# Patient Record
Sex: MALE | Race: BLACK OR AFRICAN AMERICAN | NOT HISPANIC OR LATINO | Employment: OTHER | URBAN - METROPOLITAN AREA
[De-identification: names, ages, dates, MRNs, and addresses within clinical notes are randomized per-mention and may not be internally consistent; named-entity substitution may affect disease eponyms.]

---

## 2017-07-06 ENCOUNTER — HOSPITAL ENCOUNTER (EMERGENCY)
Facility: HOSPITAL | Age: 67
Discharge: HOME | End: 2017-07-06
Attending: EMERGENCY MEDICINE | Admitting: EMERGENCY MEDICINE
Payer: MEDICARE

## 2017-07-06 ENCOUNTER — APPOINTMENT (EMERGENCY)
Dept: CT IMAGING | Facility: HOSPITAL | Age: 67
End: 2017-07-06
Payer: MEDICARE

## 2017-07-06 VITALS
WEIGHT: 187.83 LBS | RESPIRATION RATE: 17 BRPM | HEIGHT: 72 IN | SYSTOLIC BLOOD PRESSURE: 169 MMHG | OXYGEN SATURATION: 100 % | DIASTOLIC BLOOD PRESSURE: 81 MMHG | TEMPERATURE: 97.8 F | HEART RATE: 54 BPM | BODY MASS INDEX: 25.44 KG/M2

## 2017-07-06 DIAGNOSIS — M54.50 ACUTE LOW BACK PAIN: Primary | ICD-10-CM

## 2017-07-06 LAB
CLARITY, POC: CLEAR
COLOR, POC: YELLOW
EXT BILIRUBIN, UA: NEGATIVE
EXT BLOOD URINE: NEGATIVE
EXT GLUCOSE, UA: NEGATIVE
EXT KETONES: NEGATIVE
EXT NITRITE, UA: NEGATIVE
EXT PH, UA: 6.5
EXT PROTEIN, UA: NEGATIVE
EXT SPECIFIC GRAVITY, UA: 1.01
EXT UROBILINOGEN: 0.2
WBC # BLD EST: NEGATIVE 10*3/UL

## 2017-07-06 PROCEDURE — 74176 CT ABD & PELVIS W/O CONTRAST: CPT

## 2017-07-06 PROCEDURE — 81002 URINALYSIS NONAUTO W/O SCOPE: CPT | Performed by: PHYSICIAN ASSISTANT

## 2017-07-06 PROCEDURE — 99284 EMERGENCY DEPT VISIT MOD MDM: CPT

## 2017-07-06 RX ORDER — ACETAMINOPHEN 325 MG/1
650 TABLET ORAL ONCE
Status: COMPLETED | OUTPATIENT
Start: 2017-07-06 | End: 2017-07-06

## 2017-07-06 RX ORDER — ASPIRIN 81 MG/1
81 TABLET, CHEWABLE ORAL DAILY
COMMUNITY
End: 2019-10-16 | Stop reason: ALTCHOICE

## 2017-07-06 RX ORDER — DIPHENOXYLATE HYDROCHLORIDE AND ATROPINE SULFATE 2.5; .025 MG/1; MG/1
1 TABLET ORAL DAILY
COMMUNITY

## 2017-07-06 RX ORDER — IBUPROFEN 600 MG/1
600 TABLET ORAL ONCE
Status: COMPLETED | OUTPATIENT
Start: 2017-07-06 | End: 2017-07-06

## 2017-07-06 RX ADMIN — IBUPROFEN 600 MG: 600 TABLET ORAL at 11:54

## 2017-07-06 RX ADMIN — ACETAMINOPHEN 650 MG: 325 TABLET ORAL at 11:54

## 2017-10-09 ENCOUNTER — ALLSCRIPTS OFFICE VISIT (OUTPATIENT)
Dept: OTHER | Facility: OTHER | Age: 67
End: 2017-10-09

## 2017-10-09 ENCOUNTER — TRANSCRIBE ORDERS (OUTPATIENT)
Dept: ADMINISTRATIVE | Facility: HOSPITAL | Age: 67
End: 2017-10-09

## 2017-10-09 ENCOUNTER — APPOINTMENT (OUTPATIENT)
Dept: LAB | Facility: HOSPITAL | Age: 67
End: 2017-10-09
Attending: INTERNAL MEDICINE
Payer: MEDICARE

## 2017-10-09 DIAGNOSIS — N18.30 CHRONIC KIDNEY DISEASE, STAGE III (MODERATE) (HCC): ICD-10-CM

## 2017-10-09 LAB
ANION GAP SERPL CALCULATED.3IONS-SCNC: 8 MMOL/L (ref 4–13)
BILIRUB UR QL STRIP: NEGATIVE
BUN SERPL-MCNC: 20 MG/DL (ref 5–25)
CALCIUM SERPL-MCNC: 9.3 MG/DL (ref 8.3–10.1)
CHLORIDE SERPL-SCNC: 104 MMOL/L (ref 100–108)
CLARITY UR: CLEAR
CO2 SERPL-SCNC: 29 MMOL/L (ref 21–32)
COLOR UR: YELLOW
CREAT SERPL-MCNC: 1.33 MG/DL (ref 0.6–1.3)
CREAT UR-MCNC: 104 MG/DL
ERYTHROCYTE [DISTWIDTH] IN BLOOD BY AUTOMATED COUNT: 13.4 % (ref 11.6–15.1)
GFR SERPL CREATININE-BSD FRML MDRD: 63 ML/MIN/1.73SQ M
GLUCOSE P FAST SERPL-MCNC: 87 MG/DL (ref 65–99)
GLUCOSE UR STRIP-MCNC: NEGATIVE MG/DL
HCT VFR BLD AUTO: 42.2 % (ref 36.5–49.3)
HGB BLD-MCNC: 13.9 G/DL (ref 12–17)
HGB UR QL STRIP.AUTO: NEGATIVE
KETONES UR STRIP-MCNC: NEGATIVE MG/DL
LEUKOCYTE ESTERASE UR QL STRIP: NEGATIVE
MCH RBC QN AUTO: 29.4 PG (ref 26.8–34.3)
MCHC RBC AUTO-ENTMCNC: 32.9 G/DL (ref 31.4–37.4)
MCV RBC AUTO: 89 FL (ref 82–98)
NITRITE UR QL STRIP: NEGATIVE
PH UR STRIP.AUTO: 6.5 [PH] (ref 4.5–8)
PHOSPHATE SERPL-MCNC: 2.8 MG/DL (ref 2.3–4.1)
PLATELET # BLD AUTO: 193 THOUSANDS/UL (ref 149–390)
PMV BLD AUTO: 11 FL (ref 8.9–12.7)
POTASSIUM SERPL-SCNC: 4.7 MMOL/L (ref 3.5–5.3)
PROT UR STRIP-MCNC: NEGATIVE MG/DL
PROT UR-MCNC: 7 MG/DL
PROT/CREAT UR: 0.07 MG/G{CREAT} (ref 0–0.1)
RBC # BLD AUTO: 4.72 MILLION/UL (ref 3.88–5.62)
SODIUM SERPL-SCNC: 141 MMOL/L (ref 136–145)
SP GR UR STRIP.AUTO: 1.02 (ref 1–1.03)
UROBILINOGEN UR QL STRIP.AUTO: 0.2 E.U./DL
WBC # BLD AUTO: 3.68 THOUSAND/UL (ref 4.31–10.16)

## 2017-10-09 PROCEDURE — 36415 COLL VENOUS BLD VENIPUNCTURE: CPT

## 2017-10-09 PROCEDURE — 82570 ASSAY OF URINE CREATININE: CPT

## 2017-10-09 PROCEDURE — 81003 URINALYSIS AUTO W/O SCOPE: CPT

## 2017-10-09 PROCEDURE — 84156 ASSAY OF PROTEIN URINE: CPT

## 2017-10-09 PROCEDURE — 80048 BASIC METABOLIC PNL TOTAL CA: CPT

## 2017-10-09 PROCEDURE — 85027 COMPLETE CBC AUTOMATED: CPT

## 2017-10-09 PROCEDURE — 84100 ASSAY OF PHOSPHORUS: CPT

## 2017-10-10 NOTE — PROGRESS NOTES
Assessment  1  Chronic kidney disease, stage 3 (585 3) (N18 3)    Plan  Chronic kidney disease, stage 3    · (1) BASIC METABOLIC PROFILE; Status:Active; Requested AST:[id number];    Perform:[facility]; QQE:[id number]; Ordered; For:Chronic kidney disease, stage 3; Ordered By:Rocael Pyle;   · (1) CBC/ PLT (NO DIFF); Status:Active; Requested AHC:[id number];    Perform:[facility]; SQO:[id number]; Ordered; For:Chronic kidney disease, stage 3; Ordered By:Rocael Pyle;   · (1) PHOSPHORUS; Status:Active; Requested [id number];    Perform:[facility]; UZV:[id number]; Ordered; For:Chronic kidney disease, stage 3; Ordered By:Rocael Pyle;   · (1) URINALYSIS (will reflex a microscopy if leukocytes, occult blood, protein or nitrites are  not within normal limits); Status:Active; Requested USU:[id number];    Perform:[facility]; KUJ:[id number]; Ordered; For:Chronic kidney disease, stage 3; Ordered By:Rocael Pyle;   · (1) URINE PROTEIN CREATININE RATIO; Status:Active; Requested NOO:[id number];    Perform:[facility]; QVE:[id number]; Ordered; For:Chronic kidney disease, stage 3; Ordered By:Rocael Pyle;   · Follow-up visit in 1 year Evaluation and Treatment  Follow-up  Status: Complete -  Scheduling  Done: 38KGX7432 10:12AM   Ordered; For: Chronic kidney disease, stage 3; Ordered By: [place] Dose Performed:  Due: 81NMB8435; Last Updated By: Lisa Peoples; 10/9/2017 10:12:24 AM    Discussion/Summary    CKD: Creatinine baseline is about 1 4 with GFR end range of 60 which may be normal for him  I do not recent blood tests so he will get blood tests  [place] as he is not on medication and on repeated checkup it was normal  will see him back in 1 year unless blood test which he does is abnormal  Advised to avoid nonsteroidal painkiller and diet for salt discussed with him  The patient was counseled regarding instructions for management,-risk factor reductions,-prognosis     The patient has the current Goals: Keep kidney function normal and blood pressure within normal range with diet and exercise  The patent has the current Barriers: None  Patient is able to Self-Care  Possible side effects of new medications were reviewed with the patient/guardian today  CKD Teaching includes hypertension management and sodium restriction  Reason For Visit  Jin Schaeffer came in today for follow-up of CKD  History of Present Illness  He is feeling quite well  No complaint      Review of Systems    Constitutional: No complaints of fever, no chills, no anorexia, no tiredness, no recent weight gain or weight loss  Integumentary: No complaints of skin rash  Gastrointestinal: No complains of abdominal pain, no constipation or diarrhea, no nausea or vomiting  Respiratory: No complaints of shortness of breath, no cough, no productive sputum  Cardiovascular: No complaints of orthopnea, no PND, no chest pain, no palpitations, no lower extremity edema  Musculoskeletal: joint pain  Neurological: No complaints of headache, no lightheadedness or dizziness  Genitourinary: No dysuria, no hematuria, no nocturia, no urinary frequency, no incomplete emptying of bladder, no foamy urine  Eyes: No complaints of eyesight problems or dryness of eyes  ENT: no complaints of hearing loss, no nasal discharge  Psychiatric: Not suicidal, no sleep disturbance, no anxiety or depression, no change in personality, no emotional problems  ROS reviewed  Past Medical History    The active problems and past medical history were reviewed and updated today  Surgical History    The surgical history was reviewed and updated today  Family History    The family history was reviewed and updated today  Current Meds   1  Tamsulosin HCl - 0 4 MG Oral Capsule; TAKE 1 CAPSULE 30 MINUTES AFTER THE   SAME MEAL EACH DAY ONCE A DAY ORALLY;    Therapy: (Recorded:32Ote3734) to Recorded    The medication list was reviewed and updated today  Allergies  1  Penicillins    Vitals  Vital Signs    Recorded: 98TAT8883 10:03AM Recorded: 00APY9795 09:39AM   Temperature  98 F   Heart Rate 64, Apical    Pulse Quality Normal, Apical    Respiration Quality Normal    Respiration 16    Systolic 128, LUE, Sitting    Diastolic 80, LUE, Sitting    Height  5 ft 11 5 in   Weight  185 lb    BMI Calculated  25 44   BSA Calculated  2 05     Physical Exam    Constitutional: General appearance: No acute distress, well appearing and well nourished  ENT: External ears and nose appear normal      Eyes: Anicteric sclerae  Neck: No bruit heard over either carotid  JVD:  No JVD present  Pulmonary: Respiratory effort: No increased work of breathing or signs of respiratory distress  -Auscultation of lungs: Clear to auscultation  Cardiovascular: Auscultation of heart: Normal rate and rhythm, normal S1 and S2, without murmurs  Abdomen: Non-tender, no masses  Extremities: No cyanosis, clubbing or edema  Pulses: Dorsalis Pedis and Posterior Tibial pulses normal    Rash: No rash present  Neurologic: Non Focal      Psychiatric: Orientation to person, place, and time: Normal  -and-Mood and affect: Normal        Future Appointments    Date/Time Provider Specialty Site   10/16/2018 01:30 PM BRANDON Ambriz   Nephrology [address]   10/11/2017 02:00 PM Dave Baeza MD Internal Medicine [facility] INTERNAL MED     Signatures   Electronically signed by : BRANDON De ; Oct  9 2017 10:55AM EST                       (Author)

## 2017-10-11 ENCOUNTER — ALLSCRIPTS OFFICE VISIT (OUTPATIENT)
Dept: OTHER | Facility: OTHER | Age: 67
End: 2017-10-11

## 2017-11-14 ENCOUNTER — ALLSCRIPTS OFFICE VISIT (OUTPATIENT)
Dept: OTHER | Facility: OTHER | Age: 67
End: 2017-11-14

## 2017-11-15 NOTE — PROGRESS NOTES
Assessment  1  Acute bilateral low back pain without sciatica (724 2,338 19) (M54 5)    Discussion/Summary    Musculoskeletal strain  Adjusted stretching exercises heating pad and he may use his TENS unit  Suggested 2 Advil 2 or 3 times a day with food  He may continue his weight lifting but should reduce his weight and he may increase his reps if needed  The patient was counseled regarding instructions for management,-- impressions  Possible side effects of new medications were reviewed with the patient/guardian today  The treatment plan was reviewed with the patient/guardian  The patient/guardian understands and agrees with the treatment plan     Self Referrals: No      Chief Complaint    1  Back Pain  Patient is here today with complaints of lower back pain  History of Present Illness  HPI: Patient comes in today complaining of the acute onset of low back pain across his lower back  No radiation down the legs  He has known sciatic on the right but this feels a little different  He thinks he may have strained his back doing leg presses at the gym  He had increased his weight slightly  No bladder or bowel incontinence  Review of Systems   Constitutional: no fever  Genitourinary: no dysuria  Active Problems    1  Benign prostatic hyperplasia, presence of lower urinary tract symptoms unspecified (600 00) (N40 0)   2  Chronic kidney disease, stage 3 (585 3) (N18 3)   3  Erectile dysfunction (607 84) (N52 9)   4  Need for prophylactic vaccination against Streptococcus pneumoniae (pneumococcus) (V03 82) (Z23)   5  Need for prophylactic vaccination and inoculation against influenza (V04 81) (Z23)   6  Screening for colon cancer (V76 51) (Z12 11)   7  Screening for genitourinary condition (V81 6) (Z13 89)    Past Medical History  Active Problems And Past Medical History Reviewed: The active problems and past medical history were reviewed and updated today        Social History     · Active advance directive (V49 89) (Z78 9)   · NO   · Always uses seat belt   · Does not use illicit drugs (D98 86) (Z78 9)   · Exercises occasionally (V49 89) (Z78 9)   · MODERATE   · Five children   · Former smoker (V15 82) (U04 322)   · (1PPW)   · Denied: History of High risk sexual behavior   ·    · Never used chewing tobacco (V49 89) (Z78 9)   · Occasional alcohol use   · Occasional caffeine consumption   · Retired    Current Meds   1  Cialis 20 MG Oral Tablet; TAKE AS DIRECTED; Therapy: 22OCR4607 to ([phone])  Requested for: 62RAO4157; Last Rx:11Oct2017 Ordered   2  Meloxicam 15 MG Oral Tablet; Take 1 tablet daily; Therapy: (Recorded:11Oct2017) to Recorded   3  Tamsulosin HCl - 0 4 MG Oral Capsule; TAKE 1 CAPSULE 30 MINUTES AFTER THE SAME MEAL EACH DAY ONCE A DAY ORALLY  Requested for: 79YYO1102; Last Rx:11Oct2017 Ordered    The medication list was reviewed and updated today  Allergies  1  Penicillins    Vitals   Recorded: 02JXH8794 12:56PM   Temperature 97 1 F   Heart Rate 72   Systolic 716   Diastolic 70   Height 5 ft 10 5 in   Weight 184 lb 4 oz   BMI Calculated 26 06   BSA Calculated 2 03   O2 Saturation 98       Physical Exam   Constitutional  General appearance: No acute distress, well appearing and well nourished  Musculoskeletal  Gait and station: Normal    Inspection/palpation of joints, bones, and muscles: Abnormal  -- Tender in the lower lumbar region no obvious muscle spasm  Full range of motion of the lower extremities  Strength intact  Future Appointments    Date/Time Provider Specialty Site   10/16/2018 01:30 PM BRANDON Paula   Nephrology [address]   11/27/2017 02:00 PM Rossy Brambila MD Internal Medicine [facility] INTERNAL MED       Signatures   Electronically signed by : Angel Gallardo MD; Nov 14 2017  1:17PM EST                       (Author)

## 2017-11-30 ENCOUNTER — GENERIC CONVERSION - ENCOUNTER (OUTPATIENT)
Dept: OTHER | Facility: OTHER | Age: 67
End: 2017-11-30

## 2017-12-28 ENCOUNTER — GENERIC CONVERSION - ENCOUNTER (OUTPATIENT)
Dept: OTHER | Facility: OTHER | Age: 67
End: 2017-12-28

## 2018-01-13 VITALS
SYSTOLIC BLOOD PRESSURE: 140 MMHG | RESPIRATION RATE: 16 BRPM | TEMPERATURE: 98 F | HEART RATE: 64 BPM | DIASTOLIC BLOOD PRESSURE: 80 MMHG | WEIGHT: 185 LBS | HEIGHT: 72 IN | BODY MASS INDEX: 25.06 KG/M2

## 2018-01-14 VITALS
TEMPERATURE: 97.2 F | HEART RATE: 77 BPM | OXYGEN SATURATION: 98 % | SYSTOLIC BLOOD PRESSURE: 122 MMHG | DIASTOLIC BLOOD PRESSURE: 68 MMHG | HEIGHT: 71 IN | WEIGHT: 187 LBS | BODY MASS INDEX: 26.18 KG/M2

## 2018-01-14 VITALS
OXYGEN SATURATION: 98 % | WEIGHT: 184.25 LBS | HEART RATE: 72 BPM | SYSTOLIC BLOOD PRESSURE: 126 MMHG | TEMPERATURE: 97.1 F | DIASTOLIC BLOOD PRESSURE: 70 MMHG | HEIGHT: 71 IN | BODY MASS INDEX: 25.79 KG/M2

## 2018-01-22 VITALS
BODY MASS INDEX: 25.81 KG/M2 | TEMPERATURE: 96.7 F | DIASTOLIC BLOOD PRESSURE: 70 MMHG | OXYGEN SATURATION: 98 % | WEIGHT: 184.38 LBS | SYSTOLIC BLOOD PRESSURE: 124 MMHG | HEIGHT: 71 IN | HEART RATE: 66 BPM

## 2018-01-24 VITALS
OXYGEN SATURATION: 97 % | WEIGHT: 184 LBS | HEART RATE: 70 BPM | TEMPERATURE: 97.8 F | SYSTOLIC BLOOD PRESSURE: 120 MMHG | BODY MASS INDEX: 26.34 KG/M2 | HEIGHT: 70 IN | DIASTOLIC BLOOD PRESSURE: 72 MMHG

## 2018-01-29 ENCOUNTER — TELEPHONE (OUTPATIENT)
Dept: INTERNAL MEDICINE CLINIC | Facility: CLINIC | Age: 68
End: 2018-01-29

## 2018-01-29 NOTE — TELEPHONE ENCOUNTER
For what reason does his patient want a lidocaine patches, and I am not sure they are going to be covered by his pharmacy  He could try over-the-counter salon paus with 4 percent lidocaine patches

## 2018-04-18 ENCOUNTER — OFFICE VISIT (OUTPATIENT)
Dept: INTERNAL MEDICINE CLINIC | Facility: CLINIC | Age: 68
End: 2018-04-18
Payer: COMMERCIAL

## 2018-04-18 VITALS
HEART RATE: 76 BPM | TEMPERATURE: 98.7 F | DIASTOLIC BLOOD PRESSURE: 90 MMHG | SYSTOLIC BLOOD PRESSURE: 152 MMHG | HEIGHT: 61 IN | RESPIRATION RATE: 19 BRPM | OXYGEN SATURATION: 96 % | BODY MASS INDEX: 35.19 KG/M2 | WEIGHT: 186.4 LBS

## 2018-04-18 DIAGNOSIS — R00.2 PALPITATIONS: Primary | ICD-10-CM

## 2018-04-18 DIAGNOSIS — R07.9 CHEST PAIN, UNSPECIFIED TYPE: ICD-10-CM

## 2018-04-18 DIAGNOSIS — B35.4 TINEA OF THE BODY: ICD-10-CM

## 2018-04-18 PROBLEM — N52.9 ERECTILE DYSFUNCTION: Status: ACTIVE | Noted: 2017-10-11

## 2018-04-18 PROCEDURE — 3008F BODY MASS INDEX DOCD: CPT | Performed by: INTERNAL MEDICINE

## 2018-04-18 PROCEDURE — 93000 ELECTROCARDIOGRAM COMPLETE: CPT | Performed by: INTERNAL MEDICINE

## 2018-04-18 PROCEDURE — 99214 OFFICE O/P EST MOD 30 MIN: CPT | Performed by: INTERNAL MEDICINE

## 2018-04-18 PROCEDURE — 1101F PT FALLS ASSESS-DOCD LE1/YR: CPT | Performed by: INTERNAL MEDICINE

## 2018-04-18 RX ORDER — NYSTATIN 100000 U/G
CREAM TOPICAL 2 TIMES DAILY
Qty: 30 G | Refills: 0 | Status: SHIPPED | OUTPATIENT
Start: 2018-04-18 | End: 2019-02-07 | Stop reason: ALTCHOICE

## 2018-04-18 NOTE — PROGRESS NOTES
Assessment/Plan:     Presently, everything appears normal   Will order testing the make sure no significant abnormality  Followup scheduled per orders  No problem-specific Assessment & Plan notes found for this encounter  Diagnoses and all orders for this visit:    Palpitations  -     CBC and differential; Future  -     Comprehensive metabolic panel; Future  -     TSH, 3rd generation; Future  -     POCT ECG  -     Stress test only, exercise; Future  -     Holter monitor - 48 hour; Future    Chest pain, unspecified type  -     Stress test only, exercise; Future    Tinea of the body  -     nystatin (MYCOSTATIN) cream; Apply topically 2 (two) times a day          Subjective:      Patient ID: Hector Tovar is a 79 y o  male  Patient comes in today because his heart is skipping beats, he thinks  His wife noticed it when she put her hand on his chest   He admits he has felt this before  Sometimes it is sharp  Goes away on its own no associated shortness of breath  He does work out at [employer] routinely without any difficulty          ALLERGIES:  Allergies   Allergen Reactions    Penicillin V Anaphylaxis       CURRENT MEDICATIONS:    Current Outpatient Prescriptions:     aspirin 81 mg chewable tablet, Chew 81 mg daily, Disp: , Rfl:     multivitamin (THERAGRAN) TABS, Take 1 tablet by mouth daily, Disp: , Rfl:     tadalafil (CIALIS) 20 MG tablet, Take by mouth, Disp: , Rfl:     Tamsulosin HCl (FLOMAX PO), Take 1 tablet by mouth daily, Disp: , Rfl:     nystatin (MYCOSTATIN) cream, Apply topically 2 (two) times a day, Disp: 30 g, Rfl: 0    ACTIVE PROBLEM LIST:  Patient Active Problem List   Diagnosis    Benign prostatic hyperplasia, presence of lower urinary tract symptoms unspecified    Erectile dysfunction       PAST MEDICAL HISTORY:  Past Medical History:   Diagnosis Date    Esophageal reflux        PAST SURGICAL HISTORY:  Past Surgical History:   Procedure Laterality Date    APPENDECTOMY 05/21/2015    CYST REMOVAL      Fatty cyst removed from back       FAMILY HISTORY:  Family History   Problem Relation Age of Onset    Prostate cancer Father     Prostate cancer Maternal Grandfather     Stomach cancer Maternal Aunt      malignant tumor of pharynx    Stomach cancer Maternal Uncle      malignant tumor of pharynx    Mental illness Family     Depression Family     Schizophrenia Family        SOCIAL HISTORY:  Social History     Social History    Marital status: /Civil Union     Spouse name: N/A    Number of children: N/A    Years of education: N/A     Occupational History    Retired      Social History Main Topics    Smoking status: Former Smoker    Smokeless tobacco: Never Used      Comment: 1ppw    Alcohol use Yes      Comment: socially    Drug use: No    Sexual activity: Yes     Partners: Female      Comment: denied history of high risk sexual behavior     Other Topics Concern    Not on file     Social History Narrative    No active advance directive    Always uses seat belt    Exercises occasionally, moderate    Five children    Occasional caffeine consumption           Review of Systems   Respiratory: Negative for shortness of breath  Cardiovascular: Negative for chest pain  Gastrointestinal: Negative for abdominal pain  Objective:  Vitals:    04/18/18 1517   BP: 152/90   BP Location: Right arm   Patient Position: Sitting   Cuff Size: Large   Pulse: 76   Resp: 19   Temp: 98 7 °F (37 1 °C)   SpO2: 96%   Weight: 84 6 kg (186 lb 6 4 oz)   Height: 5' 1" (1 549 m)        Physical Exam   Constitutional: He is oriented to person, place, and time  He appears well-developed and well-nourished  Cardiovascular: Normal rate, regular rhythm and normal heart sounds  Pulmonary/Chest: Effort normal and breath sounds normal    Abdominal: Soft  Bowel sounds are normal    Musculoskeletal: He exhibits no edema     Neurological: He is alert and oriented to person, place, and time    Nursing note and vitals reviewed  RESULTS:    No results found for this or any previous visit (from the past 1008 hour(s))

## 2018-04-26 ENCOUNTER — TELEPHONE (OUTPATIENT)
Dept: INTERNAL MEDICINE CLINIC | Facility: CLINIC | Age: 68
End: 2018-04-26

## 2018-04-26 DIAGNOSIS — M54.16 LUMBAR RADICULOPATHY: Primary | ICD-10-CM

## 2018-04-26 RX ORDER — LIDOCAINE 50 MG/G
1 PATCH TOPICAL DAILY
Qty: 30 PATCH | Refills: 1 | Status: SHIPPED | OUTPATIENT
Start: 2018-04-26 | End: 2019-02-07 | Stop reason: SDUPTHER

## 2018-04-26 RX ORDER — LIDOCAINE 50 MG/G
1 PATCH TOPICAL DAILY
Qty: 30 PATCH | Refills: 0 | Status: CANCELLED | OUTPATIENT
Start: 2018-04-26

## 2018-04-26 NOTE — TELEPHONE ENCOUNTER
Dr Marquis Yuan,  Patient called in and requested Lidocaine patches dispense one box and send prescription to [facility] pharmacy on [address] [employer]  Patient reports that you have prescribed the patches one before

## 2018-05-03 ENCOUNTER — APPOINTMENT (OUTPATIENT)
Dept: LAB | Facility: HOSPITAL | Age: 68
End: 2018-05-03
Attending: INTERNAL MEDICINE
Payer: MEDICARE

## 2018-05-03 DIAGNOSIS — R00.2 PALPITATIONS: ICD-10-CM

## 2018-05-03 LAB
ALBUMIN SERPL BCP-MCNC: 3.8 G/DL (ref 3.5–5)
ALP SERPL-CCNC: 61 U/L (ref 46–116)
ALT SERPL W P-5'-P-CCNC: 34 U/L (ref 12–78)
ANION GAP SERPL CALCULATED.3IONS-SCNC: 6 MMOL/L (ref 4–13)
AST SERPL W P-5'-P-CCNC: 25 U/L (ref 5–45)
BASOPHILS # BLD AUTO: 0.04 THOUSANDS/ΜL (ref 0–0.1)
BASOPHILS NFR BLD AUTO: 1 % (ref 0–1)
BILIRUB SERPL-MCNC: 0.5 MG/DL (ref 0.2–1)
BUN SERPL-MCNC: 24 MG/DL (ref 5–25)
CALCIUM SERPL-MCNC: 8.9 MG/DL (ref 8.3–10.1)
CHLORIDE SERPL-SCNC: 103 MMOL/L (ref 100–108)
CO2 SERPL-SCNC: 29 MMOL/L (ref 21–32)
CREAT SERPL-MCNC: 1.36 MG/DL (ref 0.6–1.3)
EOSINOPHIL # BLD AUTO: 0.16 THOUSAND/ΜL (ref 0–0.61)
EOSINOPHIL NFR BLD AUTO: 4 % (ref 0–6)
ERYTHROCYTE [DISTWIDTH] IN BLOOD BY AUTOMATED COUNT: 13.5 % (ref 11.6–15.1)
GFR SERPL CREATININE-BSD FRML MDRD: 62 ML/MIN/1.73SQ M
GLUCOSE P FAST SERPL-MCNC: 84 MG/DL (ref 65–99)
HCT VFR BLD AUTO: 41.9 % (ref 36.5–49.3)
HGB BLD-MCNC: 13.8 G/DL (ref 12–17)
LYMPHOCYTES # BLD AUTO: 1.97 THOUSANDS/ΜL (ref 0.6–4.47)
LYMPHOCYTES NFR BLD AUTO: 46 % (ref 14–44)
MCH RBC QN AUTO: 30.1 PG (ref 26.8–34.3)
MCHC RBC AUTO-ENTMCNC: 32.9 G/DL (ref 31.4–37.4)
MCV RBC AUTO: 92 FL (ref 82–98)
MONOCYTES # BLD AUTO: 0.28 THOUSAND/ΜL (ref 0.17–1.22)
MONOCYTES NFR BLD AUTO: 7 % (ref 4–12)
NEUTROPHILS # BLD AUTO: 1.83 THOUSANDS/ΜL (ref 1.85–7.62)
NEUTS SEG NFR BLD AUTO: 43 % (ref 43–75)
NRBC BLD AUTO-RTO: 0 /100 WBCS
PLATELET # BLD AUTO: 162 THOUSANDS/UL (ref 149–390)
PMV BLD AUTO: 11.2 FL (ref 8.9–12.7)
POTASSIUM SERPL-SCNC: 3.8 MMOL/L (ref 3.5–5.3)
PROT SERPL-MCNC: 7.3 G/DL (ref 6.4–8.2)
RBC # BLD AUTO: 4.58 MILLION/UL (ref 3.88–5.62)
SODIUM SERPL-SCNC: 138 MMOL/L (ref 136–145)
TSH SERPL DL<=0.05 MIU/L-ACNC: 1.01 UIU/ML (ref 0.36–3.74)
WBC # BLD AUTO: 4.29 THOUSAND/UL (ref 4.31–10.16)

## 2018-05-03 PROCEDURE — 85025 COMPLETE CBC W/AUTO DIFF WBC: CPT

## 2018-05-03 PROCEDURE — 84443 ASSAY THYROID STIM HORMONE: CPT

## 2018-05-03 PROCEDURE — 80053 COMPREHEN METABOLIC PANEL: CPT

## 2018-05-03 PROCEDURE — 36415 COLL VENOUS BLD VENIPUNCTURE: CPT

## 2018-05-11 ENCOUNTER — HOSPITAL ENCOUNTER (OUTPATIENT)
Dept: NON INVASIVE DIAGNOSTICS | Facility: HOSPITAL | Age: 68
Discharge: HOME | End: 2018-05-11
Attending: INTERNAL MEDICINE
Payer: MEDICARE

## 2018-05-11 DIAGNOSIS — R00.2 PALPITATIONS: ICD-10-CM

## 2018-05-11 DIAGNOSIS — R07.9 CHEST PAIN, UNSPECIFIED TYPE: ICD-10-CM

## 2018-05-11 LAB
ARRHY DURING EX: NORMAL
CHEST PAIN STATEMENT: NORMAL
MAX DIASTOLIC BP: 90 MMHG
MAX HEART RATE: 150 BPM
MAX PREDICTED HEART RATE: 153 BPM
MAX. SYSTOLIC BP: 184 MMHG
PROTOCOL NAME: NORMAL
REASON FOR TERMINATION: NORMAL
TARGET HR FORMULA: NORMAL
TEST INDICATION: NORMAL
TIME IN EXERCISE PHASE: NORMAL

## 2018-05-11 PROCEDURE — 93018 CV STRESS TEST I&R ONLY: CPT

## 2018-05-11 PROCEDURE — 93226 XTRNL ECG REC<48 HR SCAN A/R: CPT

## 2018-05-11 PROCEDURE — 93016 CV STRESS TEST SUPVJ ONLY: CPT

## 2018-05-11 PROCEDURE — 93225 XTRNL ECG REC<48 HRS REC: CPT

## 2018-05-11 PROCEDURE — 93017 CV STRESS TEST TRACING ONLY: CPT

## 2018-05-17 PROCEDURE — 93227 XTRNL ECG REC<48 HR R&I: CPT

## 2018-06-24 ENCOUNTER — HOSPITAL ENCOUNTER (EMERGENCY)
Facility: HOSPITAL | Age: 68
Discharge: HOME | End: 2018-06-24
Attending: EMERGENCY MEDICINE | Admitting: EMERGENCY MEDICINE
Payer: MEDICARE

## 2018-06-24 ENCOUNTER — APPOINTMENT (EMERGENCY)
Dept: CT IMAGING | Facility: HOSPITAL | Age: 68
End: 2018-06-24
Payer: MEDICARE

## 2018-06-24 VITALS
TEMPERATURE: 97.8 F | RESPIRATION RATE: 18 BRPM | BODY MASS INDEX: 24.43 KG/M2 | SYSTOLIC BLOOD PRESSURE: 129 MMHG | HEIGHT: 72 IN | WEIGHT: 180.4 LBS | DIASTOLIC BLOOD PRESSURE: 61 MMHG | HEART RATE: 58 BPM | OXYGEN SATURATION: 100 %

## 2018-06-24 DIAGNOSIS — R68.84 JAW PAIN: Primary | ICD-10-CM

## 2018-06-24 LAB
BASOPHILS # BLD AUTO: 0.02 THOUSANDS/ΜL (ref 0–0.1)
BASOPHILS NFR BLD AUTO: 1 % (ref 0–1)
CRP SERPL HS-MCNC: 1.01 MG/L
EOSINOPHIL # BLD AUTO: 0.12 THOUSAND/ΜL (ref 0–0.61)
EOSINOPHIL NFR BLD AUTO: 3 % (ref 0–6)
ERYTHROCYTE [DISTWIDTH] IN BLOOD BY AUTOMATED COUNT: 13.6 % (ref 11.6–15.1)
ERYTHROCYTE [SEDIMENTATION RATE] IN BLOOD: 5 MM/HOUR (ref 0–10)
HCT VFR BLD AUTO: 40.1 % (ref 36.5–49.3)
HGB BLD-MCNC: 13.4 G/DL (ref 12–17)
IMM GRANULOCYTES # BLD AUTO: 0 THOUSAND/UL (ref 0–0.2)
IMM GRANULOCYTES NFR BLD AUTO: 0 % (ref 0–2)
LYMPHOCYTES # BLD AUTO: 1.57 THOUSANDS/ΜL (ref 0.6–4.47)
LYMPHOCYTES NFR BLD AUTO: 39 % (ref 14–44)
MCH RBC QN AUTO: 30.2 PG (ref 26.8–34.3)
MCHC RBC AUTO-ENTMCNC: 33.4 G/DL (ref 31.4–37.4)
MCV RBC AUTO: 90 FL (ref 82–98)
MONOCYTES # BLD AUTO: 0.34 THOUSAND/ΜL (ref 0.17–1.22)
MONOCYTES NFR BLD AUTO: 8 % (ref 4–12)
NEUTROPHILS # BLD AUTO: 2.03 THOUSANDS/ΜL (ref 1.85–7.62)
NEUTS SEG NFR BLD AUTO: 49 % (ref 43–75)
NRBC BLD AUTO-RTO: 0 /100 WBCS
PLATELET # BLD AUTO: 167 THOUSANDS/UL (ref 149–390)
PMV BLD AUTO: 11.4 FL (ref 8.9–12.7)
RBC # BLD AUTO: 4.44 MILLION/UL (ref 3.88–5.62)
WBC # BLD AUTO: 4.08 THOUSAND/UL (ref 4.31–10.16)

## 2018-06-24 PROCEDURE — 85652 RBC SED RATE AUTOMATED: CPT | Performed by: EMERGENCY MEDICINE

## 2018-06-24 PROCEDURE — 36415 COLL VENOUS BLD VENIPUNCTURE: CPT | Performed by: EMERGENCY MEDICINE

## 2018-06-24 PROCEDURE — 86141 C-REACTIVE PROTEIN HS: CPT | Performed by: EMERGENCY MEDICINE

## 2018-06-24 PROCEDURE — 99284 EMERGENCY DEPT VISIT MOD MDM: CPT

## 2018-06-24 PROCEDURE — 85025 COMPLETE CBC W/AUTO DIFF WBC: CPT | Performed by: EMERGENCY MEDICINE

## 2018-06-24 PROCEDURE — 70486 CT MAXILLOFACIAL W/O DYE: CPT

## 2018-06-24 RX ORDER — DIAZEPAM 5 MG/1
5 TABLET ORAL ONCE
Status: COMPLETED | OUTPATIENT
Start: 2018-06-24 | End: 2018-06-24

## 2018-06-24 RX ORDER — CYCLOBENZAPRINE HCL 5 MG
5 TABLET ORAL 3 TIMES DAILY PRN
Qty: 21 TABLET | Refills: 0 | Status: SHIPPED | OUTPATIENT
Start: 2018-06-24 | End: 2019-04-07

## 2018-06-24 RX ORDER — IBUPROFEN 400 MG/1
400 TABLET ORAL ONCE
Status: COMPLETED | OUTPATIENT
Start: 2018-06-24 | End: 2018-06-24

## 2018-06-24 RX ORDER — NAPROXEN 500 MG/1
500 TABLET ORAL 2 TIMES DAILY WITH MEALS
Qty: 20 TABLET | Refills: 0 | Status: SHIPPED | OUTPATIENT
Start: 2018-06-24 | End: 2018-11-13 | Stop reason: ALTCHOICE

## 2018-06-24 RX ORDER — ACETAMINOPHEN 325 MG/1
975 TABLET ORAL ONCE
Status: COMPLETED | OUTPATIENT
Start: 2018-06-24 | End: 2018-06-24

## 2018-06-24 RX ADMIN — IBUPROFEN 400 MG: 400 TABLET ORAL at 15:12

## 2018-06-24 RX ADMIN — ACETAMINOPHEN 975 MG: 325 TABLET, FILM COATED ORAL at 15:12

## 2018-06-24 RX ADMIN — DIAZEPAM 5 MG: 5 TABLET ORAL at 15:12

## 2018-06-24 NOTE — ED PROVIDER NOTES
History  Chief Complaint   Patient presents with    Jaw Pain     Pt c/o lower right sided jaw pain x 2 weeks, states pain is now radiating up to head  No prior treatment, no meds PTA  59-year-old male without significant past medical history presenting chief complaint of right jaw pain, it has been present for several weeks no definite inciting incident injury or change in lifestyle, he saw his dentist on Tuesday and was instructed to follow up with and orthodontist which she has yet to do, the pain is localized to his right master muscle right TMJ age area is worse when he opens his mouth and chews is better with rest he has not take anything for this the reason he is here today because it went to his right temple area today it did initially did not believe that there related however discussion with his wife he believes that the pain in his right jaw or TMJ area is radiating up into his right temple it is reproducible no other exacerbating remitting factors he has no headaches no visual complaints no neck pain or stiffness change in voice difficulty swallowing drooling dental pain or other associated complaints  Complete review systems otherwise negative as noted            Prior to Admission Medications   Prescriptions Last Dose Informant Patient Reported? Taking?    Tamsulosin HCl (FLOMAX PO)   Yes No   Sig: Take 1 tablet by mouth daily   aspirin 81 mg chewable tablet   Yes No   Sig: Chew 81 mg daily   lidocaine (LIDODERM) 5 %   No No   Sig: Place 1 patch on the skin daily Remove & Discard patch within 12 hours or as directed by MD   multivitamin (THERAGRAN) TABS   Yes No   Sig: Take 1 tablet by mouth daily   nystatin (MYCOSTATIN) cream   No No   Sig: Apply topically 2 (two) times a day   tadalafil (CIALIS) 20 MG tablet   Yes No   Sig: Take by mouth      Facility-Administered Medications: None       Past Medical History:   Diagnosis Date    Esophageal reflux        Past Surgical History:   Procedure Laterality Date    APPENDECTOMY  05/21/2015    CYST REMOVAL      Fatty cyst removed from back       Family History   Problem Relation Age of Onset    Prostate cancer Father     Prostate cancer Maternal Grandfather     Stomach cancer Maternal Aunt         malignant tumor of pharynx    Stomach cancer Maternal Uncle         malignant tumor of pharynx    Mental illness Family     Depression Family     Schizophrenia Family      I have reviewed and agree with the history as documented  Social History   Substance Use Topics    Smoking status: Former Smoker    Smokeless tobacco: Never Used      Comment: 1ppw    Alcohol use Yes      Comment: socially        Review of Systems   Constitutional: Negative for activity change, appetite change and fever  HENT: Negative for congestion, dental problem, drooling, ear discharge, ear pain, facial swelling, postnasal drip, rhinorrhea, sinus pain, sinus pressure, sore throat, trouble swallowing and voice change  Right-sided facial pain   Eyes: Negative for photophobia, redness and visual disturbance  Respiratory: Negative for cough and shortness of breath  Gastrointestinal: Negative for nausea and vomiting  Endocrine: Negative for polydipsia and polyphagia  Musculoskeletal: Negative for neck pain and neck stiffness  Skin: Negative for color change, pallor and wound  Allergic/Immunologic: Negative for immunocompromised state  Neurological: Negative for facial asymmetry, weakness, light-headedness and headaches  Hematological: Negative for adenopathy  Does not bruise/bleed easily  Psychiatric/Behavioral: Negative for agitation and behavioral problems  All other systems reviewed and are negative  Physical Exam  Physical Exam   Constitutional: He is oriented to person, place, and time  He appears well-developed and well-nourished  No distress     Very well-appearing in no acute distress, wife at bedside   HENT:   Head: Normocephalic and atraumatic  Right Ear: External ear normal    Left Ear: External ear normal    Mildly tender in his right TMJ area mass in her area no discrete tenderness over his right temporal artery there is no facial swelling no sinus pressure or pain TMs are clear bilaterally his dentition is intact without other acute findings posterior oropharynx widely patent there is no drooling stridor trismus neck is supple trachea midline without adenopathy there submental submandibular bases are soft without tongue elevation TMs are clear mastoid area without erythema or tenderness otherwise unremarkable head neck exam   Eyes: EOM are normal  Pupils are equal, round, and reactive to light  Neck: Normal range of motion  Neck supple  No tracheal deviation present  Cardiovascular: Normal rate, regular rhythm and normal heart sounds  Exam reveals no gallop and no friction rub  No murmur heard  Pulmonary/Chest: Effort normal and breath sounds normal  He has no wheezes  He has no rales  Musculoskeletal: Normal range of motion  He exhibits no edema or tenderness  Neurological: He is alert and oriented to person, place, and time  No cranial nerve deficit  He exhibits normal muscle tone  Coordination normal    Skin: Skin is warm and dry  No rash noted  Psychiatric: He has a normal mood and affect  His behavior is normal    Nursing note and vitals reviewed        Vital Signs  ED Triage Vitals   Temperature Pulse Respirations Blood Pressure SpO2   06/24/18 1442 06/24/18 1439 06/24/18 1439 06/24/18 1439 06/24/18 1439   97 8 °F (36 6 °C) 58 18 129/61 100 %      Temp Source Heart Rate Source Patient Position - Orthostatic VS BP Location FiO2 (%)   06/24/18 1439 06/24/18 1439 06/24/18 1439 06/24/18 1439 --   Oral Monitor Sitting Right arm       Pain Score       06/24/18 1439       5           Vitals:    06/24/18 1439   BP: 129/61   Pulse: 58   Patient Position - Orthostatic VS: Sitting       Visual Acuity      ED Medications  Medications   diazepam (VALIUM) tablet 5 mg (5 mg Oral Given 6/24/18 1512)   acetaminophen (TYLENOL) tablet 975 mg (975 mg Oral Given 6/24/18 1512)   ibuprofen (MOTRIN) tablet 400 mg (400 mg Oral Given 6/24/18 1512)       Diagnostic Studies  Results Reviewed     Procedure Component Value Units Date/Time    Sedimentation rate, automated [[id number]]  (Normal) Collected:  06/24/18 1514    Lab Status:  Final result Specimen:  Blood from Arm, Right Updated:  06/24/18 1629     Sed Rate 5 mm/hour     High sensitivity CRP [[id number]] Collected:  06/24/18 1514    Lab Status:  Final result Specimen:  Blood from Arm, Right Updated:  06/24/18 1552     CRP, High Sensitivity 1 01 mg/L     Narrative:               HsCRP Level       Relative Risk           <1 0 mg/L          Low           1 0 to 3 0 mg/L    Average           >3 0 mg/L          High      CBC and differential [[id number]]  (Abnormal) Collected:  06/24/18 1514    Lab Status:  Final result Specimen:  Blood from Arm, Right Updated:  06/24/18 1522     WBC 4 08 (L) Thousand/uL      RBC 4 44 Million/uL      Hemoglobin 13 4 g/dL      Hematocrit 40 1 %      MCV 90 fL      MCH 30 2 pg      MCHC 33 4 g/dL      RDW 13 6 %      MPV 11 4 fL      Platelets 881 Thousands/uL      nRBC 0 /100 WBCs      Neutrophils Relative 49 %      Immat GRANS % 0 %      Lymphocytes Relative 39 %      Monocytes Relative 8 %      Eosinophils Relative 3 %      Basophils Relative 1 %      Neutrophils Absolute 2 03 Thousands/µL      Immature Grans Absolute 0 00 Thousand/uL      Lymphocytes Absolute 1 57 Thousands/µL      Monocytes Absolute 0 34 Thousand/µL      Eosinophils Absolute 0 12 Thousand/µL      Basophils Absolute 0 02 Thousands/µL                  CT facial bones without contrast   Final Result by Alejandro Kearns MD (06/24 1641)      No evidence of mandible fracture, osseous lesion or temporomandibular joint degenerative change                 Workstation performed: [id number] Procedures  Procedures       Phone Contacts  ED Phone Contact    ED Course  ED Course as of Jun 24 2158   Sun Jun 24, 2018   1630 ERYTHROCYTE SEDIMENTATION RATE: 5   1630 HIGH SENSITIVITY C-REACTIVE PROTEIN: 1 01   1653 Patient has 1 day of right temporal pain he has is 1-2 weeks of ongoing reproducible right-sided jaw and TMJ pain seen by his dentist not odontogenic, he had not been taking anything for this he is symptomatically much improved he is afebrile normal vital signs he has no acute findings on head neck exam other than he has tenderness right TMJ area and right master muscle area without other acute findings, CT scan was otherwise unrevealing he does have orthodontic follow-up, will provide CD symptom control close return follow-up instructions patient family agreeable plan                                MDM  Number of Diagnoses or Management Options  Jaw pain:   Diagnosis management comments: 80-year-old male without significant past medical history of several weeks of worsening reproducible right-sided jaw pain localized to his right TMJ area master muscle radiates now today and to his right temple region is exclusively present with chewing open his mouth, he was recently evaluated by his dentist on Tuesday and told this was not go down to Lori ankle though referred to orthodontic  for further evaluation he is here because now again it radiates into his right temporal area, it is extremely reproducible he has no headache no visual complaints he has no facial swelling no dental pain no neck pain or stiffness difficulty swallowing he otherwise denies complete review of systems on exam he is afebrile normal vital signs is mildly tender in his immediate right TMJ area otherwise his head neck and oropharynx exam unremarkable, likely related to TMJ/musculoskeletal, very low clinical suspicion with 1 day of mild tenderness in his right temple region without headaches or other associated symptoms, this is not consistent with trigeminal neuralgia, although patient has followed up, will check inflammatory markers, CT of his face to exclude odontogenic source, significant TMJ arthritis will treat symptomatically and reassess, likely discharge with symptom control close return follow-up instructions of workup negative    CritCare Time    Disposition  Final diagnoses:   Jaw pain     Time reflects when diagnosis was documented in both MDM as applicable and the Disposition within this note     Time User Action Codes Description Comment    6/24/2018  4:54 PM Katy Shaffer Add [R68 84] Jaw pain       ED Disposition     ED Disposition Condition Comment    Discharge  Miquel Bowles discharge to home/self care      Condition at discharge: Good        Follow-up Information     Follow up With Specialties Details Why [facility] Emergency Department Emergency Medicine  If symptoms worsen [address] [place] [address] ED, [facility], Ethan Rausch 80, MD Internal Medicine In 1 week  [address] [place] [id number] [place] [address]  [phone]             Discharge Medication List as of 6/24/2018  4:56 PM      START taking these medications    Details   cyclobenzaprine (FLEXERIL) 5 mg tablet Take 1 tablet (5 mg total) by mouth 3 (three) times a day as needed for muscle spasms (Right jaw pain) for up to 7 doses, Starting Sun 6/24/2018, Print      naproxen (NAPROSYN) 500 mg tablet Take 1 tablet (500 mg total) by mouth 2 (two) times a day with meals for 10 days, Starting Sun 6/24/2018, Until Wed 7/4/2018, Print         CONTINUE these medications which have NOT CHANGED    Details   aspirin 81 mg chewable tablet Chew 81 mg daily, Historical Med      lidocaine (LIDODERM) 5 % Place 1 patch on the skin daily Remove & Discard patch within 12 hours or as directed by MD, Starting u 4/26/2018, Normal multivitamin (THERAGRAN) TABS Take 1 tablet by mouth daily, Historical Med      nystatin (MYCOSTATIN) cream Apply topically 2 (two) times a day, Starting Wed 4/18/2018, Normal      tadalafil (CIALIS) 20 MG tablet Take by mouth, Starting Wed 10/11/2017, Historical Med      Tamsulosin HCl (FLOMAX PO) Take 1 tablet by mouth daily, Historical Med           No discharge procedures on file      ED Provider  Electronically Signed by           Cesar Tabares DO  06/24/18 4038

## 2018-06-24 NOTE — DISCHARGE INSTRUCTIONS
Please return if he developed worsening or other concerning symptoms otherwise follow up as planned as discussed     Atypical Facial Pain   WHAT YOU NEED TO KNOW:   Atypical facial pain usually occurs on one side of your face  The pain is often constant, and may be aching, burning, throbbing, or stabbing  The pain may be felt in your nose, eye, cheek, temple, and jaw  You may also have headaches  DISCHARGE INSTRUCTIONS:   Contact your healthcare provider if:   · Your symptoms get worse, or you develop new symptoms  · You have questions or concerns about your condition or care  Medicines:   · Medicines  such as antidepressants, antiseizure medicines, or muscle relaxers may be used to decrease pain  · Take your medicine as directed  Contact your healthcare provider if you think your medicine is not helping or if you have side effects  Tell him of her if you are allergic to any medicine  Keep a list of the medicines, vitamins, and herbs you take  Include the amounts, and when and why you take them  Bring the list or the pill bottles to follow-up visits  Carry your medicine list with you in case of an emergency  Follow up with your healthcare provider as directed:  Write down your questions so you remember to ask them during your visits  © 2017 [employer]  Information is for End User's use only and may not be sold, redistributed or otherwise used for commercial purposes  All illustrations and images included in CareNotes® are the copyrighted property of [employer] , [employer]  or Alfredito Flores  The above information is an  only  It is not intended as medical advice for individual conditions or treatments  Talk to your doctor, nurse or pharmacist before following any medical regimen to see if it is safe and effective for you  Temporomandibular Disorder   WHAT YOU NEED TO KNOW:   Temporomandibular disorder is a condition that causes pain in your jaw   The disorder affects the joint between your temporal bone and your mandible (jawbone)  The muscles and nerves around the joint are also affected  DISCHARGE INSTRUCTIONS:   Medicines:   · Pain medicine: You may be given a prescription medicine to decrease pain  Do not wait until the pain is severe before you take this medicine  · NSAIDs:  These medicines decrease swelling and pain  You can buy NSAIDs without a doctor's order  Ask your healthcare provider which medicine is right for you, and how much to take  Take as directed  NSAIDs can cause stomach bleeding or kidney problems if not taken correctly  · Muscle relaxers  help decrease pain and muscle spasms  · Take your medicine as directed  Contact your healthcare provider if you think your medicine is not helping or if you have side effects  Tell him of her if you are allergic to any medicine  Keep a list of the medicines, vitamins, and herbs you take  Include the amounts, and when and why you take them  Bring the list or the pill bottles to follow-up visits  Carry your medicine list with you in case of an emergency  Follow up with your healthcare provider as directed:  Write down your questions so you remember to ask them during your visits  Manage your symptoms:   · Eat soft foods: Your healthcare provider may suggest that you eat only soft foods for several days  A dietitian may work with you to find foods that are easier to bite, chew, or swallow  Examples are soup, applesauce, cottage cheese, pudding, yogurt, and soft fruits  · Use jaw supporting devices:  Splints may be used to support your jaw or keep your jaw from moving  You may need to wear a mouth guard to keep you from clenching or grinding your teeth while you are sleeping  · Use ice and heat:  Ice helps decrease swelling and pain  Ice may also help prevent tissue damage  Use an ice pack, or put crushed ice in a plastic bag   Cover it with a towel and place it on your jaw for 15 to 20 minutes every hour or as directed  After the first 24 to 48 hours, use heat to decrease pain, swelling, and muscle spasms  Apply heat on the area for 20 to 30 minutes every 2 hours for as many days as directed  Use a heating pad, moist warm compress, or a hot water bottle  · Go to physical therapy:  A physical therapist teaches you exercises to help improve movement and strength, and to decrease pain in your jaw  A speech therapist may help you with swallowing and speech exercises  Contact your healthcare provider if:   · You have a fever  · Your splint or mouth guard is loose  · You have questions or concerns about your condition or care  Return to the emergency department if:   · You have nausea, are vomiting, or cannot keep liquids down  · You have pain that does not go away even after you take your pain medicine  · You have problems breathing, talking, drinking, eating, or swallowing  · Your splint or mouth guard gets damaged or broken  © 2017 [facility] [facility] Information is for End User's use only and may not be sold, redistributed or otherwise used for commercial purposes  All illustrations and images included in CareNotes® are the copyrighted property of A D A M [employer]  or Alfredito Flores  The above information is an  only  It is not intended as medical advice for individual conditions or treatments  Talk to your doctor, nurse or pharmacist before following any medical regimen to see if it is safe and effective for you

## 2018-08-20 ENCOUNTER — OFFICE VISIT (OUTPATIENT)
Dept: URGENT CARE | Facility: CLINIC | Age: 68
End: 2018-08-20
Payer: MEDICARE

## 2018-08-20 VITALS
DIASTOLIC BLOOD PRESSURE: 90 MMHG | SYSTOLIC BLOOD PRESSURE: 162 MMHG | WEIGHT: 186.8 LBS | BODY MASS INDEX: 25.3 KG/M2 | HEART RATE: 57 BPM | OXYGEN SATURATION: 99 % | HEIGHT: 72 IN | RESPIRATION RATE: 16 BRPM | TEMPERATURE: 98.1 F

## 2018-08-20 DIAGNOSIS — K12.0 APHTHOUS ULCER: Primary | ICD-10-CM

## 2018-08-20 PROCEDURE — 99203 OFFICE O/P NEW LOW 30 MIN: CPT | Performed by: PHYSICIAN ASSISTANT

## 2018-08-20 PROCEDURE — G0463 HOSPITAL OUTPT CLINIC VISIT: HCPCS | Performed by: PHYSICIAN ASSISTANT

## 2018-08-20 NOTE — PROGRESS NOTES
[facility]        NAME: Nilton Rodgers is a 76 y o  male  : 1950    MRN: [id number]  DATE: 2018  TIME: 6:44 PM    Assessment and Plan   Aphthous ulcer [K12 0]  1  Aphthous ulcer  al mag oxide-diphenhydramine-lidocaine viscous (MAGIC MOUTHWASH) 1:1:1 suspension         Patient Instructions     1  Aphthous Ulcer  -Use magic mouthwash as directed  -tylenol/motrin  -If not improved, follow-up with dentist for re-evaluation    Go to ER with worsening symptoms, worsening pain, fever, difficulty swallowing/breathing or any new concerns      Chief Complaint     Chief Complaint   Patient presents with    Oral Pain     Left bottom back part of cheek x 3 days  History of Present Illness       Patient is a 22-year-old male who presents today for evaluation of mouth pain for the past 3 days  Patient states that the pain his on his left lower inner gum line  Patient states that he has had something like this previously and he has been using Listerine without much relief  Patient states that today when brushing his teeth, his toothbrush hit that area and he had worsening pain  Patient rates his pain as a 7/10  Review of Systems   Review of Systems   Constitutional: Negative for chills and fever  HENT: Positive for mouth sores  Negative for dental problem, ear pain, rhinorrhea and sore throat  Respiratory: Negative for shortness of breath  Cardiovascular: Negative for chest pain  Neurological: Negative for headaches           Current Medications       Current Outpatient Prescriptions:     aspirin 81 mg chewable tablet, Chew 81 mg daily, Disp: , Rfl:     cyclobenzaprine (FLEXERIL) 5 mg tablet, Take 1 tablet (5 mg total) by mouth 3 (three) times a day as needed for muscle spasms (Right jaw pain) for up to 7 doses, Disp: 21 tablet, Rfl: 0    lidocaine (LIDODERM) 5 %, Place 1 patch on the skin daily Remove & Discard patch within 12 hours or as directed by MD, Disp: 30 patch, Rfl: 1    multivitamin (THERAGRAN) TABS, Take 1 tablet by mouth daily, Disp: , Rfl:     Tamsulosin HCl (FLOMAX PO), Take 1 tablet by mouth daily, Disp: , Rfl:     al mag oxide-diphenhydramine-lidocaine viscous (MAGIC MOUTHWASH) 1:1:1 suspension, Swish and spit 10 mL every 4 (four) hours as needed for mouth pain or discomfort, Disp: 180 mL, Rfl: 0    naproxen (NAPROSYN) 500 mg tablet, Take 1 tablet (500 mg total) by mouth 2 (two) times a day with meals for 10 days, Disp: 20 tablet, Rfl: 0    nystatin (MYCOSTATIN) cream, Apply topically 2 (two) times a day (Patient not taking: Reported on 8/20/2018 ), Disp: 30 g, Rfl: 0    tadalafil (CIALIS) 20 MG tablet, Take by mouth, Disp: , Rfl:     Current Allergies     Allergies as of 08/20/2018 - Reviewed 08/20/2018   Allergen Reaction Noted    Penicillin v Anaphylaxis 07/06/2017            The following portions of the patient's history were reviewed and updated as appropriate: allergies, current medications, past family history, past medical history, past social history, past surgical history and problem list      Past Medical History:   Diagnosis Date    Esophageal reflux        Past Surgical History:   Procedure Laterality Date    APPENDECTOMY  05/21/2015    CYST REMOVAL      Fatty cyst removed from back       Family History   Problem Relation Age of Onset    Prostate cancer Father     Prostate cancer Maternal Grandfather     Stomach cancer Maternal Aunt         malignant tumor of pharynx    Stomach cancer Maternal Uncle         malignant tumor of pharynx    Mental illness Family     Depression Family     Schizophrenia Family          Medications have been verified          Objective   /90 (BP Location: Left arm, Patient Position: Sitting)   Pulse 57   Temp 98 1 °F (36 7 °C) (Temporal)   Resp 16   Ht 6' (1 829 m)   Wt 84 7 kg (186 lb 12 8 oz)   SpO2 99%   BMI 25 33 kg/m²        Physical Exam     Physical Exam   Constitutional: He is oriented to person, place, and time  He appears well-developed and well-nourished  No distress  HENT:   Right Ear: Tympanic membrane and external ear normal    Left Ear: Tympanic membrane and external ear normal    Nose: Nose normal    Mouth/Throat: Uvula is midline, oropharynx is clear and moist and mucous membranes are normal        Eyes: Conjunctivae and EOM are normal  Pupils are equal, round, and reactive to light  Neck: Normal range of motion  Neck supple  Cardiovascular: Normal rate, regular rhythm and normal heart sounds  Pulmonary/Chest: Effort normal and breath sounds normal  He has no wheezes  He has no rales  Lymphadenopathy:     He has no cervical adenopathy  Neurological: He is alert and oriented to person, place, and time  Skin: Skin is warm and dry  Psychiatric: He has a normal mood and affect  Nursing note and vitals reviewed

## 2018-08-20 NOTE — PATIENT INSTRUCTIONS
1  Aphthous Ulcer  -Use magic mouthwash as directed  -tylenol/motrin  -If not improved, follow-up with dentist for re-evaluation    Go to ER with worsening symptoms, worsening pain, fever, difficulty swallowing/breathing or any new concerns    Canker Sores   AMBULATORY CARE:   Canker sores  are small ulcers that develop inside your mouth  Ulcers are open sores that may be shallow or deep  You may have one or more sores at a time, and they may grow in clusters  Common signs and symptoms of canker sores:   · One or more sores on the back or floor of your mouth, the inner side of your cheeks and lips, or under your tongue    · Round or oval-shaped red sores that may be covered with a white or yellow film    · Pain, burning, or tingling in your mouth    · Fever and fatigue    · Difficulty chewing and swallowing  Seek care immediately if:   · You cannot eat or drink because of your mouth pain  Contact your healthcare provider if:   · Your canker sores are not gone after 3 to 4 weeks  · Your pain does not go away after you take medicines  · Your sores are getting worse or you are getting more sores, even after treatment  · You have questions or concerns about your condition or care  Treatment  may not be needed  Canker sores cannot be cured  The sores may go away for a time, and then come back again  You may need pain medicine given as a cream, gel, or mouthwash  You may also need steroid medicine to decrease inflammation  Manage your symptoms:   · Eat soft, plain foods until your canker sores heal   Examples include yogurt, eggs, and creamy soups  You may need to change some foods you usually eat  Do not have crunchy, dry, or salty foods, such as dry toast, popcorn, or chips  These can cause pain  Do not have foods or drinks that contain citric acid, such as grapefruit, orange juice, shira, and limes  These may make your pain worse or cause more sores to form  · Care for your mouth as directed  Gently brush your teeth and tongue every day  Use a soft toothbrush  If you have dentures, clean them every day  If your braces or dentures do not feel comfortable, have a dentist check them to see that they fit well  Follow up with your healthcare provider as directed:  Write down your questions so you remember to ask them during your visits  © 2017 [employer]  Information is for End User's use only and may not be sold, redistributed or otherwise used for commercial purposes  All illustrations and images included in CareNotes® are the copyrighted property of [employer]  or Alfredito Flores  The above information is an  only  It is not intended as medical advice for individual conditions or treatments  Talk to your doctor, nurse or pharmacist before following any medical regimen to see if it is safe and effective for you

## 2018-10-02 DIAGNOSIS — N52.9 ERECTILE DYSFUNCTION: ICD-10-CM

## 2018-10-02 RX ORDER — TADALAFIL 20 MG/1
10 TABLET ORAL DAILY PRN
Qty: 10 TABLET | Refills: 6 | Status: SHIPPED | OUTPATIENT
Start: 2018-10-02 | End: 2018-10-16 | Stop reason: SDUPTHER

## 2018-10-02 NOTE — TELEPHONE ENCOUNTER
Patient said when he had his last visit the doctor was to order his Cialis with 6 refills Patient only has enough to last him till the 6 th of October  Patient is asking if this could be sent to [facility] ASAP

## 2018-10-08 DIAGNOSIS — N40.0 BENIGN PROSTATIC HYPERPLASIA, PRESENCE OF LOWER URINARY TRACT SYMPTOMS UNSPECIFIED: ICD-10-CM

## 2018-10-08 RX ORDER — TAMSULOSIN HYDROCHLORIDE 0.4 MG/1
CAPSULE ORAL
Qty: 90 CAPSULE | Refills: 3 | Status: SHIPPED | OUTPATIENT
Start: 2018-10-08 | End: 2021-01-05 | Stop reason: SDUPTHER

## 2018-10-15 ENCOUNTER — APPOINTMENT (OUTPATIENT)
Dept: LAB | Facility: HOSPITAL | Age: 68
End: 2018-10-15
Attending: INTERNAL MEDICINE
Payer: MEDICARE

## 2018-10-15 DIAGNOSIS — N18.30 CKD (CHRONIC KIDNEY DISEASE) STAGE 3, GFR 30-59 ML/MIN (HCC): Primary | ICD-10-CM

## 2018-10-15 LAB
ALBUMIN SERPL BCP-MCNC: 3.7 G/DL (ref 3.5–5)
ALP SERPL-CCNC: 63 U/L (ref 46–116)
ALT SERPL W P-5'-P-CCNC: 34 U/L (ref 12–78)
ANION GAP SERPL CALCULATED.3IONS-SCNC: 6 MMOL/L (ref 4–13)
AST SERPL W P-5'-P-CCNC: 19 U/L (ref 5–45)
BACTERIA UR QL AUTO: NORMAL /HPF
BASOPHILS # BLD AUTO: 0.03 THOUSANDS/ΜL (ref 0–0.1)
BASOPHILS NFR BLD AUTO: 1 % (ref 0–1)
BILIRUB SERPL-MCNC: 0.4 MG/DL (ref 0.2–1)
BILIRUB UR QL STRIP: NEGATIVE
BUN SERPL-MCNC: 21 MG/DL (ref 5–25)
CALCIUM SERPL-MCNC: 9.2 MG/DL (ref 8.3–10.1)
CHLORIDE SERPL-SCNC: 105 MMOL/L (ref 100–108)
CLARITY UR: CLEAR
CO2 SERPL-SCNC: 31 MMOL/L (ref 21–32)
COLOR UR: YELLOW
CREAT SERPL-MCNC: 1.37 MG/DL (ref 0.6–1.3)
CREAT UR-MCNC: 157 MG/DL
EOSINOPHIL # BLD AUTO: 0.15 THOUSAND/ΜL (ref 0–0.61)
EOSINOPHIL NFR BLD AUTO: 4 % (ref 0–6)
ERYTHROCYTE [DISTWIDTH] IN BLOOD BY AUTOMATED COUNT: 13.2 % (ref 11.6–15.1)
GFR SERPL CREATININE-BSD FRML MDRD: 61 ML/MIN/1.73SQ M
GLUCOSE P FAST SERPL-MCNC: 95 MG/DL (ref 65–99)
GLUCOSE UR STRIP-MCNC: NEGATIVE MG/DL
HCT VFR BLD AUTO: 43.4 % (ref 36.5–49.3)
HGB BLD-MCNC: 14.5 G/DL (ref 12–17)
HGB UR QL STRIP.AUTO: NEGATIVE
IMM GRANULOCYTES # BLD AUTO: 0.01 THOUSAND/UL (ref 0–0.2)
IMM GRANULOCYTES NFR BLD AUTO: 0 % (ref 0–2)
KETONES UR STRIP-MCNC: NEGATIVE MG/DL
LEUKOCYTE ESTERASE UR QL STRIP: NEGATIVE
LYMPHOCYTES # BLD AUTO: 1.85 THOUSANDS/ΜL (ref 0.6–4.47)
LYMPHOCYTES NFR BLD AUTO: 47 % (ref 14–44)
MCH RBC QN AUTO: 30.6 PG (ref 26.8–34.3)
MCHC RBC AUTO-ENTMCNC: 33.4 G/DL (ref 31.4–37.4)
MCV RBC AUTO: 92 FL (ref 82–98)
MONOCYTES # BLD AUTO: 0.27 THOUSAND/ΜL (ref 0.17–1.22)
MONOCYTES NFR BLD AUTO: 7 % (ref 4–12)
NEUTROPHILS # BLD AUTO: 1.59 THOUSANDS/ΜL (ref 1.85–7.62)
NEUTS SEG NFR BLD AUTO: 41 % (ref 43–75)
NITRITE UR QL STRIP: NEGATIVE
NON-SQ EPI CELLS URNS QL MICRO: NORMAL /HPF
NRBC BLD AUTO-RTO: 0 /100 WBCS
PH UR STRIP.AUTO: 6 [PH] (ref 4.5–8)
PHOSPHATE SERPL-MCNC: 3.2 MG/DL (ref 2.3–4.1)
PLATELET # BLD AUTO: 177 THOUSANDS/UL (ref 149–390)
PMV BLD AUTO: 11.4 FL (ref 8.9–12.7)
POTASSIUM SERPL-SCNC: 4 MMOL/L (ref 3.5–5.3)
PROT SERPL-MCNC: 7.2 G/DL (ref 6.4–8.2)
PROT UR STRIP-MCNC: NEGATIVE MG/DL
PROT UR-MCNC: 9 MG/DL
PROT/CREAT UR: 0.06 MG/G{CREAT} (ref 0–0.1)
RBC # BLD AUTO: 4.74 MILLION/UL (ref 3.88–5.62)
RBC #/AREA URNS AUTO: NORMAL /HPF
SODIUM SERPL-SCNC: 142 MMOL/L (ref 136–145)
SP GR UR STRIP.AUTO: 1.02 (ref 1–1.03)
UROBILINOGEN UR QL STRIP.AUTO: 0.2 E.U./DL
WBC # BLD AUTO: 3.9 THOUSAND/UL (ref 4.31–10.16)
WBC #/AREA URNS AUTO: NORMAL /HPF

## 2018-10-15 PROCEDURE — 36415 COLL VENOUS BLD VENIPUNCTURE: CPT

## 2018-10-15 PROCEDURE — 82570 ASSAY OF URINE CREATININE: CPT

## 2018-10-15 PROCEDURE — 80053 COMPREHEN METABOLIC PANEL: CPT

## 2018-10-15 PROCEDURE — 84100 ASSAY OF PHOSPHORUS: CPT

## 2018-10-15 PROCEDURE — 85025 COMPLETE CBC W/AUTO DIFF WBC: CPT

## 2018-10-15 PROCEDURE — 84156 ASSAY OF PROTEIN URINE: CPT

## 2018-10-15 PROCEDURE — 81001 URINALYSIS AUTO W/SCOPE: CPT

## 2018-10-16 ENCOUNTER — OFFICE VISIT (OUTPATIENT)
Dept: NEPHROLOGY | Facility: CLINIC | Age: 68
End: 2018-10-16
Payer: MEDICARE

## 2018-10-16 VITALS
BODY MASS INDEX: 25.19 KG/M2 | DIASTOLIC BLOOD PRESSURE: 70 MMHG | TEMPERATURE: 98.1 F | HEIGHT: 72 IN | HEART RATE: 80 BPM | RESPIRATION RATE: 16 BRPM | WEIGHT: 186 LBS | SYSTOLIC BLOOD PRESSURE: 120 MMHG

## 2018-10-16 DIAGNOSIS — M54.16 LUMBAR RADICULOPATHY: ICD-10-CM

## 2018-10-16 DIAGNOSIS — N52.01 ERECTILE DYSFUNCTION DUE TO ARTERIAL INSUFFICIENCY: ICD-10-CM

## 2018-10-16 DIAGNOSIS — N40.0 BENIGN PROSTATIC HYPERPLASIA, PRESENCE OF LOWER URINARY TRACT SYMPTOMS UNSPECIFIED: ICD-10-CM

## 2018-10-16 DIAGNOSIS — N18.2 CKD (CHRONIC KIDNEY DISEASE) STAGE 2, GFR 60-89 ML/MIN: Primary | ICD-10-CM

## 2018-10-16 DIAGNOSIS — R79.89 AZOTEMIA: ICD-10-CM

## 2018-10-16 PROCEDURE — 99213 OFFICE O/P EST LOW 20 MIN: CPT | Performed by: INTERNAL MEDICINE

## 2018-10-16 RX ORDER — NAPROXEN 500 MG/1
500 TABLET ORAL 2 TIMES DAILY
Refills: 0 | COMMUNITY
Start: 2018-10-02 | End: 2018-10-16 | Stop reason: ALTCHOICE

## 2018-10-16 RX ORDER — TADALAFIL 20 MG/1
20 TABLET ORAL DAILY PRN
Qty: 10 TABLET | Refills: 5 | Status: SHIPPED | OUTPATIENT
Start: 2018-10-16 | End: 2019-02-19 | Stop reason: ALTCHOICE

## 2018-10-16 NOTE — PROGRESS NOTES
NEPHROLOGY OFFICE FOLLOW UP  Varsha Mason 76 y o  male MRN: [id number]    Encounter: [id number] 10/16/2018    REASON FOR VISIT: Varsha Mason is a 76 y o  male who is here on 10/16/2018 for Follow-up    HPI:    Aurora Arora came in today for yearly nephrology follow-up  He is overall doing well has a pain in both flank region and also has some back pain  Denies any urinary complaint no nausea no vomiting no diarrhea        REVIEW OF SYSTEMS:    Review of Systems   Constitutional: Negative for activity change, fatigue and unexpected weight change  HENT: Negative for congestion, ear discharge and sinus pain  Eyes: Negative for photophobia, pain, discharge and visual disturbance  Respiratory: Negative for apnea, choking, chest tightness and wheezing  Cardiovascular: Negative for chest pain and palpitations  Gastrointestinal: Positive for abdominal pain  Negative for abdominal distention and blood in stool  Endocrine: Negative for heat intolerance and polyphagia  Genitourinary: Negative for decreased urine volume, difficulty urinating, dysuria, flank pain and urgency  Musculoskeletal: Positive for back pain  Negative for neck pain and neck stiffness  Skin: Negative for color change and wound  Allergic/Immunologic: Negative for food allergies and immunocompromised state  Neurological: Negative for seizures and facial asymmetry  Hematological: Negative for adenopathy  Does not bruise/bleed easily  Psychiatric/Behavioral: Negative for self-injury and suicidal ideas           PAST MEDICAL HISTORY:  Past Medical History:   Diagnosis Date    Benign prostatic hyperplasia     Esophageal reflux        PAST SURGICAL HISTORY:  Past Surgical History:   Procedure Laterality Date    APPENDECTOMY  05/21/2015    CYST REMOVAL      Fatty cyst removed from back       SOCIAL HISTORY:  History   Alcohol Use    Yes     Comment: socially     History   Drug Use No     History   Smoking Status    Former Smoker Smokeless Tobacco    Never Used     Comment: 1ppw       FAMILY HISTORY:  Family History   Problem Relation Age of Onset    Prostate cancer Father     Prostate cancer Maternal Grandfather     Stomach cancer Maternal Aunt         malignant tumor of pharynx    Stomach cancer Maternal Uncle         malignant tumor of pharynx    Mental illness Family     Depression Family     Schizophrenia Family        MEDICATIONS:    Current Outpatient Prescriptions:     al mag oxide-diphenhydramine-lidocaine viscous (MAGIC MOUTHWASH) 1:1:1 suspension, Swish and spit 10 mL every 4 (four) hours as needed for mouth pain or discomfort, Disp: 180 mL, Rfl: 0    aspirin 81 mg chewable tablet, Chew 81 mg daily, Disp: , Rfl:     cyclobenzaprine (FLEXERIL) 5 mg tablet, Take 1 tablet (5 mg total) by mouth 3 (three) times a day as needed for muscle spasms (Right jaw pain) for up to 7 doses, Disp: 21 tablet, Rfl: 0    lidocaine (LIDODERM) 5 %, Place 1 patch on the skin daily Remove & Discard patch within 12 hours or as directed by MD, Disp: 30 patch, Rfl: 1    multivitamin (THERAGRAN) TABS, Take 1 tablet by mouth daily, Disp: , Rfl:     nystatin (MYCOSTATIN) cream, Apply topically 2 (two) times a day, Disp: 30 g, Rfl: 0    tadalafil (CIALIS) 20 MG tablet, Take 1 tablet (20 mg total) by mouth daily as needed for erectile dysfunction, Disp: 10 tablet, Rfl: 5    tamsulosin (FLOMAX) 0 4 mg, TAKE 1 CAPSULE 30 MINUTES AFTER THE SAME MEAL EACH DAY, Disp: 90 capsule, Rfl: 3    naproxen (NAPROSYN) 500 mg tablet, Take 1 tablet (500 mg total) by mouth 2 (two) times a day with meals for 10 days (Patient not taking: Reported on 10/16/2018 ), Disp: 20 tablet, Rfl: 0    PHYSICAL EXAM:  Vitals:    10/16/18 1312   BP: 120/70   BP Location: Right arm   Patient Position: Sitting   Pulse: 80   Resp: 16   Temp: 98 1 °F (36 7 °C)   TempSrc: Tympanic   Weight: 84 4 kg (186 lb)   Height: 6' (1 829 m)     Body mass index is 25 23 kg/m²      Physical Exam Constitutional: He is oriented to person, place, and time  He appears well-developed and well-nourished  No distress  HENT:   Head: Normocephalic and atraumatic  Mouth/Throat: Oropharynx is clear and moist    Eyes: Pupils are equal, round, and reactive to light  Conjunctivae and EOM are normal  No scleral icterus  Neck: Normal range of motion  Neck supple  No JVD present  Cardiovascular: Normal rate, regular rhythm and normal heart sounds  No murmur heard  Pulmonary/Chest: Effort normal and breath sounds normal  He has no wheezes  Abdominal: Soft  Bowel sounds are normal  He exhibits no distension and no mass  There is no tenderness  Musculoskeletal: Normal range of motion  He exhibits no edema  Neurological: He is alert and oriented to person, place, and time  Skin: Skin is warm  No rash noted  Psychiatric: He has a normal mood and affect   His behavior is normal        LAB RESULTS:  Results for orders placed or performed in visit on 10/15/18   CBC and differential   Result Value Ref Range    WBC 3 90 (L) 4 31 - 10 16 Thousand/uL    RBC 4 74 3 88 - 5 62 Million/uL    Hemoglobin 14 5 12 0 - 17 0 g/dL    Hematocrit 43 4 36 5 - 49 3 %    MCV 92 82 - 98 fL    MCH 30 6 26 8 - 34 3 pg    MCHC 33 4 31 4 - 37 4 g/dL    RDW 13 2 11 6 - 15 1 %    MPV 11 4 8 9 - 12 7 fL    Platelets 176 058 - 253 Thousands/uL    nRBC 0 /100 WBCs    Neutrophils Relative 41 (L) 43 - 75 %    Immat GRANS % 0 0 - 2 %    Lymphocytes Relative 47 (H) 14 - 44 %    Monocytes Relative 7 4 - 12 %    Eosinophils Relative 4 0 - 6 %    Basophils Relative 1 0 - 1 %    Neutrophils Absolute 1 59 (L) 1 85 - 7 62 Thousands/µL    Immature Grans Absolute 0 01 0 00 - 0 20 Thousand/uL    Lymphocytes Absolute 1 85 0 60 - 4 47 Thousands/µL    Monocytes Absolute 0 27 0 17 - 1 22 Thousand/µL    Eosinophils Absolute 0 15 0 00 - 0 61 Thousand/µL    Basophils Absolute 0 03 0 00 - 0 10 Thousands/µL   Comprehensive metabolic panel   Result Value Ref Range    Sodium 142 136 - 145 mmol/L    Potassium 4 0 3 5 - 5 3 mmol/L    Chloride 105 100 - 108 mmol/L    CO2 31 21 - 32 mmol/L    ANION GAP 6 4 - 13 mmol/L    BUN 21 5 - 25 mg/dL    Creatinine 1 37 (H) 0 60 - 1 30 mg/dL    Glucose, Fasting 95 65 - 99 mg/dL    Calcium 9 2 8 3 - 10 1 mg/dL    AST 19 5 - 45 U/L    ALT 34 12 - 78 U/L    Alkaline Phosphatase 63 46 - 116 U/L    Total Protein 7 2 6 4 - 8 2 g/dL    Albumin 3 7 3 5 - 5 0 g/dL    Total Bilirubin 0 40 0 20 - 1 00 mg/dL    eGFR 61 ml/min/1 73sq m   Protein / creatinine ratio, urine   Result Value Ref Range    Creatinine, Ur 157 0 mg/dL    Protein Urine Random 9 mg/dL    Prot/Creat Ratio, Ur 0 06 0 00 - 0 10   Urinalysis with microscopic   Result Value Ref Range    Clarity, UA Clear     Color, UA Yellow     Specific [place], UA 1 020 1 003 - 1 030    pH, UA 6 0 4 5 - 8 0    Glucose, UA Negative Negative mg/dl    Ketones, UA Negative Negative mg/dl    Blood, UA Negative Negative    Protein, UA Negative Negative mg/dl    Nitrite, UA Negative Negative    Bilirubin, UA Negative Negative    Urobilinogen, UA 0 2 0 2, 1 0 E U /dl E U /dl    Leukocytes, UA Negative Negative    WBC, UA None Seen None Seen, 0-5, 5-55, 5-65 /hpf    RBC, UA None Seen None Seen, 0-5 /hpf    Bacteria, UA None Seen None Seen, Occasional /hpf    Epithelial Cells Occasional None Seen, Occasional /hpf   Phosphorus   Result Value Ref Range    Phosphorus 3 2 2 3 - 4 1 mg/dL       ASSESSMENT and PLAN:      Benign prostatic hyperplasia, presence of lower urinary tract symptoms unspecified  Stable at this point without any urinary complaint    Lumbar radiculopathy  Likely causing the pain which is present at this point    Azotemia  Creatinine is stable with GFR of 62  Advised to continue what is doing good hydration and avoidance of nephrotoxic medicine discussed with him        I will see him back in 1 year again      Portions of the record may have been created with voice recognition software  Occasional wrong word or "sound a like" substitutions may have occurred due to the inherent limitations of voice recognition software  Read the chart carefully and recognize, using context, where substitutions have occurred  If you have any questions, please contact the dictating provider

## 2018-10-16 NOTE — ASSESSMENT & PLAN NOTE
Creatinine is stable with GFR of 62    Advised to continue what is doing good hydration and avoidance of nephrotoxic medicine discussed with him

## 2018-10-16 NOTE — PATIENT INSTRUCTIONS
Chronic Kidney Disease   AMBULATORY CARE:   Chronic kidney disease (CKD)  is the gradual and permanent loss of kidney function  Normally, the kidneys remove fluid, chemicals, and waste from your blood  These wastes are turned into urine by your kidneys  CKD may worsen over time and lead to kidney failure  Common symptoms include the following:   · Changes in how often you need to urinate    · Swelling in your arms, legs, or feet    · Shortness of breath    · Fatigue or weakness    · Bad or bitter taste in your mouth    · Nausea, vomiting, or loss of appetite  Seek care immediately if:   · You are confused and very drowsy  · You have a seizure  · You have shortness of breath  Contact your healthcare provider if:   · You suddenly gain or lose more weight than your healthcare provider has told you is okay  · You have itchy skin or a rash  · You urinate more or less than you normally do  · You have blood in your urine  · You have nausea and repeated vomiting  · You have fatigue or muscle weakness  · You have hiccups that will not stop  · You have questions or concerns about your condition or care  Treatment for CKD:  Medicines may be given to decrease blood pressure and get rid of extra fluid  You may also receive medicine to manage health conditions that may occur with CKD  Dialysis is a treatment to remove chemicals and waste from your blood when your kidneys can no longer do this  Surgery may be needed to create an arteriovenous fistula (AVF) in your arm or insert a catheter into your abdomen so that you can receive dialysis  A kidney transplant may be done if your CKD becomes severe  Manage CKD:   · Maintain a healthy weight  Ask your healthcare provider how much you should weigh  Ask him to help you create a weight loss plan if you are overweight  · Exercise 30 to 60 minutes a day, 4 to 7 times a week, or as directed  Ask about the best exercise plan for you   Regular exercise can help you manage CKD, high blood pressure, and diabetes  · Follow your healthcare provider's advice about what to eat and drink  He may tell you to eat food low in sodium (salt), potassium, phosphorus, or protein  You may need to see a dietitian if you need help planning meals  Ask how much liquid to drink each day and which liquids are best for you  · Limit alcohol  Ask how much alcohol is safe for you to drink  A drink of alcohol is 12 ounces of beer, 5 ounces of wine, or 1½ ounces of liquor  · Do not smoke  Nicotine and other chemicals in cigarettes and cigars can cause lung and kidney damage  Ask your healthcare provider for information if you currently smoke and need help to quit  E-cigarettes or smokeless tobacco still contain nicotine  Talk to your healthcare provider before you use these products  · Ask your healthcare provider if you need vaccines  Infections such as pneumonia, influenza, and hepatitis can be more harmful or more likely to occur in a person who has CKD  Vaccines reduce your risk of infection with these viruses  Follow up with your healthcare provider as directed:  Write down your questions so you remember to ask them during your visits  © 2017 [employer] Information is for End User's use only and may not be sold, redistributed or otherwise used for commercial purposes  All illustrations and images included in CareNotes® are the copyrighted property of [employer]  or [employer]  The above information is an  only  It is not intended as medical advice for individual conditions or treatments  Talk to your doctor, nurse or pharmacist before following any medical regimen to see if it is safe and effective for you

## 2018-10-16 NOTE — LETTER
October 16, 2018     April Salcido MD  [address]  [address] [employer]  [address] [place] [id number]    Patient: Clarissa Rea   YOB: 1950   Date of Visit: 10/16/2018       Dear Dr Robert Flight: Thank you for referring Selena Adam to me for evaluation  Below are my notes for this consultation  If you have questions, please do not hesitate to call me  I look forward to following your patient along with you  Sincerely,        Lazarus Hooker, MD        CC: No Recipients  Lazarus Hooker, MD  10/16/2018  2:36 PM  Sign at close encounter  [address] 76 y o  male MRN: [id number]    Encounter: [id number] 10/16/2018    REASON FOR VISIT: Clarissa Rea is a 76 y o  male who is here on 10/16/2018 for Follow-up    HPI:    Brian Neely came in today for yearly nephrology follow-up  He is overall doing well has a pain in both flank region and also has some back pain  Denies any urinary complaint no nausea no vomiting no diarrhea        REVIEW OF SYSTEMS:    Review of Systems   Constitutional: Negative for activity change, fatigue and unexpected weight change  HENT: Negative for congestion, ear discharge and sinus pain  Eyes: Negative for photophobia, pain, discharge and visual disturbance  Respiratory: Negative for apnea, choking, chest tightness and wheezing  Cardiovascular: Negative for chest pain and palpitations  Gastrointestinal: Positive for abdominal pain  Negative for abdominal distention and blood in stool  Endocrine: Negative for heat intolerance and polyphagia  Genitourinary: Negative for decreased urine volume, difficulty urinating, dysuria, flank pain and urgency  Musculoskeletal: Positive for back pain  Negative for neck pain and neck stiffness  Skin: Negative for color change and wound  Allergic/Immunologic: Negative for food allergies and immunocompromised state  Neurological: Negative for seizures and facial asymmetry     Hematological: Negative for adenopathy  Does not bruise/bleed easily  Psychiatric/Behavioral: Negative for self-injury and suicidal ideas           PAST MEDICAL HISTORY:  Past Medical History:   Diagnosis Date    Benign prostatic hyperplasia     Esophageal reflux        PAST SURGICAL HISTORY:  Past Surgical History:   Procedure Laterality Date    APPENDECTOMY  05/21/2015    CYST REMOVAL      Fatty cyst removed from back       SOCIAL HISTORY:  History   Alcohol Use    Yes     Comment: socially     History   Drug Use No     History   Smoking Status    Former Smoker   Smokeless Tobacco    Never Used     Comment: 1ppw       FAMILY HISTORY:  Family History   Problem Relation Age of Onset    Prostate cancer Father     Prostate cancer Maternal Grandfather     Stomach cancer Maternal Aunt         malignant tumor of pharynx    Stomach cancer Maternal Uncle         malignant tumor of pharynx    Mental illness Family     Depression Family     Schizophrenia Family        MEDICATIONS:    Current Outpatient Prescriptions:     al mag oxide-diphenhydramine-lidocaine viscous (MAGIC MOUTHWASH) 1:1:1 suspension, Swish and spit 10 mL every 4 (four) hours as needed for mouth pain or discomfort, Disp: 180 mL, Rfl: 0    aspirin 81 mg chewable tablet, Chew 81 mg daily, Disp: , Rfl:     cyclobenzaprine (FLEXERIL) 5 mg tablet, Take 1 tablet (5 mg total) by mouth 3 (three) times a day as needed for muscle spasms (Right jaw pain) for up to 7 doses, Disp: 21 tablet, Rfl: 0    lidocaine (LIDODERM) 5 %, Place 1 patch on the skin daily Remove & Discard patch within 12 hours or as directed by MD, Disp: 30 patch, Rfl: 1    multivitamin (THERAGRAN) TABS, Take 1 tablet by mouth daily, Disp: , Rfl:     nystatin (MYCOSTATIN) cream, Apply topically 2 (two) times a day, Disp: 30 g, Rfl: 0    tadalafil (CIALIS) 20 MG tablet, Take 1 tablet (20 mg total) by mouth daily as needed for erectile dysfunction, Disp: 10 tablet, Rfl: 5    tamsulosin (FLOMAX) 0 4 mg, TAKE 1 CAPSULE 30 MINUTES AFTER THE SAME MEAL EACH DAY, Disp: 90 capsule, Rfl: 3    naproxen (NAPROSYN) 500 mg tablet, Take 1 tablet (500 mg total) by mouth 2 (two) times a day with meals for 10 days (Patient not taking: Reported on 10/16/2018 ), Disp: 20 tablet, Rfl: 0    PHYSICAL EXAM:  Vitals:    10/16/18 1312   BP: 120/70   BP Location: Right arm   Patient Position: Sitting   Pulse: 80   Resp: 16   Temp: 98 1 °F (36 7 °C)   TempSrc: Tympanic   Weight: 84 4 kg (186 lb)   Height: 6' (1 829 m)     Body mass index is 25 23 kg/m²  Physical Exam   Constitutional: He is oriented to person, place, and time  He appears well-developed and well-nourished  No distress  HENT:   Head: Normocephalic and atraumatic  Mouth/Throat: Oropharynx is clear and moist    Eyes: Pupils are equal, round, and reactive to light  Conjunctivae and EOM are normal  No scleral icterus  Neck: Normal range of motion  Neck supple  No JVD present  Cardiovascular: Normal rate, regular rhythm and normal heart sounds  No murmur heard  Pulmonary/Chest: Effort normal and breath sounds normal  He has no wheezes  Abdominal: Soft  Bowel sounds are normal  He exhibits no distension and no mass  There is no tenderness  Musculoskeletal: Normal range of motion  He exhibits no edema  Neurological: He is alert and oriented to person, place, and time  Skin: Skin is warm  No rash noted  Psychiatric: He has a normal mood and affect   His behavior is normal        LAB RESULTS:  Results for orders placed or performed in visit on 10/15/18   CBC and differential   Result Value Ref Range    WBC 3 90 (L) 4 31 - 10 16 Thousand/uL    RBC 4 74 3 88 - 5 62 Million/uL    Hemoglobin 14 5 12 0 - 17 0 g/dL    Hematocrit 43 4 36 5 - 49 3 %    MCV 92 82 - 98 fL    MCH 30 6 26 8 - 34 3 pg    MCHC 33 4 31 4 - 37 4 g/dL    RDW 13 2 11 6 - 15 1 %    MPV 11 4 8 9 - 12 7 fL    Platelets 134 528 - 970 Thousands/uL    nRBC 0 /100 WBCs    Neutrophils Relative 41 (L) 43 - 75 %    Immat GRANS % 0 0 - 2 %    Lymphocytes Relative 47 (H) 14 - 44 %    Monocytes Relative 7 4 - 12 %    Eosinophils Relative 4 0 - 6 %    Basophils Relative 1 0 - 1 %    Neutrophils Absolute 1 59 (L) 1 85 - 7 62 Thousands/µL    Immature Grans Absolute 0 01 0 00 - 0 20 Thousand/uL    Lymphocytes Absolute 1 85 0 60 - 4 47 Thousands/µL    Monocytes Absolute 0 27 0 17 - 1 22 Thousand/µL    Eosinophils Absolute 0 15 0 00 - 0 61 Thousand/µL    Basophils Absolute 0 03 0 00 - 0 10 Thousands/µL   Comprehensive metabolic panel   Result Value Ref Range    Sodium 142 136 - 145 mmol/L    Potassium 4 0 3 5 - 5 3 mmol/L    Chloride 105 100 - 108 mmol/L    CO2 31 21 - 32 mmol/L    ANION GAP 6 4 - 13 mmol/L    BUN 21 5 - 25 mg/dL    Creatinine 1 37 (H) 0 60 - 1 30 mg/dL    Glucose, Fasting 95 65 - 99 mg/dL    Calcium 9 2 8 3 - 10 1 mg/dL    AST 19 5 - 45 U/L    ALT 34 12 - 78 U/L    Alkaline Phosphatase 63 46 - 116 U/L    Total Protein 7 2 6 4 - 8 2 g/dL    Albumin 3 7 3 5 - 5 0 g/dL    Total Bilirubin 0 40 0 20 - 1 00 mg/dL    eGFR 61 ml/min/1 73sq m   Protein / creatinine ratio, urine   Result Value Ref Range    Creatinine, Ur 157 0 mg/dL    Protein Urine Random 9 mg/dL    Prot/Creat Ratio, Ur 0 06 0 00 - 0 10   Urinalysis with microscopic   Result Value Ref Range    Clarity, UA Clear     Color, UA Yellow     Specific [place], UA 1 020 1 003 - 1 030    pH, UA 6 0 4 5 - 8 0    Glucose, UA Negative Negative mg/dl    Ketones, UA Negative Negative mg/dl    Blood, UA Negative Negative    Protein, UA Negative Negative mg/dl    Nitrite, UA Negative Negative    Bilirubin, UA Negative Negative    Urobilinogen, UA 0 2 0 2, 1 0 E U /dl E U /dl    Leukocytes, UA Negative Negative    WBC, UA None Seen None Seen, 0-5, 5-55, 5-65 /hpf    RBC, UA None Seen None Seen, 0-5 /hpf    Bacteria, UA None Seen None Seen, Occasional /hpf    Epithelial Cells Occasional None Seen, Occasional /hpf   Phosphorus   Result Value Ref Range    Phosphorus 3 2 2 3 - 4 1 mg/dL       ASSESSMENT and PLAN:      Benign prostatic hyperplasia, presence of lower urinary tract symptoms unspecified  Stable at this point without any urinary complaint    Lumbar radiculopathy  Likely causing the pain which is present at this point    Azotemia  Creatinine is stable with GFR of 62  Advised to continue what is doing good hydration and avoidance of nephrotoxic medicine discussed with him        I will see him back in 1 year again      Portions of the record may have been created with voice recognition software  Occasional wrong word or "sound a like" substitutions may have occurred due to the inherent limitations of voice recognition software  Read the chart carefully and recognize, using context, where substitutions have occurred  If you have any questions, please contact the dictating provider

## 2018-11-07 ENCOUNTER — TELEPHONE (OUTPATIENT)
Dept: INTERNAL MEDICINE CLINIC | Facility: CLINIC | Age: 68
End: 2018-11-07

## 2018-11-07 NOTE — TELEPHONE ENCOUNTER
While patient was in during his wife's visit, he brought up that he was getting neck pain and did not want to wait until his appointment  He has had it for a few weeks now  More so on the right side  There is also a burning or warmth sensation on the right side of his head and scalp at times as well  He admits that his symptoms can worsen with rotation of the head to the left or right  I had him tilts his head all the way forward chin to his chest and that relieved a lot of his symptoms  I told him it was probably a pinched nerve in his neck  He should continue his Naprosyn that he is taking for his knee and use warm compress  If he has worsening, he should go to the ER  He then mentioned that sometimes the right eye feels funny  But he describes the same sensation that he is getting on the scalp  I had him maneuvering his neck in several planes, multiple times and was able to worsen or improve his symptoms each time  Again told him if he just does not feel right he can get checked in the ER if it worsens

## 2018-11-13 ENCOUNTER — OFFICE VISIT (OUTPATIENT)
Dept: INTERNAL MEDICINE CLINIC | Facility: CLINIC | Age: 68
End: 2018-11-13
Payer: MEDICARE

## 2018-11-13 VITALS
DIASTOLIC BLOOD PRESSURE: 80 MMHG | WEIGHT: 185.2 LBS | HEIGHT: 71 IN | RESPIRATION RATE: 16 BRPM | HEART RATE: 67 BPM | OXYGEN SATURATION: 98 % | TEMPERATURE: 98.4 F | BODY MASS INDEX: 25.93 KG/M2 | SYSTOLIC BLOOD PRESSURE: 120 MMHG

## 2018-11-13 DIAGNOSIS — N40.0 BENIGN PROSTATIC HYPERPLASIA, PRESENCE OF LOWER URINARY TRACT SYMPTOMS UNSPECIFIED: Primary | ICD-10-CM

## 2018-11-13 DIAGNOSIS — Z13.6 SCREENING FOR CARDIOVASCULAR CONDITION: ICD-10-CM

## 2018-11-13 DIAGNOSIS — M54.16 LUMBAR RADICULOPATHY: ICD-10-CM

## 2018-11-13 DIAGNOSIS — M54.2 NECK PAIN: ICD-10-CM

## 2018-11-13 PROCEDURE — 99214 OFFICE O/P EST MOD 30 MIN: CPT | Performed by: INTERNAL MEDICINE

## 2018-11-13 PROCEDURE — 1160F RVW MEDS BY RX/DR IN RCRD: CPT | Performed by: INTERNAL MEDICINE

## 2018-11-13 PROCEDURE — 3008F BODY MASS INDEX DOCD: CPT | Performed by: INTERNAL MEDICINE

## 2018-11-13 PROCEDURE — 1036F TOBACCO NON-USER: CPT | Performed by: INTERNAL MEDICINE

## 2018-11-13 NOTE — PROGRESS NOTES
Assessment/Plan:      I told him I suspect he has arthritis in the spine  He already has known lumbar disc disease  Will order an x-ray of his cervical spine  Reviewed exercises and proper lifting technique for his neck  Ordered blood work  Continue current medications  No Follow-up on file  No problem-specific Assessment & Plan notes found for this encounter  Diagnoses and all orders for this visit:    Benign prostatic hyperplasia, presence of lower urinary tract symptoms unspecified    Neck pain  -     XR spine cervical complete 4 or 5 vw non injury; Future  -     CBC and differential; Future  -     Comprehensive metabolic panel; Future    Lumbar radiculopathy    Screening for cardiovascular condition  -     Lipid panel; Future          Subjective:      Patient ID: Lee Abraham is a 76 y o  male  Patient comes in today for routine follow-up with his wife  He continues on his prostate medication and continues to follow with Urology  He has had no issues in that regards  No troubles with the medicine  Still remains quite active and goes to the gym several days a week  During his wife's visit, he was mentioning some issues on the right side of his neck that appeared to be related to his neck and most likely arthritis  He has made some adjustments after we talked and it is a little better  But will still start to hurt if he moves his neck a certain way  He noted some things he was doing wrong when he was weight lifting at the gym  He has corrected those maneuvers          ALLERGIES:  Allergies   Allergen Reactions    Penicillin V Anaphylaxis       CURRENT MEDICATIONS:    Current Outpatient Prescriptions:     al mag oxide-diphenhydramine-lidocaine viscous (MAGIC MOUTHWASH) 1:1:1 suspension, Swish and spit 10 mL every 4 (four) hours as needed for mouth pain or discomfort, Disp: 180 mL, Rfl: 0    aspirin 81 mg chewable tablet, Chew 81 mg daily, Disp: , Rfl:     cyclobenzaprine (FLEXERIL) 5 mg tablet, Take 1 tablet (5 mg total) by mouth 3 (three) times a day as needed for muscle spasms (Right jaw pain) for up to 7 doses, Disp: 21 tablet, Rfl: 0    lidocaine (LIDODERM) 5 %, Place 1 patch on the skin daily Remove & Discard patch within 12 hours or as directed by MD, Disp: 30 patch, Rfl: 1    multivitamin (THERAGRAN) TABS, Take 1 tablet by mouth daily, Disp: , Rfl:     nystatin (MYCOSTATIN) cream, Apply topically 2 (two) times a day, Disp: 30 g, Rfl: 0    tamsulosin (FLOMAX) 0 4 mg, TAKE 1 CAPSULE 30 MINUTES AFTER THE SAME MEAL EACH DAY, Disp: 90 capsule, Rfl: 3    tadalafil (CIALIS) 20 MG tablet, Take 1 tablet (20 mg total) by mouth daily as needed for erectile dysfunction, Disp: 10 tablet, Rfl: 5    ACTIVE PROBLEM LIST:  Patient Active Problem List   Diagnosis    Benign prostatic hyperplasia, presence of lower urinary tract symptoms unspecified    Erectile dysfunction    Lumbar radiculopathy    Azotemia       PAST MEDICAL HISTORY:  Past Medical History:   Diagnosis Date    Benign prostatic hyperplasia     Esophageal reflux        PAST SURGICAL HISTORY:  Past Surgical History:   Procedure Laterality Date    APPENDECTOMY  05/21/2015    CYST REMOVAL      Fatty cyst removed from back       FAMILY HISTORY:  Family History   Problem Relation Age of Onset    Prostate cancer Father     Prostate cancer Maternal Grandfather     Stomach cancer Maternal Aunt         malignant tumor of pharynx    Stomach cancer Maternal Uncle         malignant tumor of pharynx    Mental illness Family     Depression Family     Schizophrenia Family        SOCIAL HISTORY:  Social History     Social History    Marital status: /Civil Union     Spouse name: N/A    Number of children: N/A    Years of education: N/A     Occupational History    Retired      Social History Main Topics    Smoking status: Former Smoker    Smokeless tobacco: Never Used      Comment: 1ppw    Alcohol use Yes      Comment: socially    Drug use: No    Sexual activity: Yes     Partners: Female      Comment: denied history of high risk sexual behavior     Other Topics Concern    Not on file     Social History Narrative    No active advance directive    Always uses seat belt    Exercises occasionally, moderate    Five children    Occasional caffeine consumption           Review of Systems   Respiratory: Negative for shortness of breath  Cardiovascular: Negative for chest pain  Gastrointestinal: Negative for abdominal pain  Musculoskeletal: Positive for back pain, neck pain and neck stiffness  Objective:  Vitals:    11/13/18 1411 11/13/18 1442   BP: 160/88 120/80   BP Location: Left arm    Patient Position: Sitting    Cuff Size: Adult    Pulse: 67    Resp: 16    Temp: 98 4 °F (36 9 °C)    TempSrc: Oral    SpO2: 98%    Weight: 84 kg (185 lb 3 2 oz)    Height: 5' 10 87" (1 8 m)         Physical Exam   Constitutional: He is oriented to person, place, and time  He appears well-developed and well-nourished  HENT:   Head: Atraumatic  Right Ear: External ear normal    Left Ear: External ear normal    Mouth/Throat: Oropharynx is clear and moist  No oropharyngeal exudate  Eyes: Pupils are equal, round, and reactive to light  Conjunctivae and EOM are normal    Neck: Normal range of motion  Neck supple  Neck symptoms are reproducible   Cardiovascular: Normal rate and regular rhythm  Pulmonary/Chest: Effort normal and breath sounds normal    Abdominal: Soft  There is no tenderness  Musculoskeletal: Normal range of motion  He exhibits no edema  Lymphadenopathy:     He has no cervical adenopathy  Neurological: He is alert and oriented to person, place, and time  Skin: Skin is warm and dry  No rash noted  Psychiatric: He has a normal mood and affect           RESULTS:    Recent Results (from the past 1008 hour(s))   CBC and differential    Collection Time: 10/15/18  9:39 AM   Result Value Ref Range    WBC 3 90 (L) 4 31 - 10 16 Thousand/uL    RBC 4 74 3 88 - 5 62 Million/uL    Hemoglobin 14 5 12 0 - 17 0 g/dL    Hematocrit 43 4 36 5 - 49 3 %    MCV 92 82 - 98 fL    MCH 30 6 26 8 - 34 3 pg    MCHC 33 4 31 4 - 37 4 g/dL    RDW 13 2 11 6 - 15 1 %    MPV 11 4 8 9 - 12 7 fL    Platelets 161 783 - 467 Thousands/uL    nRBC 0 /100 WBCs    Neutrophils Relative 41 (L) 43 - 75 %    Immat GRANS % 0 0 - 2 %    Lymphocytes Relative 47 (H) 14 - 44 %    Monocytes Relative 7 4 - 12 %    Eosinophils Relative 4 0 - 6 %    Basophils Relative 1 0 - 1 %    Neutrophils Absolute 1 59 (L) 1 85 - 7 62 Thousands/µL    Immature Grans Absolute 0 01 0 00 - 0 20 Thousand/uL    Lymphocytes Absolute 1 85 0 60 - 4 47 Thousands/µL    Monocytes Absolute 0 27 0 17 - 1 22 Thousand/µL    Eosinophils Absolute 0 15 0 00 - 0 61 Thousand/µL    Basophils Absolute 0 03 0 00 - 0 10 Thousands/µL   Comprehensive metabolic panel    Collection Time: 10/15/18  9:39 AM   Result Value Ref Range    Sodium 142 136 - 145 mmol/L    Potassium 4 0 3 5 - 5 3 mmol/L    Chloride 105 100 - 108 mmol/L    CO2 31 21 - 32 mmol/L    ANION GAP 6 4 - 13 mmol/L    BUN 21 5 - 25 mg/dL    Creatinine 1 37 (H) 0 60 - 1 30 mg/dL    Glucose, Fasting 95 65 - 99 mg/dL    Calcium 9 2 8 3 - 10 1 mg/dL    AST 19 5 - 45 U/L    ALT 34 12 - 78 U/L    Alkaline Phosphatase 63 46 - 116 U/L    Total Protein 7 2 6 4 - 8 2 g/dL    Albumin 3 7 3 5 - 5 0 g/dL    Total Bilirubin 0 40 0 20 - 1 00 mg/dL    eGFR 61 ml/min/1 73sq m   Protein / creatinine ratio, urine    Collection Time: 10/15/18  9:39 AM   Result Value Ref Range    Creatinine, Ur 157 0 mg/dL    Protein Urine Random 9 mg/dL    Prot/Creat Ratio, Ur 0 06 0 00 - 0 10   Urinalysis with microscopic    Collection Time: 10/15/18  9:39 AM   Result Value Ref Range    Clarity, UA Clear     Color, UA Yellow     Specific [place], UA 1 020 1 003 - 1 030    pH, UA 6 0 4 5 - 8 0    Glucose, UA Negative Negative mg/dl    Ketones, UA Negative Negative mg/dl    Blood, UA Negative Negative    Protein, UA Negative Negative mg/dl    Nitrite, UA Negative Negative    Bilirubin, UA Negative Negative    Urobilinogen, UA 0 2 0 2, 1 0 E U /dl E U /dl    Leukocytes, UA Negative Negative    WBC, UA None Seen None Seen, 0-5, 5-55, 5-65 /hpf    RBC, UA None Seen None Seen, 0-5 /hpf    Bacteria, UA None Seen None Seen, Occasional /hpf    Epithelial Cells Occasional None Seen, Occasional /hpf   Phosphorus    Collection Time: 10/15/18  9:39 AM   Result Value Ref Range    Phosphorus 3 2 2 3 - 4 1 mg/dL       This note was created with voice recognition software  Phonic, grammatical and spelling errors may be present within the note as a result

## 2018-12-04 ENCOUNTER — HOSPITAL ENCOUNTER (OUTPATIENT)
Dept: RADIOLOGY | Facility: HOSPITAL | Age: 68
Discharge: HOME | End: 2018-12-04
Attending: INTERNAL MEDICINE
Payer: MEDICARE

## 2018-12-04 ENCOUNTER — APPOINTMENT (OUTPATIENT)
Dept: LAB | Facility: HOSPITAL | Age: 68
End: 2018-12-04
Attending: INTERNAL MEDICINE
Payer: MEDICARE

## 2018-12-04 DIAGNOSIS — Z13.6 SCREENING FOR CARDIOVASCULAR CONDITION: ICD-10-CM

## 2018-12-04 DIAGNOSIS — M54.2 NECK PAIN: ICD-10-CM

## 2018-12-04 LAB
ALBUMIN SERPL BCP-MCNC: 3.7 G/DL (ref 3.5–5)
ALP SERPL-CCNC: 63 U/L (ref 46–116)
ALT SERPL W P-5'-P-CCNC: 30 U/L (ref 12–78)
ANION GAP SERPL CALCULATED.3IONS-SCNC: 9 MMOL/L (ref 4–13)
AST SERPL W P-5'-P-CCNC: 22 U/L (ref 5–45)
BASOPHILS # BLD AUTO: 0.03 THOUSANDS/ΜL (ref 0–0.1)
BASOPHILS NFR BLD AUTO: 1 % (ref 0–1)
BILIRUB SERPL-MCNC: 0.5 MG/DL (ref 0.2–1)
BUN SERPL-MCNC: 20 MG/DL (ref 5–25)
CALCIUM SERPL-MCNC: 8.7 MG/DL (ref 8.3–10.1)
CHLORIDE SERPL-SCNC: 104 MMOL/L (ref 100–108)
CHOLEST SERPL-MCNC: 172 MG/DL (ref 50–200)
CO2 SERPL-SCNC: 27 MMOL/L (ref 21–32)
CREAT SERPL-MCNC: 1.42 MG/DL (ref 0.6–1.3)
EOSINOPHIL # BLD AUTO: 0.21 THOUSAND/ΜL (ref 0–0.61)
EOSINOPHIL NFR BLD AUTO: 5 % (ref 0–6)
ERYTHROCYTE [DISTWIDTH] IN BLOOD BY AUTOMATED COUNT: 13.2 % (ref 11.6–15.1)
GFR SERPL CREATININE-BSD FRML MDRD: 58 ML/MIN/1.73SQ M
GLUCOSE P FAST SERPL-MCNC: 96 MG/DL (ref 65–99)
HCT VFR BLD AUTO: 42.5 % (ref 36.5–49.3)
HDLC SERPL-MCNC: 83 MG/DL (ref 40–60)
HGB BLD-MCNC: 14.4 G/DL (ref 12–17)
IMM GRANULOCYTES # BLD AUTO: 0.01 THOUSAND/UL (ref 0–0.2)
IMM GRANULOCYTES NFR BLD AUTO: 0 % (ref 0–2)
LDLC SERPL CALC-MCNC: 79 MG/DL (ref 0–100)
LYMPHOCYTES # BLD AUTO: 2.07 THOUSANDS/ΜL (ref 0.6–4.47)
LYMPHOCYTES NFR BLD AUTO: 49 % (ref 14–44)
MCH RBC QN AUTO: 30.4 PG (ref 26.8–34.3)
MCHC RBC AUTO-ENTMCNC: 33.9 G/DL (ref 31.4–37.4)
MCV RBC AUTO: 90 FL (ref 82–98)
MONOCYTES # BLD AUTO: 0.31 THOUSAND/ΜL (ref 0.17–1.22)
MONOCYTES NFR BLD AUTO: 8 % (ref 4–12)
NEUTROPHILS # BLD AUTO: 1.51 THOUSANDS/ΜL (ref 1.85–7.62)
NEUTS SEG NFR BLD AUTO: 37 % (ref 43–75)
NONHDLC SERPL-MCNC: 89 MG/DL
NRBC BLD AUTO-RTO: 0 /100 WBCS
PLATELET # BLD AUTO: 184 THOUSANDS/UL (ref 149–390)
PMV BLD AUTO: 11.1 FL (ref 8.9–12.7)
POTASSIUM SERPL-SCNC: 3.7 MMOL/L (ref 3.5–5.3)
PROT SERPL-MCNC: 7.1 G/DL (ref 6.4–8.2)
RBC # BLD AUTO: 4.73 MILLION/UL (ref 3.88–5.62)
SODIUM SERPL-SCNC: 140 MMOL/L (ref 136–145)
TRIGL SERPL-MCNC: 50 MG/DL
WBC # BLD AUTO: 4.14 THOUSAND/UL (ref 4.31–10.16)

## 2018-12-04 PROCEDURE — 80061 LIPID PANEL: CPT

## 2018-12-04 PROCEDURE — 85025 COMPLETE CBC W/AUTO DIFF WBC: CPT

## 2018-12-04 PROCEDURE — 36415 COLL VENOUS BLD VENIPUNCTURE: CPT

## 2018-12-04 PROCEDURE — 72050 X-RAY EXAM NECK SPINE 4/5VWS: CPT

## 2018-12-04 PROCEDURE — 80053 COMPREHEN METABOLIC PANEL: CPT

## 2019-02-07 ENCOUNTER — OFFICE VISIT (OUTPATIENT)
Dept: INTERNAL MEDICINE CLINIC | Facility: CLINIC | Age: 69
End: 2019-02-07
Payer: MEDICARE

## 2019-02-07 VITALS
BODY MASS INDEX: 26.48 KG/M2 | RESPIRATION RATE: 16 BRPM | DIASTOLIC BLOOD PRESSURE: 76 MMHG | WEIGHT: 185 LBS | SYSTOLIC BLOOD PRESSURE: 150 MMHG | HEIGHT: 70 IN

## 2019-02-07 DIAGNOSIS — Z11.59 NEED FOR HEPATITIS C SCREENING TEST: ICD-10-CM

## 2019-02-07 DIAGNOSIS — M54.16 LUMBAR RADICULOPATHY: ICD-10-CM

## 2019-02-07 DIAGNOSIS — Z00.00 ANNUAL PHYSICAL EXAM: Primary | ICD-10-CM

## 2019-02-07 PROCEDURE — G0438 PPPS, INITIAL VISIT: HCPCS | Performed by: INTERNAL MEDICINE

## 2019-02-07 RX ORDER — LIDOCAINE 50 MG/G
1 PATCH TOPICAL DAILY
Qty: 30 PATCH | Refills: 1 | Status: SHIPPED | OUTPATIENT
Start: 2019-02-07 | End: 2021-03-08 | Stop reason: ALTCHOICE

## 2019-02-07 NOTE — PROGRESS NOTES
Assessment and Plan:  Problem List Items Addressed This Visit     None        Health Maintenance Due   Topic Date Due    Hepatitis C Screening  1950    Medicare Annual Wellness Visit (AWV)  1950    DTaP,Tdap,and Td Vaccines (1 - Tdap) 07/10/1971    Pneumococcal PPSV23/PCV13 65+ Years / Low and Medium Risk (1 of 2 - PCV13) 07/10/2015         HPI:  Patient Active Problem List   Diagnosis    Benign prostatic hyperplasia, presence of lower urinary tract symptoms unspecified    Erectile dysfunction    Lumbar radiculopathy    Azotemia     Past Medical History:   Diagnosis Date    Benign prostatic hyperplasia     Esophageal reflux      Past Surgical History:   Procedure Laterality Date    APPENDECTOMY  05/21/2015    CYST REMOVAL      Fatty cyst removed from back     Family History   Problem Relation Age of Onset    Prostate cancer Father     Prostate cancer Maternal Grandfather     Stomach cancer Maternal Aunt         malignant tumor of pharynx    Stomach cancer Maternal Uncle         malignant tumor of pharynx    Mental illness Family     Depression Family     Schizophrenia Family      History   Smoking Status    Former Smoker   Smokeless Tobacco    Never Used     Comment: 1ppw     History   Alcohol Use    Yes     Comment: socially      History   Drug Use No         Current Outpatient Prescriptions   Medication Sig Dispense Refill    aspirin 81 mg chewable tablet Chew 81 mg daily      cyclobenzaprine (FLEXERIL) 5 mg tablet Take 1 tablet (5 mg total) by mouth 3 (three) times a day as needed for muscle spasms (Right jaw pain) for up to 7 doses 21 tablet 0    lidocaine (LIDODERM) 5 % Place 1 patch on the skin daily Remove & Discard patch within 12 hours or as directed by MD 30 patch 1    multivitamin (THERAGRAN) TABS Take 1 tablet by mouth daily      tadalafil (CIALIS) 20 MG tablet Take 1 tablet (20 mg total) by mouth daily as needed for erectile dysfunction 10 tablet 5    tamsulosin (FLOMAX) 0 4 mg TAKE 1 CAPSULE 30 MINUTES AFTER THE SAME MEAL EACH DAY 90 capsule 3    al mag oxide-diphenhydramine-lidocaine viscous (MAGIC MOUTHWASH) 1:1:1 suspension Swish and spit 10 mL every 4 (four) hours as needed for mouth pain or discomfort (Patient not taking: Reported on 2/7/2019 ) 180 mL 0    nystatin (MYCOSTATIN) cream Apply topically 2 (two) times a day (Patient not taking: Reported on 2/7/2019 ) 30 g 0     No current facility-administered medications for this visit  Allergies   Allergen Reactions    Penicillin V Anaphylaxis     There is no immunization history for the selected administration types on file for this patient  Patient Care Team:  Michel Valdez MD as PCP - MD Ifrah Barron MD    Medicare Screening Tests and Risk Assessments:  Bashir Foster is here for his Subsequent Wellness visit  Last Medicare Wellness visit information reviewed, patient interviewed, no change since last AWV  Health Risk Assessment:  Patient rates overall health as very good  Patient feels that their physical health rating is Much better  Eyesight was rated as Same  Hearing was rated as Same  Patient feels that their emotional and mental health rating is Much better  Pain experienced by patient in the last 7 days has been Some  Patient's pain rating has been 2/10  Patient states that he has experienced weight loss or gain in last 6 months  Emotional/Mental Health:  Patient has been feeling nervous/anxious  PHQ-9 Depression Screening:    Frequency of the following problems over the past two weeks:      1  Little interest or pleasure in doing things: 0 - not at all      2  Feeling down, depressed, or hopeless: 0 - not at all  PHQ-2 Score: 0          Broken Bones/Falls: Fall Risk Assessment:    In the past year, patient has experienced: No history of falling in past year          Bladder/Bowel:  Patient has leaked urine accidently in the last six months    Patient reports no loss of bowel control  (Additional Comments: Patient feels this is a minor issue  He has been doing Kegel exercises, which have helped )    Immunizations:  Patient has not had a flu vaccination within the last year  Patient has not received a pneumonia shot  Patient has not received a shingles shot  Patient has not received tetanus/diphtheria shot  Home Safety:  Patient does not have trouble with stairs inside or outside of their home  Patient currently reports that there are working smoke alarms, working carbon monoxide detectors  Preventative Screenings:   prostate cancer screen performed, colon cancer screen completed, cholesterol screen completed, glaucoma eye exam completed,     Nutrition:  Current diet: No Added Salt with servings of the following:    Medications:  Patient is currently taking over-the-counter supplements  Patient is able to manage medications  Lifestyle Choices:  Patient reports no tobacco use  Patient has smoked or used tobacco in the past   Patient has stopped his tobacco use  Tobacco use quit date: 25 years ago  Patient reports alcohol use  Alcohol use per week: 1  Patient drives a vehicle  Patient wears seat belt  Activities of Daily Living:  Can get out of bed by his or her self, able to dress self, able to make own meals, able to do own shopping, able to bathe self, can do own laundry/housekeeping, can manage own money, pay bills and track expenses    Previous Hospitalizations:  No hospitalization or ED visit in past 12 months        Advanced Directives:  Patient has decided on a power of   Patient has spoken to designated power of   Patient has not completed advanced directive          Preventative Screening/Counseling:      Cardiovascular:      General: Screening Current          Diabetes:      General: Screening Current          Colorectal Cancer:      General: Screening Current          Prostate Cancer:      General: Screening Current          Osteoporosis:      General: Screening Not Indicated          AAA:      General: Screening Not Indicated          Glaucoma:      General: Screening Current          HIV:      General: Screening Not Indicated          Hepatitis C:      General: Risks and Benefits Discussed        Advanced Directives:   Patient has no living will for healthcare, Information on ACP and/or AD provided  5 wishes given  Immunizations:      Influenza: Patient Declines      Pneumococcal: Patient Declines            No exam data present    Physical Exam:  Review of Systems   Respiratory: Negative for shortness of breath  Cardiovascular: Negative for chest pain  Gastrointestinal: Negative for abdominal pain and bowel incontinence  Psychiatric/Behavioral: The patient is not nervous/anxious  Vitals:    02/07/19 1333   Resp: 16   Weight: 83 9 kg (185 lb)   Height: 5' 10" (1 778 m)   Body mass index is 26 54 kg/m²  Physical Exam   Constitutional: He is oriented to person, place, and time  Neurological: He is alert and oriented to person, place, and time  Psychiatric: He has a normal mood and affect  His behavior is normal  Judgment and thought content normal    Nursing note and vitals reviewed

## 2019-02-19 ENCOUNTER — HOSPITAL ENCOUNTER (EMERGENCY)
Facility: HOSPITAL | Age: 69
Discharge: HOME | End: 2019-02-19
Attending: EMERGENCY MEDICINE | Admitting: EMERGENCY MEDICINE
Payer: MEDICARE

## 2019-02-19 ENCOUNTER — APPOINTMENT (EMERGENCY)
Dept: CT IMAGING | Facility: HOSPITAL | Age: 69
End: 2019-02-19
Payer: MEDICARE

## 2019-02-19 ENCOUNTER — OFFICE VISIT (OUTPATIENT)
Dept: INTERNAL MEDICINE CLINIC | Facility: CLINIC | Age: 69
End: 2019-02-19
Payer: MEDICARE

## 2019-02-19 VITALS
SYSTOLIC BLOOD PRESSURE: 181 MMHG | BODY MASS INDEX: 24.92 KG/M2 | TEMPERATURE: 98 F | OXYGEN SATURATION: 98 % | WEIGHT: 184 LBS | DIASTOLIC BLOOD PRESSURE: 81 MMHG | HEART RATE: 49 BPM | RESPIRATION RATE: 16 BRPM | HEIGHT: 72 IN

## 2019-02-19 VITALS
HEART RATE: 78 BPM | DIASTOLIC BLOOD PRESSURE: 82 MMHG | BODY MASS INDEX: 24.92 KG/M2 | HEIGHT: 72 IN | OXYGEN SATURATION: 98 % | WEIGHT: 184 LBS | RESPIRATION RATE: 18 BRPM | SYSTOLIC BLOOD PRESSURE: 172 MMHG

## 2019-02-19 DIAGNOSIS — R51.9 HEADACHE: Primary | ICD-10-CM

## 2019-02-19 DIAGNOSIS — I10 HYPERTENSION: ICD-10-CM

## 2019-02-19 DIAGNOSIS — H53.8 BLURRY VISION: ICD-10-CM

## 2019-02-19 DIAGNOSIS — I10 ESSENTIAL HYPERTENSION: Primary | ICD-10-CM

## 2019-02-19 LAB
ALBUMIN SERPL BCP-MCNC: 3.9 G/DL (ref 3.5–5)
ALP SERPL-CCNC: 59 U/L (ref 46–116)
ALT SERPL W P-5'-P-CCNC: 29 U/L (ref 12–78)
ANION GAP SERPL CALCULATED.3IONS-SCNC: 7 MMOL/L (ref 4–13)
AST SERPL W P-5'-P-CCNC: 22 U/L (ref 5–45)
ATRIAL RATE: 54 BPM
BASOPHILS # BLD AUTO: 0.02 THOUSANDS/ΜL (ref 0–0.1)
BASOPHILS NFR BLD AUTO: 1 % (ref 0–1)
BILIRUB SERPL-MCNC: 0.4 MG/DL (ref 0.2–1)
BILIRUB UR QL STRIP: NEGATIVE
BUN SERPL-MCNC: 20 MG/DL (ref 5–25)
CALCIUM SERPL-MCNC: 9.3 MG/DL (ref 8.3–10.1)
CHLORIDE SERPL-SCNC: 106 MMOL/L (ref 100–108)
CLARITY UR: CLEAR
CO2 SERPL-SCNC: 30 MMOL/L (ref 21–32)
COLOR UR: YELLOW
CREAT SERPL-MCNC: 1.33 MG/DL (ref 0.6–1.3)
EOSINOPHIL # BLD AUTO: 0.12 THOUSAND/ΜL (ref 0–0.61)
EOSINOPHIL NFR BLD AUTO: 3 % (ref 0–6)
ERYTHROCYTE [DISTWIDTH] IN BLOOD BY AUTOMATED COUNT: 13.6 % (ref 11.6–15.1)
GFR SERPL CREATININE-BSD FRML MDRD: 63 ML/MIN/1.73SQ M
GLUCOSE SERPL-MCNC: 82 MG/DL (ref 65–140)
GLUCOSE UR STRIP-MCNC: NEGATIVE MG/DL
HCT VFR BLD AUTO: 41.3 % (ref 36.5–49.3)
HGB BLD-MCNC: 13.7 G/DL (ref 12–17)
HGB UR QL STRIP.AUTO: NEGATIVE
IMM GRANULOCYTES # BLD AUTO: 0.01 THOUSAND/UL (ref 0–0.2)
IMM GRANULOCYTES NFR BLD AUTO: 0 % (ref 0–2)
INR PPP: 1.09 (ref 0.86–1.17)
KETONES UR STRIP-MCNC: NEGATIVE MG/DL
LEUKOCYTE ESTERASE UR QL STRIP: NEGATIVE
LYMPHOCYTES # BLD AUTO: 1.47 THOUSANDS/ΜL (ref 0.6–4.47)
LYMPHOCYTES NFR BLD AUTO: 39 % (ref 14–44)
MCH RBC QN AUTO: 30.4 PG (ref 26.8–34.3)
MCHC RBC AUTO-ENTMCNC: 33.2 G/DL (ref 31.4–37.4)
MCV RBC AUTO: 92 FL (ref 82–98)
MONOCYTES # BLD AUTO: 0.27 THOUSAND/ΜL (ref 0.17–1.22)
MONOCYTES NFR BLD AUTO: 7 % (ref 4–12)
NEUTROPHILS # BLD AUTO: 1.88 THOUSANDS/ΜL (ref 1.85–7.62)
NEUTS SEG NFR BLD AUTO: 50 % (ref 43–75)
NITRITE UR QL STRIP: NEGATIVE
NRBC BLD AUTO-RTO: 0 /100 WBCS
P AXIS: 71 DEGREES
PH UR STRIP.AUTO: 6 [PH] (ref 4.5–8)
PLATELET # BLD AUTO: 157 THOUSANDS/UL (ref 149–390)
PMV BLD AUTO: 12 FL (ref 8.9–12.7)
POTASSIUM SERPL-SCNC: 4.4 MMOL/L (ref 3.5–5.3)
PR INTERVAL: 162 MS
PROT SERPL-MCNC: 7.4 G/DL (ref 6.4–8.2)
PROT UR STRIP-MCNC: NEGATIVE MG/DL
PROTHROMBIN TIME: 14 SECONDS (ref 11.8–14.2)
QRS AXIS: 53 DEGREES
QRSD INTERVAL: 92 MS
QT INTERVAL: 428 MS
QTC INTERVAL: 405 MS
RBC # BLD AUTO: 4.5 MILLION/UL (ref 3.88–5.62)
SODIUM SERPL-SCNC: 143 MMOL/L (ref 136–145)
SP GR UR STRIP.AUTO: <=1.005 (ref 1–1.03)
T WAVE AXIS: 40 DEGREES
TROPONIN I SERPL-MCNC: <0.02 NG/ML
UROBILINOGEN UR QL STRIP.AUTO: 0.2 E.U./DL
VENTRICULAR RATE: 54 BPM
WBC # BLD AUTO: 3.77 THOUSAND/UL (ref 4.31–10.16)

## 2019-02-19 PROCEDURE — 85610 PROTHROMBIN TIME: CPT | Performed by: EMERGENCY MEDICINE

## 2019-02-19 PROCEDURE — 36415 COLL VENOUS BLD VENIPUNCTURE: CPT | Performed by: EMERGENCY MEDICINE

## 2019-02-19 PROCEDURE — 93010 ELECTROCARDIOGRAM REPORT: CPT | Performed by: INTERNAL MEDICINE

## 2019-02-19 PROCEDURE — 96374 THER/PROPH/DIAG INJ IV PUSH: CPT

## 2019-02-19 PROCEDURE — 70498 CT ANGIOGRAPHY NECK: CPT

## 2019-02-19 PROCEDURE — 84484 ASSAY OF TROPONIN QUANT: CPT | Performed by: EMERGENCY MEDICINE

## 2019-02-19 PROCEDURE — 70496 CT ANGIOGRAPHY HEAD: CPT

## 2019-02-19 PROCEDURE — 99214 OFFICE O/P EST MOD 30 MIN: CPT | Performed by: INTERNAL MEDICINE

## 2019-02-19 PROCEDURE — 85025 COMPLETE CBC W/AUTO DIFF WBC: CPT | Performed by: EMERGENCY MEDICINE

## 2019-02-19 PROCEDURE — 81003 URINALYSIS AUTO W/O SCOPE: CPT | Performed by: EMERGENCY MEDICINE

## 2019-02-19 PROCEDURE — 93005 ELECTROCARDIOGRAM TRACING: CPT

## 2019-02-19 PROCEDURE — 80053 COMPREHEN METABOLIC PANEL: CPT | Performed by: EMERGENCY MEDICINE

## 2019-02-19 PROCEDURE — 99285 EMERGENCY DEPT VISIT HI MDM: CPT

## 2019-02-19 RX ORDER — DIAZEPAM 5 MG/ML
2.5 INJECTION, SOLUTION INTRAMUSCULAR; INTRAVENOUS ONCE
Status: COMPLETED | OUTPATIENT
Start: 2019-02-19 | End: 2019-02-19

## 2019-02-19 RX ORDER — AMLODIPINE BESYLATE 5 MG/1
5 TABLET ORAL DAILY
Qty: 30 TABLET | Refills: 5 | Status: SHIPPED | OUTPATIENT
Start: 2019-02-19 | End: 2019-03-08 | Stop reason: SDUPTHER

## 2019-02-19 RX ADMIN — IOHEXOL 85 ML: 350 INJECTION, SOLUTION INTRAVENOUS at 12:47

## 2019-02-19 RX ADMIN — Medication 2.5 MG: at 12:20

## 2019-02-19 NOTE — ED NOTES
Patient reports history of TIA in 2004 and is taking Aspirin 81 mcg daily     Velasquez Das, [address] [facility]  02/19/19 6056

## 2019-02-19 NOTE — PROGRESS NOTES
Assessment/Plan:     Reviewed his ER records  This is probably hypertension  He was borderline before  Even with his exercise regimen, the pressure is now consistently elevated  He is willing to go on medicine  Will start him on amlodipine 5 mg daily  Reviewed the medication with him  He will stop the medicine and call for any perceived side effects  Return in about 1 week (around 2/26/2019)  No problem-specific Assessment & Plan notes found for this encounter  Diagnoses and all orders for this visit:    Essential hypertension  -     amLODIPine (NORVASC) 5 mg tablet; Take 1 tablet (5 mg total) by mouth daily    Blurry vision          Subjective:      Patient ID: Anna Ibrahim is a 76 y o  male  Patient comes in today with elevated blood pressure  He had called this morning because yesterday after exercising he started to notice some blurry vision and a slight headache  Some slight vertigo  He was given an appointment today but also instructed to go to the ER if worsened  He did elect to go to the ER  In the ER, his labs were normal and a CTA of the head was unrevealing  But his blood pressure was elevated  It is also elevated here        ALLERGIES:  Allergies   Allergen Reactions    Penicillin V Anaphylaxis       CURRENT MEDICATIONS:    Current Outpatient Medications:     aspirin 81 mg chewable tablet, Chew 81 mg daily, Disp: , Rfl:     cyclobenzaprine (FLEXERIL) 5 mg tablet, Take 1 tablet (5 mg total) by mouth 3 (three) times a day as needed for muscle spasms (Right jaw pain) for up to 7 doses, Disp: 21 tablet, Rfl: 0    lidocaine (LIDODERM) 5 %, Apply 1 patch topically daily Remove & Discard patch within 12 hours or as directed by MD, Disp: 30 patch, Rfl: 1    multivitamin (THERAGRAN) TABS, Take 1 tablet by mouth daily, Disp: , Rfl:     tamsulosin (FLOMAX) 0 4 mg, TAKE 1 CAPSULE 30 MINUTES AFTER THE SAME MEAL EACH DAY, Disp: 90 capsule, Rfl: 3    amLODIPine (NORVASC) 5 mg tablet, Take 1 tablet (5 mg total) by mouth daily, Disp: 30 tablet, Rfl: 5  No current facility-administered medications for this visit       ACTIVE PROBLEM LIST:  Patient Active Problem List   Diagnosis    Benign prostatic hyperplasia, presence of lower urinary tract symptoms unspecified    Erectile dysfunction    Lumbar radiculopathy    Azotemia       PAST MEDICAL HISTORY:  Past Medical History:   Diagnosis Date    Benign prostatic hyperplasia     Esophageal reflux     GERD (gastroesophageal reflux disease)     Vertigo        PAST SURGICAL HISTORY:  Past Surgical History:   Procedure Laterality Date    APPENDECTOMY  05/21/2015    CYST REMOVAL      Fatty cyst removed from back       FAMILY HISTORY:  Family History   Problem Relation Age of Onset    Prostate cancer Father     Prostate cancer Maternal Grandfather     Stomach cancer Maternal Aunt         malignant tumor of pharynx    Stomach cancer Maternal Uncle         malignant tumor of pharynx    Mental illness Family     Depression Family     Schizophrenia Family        SOCIAL HISTORY:  Social History     Socioeconomic History    Marital status: /Civil Union     Spouse name: Not on file    Number of children: Not on file    Years of education: Not on file    Highest education level: Not on file   Occupational History    Occupation: Retired   Social Needs    Financial resource strain: Not on file    Food insecurity:     Worry: Not on file     Inability: Not on file   Advisor Client Match needs:     Medical: Not on file     Non-medical: Not on file   Tobacco Use    Smoking status: Former Smoker    Smokeless tobacco: Never Used    Tobacco comment: 1ppw   Substance and Sexual Activity    Alcohol use: Yes     Comment: socially    Drug use: No    Sexual activity: Yes     Partners: Female     Comment: denied history of high risk sexual behavior   Lifestyle    Physical activity:     Days per week: Not on file     Minutes per session: Not on file    Stress: Not on file   Relationships    Social connections:     Talks on phone: Not on file     Gets together: Not on file     Attends Jain service: Not on file     Active member of club or organization: Not on file     Attends meetings of clubs or organizations: Not on file     Relationship status: Not on file    Intimate partner violence:     Fear of current or ex partner: Not on file     Emotionally abused: Not on file     Physically abused: Not on file     Forced sexual activity: Not on file   Other Topics Concern    Not on file   Social History Narrative    No active advance directive    Always uses seat belt    Exercises occasionally, moderate    Five children    Occasional caffeine consumption       Review of Systems   Respiratory: Negative for shortness of breath  Cardiovascular: Negative for chest pain  Gastrointestinal: Negative for abdominal pain  Objective:  Vitals:    02/19/19 1558   BP: (!) 172/82   BP Location: Left arm   Patient Position: Sitting   Cuff Size: Large   Pulse: 78   Resp: 18   SpO2: 98%   Weight: 83 5 kg (184 lb)   Height: 6' (1 829 m)     Body mass index is 24 95 kg/m²  Physical Exam   Constitutional: He is oriented to person, place, and time  He appears well-developed and well-nourished  Neurological: He is alert and oriented to person, place, and time  Nursing note and vitals reviewed          RESULTS:    Recent Results (from the past 1008 hour(s))   ECG 12 lead    Collection Time: 02/19/19 11:49 AM   Result Value Ref Range    Ventricular Rate 54 BPM    Atrial Rate 54 BPM    ME Interval 162 ms    QRSD Interval 92 ms    QT Interval 428 ms    QTC Interval 405 ms    P Axis 71 degrees    QRS Axis 53 degrees    T Wave Axis 40 degrees   UA w Reflex to Microscopic w Reflex to Culture    Collection Time: 02/19/19 11:51 AM   Result Value Ref Range    Color, UA Yellow     Clarity, UA Clear     Specific Gravity, UA <=1 005 1 003 - 1 030    pH, UA 6 0 4 5 - 8 0    Leukocytes, UA Negative Negative    Nitrite, UA Negative Negative    Protein, UA Negative Negative mg/dl    Glucose, UA Negative Negative mg/dl    Ketones, UA Negative Negative mg/dl    Urobilinogen, UA 0 2 0 2, 1 0 E U /dl E U /dl    Bilirubin, UA Negative Negative    Blood, UA Negative Negative   CBC and differential    Collection Time: 02/19/19 12:04 PM   Result Value Ref Range    WBC 3 77 (L) 4 31 - 10 16 Thousand/uL    RBC 4 50 3 88 - 5 62 Million/uL    Hemoglobin 13 7 12 0 - 17 0 g/dL    Hematocrit 41 3 36 5 - 49 3 %    MCV 92 82 - 98 fL    MCH 30 4 26 8 - 34 3 pg    MCHC 33 2 31 4 - 37 4 g/dL    RDW 13 6 11 6 - 15 1 %    MPV 12 0 8 9 - 12 7 fL    Platelets 931 763 - 933 Thousands/uL    nRBC 0 /100 WBCs    Neutrophils Relative 50 43 - 75 %    Immat GRANS % 0 0 - 2 %    Lymphocytes Relative 39 14 - 44 %    Monocytes Relative 7 4 - 12 %    Eosinophils Relative 3 0 - 6 %    Basophils Relative 1 0 - 1 %    Neutrophils Absolute 1 88 1 85 - 7 62 Thousands/µL    Immature Grans Absolute 0 01 0 00 - 0 20 Thousand/uL    Lymphocytes Absolute 1 47 0 60 - 4 47 Thousands/µL    Monocytes Absolute 0 27 0 17 - 1 22 Thousand/µL    Eosinophils Absolute 0 12 0 00 - 0 61 Thousand/µL    Basophils Absolute 0 02 0 00 - 0 10 Thousands/µL   Comprehensive metabolic panel    Collection Time: 02/19/19 12:04 PM   Result Value Ref Range    Sodium 143 136 - 145 mmol/L    Potassium 4 4 3 5 - 5 3 mmol/L    Chloride 106 100 - 108 mmol/L    CO2 30 21 - 32 mmol/L    ANION GAP 7 4 - 13 mmol/L    BUN 20 5 - 25 mg/dL    Creatinine 1 33 (H) 0 60 - 1 30 mg/dL    Glucose 82 65 - 140 mg/dL    Calcium 9 3 8 3 - 10 1 mg/dL    AST 22 5 - 45 U/L    ALT 29 12 - 78 U/L    Alkaline Phosphatase 59 46 - 116 U/L    Total Protein 7 4 6 4 - 8 2 g/dL    Albumin 3 9 3 5 - 5 0 g/dL    Total Bilirubin 0 40 0 20 - 1 00 mg/dL    eGFR 63 ml/min/1 73sq m   Protime-INR    Collection Time: 02/19/19 12:04 PM   Result Value Ref Range    Protime 14 0 11 8 - 14 2 seconds INR 1 09 0 86 - 1 17   Troponin I    Collection Time: 02/19/19 12:04 PM   Result Value Ref Range    Troponin I <0 02 <=0 04 ng/mL       This note was created with voice recognition software  Phonic, grammatical and spelling errors may be present within the note as a result

## 2019-02-19 NOTE — ED PROVIDER NOTES
History  Chief Complaint   Patient presents with   [address]     pt states "was at gym yesterday and had episoside of blurred vision" pt states to have hx of vertigo, but states it "feels different than normal"     Pt states that yesterday at the gym he got dizzy and lightheaded at the gym  He was lightheaded and had blurry vision  He had another episode last night and now has central blurry vision  He has not had this happen before  He states he also woke up with left foot pain  He can't explain these symptoms  History provided by:  Patient   used: No    Headache   Pain location:  Generalized  Quality:  Dull  Timing:  Constant  Progression:  Waxing and waning  Chronicity:  New  Context: bright light and loud noise    Relieved by:  Nothing  Worsened by:  Nothing  Ineffective treatments:  None tried  Associated symptoms: blurred vision    Associated symptoms: no back pain, no cough, no ear pain, no fever, no focal weakness, no loss of balance, no neck pain, no photophobia, no sinus pressure, no sore throat, no swollen glands and no URI        Prior to Admission Medications   Prescriptions Last Dose Informant Patient Reported?  Taking?   aspirin 81 mg chewable tablet   Yes No   Sig: Chew 81 mg daily   cyclobenzaprine (FLEXERIL) 5 mg tablet   No No   Sig: Take 1 tablet (5 mg total) by mouth 3 (three) times a day as needed for muscle spasms (Right jaw pain) for up to 7 doses   lidocaine (LIDODERM) 5 %   No No   Sig: Apply 1 patch topically daily Remove & Discard patch within 12 hours or as directed by MD   multivitamin (THERAGRAN) TABS   Yes No   Sig: Take 1 tablet by mouth daily   tamsulosin (FLOMAX) 0 4 mg   No No   Sig: TAKE 1 CAPSULE 30 MINUTES AFTER THE SAME MEAL EACH DAY      Facility-Administered Medications: None       Past Medical History:   Diagnosis Date    Benign prostatic hyperplasia     Esophageal reflux     GERD (gastroesophageal reflux disease)     Vertigo Past Surgical History:   Procedure Laterality Date    APPENDECTOMY  05/21/2015    CYST REMOVAL      Fatty cyst removed from back       Family History   Problem Relation Age of Onset    Prostate cancer Father     Prostate cancer Maternal Grandfather     Stomach cancer Maternal Aunt         malignant tumor of pharynx    Stomach cancer Maternal Uncle         malignant tumor of pharynx    Mental illness Family     Depression Family     Schizophrenia Family      I have reviewed and agree with the history as documented  Social History     Tobacco Use    Smoking status: Former Smoker    Smokeless tobacco: Never Used    Tobacco comment: 1ppw   Substance Use Topics    Alcohol use: Yes     Comment: socially    Drug use: No        Review of Systems   Constitutional: Negative for fever  HENT: Negative for ear pain, sinus pressure and sore throat  Eyes: Positive for blurred vision  Negative for photophobia  Respiratory: Negative for cough  Musculoskeletal: Negative for back pain and neck pain  Neurological: Positive for headaches  Negative for focal weakness and loss of balance  All other systems reviewed and are negative  Physical Exam  Physical Exam   Constitutional: He is oriented to person, place, and time  He appears well-developed and well-nourished  No distress  HENT:   Head: Normocephalic and atraumatic  Right Ear: External ear normal    Left Ear: External ear normal    Eyes: Conjunctivae and EOM are normal  Right eye exhibits no discharge  Left eye exhibits no discharge  No scleral icterus  Neck: Normal range of motion  Neck supple  No JVD present  No tracheal deviation present  No thyromegaly present  Cardiovascular: Normal rate and regular rhythm  Pulmonary/Chest: Effort normal and breath sounds normal  No stridor  No respiratory distress  He has no wheezes  He has no rales  Abdominal: Soft  Bowel sounds are normal  He exhibits no distension   There is no tenderness  Musculoskeletal: Normal range of motion  He exhibits no edema, tenderness or deformity  Neurological: He is alert and oriented to person, place, and time  No cranial nerve deficit  Coordination normal    Skin: Skin is warm and dry  He is not diaphoretic  Psychiatric: He has a normal mood and affect  His behavior is normal    Nursing note and vitals reviewed        Vital Signs  ED Triage Vitals   Temperature Pulse Respirations Blood Pressure SpO2   02/19/19 1059 02/19/19 1059 02/19/19 1339 02/19/19 1059 02/19/19 1059   98 °F (36 7 °C) 62 16 (!) 178/85 100 %      Temp Source Heart Rate Source Patient Position - Orthostatic VS BP Location FiO2 (%)   02/19/19 1059 02/19/19 1059 02/19/19 1059 02/19/19 1059 --   Oral Monitor Sitting Left arm       Pain Score       02/19/19 1059       2           Vitals:    02/19/19 1059 02/19/19 1339   BP: (!) 178/85 (!) 181/81   Pulse: 62 (!) 49   Patient Position - Orthostatic VS: Sitting Lying       Visual Acuity      ED Medications  Medications   diazepam (VALIUM) injection 2 5 mg (2 5 mg Intravenous Given 2/19/19 1220)   iohexol (OMNIPAQUE) 350 MG/ML injection (MULTI-DOSE) 85 mL (85 mL Intravenous Given 2/19/19 1247)       Diagnostic Studies  Results Reviewed     Procedure Component Value Units Date/Time    Troponin I [[id number]]  (Normal) Collected:  02/19/19 1204    Lab Status:  Final result Specimen:  Blood from Arm, Left Updated:  02/19/19 1234     Troponin I <0 02 ng/mL     Comprehensive metabolic panel [[id number]]  (Abnormal) Collected:  02/19/19 1204    Lab Status:  Final result Specimen:  Blood from Arm, Left Updated:  02/19/19 1231     Sodium 143 mmol/L      Potassium 4 4 mmol/L      Chloride 106 mmol/L      CO2 30 mmol/L      ANION GAP 7 mmol/L      BUN 20 mg/dL      Creatinine 1 33 mg/dL      Glucose 82 mg/dL      Calcium 9 3 mg/dL      AST 22 U/L      ALT 29 U/L      Alkaline Phosphatase 59 U/L      Total Protein 7 4 g/dL      Albumin 3 9 g/dL      Total Bilirubin 0 40 mg/dL      eGFR 63 ml/min/1 73sq m     Narrative:       National Kidney Disease Education Program recommendations are as follows:  GFR calculation is accurate only with a steady state creatinine  Chronic Kidney disease less than 60 ml/min/1 73 sq  meters  Kidney failure less than 15 ml/min/1 73 sq  meters      Protime-INR [[id number]]  (Normal) Collected:  02/19/19 1204    Lab Status:  Final result Specimen:  Blood from Arm, Left Updated:  02/19/19 1225     Protime 14 0 seconds      INR 1 09    CBC and differential [[id number]]  (Abnormal) Collected:  02/19/19 1204    Lab Status:  Final result Specimen:  Blood from Arm, Left Updated:  02/19/19 1216     WBC 3 77 Thousand/uL      RBC 4 50 Million/uL      Hemoglobin 13 7 g/dL      Hematocrit 41 3 %      MCV 92 fL      MCH 30 4 pg      MCHC 33 2 g/dL      RDW 13 6 %      MPV 12 0 fL      Platelets 578 Thousands/uL      nRBC 0 /100 WBCs      Neutrophils Relative 50 %      Immat GRANS % 0 %      Lymphocytes Relative 39 %      Monocytes Relative 7 %      Eosinophils Relative 3 %      Basophils Relative 1 %      Neutrophils Absolute 1 88 Thousands/µL      Immature Grans Absolute 0 01 Thousand/uL      Lymphocytes Absolute 1 47 Thousands/µL      Monocytes Absolute 0 27 Thousand/µL      Eosinophils Absolute 0 12 Thousand/µL      Basophils Absolute 0 02 Thousands/µL     Troponin I [[id number]]     Lab Status:  No result Specimen:  Blood     UA w Reflex to Microscopic w Reflex to Culture [[id number]] Collected:  02/19/19 1151    Lab Status:  Final result Specimen:  Urine, Clean Catch Updated:  02/19/19 1158     Color, UA Yellow     Clarity, UA Clear     Specific Gravity, UA <=1 005     pH, UA 6 0     Leukocytes, UA Negative     Nitrite, UA Negative     Protein, UA Negative mg/dl      Glucose, UA Negative mg/dl      Ketones, UA Negative mg/dl      Urobilinogen, UA 0 2 E U /dl      Bilirubin, UA Negative     Blood, UA Negative                 CTA head and neck with and without contrast   Final Result by Yue Chacon MD (02/19 0198)      1  No acute intracranial CT abnormality  2   Patent major cervical and intracranial arteries without critical stenosis  No aneurysm or AV malformation  Workstation performed: [id number]                    Procedures  Procedures       Phone Contacts  ED Phone Contact    ED Course  ED Course as of Feb 19 1619   Tue Feb 19, 2019   1257 Comprehensive metabolic panel(!)           Identification of Seniors at Risk      Most Recent Value   (ISAR) Identification of Seniors at Risk   Before the illness or injury that brought you to the Emergency, did you need someone to help you on a regular basis? 0 Filed at: 02/19/2019 1101   In the last 24 hours, have you needed more help than usual?  0 Filed at: 02/19/2019 1101   Have you been hospitalized for one or more nights during the past 6 months? 0 Filed at: 02/19/2019 1101   In general, do you see well?  0 Filed at: 02/19/2019 1101   In general, do you have serious problems with your memory?   0 Filed at: 02/19/2019 1101   Do you take more than three different medications every day?  0 Filed at: 02/19/2019 1101   ISAR Score  0 Filed at: 02/19/2019 1101                          MDM  Number of Diagnoses or Management Options  Headache: new and requires workup  Hypertension: new and requires workup     Amount and/or Complexity of Data Reviewed  Clinical lab tests: ordered  Tests in the radiology section of CPT®: reviewed and ordered  Decide to obtain previous medical records or to obtain history from someone other than the patient: yes  Review and summarize past medical records: yes    Patient Progress  Patient progress: stable      Disposition  Final diagnoses:   Headache   Hypertension     Time reflects when diagnosis was documented in both MDM as applicable and the Disposition within this note     Time User Action Codes Description Comment    2/19/2019  2:25 PM Dipti Hood Headache     2/19/2019  2:26 PM Dovoria Shone Add [I10] Hypertension       ED Disposition     ED Disposition Condition Date/Time Comment    Discharge Stable Tue Feb 19, 2019  2:26 PM Varsha Mason discharge to home/self care  Follow-up Information     Follow up With Specialties Details Why Contact Info    Joe Brambila MD Internal Medicine   [address]   [address] [place] [address]  [phone]            Discharge Medication List as of 2/19/2019  2:27 PM      CONTINUE these medications which have NOT CHANGED    Details   aspirin 81 mg chewable tablet Chew 81 mg daily, Historical Med      cyclobenzaprine (FLEXERIL) 5 mg tablet Take 1 tablet (5 mg total) by mouth 3 (three) times a day as needed for muscle spasms (Right jaw pain) for up to 7 doses, Starting Sun 6/24/2018, Print      lidocaine (LIDODERM) 5 % Apply 1 patch topically daily Remove & Discard patch within 12 hours or as directed by MD, Starting Thu 2/7/2019, Normal      multivitamin (THERAGRAN) TABS Take 1 tablet by mouth daily, Historical Med      tamsulosin (FLOMAX) 0 4 mg TAKE 1 CAPSULE 30 MINUTES AFTER THE SAME MEAL EACH DAY, Normal      tadalafil (CIALIS) 20 MG tablet Take 1 tablet (20 mg total) by mouth daily as needed for erectile dysfunction, Starting e 10/16/2018, Normal           No discharge procedures on file      ED Provider  Electronically Signed by           Ce Maxwell DO  02/19/19 5742

## 2019-02-26 ENCOUNTER — OFFICE VISIT (OUTPATIENT)
Dept: INTERNAL MEDICINE CLINIC | Facility: CLINIC | Age: 69
End: 2019-02-26
Payer: MEDICARE

## 2019-02-26 VITALS
BODY MASS INDEX: 24.92 KG/M2 | SYSTOLIC BLOOD PRESSURE: 138 MMHG | DIASTOLIC BLOOD PRESSURE: 78 MMHG | WEIGHT: 184 LBS | RESPIRATION RATE: 16 BRPM | HEART RATE: 72 BPM | OXYGEN SATURATION: 97 % | HEIGHT: 72 IN

## 2019-02-26 DIAGNOSIS — I10 ESSENTIAL HYPERTENSION: Primary | ICD-10-CM

## 2019-02-26 PROCEDURE — 99213 OFFICE O/P EST LOW 20 MIN: CPT | Performed by: INTERNAL MEDICINE

## 2019-02-26 NOTE — PROGRESS NOTES
Assessment/Plan:     Pressure is much better  Continue current medications  He may resume diet and exercise  Return in about 1 month (around 3/26/2019)  No problem-specific Assessment & Plan notes found for this encounter  Diagnoses and all orders for this visit:    Essential hypertension          Subjective:      Patient ID: Hitesh Wood is a 76 y o  male  Patient comes in today for follow-up on the amlodipine  His blood pressure is much better  He notes no side effects on the medicine  No headaches  Vision is improving but he thinks he may just need new glasses now  No new complaints        ALLERGIES:  Allergies   Allergen Reactions    Penicillin V Anaphylaxis       CURRENT MEDICATIONS:    Current Outpatient Medications:     amLODIPine (NORVASC) 5 mg tablet, Take 1 tablet (5 mg total) by mouth daily, Disp: 30 tablet, Rfl: 5    aspirin 81 mg chewable tablet, Chew 81 mg daily, Disp: , Rfl:     cyclobenzaprine (FLEXERIL) 5 mg tablet, Take 1 tablet (5 mg total) by mouth 3 (three) times a day as needed for muscle spasms (Right jaw pain) for up to 7 doses, Disp: 21 tablet, Rfl: 0    lidocaine (LIDODERM) 5 %, Apply 1 patch topically daily Remove & Discard patch within 12 hours or as directed by MD, Disp: 30 patch, Rfl: 1    multivitamin (THERAGRAN) TABS, Take 1 tablet by mouth daily, Disp: , Rfl:     tamsulosin (FLOMAX) 0 4 mg, TAKE 1 CAPSULE 30 MINUTES AFTER THE SAME MEAL EACH DAY, Disp: 90 capsule, Rfl: 3    ACTIVE PROBLEM LIST:  Patient Active Problem List   Diagnosis    Benign prostatic hyperplasia, presence of lower urinary tract symptoms unspecified    Erectile dysfunction    Lumbar radiculopathy    Azotemia       PAST MEDICAL HISTORY:  Past Medical History:   Diagnosis Date    Benign prostatic hyperplasia     Esophageal reflux     GERD (gastroesophageal reflux disease)     Vertigo        PAST SURGICAL HISTORY:  Past Surgical History:   Procedure Laterality Date    APPENDECTOMY  05/21/2015    CYST REMOVAL      Fatty cyst removed from back       FAMILY HISTORY:  Family History   Problem Relation Age of Onset    Prostate cancer Father     Prostate cancer Maternal Grandfather     Stomach cancer Maternal Aunt         malignant tumor of pharynx    Stomach cancer Maternal Uncle         malignant tumor of pharynx    Mental illness Family     Depression Family     Schizophrenia Family        SOCIAL HISTORY:  Social History     Socioeconomic History    Marital status: /Civil Union     Spouse name: Not on file    Number of children: Not on file    Years of education: Not on file    Highest education level: Not on file   Occupational History    Occupation: Retired   Social Needs    Financial resource strain: Not on file    Food insecurity:     Worry: Not on file     Inability: Not on file   [employer] needs:     Medical: Not on file     Non-medical: Not on file   Tobacco Use    Smoking status: Former Smoker    Smokeless tobacco: Never Used    Tobacco comment: 1ppw   Substance and Sexual Activity    Alcohol use: Yes     Comment: socially    Drug use: No    Sexual activity: Yes     Partners: Female     Comment: denied history of high risk sexual behavior   Lifestyle    Physical activity:     Days per week: Not on file     Minutes per session: Not on file    Stress: Not on file   Relationships    Social connections:     Talks on phone: Not on file     Gets together: Not on file     Attends Hindu service: Not on file     Active member of club or organization: Not on file     Attends meetings of clubs or organizations: Not on file     Relationship status: Not on file    Intimate partner violence:     Fear of current or ex partner: Not on file     Emotionally abused: Not on file     Physically abused: Not on file     Forced sexual activity: Not on file   Other Topics Concern    Not on file   Social History Narrative    No active advance directive Always uses seat belt    Exercises occasionally, moderate    Five children    Occasional caffeine consumption       Review of Systems   Respiratory: Negative for shortness of breath  Cardiovascular: Negative for chest pain  Gastrointestinal: Negative for abdominal pain  Objective:  Vitals:    02/26/19 1535   BP: 138/78   Pulse: 72   Resp: 16   SpO2: 97%   Weight: 83 5 kg (184 lb)   Height: 6' (1 829 m)     Body mass index is 24 95 kg/m²  Physical Exam   Constitutional: He is oriented to person, place, and time  He appears well-developed and well-nourished  Cardiovascular: Normal rate, regular rhythm and normal heart sounds  Pulmonary/Chest: Effort normal and breath sounds normal    Abdominal: Soft  Bowel sounds are normal    Musculoskeletal: He exhibits no edema  Neurological: He is alert and oriented to person, place, and time  Nursing note and vitals reviewed          RESULTS:    Recent Results (from the past 1008 hour(s))   ECG 12 lead    Collection Time: 02/19/19 11:49 AM   Result Value Ref Range    Ventricular Rate 54 BPM    Atrial Rate 54 BPM    IL Interval 162 ms    QRSD Interval 92 ms    QT Interval 428 ms    QTC Interval 405 ms    P Axis 71 degrees    QRS Axis 53 degrees    T Wave Axis 40 degrees   UA w Reflex to Microscopic w Reflex to Culture    Collection Time: 02/19/19 11:51 AM   Result Value Ref Range    Color, UA Yellow     Clarity, UA Clear     Specific Gravity, UA <=1 005 1 003 - 1 030    pH, UA 6 0 4 5 - 8 0    Leukocytes, UA Negative Negative    Nitrite, UA Negative Negative    Protein, UA Negative Negative mg/dl    Glucose, UA Negative Negative mg/dl    Ketones, UA Negative Negative mg/dl    Urobilinogen, UA 0 2 0 2, 1 0 E U /dl E U /dl    Bilirubin, UA Negative Negative    Blood, UA Negative Negative   CBC and differential    Collection Time: 02/19/19 12:04 PM   Result Value Ref Range    WBC 3 77 (L) 4 31 - 10 16 Thousand/uL    RBC 4 50 3 88 - 5 62 Million/uL Hemoglobin 13 7 12 0 - 17 0 g/dL    Hematocrit 41 3 36 5 - 49 3 %    MCV 92 82 - 98 fL    MCH 30 4 26 8 - 34 3 pg    MCHC 33 2 31 4 - 37 4 g/dL    RDW 13 6 11 6 - 15 1 %    MPV 12 0 8 9 - 12 7 fL    Platelets 094 617 - 039 Thousands/uL    nRBC 0 /100 WBCs    Neutrophils Relative 50 43 - 75 %    Immat GRANS % 0 0 - 2 %    Lymphocytes Relative 39 14 - 44 %    Monocytes Relative 7 4 - 12 %    Eosinophils Relative 3 0 - 6 %    Basophils Relative 1 0 - 1 %    Neutrophils Absolute 1 88 1 85 - 7 62 Thousands/µL    Immature Grans Absolute 0 01 0 00 - 0 20 Thousand/uL    Lymphocytes Absolute 1 47 0 60 - 4 47 Thousands/µL    Monocytes Absolute 0 27 0 17 - 1 22 Thousand/µL    Eosinophils Absolute 0 12 0 00 - 0 61 Thousand/µL    Basophils Absolute 0 02 0 00 - 0 10 Thousands/µL   Comprehensive metabolic panel    Collection Time: 02/19/19 12:04 PM   Result Value Ref Range    Sodium 143 136 - 145 mmol/L    Potassium 4 4 3 5 - 5 3 mmol/L    Chloride 106 100 - 108 mmol/L    CO2 30 21 - 32 mmol/L    ANION GAP 7 4 - 13 mmol/L    BUN 20 5 - 25 mg/dL    Creatinine 1 33 (H) 0 60 - 1 30 mg/dL    Glucose 82 65 - 140 mg/dL    Calcium 9 3 8 3 - 10 1 mg/dL    AST 22 5 - 45 U/L    ALT 29 12 - 78 U/L    Alkaline Phosphatase 59 46 - 116 U/L    Total Protein 7 4 6 4 - 8 2 g/dL    Albumin 3 9 3 5 - 5 0 g/dL    Total Bilirubin 0 40 0 20 - 1 00 mg/dL    eGFR 63 ml/min/1 73sq m   Protime-INR    Collection Time: 02/19/19 12:04 PM   Result Value Ref Range    Protime 14 0 11 8 - 14 2 seconds    INR 1 09 0 86 - 1 17   Troponin I    Collection Time: 02/19/19 12:04 PM   Result Value Ref Range    Troponin I <0 02 <=0 04 ng/mL       This note was created with voice recognition software  Phonic, grammatical and spelling errors may be present within the note as a result

## 2019-03-08 DIAGNOSIS — I10 ESSENTIAL HYPERTENSION: ICD-10-CM

## 2019-03-11 RX ORDER — AMLODIPINE BESYLATE 5 MG/1
5 TABLET ORAL DAILY
Qty: 90 TABLET | Refills: 1 | Status: SHIPPED | OUTPATIENT
Start: 2019-03-11 | End: 2019-07-03 | Stop reason: ALTCHOICE

## 2019-03-21 ENCOUNTER — HOSPITAL ENCOUNTER (EMERGENCY)
Facility: HOSPITAL | Age: 69
Discharge: HOME | End: 2019-03-21
Attending: EMERGENCY MEDICINE | Admitting: EMERGENCY MEDICINE
Payer: MEDICARE

## 2019-03-21 ENCOUNTER — APPOINTMENT (EMERGENCY)
Dept: RADIOLOGY | Facility: HOSPITAL | Age: 69
End: 2019-03-21
Payer: MEDICARE

## 2019-03-21 VITALS
HEART RATE: 61 BPM | WEIGHT: 180 LBS | SYSTOLIC BLOOD PRESSURE: 169 MMHG | BODY MASS INDEX: 24.38 KG/M2 | RESPIRATION RATE: 18 BRPM | OXYGEN SATURATION: 99 % | DIASTOLIC BLOOD PRESSURE: 82 MMHG | TEMPERATURE: 97.5 F | HEIGHT: 72 IN

## 2019-03-21 DIAGNOSIS — S92.514A CLOSED NONDISPLACED FRACTURE OF PROXIMAL PHALANX OF LESSER TOE OF RIGHT FOOT, INITIAL ENCOUNTER: Primary | ICD-10-CM

## 2019-03-21 PROCEDURE — 99283 EMERGENCY DEPT VISIT LOW MDM: CPT

## 2019-03-21 PROCEDURE — 73630 X-RAY EXAM OF FOOT: CPT

## 2019-03-21 RX ORDER — NAPROXEN 500 MG/1
500 TABLET ORAL 2 TIMES DAILY WITH MEALS
Qty: 20 TABLET | Refills: 0 | Status: SHIPPED | OUTPATIENT
Start: 2019-03-21 | End: 2019-04-07

## 2019-03-21 RX ORDER — HYDROCODONE BITARTRATE AND ACETAMINOPHEN 5; 325 MG/1; MG/1
1 TABLET ORAL EVERY 6 HOURS PRN
Qty: 10 TABLET | Refills: 0 | Status: SHIPPED | OUTPATIENT
Start: 2019-03-21 | End: 2019-03-24

## 2019-03-21 RX ORDER — HYDROCODONE BITARTRATE AND ACETAMINOPHEN 5; 325 MG/1; MG/1
1 TABLET ORAL ONCE
Status: DISCONTINUED | OUTPATIENT
Start: 2019-03-21 | End: 2019-03-21 | Stop reason: HOSPADM

## 2019-03-21 RX ORDER — NAPROXEN 250 MG/1
500 TABLET ORAL ONCE
Status: COMPLETED | OUTPATIENT
Start: 2019-03-21 | End: 2019-03-21

## 2019-03-21 RX ADMIN — NAPROXEN 500 MG: 250 TABLET ORAL at 21:32

## 2019-03-22 NOTE — ED PROVIDER NOTES
History  Chief Complaint   Patient presents with   [facility] Injury     Per patient his mirror that attaches to his dresser fell on his right foot  Patient reports small laceration and swelling of the foot  Bleeding controlled  51-year-old male with a history of hypertension here for evaluation of right foot injury  Patient reports on Sunday this past week he was moving accidentally dropped a large more on the dorsum of his right distal foot he has had some pain since that time although got worse today noticed that some swelling on the dorsum of his foot is here for further evaluation  He has been able to walk and ambulate on it without significant discomfort his area of concern is localized to the dorsum of his right lateral foot primarily his 3rd 4th and 5th toe radiates into the immediate area it is worse with ambulation he has not taken anything for this he notes a very superficial non communicating abrasion on the dorsum of switch but denies other wounds weakness paresthesias or anesthesia history of diabetes poor wound healing easy bleeding or bruising he has no other injuries complaints or concerns otherwise denies a complete review systems as noted          Prior to Admission Medications   Prescriptions Last Dose Informant Patient Reported? Taking?    amLODIPine (NORVASC) 5 mg tablet   No Yes   Sig: Take 1 tablet (5 mg total) by mouth daily   aspirin 81 mg chewable tablet   Yes Yes   Sig: Chew 81 mg daily   cyclobenzaprine (FLEXERIL) 5 mg tablet   No Yes   Sig: Take 1 tablet (5 mg total) by mouth 3 (three) times a day as needed for muscle spasms (Right jaw pain) for up to 7 doses   lidocaine (LIDODERM) 5 %   No Yes   Sig: Apply 1 patch topically daily Remove & Discard patch within 12 hours or as directed by MD   multivitamin (THERAGRAN) TABS   Yes Yes   Sig: Take 1 tablet by mouth daily   tamsulosin (FLOMAX) 0 4 mg   No Yes   Sig: TAKE 1 CAPSULE 30 MINUTES AFTER THE SAME MEAL EACH DAY Facility-Administered Medications: None       Past Medical History:   Diagnosis Date    Benign prostatic hyperplasia     Esophageal reflux     GERD (gastroesophageal reflux disease)     Vertigo        Past Surgical History:   Procedure Laterality Date    APPENDECTOMY  05/21/2015    CYST REMOVAL      Fatty cyst removed from back       Family History   Problem Relation Age of Onset    Prostate cancer Father     Prostate cancer Maternal Grandfather     Stomach cancer Maternal Aunt         malignant tumor of pharynx    Stomach cancer Maternal Uncle         malignant tumor of pharynx    Mental illness Family     Depression Family     Schizophrenia Family      I have reviewed and agree with the history as documented  Social History     Tobacco Use    Smoking status: Former Smoker    Smokeless tobacco: Never Used    Tobacco comment: 1ppw   Substance Use Topics    Alcohol use: Yes     Comment: socially    Drug use: No        Review of Systems   Constitutional: Negative for activity change, appetite change, fatigue and fever  Gastrointestinal: Negative for nausea and vomiting  Musculoskeletal: Positive for joint swelling  Right foot pain and swelling   Skin: Positive for color change and wound  Neurological: Negative for dizziness, weakness and numbness  Hematological: Negative for adenopathy  Does not bruise/bleed easily  Psychiatric/Behavioral: Negative for agitation and behavioral problems  All other systems reviewed and are negative  Physical Exam  Physical Exam   Constitutional: He is oriented to person, place, and time  He appears well-developed and well-nourished  No distress  HENT:   Head: Normocephalic and atraumatic  Eyes: Pupils are equal, round, and reactive to light  EOM are normal    Neck: Normal range of motion  Neck supple  No tracheal deviation present  Abdominal: There is no guarding     Musculoskeletal:   Patient has mild swelling and tenderness of the dorsum of his very distal lateral foot primarily over the very distal 3rd and 4th metatarsals, he is a minimal superficial abrasion that is not communicate does not appear secondarily infected in this immediate area but is not over the fracture, he has 2+ pulses throughout there is no midfoot tenderness or plantar ecchymosis there are no other acute ischemic infectious inflammatory traumatic findings on exam   Neurological: He is alert and oriented to person, place, and time  No cranial nerve deficit  He exhibits normal muscle tone  Coordination normal    Skin: Skin is warm and dry  No rash noted  Psychiatric: He has a normal mood and affect  His behavior is normal    Nursing note and vitals reviewed  Vital Signs  ED Triage Vitals [03/21/19 2019]   Temperature Pulse Respirations Blood Pressure SpO2   97 5 °F (36 4 °C) 61 18 169/82 99 %      Temp Source Heart Rate Source Patient Position - Orthostatic VS BP Location FiO2 (%)   Oral Monitor Sitting Left arm --      Pain Score       8           Vitals:    03/21/19 2019   BP: 169/82   Pulse: 61   Patient Position - Orthostatic VS: Sitting         Visual Acuity      ED Medications  Medications   naproxen (NAPROSYN) tablet 500 mg (500 mg Oral Given 3/21/19 2132)       Diagnostic Studies  Results Reviewed     None                 XR foot 3+ views RIGHT   ED Interpretation by Holmes Meigs, DO (03/21 2126)   Fourth toe fracture                 Procedures  Procedures       Phone Contacts  ED Phone Contact    ED Course  ED Course as of Mar 22 0215   Thu Mar 21, 2019   2127 Patient given Surgical shoe pain control ice packs wound care supplies podiatry follow-up expected management with close return instructions patient family agreeable plan              Identification of Seniors at Risk      Most Recent Value   (ISAR) Identification of Seniors at Risk   Before the illness or injury that brought you to the Emergency, did you need someone to help you on a regular basis? 0 Filed at: 03/21/2019 2020   In the last 24 hours, have you needed more help than usual?  0 Filed at: 03/21/2019 2020   Have you been hospitalized for one or more nights during the past 6 months? 0 Filed at: 03/21/2019 2020   In general, do you see well?  0 Filed at: 03/21/2019 2020   In general, do you have serious problems with your memory? 0 Filed at: 03/21/2019 2020   Do you take more than three different medications every day?  0 Filed at: 03/21/2019 2020   ISAR Score  0 Filed at: 03/21/2019 2020                          MDM    Disposition  Final diagnoses:   Closed nondisplaced fracture of proximal phalanx of lesser toe of right foot, initial encounter     Time reflects when diagnosis was documented in both MDM as applicable and the Disposition within this note     Time User Action Codes Description Comment    3/21/2019  9:30 PM Dorothea Bolaños Closed nondisplaced fracture of proximal phalanx of lesser toe of right foot, initial encounter       ED Disposition     ED Disposition Condition Date/Time Comment    Discharge Stable Thu Mar 21, 2019  9:30 PM Delroy Summers discharge to home/self care              Follow-up Information     Follow up With Specialties Details Why Contact Info Additional Information    [address] [facility] Emergency Department Emergency Medicine  If symptoms worsen [address] [facility] [address] [phone] [place] ED, [address], [place], [address] In 1 week  [facility] [address] [place] [address]  [phone]             Discharge Medication List as of 3/21/2019  9:31 PM      START taking these medications    Details   HYDROcodone-acetaminophen (NORCO) 5-325 mg per tablet Take 1 tablet by mouth every 6 (six) hours as needed for pain for up to 3 daysMax Daily Amount: 4 tablets, Starting Thu 3/21/2019, Until Sun 3/24/2019, Print      naproxen (NAPROSYN) 500 mg tablet Take 1 tablet (500 mg total) by mouth 2 (two) times a day with meals for 10 days, Starting u 3/21/2019, Until Sun 3/31/2019, Print         CONTINUE these medications which have NOT CHANGED    Details   amLODIPine (NORVASC) 5 mg tablet Take 1 tablet (5 mg total) by mouth daily, Starting Mon 3/11/2019, Normal      aspirin 81 mg chewable tablet Chew 81 mg daily, Historical Med      cyclobenzaprine (FLEXERIL) 5 mg tablet Take 1 tablet (5 mg total) by mouth 3 (three) times a day as needed for muscle spasms (Right jaw pain) for up to 7 doses, Starting Sun 6/24/2018, Print      lidocaine (LIDODERM) 5 % Apply 1 patch topically daily Remove & Discard patch within 12 hours or as directed by MD, Starting Thu 2/7/2019, Normal      multivitamin (THERAGRAN) TABS Take 1 tablet by mouth daily, Historical Med      tamsulosin (FLOMAX) 0 4 mg TAKE 1 CAPSULE 30 MINUTES AFTER THE SAME MEAL EACH DAY, Normal           No discharge procedures on file      ED Provider  Electronically Signed by           Sparkle Mohr DO  03/22/19 8869

## 2019-03-22 NOTE — DISCHARGE INSTRUCTIONS
Please return if you worsening or other concerning symptoms otherwise follow up as instructed as discussed

## 2019-03-26 ENCOUNTER — OFFICE VISIT (OUTPATIENT)
Dept: INTERNAL MEDICINE CLINIC | Facility: CLINIC | Age: 69
End: 2019-03-26
Payer: MEDICARE

## 2019-03-26 VITALS
HEART RATE: 70 BPM | OXYGEN SATURATION: 99 % | HEIGHT: 72 IN | DIASTOLIC BLOOD PRESSURE: 76 MMHG | RESPIRATION RATE: 16 BRPM | BODY MASS INDEX: 25.33 KG/M2 | SYSTOLIC BLOOD PRESSURE: 130 MMHG | WEIGHT: 187 LBS

## 2019-03-26 DIAGNOSIS — I10 ESSENTIAL HYPERTENSION: Primary | ICD-10-CM

## 2019-03-26 PROCEDURE — 99213 OFFICE O/P EST LOW 20 MIN: CPT | Performed by: INTERNAL MEDICINE

## 2019-03-26 RX ORDER — TADALAFIL 20 MG
TABLET ORAL
COMMUNITY
Start: 2019-03-07 | End: 2019-07-03 | Stop reason: SDUPTHER

## 2019-03-26 NOTE — PROGRESS NOTES
Assessment/Plan:     Blood pressure is controlled  I told him if he is going to try this herbal tea for his blood pressure to also continue his medicine  If the tea somehow works very well and his blood pressure gets low, he can call and we can start cutting back the dose  BMI Counseling: Body mass index is 25 36 kg/m²  Discussed the patient's BMI with him  The BMI is above average  BMI counseling and education was provided to the patient  Exercise recommendations include joining a gym  which he already does    Return in about 4 months (around 7/26/2019)  No problem-specific Assessment & Plan notes found for this encounter  Diagnoses and all orders for this visit:    Essential hypertension    Other orders  -     CIALIS 20 MG tablet          Subjective:      Patient ID: Huy Patel is a 76 y o  male  Patient comes in today for follow-up of his pressure  His pressure remains well controlled on the medicine  He notes no side effects on the medicine  He found out that his daughter takes the same medicine  Today, he tells me he wants to be start a special herbal tea for blood pressure  He was thinking of stopping the medicine and taking this and then checking his blood pressure        ALLERGIES:  Allergies   Allergen Reactions    Penicillin V Anaphylaxis       CURRENT MEDICATIONS:    Current Outpatient Medications:     amLODIPine (NORVASC) 5 mg tablet, Take 1 tablet (5 mg total) by mouth daily, Disp: 90 tablet, Rfl: 1    aspirin 81 mg chewable tablet, Chew 81 mg daily, Disp: , Rfl:     cyclobenzaprine (FLEXERIL) 5 mg tablet, Take 1 tablet (5 mg total) by mouth 3 (three) times a day as needed for muscle spasms (Right jaw pain) for up to 7 doses, Disp: 21 tablet, Rfl: 0    lidocaine (LIDODERM) 5 %, Apply 1 patch topically daily Remove & Discard patch within 12 hours or as directed by MD, Disp: 30 patch, Rfl: 1    multivitamin (THERAGRAN) TABS, Take 1 tablet by mouth daily, Disp: , Rfl:    naproxen (NAPROSYN) 500 mg tablet, Take 1 tablet (500 mg total) by mouth 2 (two) times a day with meals for 10 days, Disp: 20 tablet, Rfl: 0    tamsulosin (FLOMAX) 0 4 mg, TAKE 1 CAPSULE 30 MINUTES AFTER THE SAME MEAL EACH DAY, Disp: 90 capsule, Rfl: 3    CIALIS 20 MG tablet, , Disp: , Rfl:     ACTIVE PROBLEM LIST:  Patient Active Problem List   Diagnosis    Benign prostatic hyperplasia, presence of lower urinary tract symptoms unspecified    Erectile dysfunction    Lumbar radiculopathy    Azotemia    Essential hypertension       PAST MEDICAL HISTORY:  Past Medical History:   Diagnosis Date    Benign prostatic hyperplasia     Esophageal reflux     GERD (gastroesophageal reflux disease)     Vertigo        PAST SURGICAL HISTORY:  Past Surgical History:   Procedure Laterality Date    APPENDECTOMY  05/21/2015    CYST REMOVAL      Fatty cyst removed from back       FAMILY HISTORY:  Family History   Problem Relation Age of Onset    Prostate cancer Father     Prostate cancer Maternal Grandfather     Stomach cancer Maternal Aunt         malignant tumor of pharynx    Stomach cancer Maternal Uncle         malignant tumor of pharynx    Mental illness Family     Depression Family     Schizophrenia Family        SOCIAL HISTORY:  Social History     Socioeconomic History    Marital status: /Civil Union     Spouse name: Not on file    Number of children: Not on file    Years of education: Not on file    Highest education level: Not on file   Occupational History    Occupation: Retired   Social Needs    Financial resource strain: Not on file    Food insecurity:     Worry: Not on file     Inability: Not on file   eJamming needs:     Medical: Not on file     Non-medical: Not on file   Tobacco Use    Smoking status: Former Smoker    Smokeless tobacco: Never Used    Tobacco comment: 1ppw   Substance and Sexual Activity    Alcohol use: Yes     Comment: socially    Drug use: No    Sexual activity: Yes     Partners: Female     Comment: denied history of high risk sexual behavior   Lifestyle    Physical activity:     Days per week: Not on file     Minutes per session: Not on file    Stress: Not on file   Relationships    Social connections:     Talks on phone: Not on file     Gets together: Not on file     Attends Nondenominational service: Not on file     Active member of club or organization: Not on file     Attends meetings of clubs or organizations: Not on file     Relationship status: Not on file    Intimate partner violence:     Fear of current or ex partner: Not on file     Emotionally abused: Not on file     Physically abused: Not on file     Forced sexual activity: Not on file   Other Topics Concern    Not on file   Social History Narrative    No active advance directive    Always uses seat belt    Exercises occasionally, moderate    Five children    Occasional caffeine consumption       Review of Systems   Respiratory: Negative for shortness of breath  Cardiovascular: Negative for chest pain  Gastrointestinal: Negative for abdominal pain  Objective:  Vitals:    03/26/19 1503   BP: 130/76   BP Location: Left arm   Patient Position: Sitting   Cuff Size: Standard   Pulse: 70   Resp: 16   SpO2: 99%   Weight: 84 8 kg (187 lb)   Height: 6' (1 829 m)     Body mass index is 25 36 kg/m²  Physical Exam   Constitutional: He is oriented to person, place, and time  He appears well-developed and well-nourished  Cardiovascular: Normal rate, regular rhythm and normal heart sounds  Pulmonary/Chest: Effort normal and breath sounds normal    Abdominal: Soft  Bowel sounds are normal    Musculoskeletal: He exhibits no edema  Neurological: He is alert and oriented to person, place, and time  Nursing note and vitals reviewed          RESULTS:    Recent Results (from the past 1008 hour(s))   ECG 12 lead    Collection Time: 02/19/19 11:49 AM   Result Value Ref Range    Ventricular Rate 54 BPM Atrial Rate 54 BPM    NJ Interval 162 ms    QRSD Interval 92 ms    QT Interval 428 ms    QTC Interval 405 ms    P Axis 71 degrees    QRS Axis 53 degrees    T Wave Axis 40 degrees   UA w Reflex to Microscopic w Reflex to Culture    Collection Time: 02/19/19 11:51 AM   Result Value Ref Range    Color, UA Yellow     Clarity, UA Clear     Specific Gravity, UA <=1 005 1 003 - 1 030    pH, UA 6 0 4 5 - 8 0    Leukocytes, UA Negative Negative    Nitrite, UA Negative Negative    Protein, UA Negative Negative mg/dl    Glucose, UA Negative Negative mg/dl    Ketones, UA Negative Negative mg/dl    Urobilinogen, UA 0 2 0 2, 1 0 E U /dl E U /dl    Bilirubin, UA Negative Negative    Blood, UA Negative Negative   CBC and differential    Collection Time: 02/19/19 12:04 PM   Result Value Ref Range    WBC 3 77 (L) 4 31 - 10 16 Thousand/uL    RBC 4 50 3 88 - 5 62 Million/uL    Hemoglobin 13 7 12 0 - 17 0 g/dL    Hematocrit 41 3 36 5 - 49 3 %    MCV 92 82 - 98 fL    MCH 30 4 26 8 - 34 3 pg    MCHC 33 2 31 4 - 37 4 g/dL    RDW 13 6 11 6 - 15 1 %    MPV 12 0 8 9 - 12 7 fL    Platelets 433 743 - 618 Thousands/uL    nRBC 0 /100 WBCs    Neutrophils Relative 50 43 - 75 %    Immat GRANS % 0 0 - 2 %    Lymphocytes Relative 39 14 - 44 %    Monocytes Relative 7 4 - 12 %    Eosinophils Relative 3 0 - 6 %    Basophils Relative 1 0 - 1 %    Neutrophils Absolute 1 88 1 85 - 7 62 Thousands/µL    Immature Grans Absolute 0 01 0 00 - 0 20 Thousand/uL    Lymphocytes Absolute 1 47 0 60 - 4 47 Thousands/µL    Monocytes Absolute 0 27 0 17 - 1 22 Thousand/µL    Eosinophils Absolute 0 12 0 00 - 0 61 Thousand/µL    Basophils Absolute 0 02 0 00 - 0 10 Thousands/µL   Comprehensive metabolic panel    Collection Time: 02/19/19 12:04 PM   Result Value Ref Range    Sodium 143 136 - 145 mmol/L    Potassium 4 4 3 5 - 5 3 mmol/L    Chloride 106 100 - 108 mmol/L    CO2 30 21 - 32 mmol/L    ANION GAP 7 4 - 13 mmol/L    BUN 20 5 - 25 mg/dL    Creatinine 1 33 (H) 0 60 - 1 30 mg/dL    Glucose 82 65 - 140 mg/dL    Calcium 9 3 8 3 - 10 1 mg/dL    AST 22 5 - 45 U/L    ALT 29 12 - 78 U/L    Alkaline Phosphatase 59 46 - 116 U/L    Total Protein 7 4 6 4 - 8 2 g/dL    Albumin 3 9 3 5 - 5 0 g/dL    Total Bilirubin 0 40 0 20 - 1 00 mg/dL    eGFR 63 ml/min/1 73sq m   Protime-INR    Collection Time: 02/19/19 12:04 PM   Result Value Ref Range    Protime 14 0 11 8 - 14 2 seconds    INR 1 09 0 86 - 1 17   Troponin I    Collection Time: 02/19/19 12:04 PM   Result Value Ref Range    Troponin I <0 02 <=0 04 ng/mL       This note was created with voice recognition software  Phonic, grammatical and spelling errors may be present within the note as a result

## 2019-04-07 ENCOUNTER — HOSPITAL ENCOUNTER (EMERGENCY)
Facility: HOSPITAL | Age: 69
Discharge: HOME | End: 2019-04-07
Attending: EMERGENCY MEDICINE | Admitting: EMERGENCY MEDICINE
Payer: MEDICARE

## 2019-04-07 VITALS
HEIGHT: 72 IN | HEART RATE: 54 BPM | DIASTOLIC BLOOD PRESSURE: 96 MMHG | WEIGHT: 183 LBS | OXYGEN SATURATION: 99 % | TEMPERATURE: 97.9 F | SYSTOLIC BLOOD PRESSURE: 160 MMHG | BODY MASS INDEX: 24.79 KG/M2 | RESPIRATION RATE: 18 BRPM

## 2019-04-07 DIAGNOSIS — M72.2 PLANTAR FASCIITIS: Primary | ICD-10-CM

## 2019-04-07 DIAGNOSIS — M79.672 LEFT FOOT PAIN: ICD-10-CM

## 2019-04-07 LAB
ANION GAP SERPL CALCULATED.3IONS-SCNC: 7 MMOL/L (ref 4–13)
BASOPHILS # BLD AUTO: 0.02 THOUSANDS/ΜL (ref 0–0.1)
BASOPHILS NFR BLD AUTO: 1 % (ref 0–1)
BUN SERPL-MCNC: 20 MG/DL (ref 5–25)
CALCIUM SERPL-MCNC: 9.1 MG/DL (ref 8.3–10.1)
CHLORIDE SERPL-SCNC: 104 MMOL/L (ref 100–108)
CO2 SERPL-SCNC: 27 MMOL/L (ref 21–32)
CREAT SERPL-MCNC: 1.26 MG/DL (ref 0.6–1.3)
CRP SERPL QL: <3 MG/L
EOSINOPHIL # BLD AUTO: 0.08 THOUSAND/ΜL (ref 0–0.61)
EOSINOPHIL NFR BLD AUTO: 2 % (ref 0–6)
ERYTHROCYTE [DISTWIDTH] IN BLOOD BY AUTOMATED COUNT: 13.2 % (ref 11.6–15.1)
GFR SERPL CREATININE-BSD FRML MDRD: 67 ML/MIN/1.73SQ M
GLUCOSE SERPL-MCNC: 91 MG/DL (ref 65–140)
HCT VFR BLD AUTO: 42.6 % (ref 36.5–49.3)
HGB BLD-MCNC: 14.1 G/DL (ref 12–17)
IMM GRANULOCYTES # BLD AUTO: 0 THOUSAND/UL (ref 0–0.2)
IMM GRANULOCYTES NFR BLD AUTO: 0 % (ref 0–2)
LYMPHOCYTES # BLD AUTO: 1.63 THOUSANDS/ΜL (ref 0.6–4.47)
LYMPHOCYTES NFR BLD AUTO: 41 % (ref 14–44)
MCH RBC QN AUTO: 30.3 PG (ref 26.8–34.3)
MCHC RBC AUTO-ENTMCNC: 33.1 G/DL (ref 31.4–37.4)
MCV RBC AUTO: 91 FL (ref 82–98)
MONOCYTES # BLD AUTO: 0.23 THOUSAND/ΜL (ref 0.17–1.22)
MONOCYTES NFR BLD AUTO: 6 % (ref 4–12)
NEUTROPHILS # BLD AUTO: 2.06 THOUSANDS/ΜL (ref 1.85–7.62)
NEUTS SEG NFR BLD AUTO: 50 % (ref 43–75)
NRBC BLD AUTO-RTO: 0 /100 WBCS
PLATELET # BLD AUTO: 170 THOUSANDS/UL (ref 149–390)
PMV BLD AUTO: 10.9 FL (ref 8.9–12.7)
POTASSIUM SERPL-SCNC: 4.7 MMOL/L (ref 3.5–5.3)
RBC # BLD AUTO: 4.66 MILLION/UL (ref 3.88–5.62)
SODIUM SERPL-SCNC: 138 MMOL/L (ref 136–145)
URATE SERPL-MCNC: 3.8 MG/DL (ref 4.2–8)
WBC # BLD AUTO: 4.02 THOUSAND/UL (ref 4.31–10.16)

## 2019-04-07 PROCEDURE — 86618 LYME DISEASE ANTIBODY: CPT | Performed by: EMERGENCY MEDICINE

## 2019-04-07 PROCEDURE — 99282 EMERGENCY DEPT VISIT SF MDM: CPT | Performed by: EMERGENCY MEDICINE

## 2019-04-07 PROCEDURE — 36415 COLL VENOUS BLD VENIPUNCTURE: CPT | Performed by: EMERGENCY MEDICINE

## 2019-04-07 PROCEDURE — 80048 BASIC METABOLIC PNL TOTAL CA: CPT | Performed by: EMERGENCY MEDICINE

## 2019-04-07 PROCEDURE — 86140 C-REACTIVE PROTEIN: CPT | Performed by: EMERGENCY MEDICINE

## 2019-04-07 PROCEDURE — 85025 COMPLETE CBC W/AUTO DIFF WBC: CPT | Performed by: EMERGENCY MEDICINE

## 2019-04-07 PROCEDURE — 84550 ASSAY OF BLOOD/URIC ACID: CPT | Performed by: EMERGENCY MEDICINE

## 2019-04-07 PROCEDURE — 99283 EMERGENCY DEPT VISIT LOW MDM: CPT

## 2019-04-08 LAB
B BURGDOR IGG SER IA-ACNC: 0.07
B BURGDOR IGM SER IA-ACNC: 0.35

## 2019-04-09 ENCOUNTER — TELEPHONE (OUTPATIENT)
Dept: INTERNAL MEDICINE CLINIC | Facility: CLINIC | Age: 69
End: 2019-04-09

## 2019-04-11 ENCOUNTER — HOSPITAL ENCOUNTER (OUTPATIENT)
Dept: RADIOLOGY | Facility: HOSPITAL | Age: 69
Discharge: HOME | End: 2019-04-11
Payer: MEDICARE

## 2019-04-11 ENCOUNTER — TRANSCRIBE ORDERS (OUTPATIENT)
Dept: ADMINISTRATIVE | Facility: HOSPITAL | Age: 69
End: 2019-04-11

## 2019-04-11 DIAGNOSIS — R52 PAIN: Primary | ICD-10-CM

## 2019-04-11 DIAGNOSIS — R52 PAIN: ICD-10-CM

## 2019-04-11 PROCEDURE — 73630 X-RAY EXAM OF FOOT: CPT

## 2019-05-28 ENCOUNTER — HOSPITAL ENCOUNTER (EMERGENCY)
Facility: HOSPITAL | Age: 69
Discharge: HOME | End: 2019-05-28
Attending: EMERGENCY MEDICINE
Payer: MEDICARE

## 2019-05-28 ENCOUNTER — APPOINTMENT (EMERGENCY)
Dept: ULTRASOUND IMAGING | Facility: HOSPITAL | Age: 69
End: 2019-05-28
Payer: MEDICARE

## 2019-05-28 ENCOUNTER — APPOINTMENT (EMERGENCY)
Dept: CT IMAGING | Facility: HOSPITAL | Age: 69
End: 2019-05-28
Payer: MEDICARE

## 2019-05-28 VITALS
HEART RATE: 70 BPM | SYSTOLIC BLOOD PRESSURE: 161 MMHG | HEIGHT: 72 IN | RESPIRATION RATE: 20 BRPM | OXYGEN SATURATION: 98 % | TEMPERATURE: 97.9 F | DIASTOLIC BLOOD PRESSURE: 72 MMHG | BODY MASS INDEX: 24.79 KG/M2 | WEIGHT: 183 LBS

## 2019-05-28 DIAGNOSIS — K40.20 BILATERAL INGUINAL HERNIA: Primary | ICD-10-CM

## 2019-05-28 LAB
ALBUMIN SERPL BCP-MCNC: 4 G/DL (ref 3.5–5)
ALP SERPL-CCNC: 70 U/L (ref 46–116)
ALT SERPL W P-5'-P-CCNC: 38 U/L (ref 12–78)
ANION GAP SERPL CALCULATED.3IONS-SCNC: 8 MMOL/L (ref 4–13)
AST SERPL W P-5'-P-CCNC: 35 U/L (ref 5–45)
BASOPHILS # BLD AUTO: 0.02 THOUSANDS/ΜL (ref 0–0.1)
BASOPHILS NFR BLD AUTO: 1 % (ref 0–1)
BILIRUB SERPL-MCNC: 0.3 MG/DL (ref 0.2–1)
BILIRUB UR QL STRIP: NEGATIVE
BUN SERPL-MCNC: 26 MG/DL (ref 5–25)
CALCIUM SERPL-MCNC: 9.2 MG/DL (ref 8.3–10.1)
CHLORIDE SERPL-SCNC: 102 MMOL/L (ref 100–108)
CLARITY UR: CLEAR
CO2 SERPL-SCNC: 29 MMOL/L (ref 21–32)
COLOR UR: NORMAL
CREAT SERPL-MCNC: 1.38 MG/DL (ref 0.6–1.3)
EOSINOPHIL # BLD AUTO: 0.08 THOUSAND/ΜL (ref 0–0.61)
EOSINOPHIL NFR BLD AUTO: 2 % (ref 0–6)
ERYTHROCYTE [DISTWIDTH] IN BLOOD BY AUTOMATED COUNT: 14.3 % (ref 11.6–15.1)
GFR SERPL CREATININE-BSD FRML MDRD: 60 ML/MIN/1.73SQ M
GLUCOSE SERPL-MCNC: 90 MG/DL (ref 65–140)
GLUCOSE UR STRIP-MCNC: NEGATIVE MG/DL
HCT VFR BLD AUTO: 39.5 % (ref 36.5–49.3)
HGB BLD-MCNC: 13 G/DL (ref 12–17)
HGB UR QL STRIP.AUTO: NEGATIVE
IMM GRANULOCYTES # BLD AUTO: 0.01 THOUSAND/UL (ref 0–0.2)
IMM GRANULOCYTES NFR BLD AUTO: 0 % (ref 0–2)
KETONES UR STRIP-MCNC: NEGATIVE MG/DL
LEUKOCYTE ESTERASE UR QL STRIP: NEGATIVE
LYMPHOCYTES # BLD AUTO: 1.46 THOUSANDS/ΜL (ref 0.6–4.47)
LYMPHOCYTES NFR BLD AUTO: 35 % (ref 14–44)
MCH RBC QN AUTO: 30.2 PG (ref 26.8–34.3)
MCHC RBC AUTO-ENTMCNC: 32.9 G/DL (ref 31.4–37.4)
MCV RBC AUTO: 92 FL (ref 82–98)
MONOCYTES # BLD AUTO: 0.38 THOUSAND/ΜL (ref 0.17–1.22)
MONOCYTES NFR BLD AUTO: 9 % (ref 4–12)
NEUTROPHILS # BLD AUTO: 2.24 THOUSANDS/ΜL (ref 1.85–7.62)
NEUTS SEG NFR BLD AUTO: 53 % (ref 43–75)
NITRITE UR QL STRIP: NEGATIVE
NRBC BLD AUTO-RTO: 0 /100 WBCS
PH UR STRIP.AUTO: 7.5 [PH]
PLATELET # BLD AUTO: 175 THOUSANDS/UL (ref 149–390)
PMV BLD AUTO: 11.6 FL (ref 8.9–12.7)
POTASSIUM SERPL-SCNC: 4 MMOL/L (ref 3.5–5.3)
PROT SERPL-MCNC: 7.4 G/DL (ref 6.4–8.2)
PROT UR STRIP-MCNC: NEGATIVE MG/DL
RBC # BLD AUTO: 4.3 MILLION/UL (ref 3.88–5.62)
SODIUM SERPL-SCNC: 139 MMOL/L (ref 136–145)
SP GR UR STRIP.AUTO: 1.01 (ref 1–1.03)
UROBILINOGEN UR QL STRIP.AUTO: 0.2 E.U./DL
WBC # BLD AUTO: 4.19 THOUSAND/UL (ref 4.31–10.16)

## 2019-05-28 PROCEDURE — 36415 COLL VENOUS BLD VENIPUNCTURE: CPT | Performed by: EMERGENCY MEDICINE

## 2019-05-28 PROCEDURE — 76870 US EXAM SCROTUM: CPT

## 2019-05-28 PROCEDURE — 81003 URINALYSIS AUTO W/O SCOPE: CPT | Performed by: EMERGENCY MEDICINE

## 2019-05-28 PROCEDURE — 85025 COMPLETE CBC W/AUTO DIFF WBC: CPT | Performed by: EMERGENCY MEDICINE

## 2019-05-28 PROCEDURE — 74177 CT ABD & PELVIS W/CONTRAST: CPT

## 2019-05-28 PROCEDURE — 80053 COMPREHEN METABOLIC PANEL: CPT | Performed by: EMERGENCY MEDICINE

## 2019-05-28 PROCEDURE — 99284 EMERGENCY DEPT VISIT MOD MDM: CPT

## 2019-05-28 PROCEDURE — 99283 EMERGENCY DEPT VISIT LOW MDM: CPT | Performed by: EMERGENCY MEDICINE

## 2019-05-28 RX ADMIN — IOHEXOL 100 ML: 350 INJECTION, SOLUTION INTRAVENOUS at 20:48

## 2019-06-05 ENCOUNTER — OFFICE VISIT (OUTPATIENT)
Dept: INTERNAL MEDICINE CLINIC | Facility: CLINIC | Age: 69
End: 2019-06-05
Payer: COMMERCIAL

## 2019-06-05 VITALS
DIASTOLIC BLOOD PRESSURE: 72 MMHG | BODY MASS INDEX: 24.79 KG/M2 | HEIGHT: 72 IN | SYSTOLIC BLOOD PRESSURE: 132 MMHG | HEART RATE: 70 BPM | RESPIRATION RATE: 18 BRPM | OXYGEN SATURATION: 98 % | WEIGHT: 183 LBS

## 2019-06-05 DIAGNOSIS — R42 VERTIGO: Primary | ICD-10-CM

## 2019-06-05 DIAGNOSIS — Z11.3 SCREENING FOR STD (SEXUALLY TRANSMITTED DISEASE): ICD-10-CM

## 2019-06-05 PROCEDURE — 99213 OFFICE O/P EST LOW 20 MIN: CPT | Performed by: INTERNAL MEDICINE

## 2019-06-06 ENCOUNTER — APPOINTMENT (OUTPATIENT)
Dept: LAB | Facility: CLINIC | Age: 69
End: 2019-06-06
Payer: MEDICARE

## 2019-06-06 DIAGNOSIS — R42 VERTIGO: ICD-10-CM

## 2019-06-06 DIAGNOSIS — Z11.3 SCREENING FOR STD (SEXUALLY TRANSMITTED DISEASE): ICD-10-CM

## 2019-06-06 DIAGNOSIS — Z11.59 NEED FOR HEPATITIS C SCREENING TEST: ICD-10-CM

## 2019-06-06 LAB
ALBUMIN SERPL BCP-MCNC: 3.8 G/DL (ref 3.5–5)
ALP SERPL-CCNC: 61 U/L (ref 46–116)
ALT SERPL W P-5'-P-CCNC: 38 U/L (ref 12–78)
ANION GAP SERPL CALCULATED.3IONS-SCNC: 6 MMOL/L (ref 4–13)
AST SERPL W P-5'-P-CCNC: 22 U/L (ref 5–45)
BASOPHILS # BLD AUTO: 0.02 THOUSANDS/ΜL (ref 0–0.1)
BASOPHILS NFR BLD AUTO: 1 % (ref 0–1)
BILIRUB SERPL-MCNC: 0.37 MG/DL (ref 0.2–1)
BUN SERPL-MCNC: 20 MG/DL (ref 5–25)
CALCIUM SERPL-MCNC: 8.6 MG/DL (ref 8.3–10.1)
CHLORIDE SERPL-SCNC: 106 MMOL/L (ref 100–108)
CO2 SERPL-SCNC: 27 MMOL/L (ref 21–32)
CREAT SERPL-MCNC: 1.41 MG/DL (ref 0.6–1.3)
EOSINOPHIL # BLD AUTO: 0.04 THOUSAND/ΜL (ref 0–0.61)
EOSINOPHIL NFR BLD AUTO: 1 % (ref 0–6)
ERYTHROCYTE [DISTWIDTH] IN BLOOD BY AUTOMATED COUNT: 14.3 % (ref 11.6–15.1)
GFR SERPL CREATININE-BSD FRML MDRD: 59 ML/MIN/1.73SQ M
GLUCOSE P FAST SERPL-MCNC: 86 MG/DL (ref 65–99)
HCT VFR BLD AUTO: 43.1 % (ref 36.5–49.3)
HGB BLD-MCNC: 14 G/DL (ref 12–17)
IMM GRANULOCYTES # BLD AUTO: 0.01 THOUSAND/UL (ref 0–0.2)
IMM GRANULOCYTES NFR BLD AUTO: 0 % (ref 0–2)
LYMPHOCYTES # BLD AUTO: 1.52 THOUSANDS/ΜL (ref 0.6–4.47)
LYMPHOCYTES NFR BLD AUTO: 41 % (ref 14–44)
MCH RBC QN AUTO: 30.5 PG (ref 26.8–34.3)
MCHC RBC AUTO-ENTMCNC: 32.5 G/DL (ref 31.4–37.4)
MCV RBC AUTO: 94 FL (ref 82–98)
MONOCYTES # BLD AUTO: 0.3 THOUSAND/ΜL (ref 0.17–1.22)
MONOCYTES NFR BLD AUTO: 8 % (ref 4–12)
NEUTROPHILS # BLD AUTO: 1.78 THOUSANDS/ΜL (ref 1.85–7.62)
NEUTS SEG NFR BLD AUTO: 49 % (ref 43–75)
NRBC BLD AUTO-RTO: 0 /100 WBCS
PLATELET # BLD AUTO: 190 THOUSANDS/UL (ref 149–390)
PMV BLD AUTO: 12.4 FL (ref 8.9–12.7)
POTASSIUM SERPL-SCNC: 4 MMOL/L (ref 3.5–5.3)
PROT SERPL-MCNC: 7.3 G/DL (ref 6.4–8.2)
RBC # BLD AUTO: 4.59 MILLION/UL (ref 3.88–5.62)
SODIUM SERPL-SCNC: 139 MMOL/L (ref 136–145)
WBC # BLD AUTO: 3.67 THOUSAND/UL (ref 4.31–10.16)

## 2019-06-06 PROCEDURE — 36415 COLL VENOUS BLD VENIPUNCTURE: CPT

## 2019-06-06 PROCEDURE — 80074 ACUTE HEPATITIS PANEL: CPT

## 2019-06-06 PROCEDURE — 87389 HIV-1 AG W/HIV-1&-2 AB AG IA: CPT

## 2019-06-06 PROCEDURE — 87491 CHLMYD TRACH DNA AMP PROBE: CPT

## 2019-06-06 PROCEDURE — 87591 N.GONORRHOEAE DNA AMP PROB: CPT

## 2019-06-06 PROCEDURE — 86592 SYPHILIS TEST NON-TREP QUAL: CPT

## 2019-06-06 PROCEDURE — 85025 COMPLETE CBC W/AUTO DIFF WBC: CPT

## 2019-06-06 PROCEDURE — 80053 COMPREHEN METABOLIC PANEL: CPT

## 2019-06-07 LAB
C TRACH DNA SPEC QL NAA+PROBE: NEGATIVE
HAV IGM SER QL: NORMAL
HBV CORE IGM SER QL: NORMAL
HBV SURFACE AG SER QL: NORMAL
HCV AB SER QL: NORMAL
HIV 1+2 AB+HIV1 P24 AG SERPL QL IA: NORMAL
N GONORRHOEA DNA SPEC QL NAA+PROBE: NEGATIVE
RPR SER QL: NORMAL

## 2019-07-03 ENCOUNTER — OFFICE VISIT (OUTPATIENT)
Dept: INTERNAL MEDICINE CLINIC | Facility: CLINIC | Age: 69
End: 2019-07-03
Payer: MEDICARE

## 2019-07-03 VITALS
DIASTOLIC BLOOD PRESSURE: 72 MMHG | HEIGHT: 72 IN | OXYGEN SATURATION: 99 % | HEART RATE: 76 BPM | RESPIRATION RATE: 16 BRPM | WEIGHT: 186 LBS | BODY MASS INDEX: 25.19 KG/M2 | SYSTOLIC BLOOD PRESSURE: 140 MMHG

## 2019-07-03 DIAGNOSIS — I10 ESSENTIAL HYPERTENSION: ICD-10-CM

## 2019-07-03 DIAGNOSIS — N52.9 ERECTILE DYSFUNCTION, UNSPECIFIED ERECTILE DYSFUNCTION TYPE: Primary | ICD-10-CM

## 2019-07-03 DIAGNOSIS — N52.9 ERECTILE DYSFUNCTION, UNSPECIFIED ERECTILE DYSFUNCTION TYPE: ICD-10-CM

## 2019-07-03 DIAGNOSIS — R60.9 PERIPHERAL EDEMA: Primary | ICD-10-CM

## 2019-07-03 PROCEDURE — 99213 OFFICE O/P EST LOW 20 MIN: CPT | Performed by: INTERNAL MEDICINE

## 2019-07-03 RX ORDER — TADALAFIL 20 MG
20 TABLET ORAL DAILY PRN
Qty: 18 TABLET | Refills: 3 | Status: SHIPPED | OUTPATIENT
Start: 2019-07-03 | End: 2019-09-12 | Stop reason: SDUPTHER

## 2019-07-03 RX ORDER — LISINOPRIL 5 MG/1
5 TABLET ORAL DAILY
Qty: 30 TABLET | Refills: 3 | Status: SHIPPED | OUTPATIENT
Start: 2019-07-03 | End: 2019-07-31 | Stop reason: SDUPTHER

## 2019-07-03 RX ORDER — TADALAFIL 20 MG
20 TABLET ORAL DAILY PRN
Qty: 10 TABLET | Refills: 5 | Status: SHIPPED | OUTPATIENT
Start: 2019-07-03 | End: 2019-07-03 | Stop reason: SDUPTHER

## 2019-07-03 NOTE — PROGRESS NOTES
Assessment/Plan: This is the 3rd time he has brought up the swelling from the amlodipine  Therefore, will switch the medicine  Start him on an ACE-inhibitor  Reviewed the medicine with him  Return in about 3 weeks (around 7/24/2019)  No problem-specific Assessment & Plan notes found for this encounter  Diagnoses and all orders for this visit:    Peripheral edema    Essential hypertension  -     lisinopril (ZESTRIL) 5 mg tablet; Take 1 tablet (5 mg total) by mouth daily    Erectile dysfunction, unspecified erectile dysfunction type  -     CIALIS 20 MG tablet; Take 1 tablet (20 mg total) by mouth daily as needed for erectile dysfunction          Subjective:      Patient ID: Fausto Noyola is a 76 y o  male  Patient comes in today complaining of episodic swelling in his ankles again  We have discussed this before and felt it was from the amlodipine  Before, it really was not bothering him but now he even gets swelling in his hands at times  He checked his pressure and it was normal   He otherwise feels fine        ALLERGIES:  Allergies   Allergen Reactions    Penicillin V Anaphylaxis       CURRENT MEDICATIONS:    Current Outpatient Medications:     aspirin 81 mg chewable tablet, Chew 81 mg daily, Disp: , Rfl:     CIALIS 20 MG tablet, Take 1 tablet (20 mg total) by mouth daily as needed for erectile dysfunction, Disp: 18 tablet, Rfl: 3    lidocaine (LIDODERM) 5 %, Apply 1 patch topically daily Remove & Discard patch within 12 hours or as directed by MD, Disp: 30 patch, Rfl: 1    multivitamin (THERAGRAN) TABS, Take 1 tablet by mouth daily, Disp: , Rfl:     tamsulosin (FLOMAX) 0 4 mg, TAKE 1 CAPSULE 30 MINUTES AFTER THE SAME MEAL EACH DAY, Disp: 90 capsule, Rfl: 3    lisinopril (ZESTRIL) 5 mg tablet, Take 1 tablet (5 mg total) by mouth daily, Disp: 30 tablet, Rfl: 3    ACTIVE PROBLEM LIST:  Patient Active Problem List   Diagnosis    Benign prostatic hyperplasia, presence of lower urinary tract symptoms unspecified    Erectile dysfunction    Lumbar radiculopathy    Azotemia    Essential hypertension       PAST MEDICAL HISTORY:  Past Medical History:   Diagnosis Date    Benign prostatic hyperplasia     Esophageal reflux     GERD (gastroesophageal reflux disease)     Vertigo        PAST SURGICAL HISTORY:  Past Surgical History:   Procedure Laterality Date    APPENDECTOMY  05/21/2015    CYST REMOVAL      Fatty cyst removed from back       FAMILY HISTORY:  Family History   Problem Relation Age of Onset    Prostate cancer Father     Prostate cancer Maternal Grandfather     Stomach cancer Maternal Aunt         malignant tumor of pharynx    Stomach cancer Maternal Uncle         malignant tumor of pharynx    Mental illness Family     Depression Family     Schizophrenia Family        SOCIAL HISTORY:  Social History     Socioeconomic History    Marital status: /Civil Union     Spouse name: Not on file    Number of children: Not on file    Years of education: Not on file    Highest education level: Not on file   Occupational History    Occupation: Retired   Social Needs    Financial resource strain: Not on file    Food insecurity:     Worry: Not on file     Inability: Not on file   fuseSPORT needs:     Medical: Not on file     Non-medical: Not on file   Tobacco Use    Smoking status: Former Smoker    Smokeless tobacco: Never Used    Tobacco comment: 1ppw   Substance and Sexual Activity    Alcohol use: Yes     Comment: socially    Drug use: No    Sexual activity: Yes     Partners: Female     Comment: denied history of high risk sexual behavior   Lifestyle    Physical activity:     Days per week: Not on file     Minutes per session: Not on file    Stress: Not on file   Relationships    Social connections:     Talks on phone: Not on file     Gets together: Not on file     Attends Buddhist service: Not on file     Active member of club or organization: Not on file Attends meetings of clubs or organizations: Not on file     Relationship status: Not on file    Intimate partner violence:     Fear of current or ex partner: Not on file     Emotionally abused: Not on file     Physically abused: Not on file     Forced sexual activity: Not on file   Other Topics Concern    Not on file   Social History Narrative    No active advance directive    Always uses seat belt    Exercises occasionally, moderate    Five children    Occasional caffeine consumption       Review of Systems   Respiratory: Negative for shortness of breath  Cardiovascular: Negative for chest pain  Gastrointestinal: Negative for abdominal pain  Objective:  Vitals:    07/03/19 1317   BP: 140/72   BP Location: Left arm   Patient Position: Sitting   Cuff Size: Large   Pulse: 76   Resp: 16   SpO2: 99%   Weight: 84 4 kg (186 lb)   Height: 6' (1 829 m)     Body mass index is 25 23 kg/m²  Physical Exam   Constitutional: He is oriented to person, place, and time  He appears well-developed and well-nourished  Neurological: He is alert and oriented to person, place, and time  Nursing note and vitals reviewed          RESULTS:    Recent Results (from the past 1008 hour(s))   CBC and differential    Collection Time: 05/28/19  7:31 PM   Result Value Ref Range    WBC 4 19 (L) 4 31 - 10 16 Thousand/uL    RBC 4 30 3 88 - 5 62 Million/uL    Hemoglobin 13 0 12 0 - 17 0 g/dL    Hematocrit 39 5 36 5 - 49 3 %    MCV 92 82 - 98 fL    MCH 30 2 26 8 - 34 3 pg    MCHC 32 9 31 4 - 37 4 g/dL    RDW 14 3 11 6 - 15 1 %    MPV 11 6 8 9 - 12 7 fL    Platelets 864 414 - 677 Thousands/uL    nRBC 0 /100 WBCs    Neutrophils Relative 53 43 - 75 %    Immat GRANS % 0 0 - 2 %    Lymphocytes Relative 35 14 - 44 %    Monocytes Relative 9 4 - 12 %    Eosinophils Relative 2 0 - 6 %    Basophils Relative 1 0 - 1 %    Neutrophils Absolute 2 24 1 85 - 7 62 Thousands/µL    Immature Grans Absolute 0 01 0 00 - 0 20 Thousand/uL    Lymphocytes Absolute 1 46 0 60 - 4 47 Thousands/µL    Monocytes Absolute 0 38 0 17 - 1 22 Thousand/µL    Eosinophils Absolute 0 08 0 00 - 0 61 Thousand/µL    Basophils Absolute 0 02 0 00 - 0 10 Thousands/µL   Comprehensive metabolic panel    Collection Time: 05/28/19  7:31 PM   Result Value Ref Range    Sodium 139 136 - 145 mmol/L    Potassium 4 0 3 5 - 5 3 mmol/L    Chloride 102 100 - 108 mmol/L    CO2 29 21 - 32 mmol/L    ANION GAP 8 4 - 13 mmol/L    BUN 26 (H) 5 - 25 mg/dL    Creatinine 1 38 (H) 0 60 - 1 30 mg/dL    Glucose 90 65 - 140 mg/dL    Calcium 9 2 8 3 - 10 1 mg/dL    AST 35 5 - 45 U/L    ALT 38 12 - 78 U/L    Alkaline Phosphatase 70 46 - 116 U/L    Total Protein 7 4 6 4 - 8 2 g/dL    Albumin 4 0 3 5 - 5 0 g/dL    Total Bilirubin 0 30 0 20 - 1 00 mg/dL    eGFR 60 ml/min/1 73sq m   UA w Reflex to Microscopic w Reflex to Culture    Collection Time: 05/28/19  7:31 PM   Result Value Ref Range    Color, UA Light Yellow     Clarity, UA Clear     Specific [place], UA 1 015 1 003 - 1 030    pH, UA 7 5 4 5, 5 0, 5 5, 6 0, 6 5, 7 0, 7 5, 8 0    Leukocytes, UA Negative Negative    Nitrite, UA Negative Negative    Protein, UA Negative Negative mg/dl    Glucose, UA Negative Negative mg/dl    Ketones, UA Negative Negative mg/dl    Urobilinogen, UA 0 2 0 2, 1 0 E U /dl E U /dl    Bilirubin, UA Negative Negative    Blood, UA Negative Negative   CBC and differential    Collection Time: 06/06/19  9:22 AM   Result Value Ref Range    WBC 3 67 (L) 4 31 - 10 16 Thousand/uL    RBC 4 59 3 88 - 5 62 Million/uL    Hemoglobin 14 0 12 0 - 17 0 g/dL    Hematocrit 43 1 36 5 - 49 3 %    MCV 94 82 - 98 fL    MCH 30 5 26 8 - 34 3 pg    MCHC 32 5 31 4 - 37 4 g/dL    RDW 14 3 11 6 - 15 1 %    MPV 12 4 8 9 - 12 7 fL    Platelets 068 326 - 484 Thousands/uL    nRBC 0 /100 WBCs    Neutrophils Relative 49 43 - 75 %    Immat GRANS % 0 0 - 2 %    Lymphocytes Relative 41 14 - 44 %    Monocytes Relative 8 4 - 12 %    Eosinophils Relative 1 0 - 6 %    Basophils Relative 1 0 - 1 %    Neutrophils Absolute 1 78 (L) 1 85 - 7 62 Thousands/µL    Immature Grans Absolute 0 01 0 00 - 0 20 Thousand/uL    Lymphocytes Absolute 1 52 0 60 - 4 47 Thousands/µL    Monocytes Absolute 0 30 0 17 - 1 22 Thousand/µL    Eosinophils Absolute 0 04 0 00 - 0 61 Thousand/µL    Basophils Absolute 0 02 0 00 - 0 10 Thousands/µL   Comprehensive metabolic panel    Collection Time: 06/06/19  9:22 AM   Result Value Ref Range    Sodium 139 136 - 145 mmol/L    Potassium 4 0 3 5 - 5 3 mmol/L    Chloride 106 100 - 108 mmol/L    CO2 27 21 - 32 mmol/L    ANION GAP 6 4 - 13 mmol/L    BUN 20 5 - 25 mg/dL    Creatinine 1 41 (H) 0 60 - 1 30 mg/dL    Glucose, Fasting 86 65 - 99 mg/dL    Calcium 8 6 8 3 - 10 1 mg/dL    AST 22 5 - 45 U/L    ALT 38 12 - 78 U/L    Alkaline Phosphatase 61 46 - 116 U/L    Total Protein 7 3 6 4 - 8 2 g/dL    Albumin 3 8 3 5 - 5 0 g/dL    Total Bilirubin 0 37 0 20 - 1 00 mg/dL    eGFR 59 ml/min/1 73sq m   HIV 1/2 AG-AB combo    Collection Time: 06/06/19  9:22 AM   Result Value Ref Range    HIV-1/HIV-2 Ab Non-Reactive Non-Reactive   Chlamydia/GC amplified DNA by PCR    Collection Time: 06/06/19  9:22 AM   Result Value Ref Range    N gonorrhoeae, DNA Probe Negative Negative    Chlamydia trachomatis, DNA Probe Negative Negative   RPR    Collection Time: 06/06/19  9:22 AM   Result Value Ref Range    RPR Non-Reactive Non-Reactive   Hepatitis panel, acute    Collection Time: 06/06/19  9:22 AM   Result Value Ref Range    Hepatitis B Surface Ag Non-reactive Non-reactive, NonReactive - Confirmed    Hep A IgM Non-reactive Non-reactive, Equivocal-Suggest Recollect    Hepatitis C Ab Non-reactive Non-reactive    Hep B C IgM Non-reactive Non-reactive       This note was created with voice recognition software  Phonic, grammatical and spelling errors may be present within the note as a result

## 2019-07-31 ENCOUNTER — OFFICE VISIT (OUTPATIENT)
Dept: INTERNAL MEDICINE CLINIC | Facility: CLINIC | Age: 69
End: 2019-07-31
Payer: MEDICARE

## 2019-07-31 VITALS
WEIGHT: 188 LBS | HEART RATE: 80 BPM | BODY MASS INDEX: 25.47 KG/M2 | DIASTOLIC BLOOD PRESSURE: 76 MMHG | HEIGHT: 72 IN | SYSTOLIC BLOOD PRESSURE: 128 MMHG | OXYGEN SATURATION: 97 % | RESPIRATION RATE: 18 BRPM

## 2019-07-31 DIAGNOSIS — I10 ESSENTIAL HYPERTENSION: ICD-10-CM

## 2019-07-31 PROCEDURE — 99213 OFFICE O/P EST LOW 20 MIN: CPT | Performed by: INTERNAL MEDICINE

## 2019-07-31 RX ORDER — LISINOPRIL 10 MG/1
10 TABLET ORAL DAILY
Qty: 30 TABLET | Refills: 3 | Status: SHIPPED | OUTPATIENT
Start: 2019-07-31 | End: 2019-08-05 | Stop reason: ALTCHOICE

## 2019-08-04 ENCOUNTER — HOSPITAL ENCOUNTER (EMERGENCY)
Facility: HOSPITAL | Age: 69
Discharge: HOME | End: 2019-08-05
Attending: EMERGENCY MEDICINE | Admitting: EMERGENCY MEDICINE
Payer: MEDICARE

## 2019-08-04 VITALS
SYSTOLIC BLOOD PRESSURE: 178 MMHG | HEART RATE: 50 BPM | BODY MASS INDEX: 25.56 KG/M2 | OXYGEN SATURATION: 99 % | WEIGHT: 188.71 LBS | TEMPERATURE: 97.9 F | DIASTOLIC BLOOD PRESSURE: 85 MMHG | RESPIRATION RATE: 18 BRPM | HEIGHT: 72 IN

## 2019-08-04 DIAGNOSIS — I10 HYPERTENSION: Primary | ICD-10-CM

## 2019-08-04 DIAGNOSIS — N17.9 ACUTE KIDNEY INJURY (HCC): ICD-10-CM

## 2019-08-04 PROCEDURE — 93005 ELECTROCARDIOGRAM TRACING: CPT

## 2019-08-04 PROCEDURE — 99284 EMERGENCY DEPT VISIT MOD MDM: CPT

## 2019-08-05 ENCOUNTER — APPOINTMENT (EMERGENCY)
Dept: CT IMAGING | Facility: HOSPITAL | Age: 69
End: 2019-08-05
Payer: MEDICARE

## 2019-08-05 ENCOUNTER — TELEPHONE (OUTPATIENT)
Dept: INTERNAL MEDICINE CLINIC | Facility: CLINIC | Age: 69
End: 2019-08-05

## 2019-08-05 ENCOUNTER — OFFICE VISIT (OUTPATIENT)
Dept: INTERNAL MEDICINE CLINIC | Facility: CLINIC | Age: 69
End: 2019-08-05
Payer: MEDICARE

## 2019-08-05 VITALS
HEART RATE: 68 BPM | RESPIRATION RATE: 18 BRPM | BODY MASS INDEX: 25.73 KG/M2 | DIASTOLIC BLOOD PRESSURE: 80 MMHG | HEIGHT: 72 IN | OXYGEN SATURATION: 98 % | WEIGHT: 190 LBS | SYSTOLIC BLOOD PRESSURE: 126 MMHG

## 2019-08-05 DIAGNOSIS — I10 ESSENTIAL HYPERTENSION: Primary | ICD-10-CM

## 2019-08-05 PROBLEM — N17.9 ACUTE KIDNEY INJURY (HCC): Status: ACTIVE | Noted: 2019-08-05

## 2019-08-05 LAB
ANION GAP SERPL CALCULATED.3IONS-SCNC: 9 MMOL/L (ref 4–13)
BASOPHILS # BLD AUTO: 0.03 THOUSANDS/ΜL (ref 0–0.1)
BASOPHILS NFR BLD AUTO: 1 % (ref 0–1)
BUN SERPL-MCNC: 29 MG/DL (ref 5–25)
CALCIUM SERPL-MCNC: 8.8 MG/DL (ref 8.3–10.1)
CHLORIDE SERPL-SCNC: 105 MMOL/L (ref 100–108)
CO2 SERPL-SCNC: 26 MMOL/L (ref 21–32)
CREAT SERPL-MCNC: 1.48 MG/DL (ref 0.6–1.3)
EOSINOPHIL # BLD AUTO: 0.08 THOUSAND/ΜL (ref 0–0.61)
EOSINOPHIL NFR BLD AUTO: 2 % (ref 0–6)
ERYTHROCYTE [DISTWIDTH] IN BLOOD BY AUTOMATED COUNT: 13.5 % (ref 11.6–15.1)
GFR SERPL CREATININE-BSD FRML MDRD: 55 ML/MIN/1.73SQ M
GLUCOSE SERPL-MCNC: 92 MG/DL (ref 65–140)
HCT VFR BLD AUTO: 41.2 % (ref 36.5–49.3)
HGB BLD-MCNC: 13.9 G/DL (ref 12–17)
IMM GRANULOCYTES # BLD AUTO: 0.01 THOUSAND/UL (ref 0–0.2)
IMM GRANULOCYTES NFR BLD AUTO: 0 % (ref 0–2)
LYMPHOCYTES # BLD AUTO: 1.62 THOUSANDS/ΜL (ref 0.6–4.47)
LYMPHOCYTES NFR BLD AUTO: 45 % (ref 14–44)
MCH RBC QN AUTO: 31 PG (ref 26.8–34.3)
MCHC RBC AUTO-ENTMCNC: 33.7 G/DL (ref 31.4–37.4)
MCV RBC AUTO: 92 FL (ref 82–98)
MONOCYTES # BLD AUTO: 0.27 THOUSAND/ΜL (ref 0.17–1.22)
MONOCYTES NFR BLD AUTO: 8 % (ref 4–12)
NEUTROPHILS # BLD AUTO: 1.61 THOUSANDS/ΜL (ref 1.85–7.62)
NEUTS SEG NFR BLD AUTO: 44 % (ref 43–75)
NRBC BLD AUTO-RTO: 0 /100 WBCS
PLATELET # BLD AUTO: 172 THOUSANDS/UL (ref 149–390)
PMV BLD AUTO: 11.2 FL (ref 8.9–12.7)
POTASSIUM SERPL-SCNC: 4.1 MMOL/L (ref 3.5–5.3)
RBC # BLD AUTO: 4.48 MILLION/UL (ref 3.88–5.62)
SODIUM SERPL-SCNC: 140 MMOL/L (ref 136–145)
WBC # BLD AUTO: 3.62 THOUSAND/UL (ref 4.31–10.16)

## 2019-08-05 PROCEDURE — 80048 BASIC METABOLIC PNL TOTAL CA: CPT | Performed by: PHYSICIAN ASSISTANT

## 2019-08-05 PROCEDURE — 36415 COLL VENOUS BLD VENIPUNCTURE: CPT | Performed by: PHYSICIAN ASSISTANT

## 2019-08-05 PROCEDURE — 99213 OFFICE O/P EST LOW 20 MIN: CPT | Performed by: PHYSICIAN ASSISTANT

## 2019-08-05 PROCEDURE — 70450 CT HEAD/BRAIN W/O DYE: CPT

## 2019-08-05 PROCEDURE — 99283 EMERGENCY DEPT VISIT LOW MDM: CPT | Performed by: EMERGENCY MEDICINE

## 2019-08-05 PROCEDURE — 85025 COMPLETE CBC W/AUTO DIFF WBC: CPT | Performed by: PHYSICIAN ASSISTANT

## 2019-08-05 RX ORDER — AMLODIPINE BESYLATE 5 MG/1
5 TABLET ORAL DAILY
Qty: 30 TABLET | Refills: 5
Start: 2019-08-05 | End: 2019-10-17 | Stop reason: SDUPTHER

## 2019-08-05 RX ORDER — ACETAMINOPHEN 325 MG/1
650 TABLET ORAL ONCE
Status: COMPLETED | OUTPATIENT
Start: 2019-08-05 | End: 2019-08-05

## 2019-08-05 RX ADMIN — ACETAMINOPHEN 650 MG: 325 TABLET, FILM COATED ORAL at 00:13

## 2019-08-05 NOTE — PATIENT INSTRUCTIONS
Will stop lisinopril  Will go back to Norvasc 5 mg daily  Patient will have repeat labs done 1 day prior to follow-up visit next Friday

## 2019-08-05 NOTE — PROGRESS NOTES
Assessment/Plan:   Patient Instructions   Will stop lisinopril  Will go back to Norvasc 5 mg daily  Patient will have repeat labs done 1 day prior to follow-up visit next Friday  BMI Counseling: Body mass index is 25 77 kg/m²  Discussed the patient's BMI with him  The BMI is above average  BMI counseling and education was provided to the patient  Nutrition recommendations include decreasing overall calorie intake  This BMI is inaccurate as patient is extremely muscular  Return in 10 days (on 8/15/2019) for Next scheduled follow up  Diagnoses and all orders for this visit:    Essential hypertension  -     amLODIPine (NORVASC) 5 mg tablet; Take 1 tablet (5 mg total) by mouth daily  -     Basic metabolic panel; Future  -     CBC and differential; Future          Subjective:      Patient ID: Marilee Aquino is a 71 y o  male  Follow-up    Patient was seen in the ER yesterday due to having a bilateral temporal headache  In the ER he was found to have an elevated blood pressure, his labs gave indication of elevated BUN and creatinine, was slightly decreased E GFR  ER was concerned that this could be related to his use of lisinopril for his blood pressure  Labs were reviewed with the patient  Looks as if he has had some mild azotemia for some time  Patient reports to me he had been on amlodipine 5 mg in the past, had done well but developed a slight bumpy rash on his left arm, therefore the medication was discontinued  Eduarda cp, palp, sob, edema, HA, dizziness, diaphoresis, syncope, visual disturbance  ER labs and imaging study reports have been reviewed  Also noted this patient exercises vigorously with weight lifting etc 5 times a week  He also has been consuming extra protein in his diet on a regular basis        ALLERGIES:  Allergies   Allergen Reactions    Penicillin V Anaphylaxis       CURRENT MEDICATIONS:    Current Outpatient Medications:     CIALIS 20 MG tablet, Take 1 tablet (20 mg total) by mouth daily as needed for erectile dysfunction, Disp: 18 tablet, Rfl: 3    lidocaine (LIDODERM) 5 %, Apply 1 patch topically daily Remove & Discard patch within 12 hours or as directed by MD, Disp: 30 patch, Rfl: 1    multivitamin (THERAGRAN) TABS, Take 1 tablet by mouth daily, Disp: , Rfl:     tamsulosin (FLOMAX) 0 4 mg, TAKE 1 CAPSULE 30 MINUTES AFTER THE SAME MEAL EACH DAY, Disp: 90 capsule, Rfl: 3    amLODIPine (NORVASC) 5 mg tablet, Take 1 tablet (5 mg total) by mouth daily, Disp: 30 tablet, Rfl: 5    aspirin 81 mg chewable tablet, Chew 81 mg daily, Disp: , Rfl:   No current facility-administered medications for this visit       ACTIVE PROBLEM LIST:  Patient Active Problem List   Diagnosis    Benign prostatic hyperplasia, presence of lower urinary tract symptoms unspecified    Erectile dysfunction    Lumbar radiculopathy    Azotemia    Hypertension    Acute kidney injury ([facility] [address] )       PAST MEDICAL HISTORY:  Past Medical History:   Diagnosis Date    Benign prostatic hyperplasia     Esophageal reflux     Vertigo        PAST SURGICAL HISTORY:  Past Surgical History:   Procedure Laterality Date    APPENDECTOMY  05/21/2015    CYST REMOVAL      Fatty cyst removed from back       FAMILY HISTORY:  Family History   Problem Relation Age of Onset    Prostate cancer Father     Prostate cancer Maternal Grandfather     Stomach cancer Maternal Aunt         malignant tumor of pharynx    Stomach cancer Maternal Uncle         malignant tumor of pharynx    Mental illness Family     Depression Family     Schizophrenia Family        SOCIAL HISTORY:  Social History     Socioeconomic History    Marital status: /Civil Union     Spouse name: Not on file    Number of children: Not on file    Years of education: Not on file    Highest education level: Not on file   Occupational History    Occupation: Retired   Social Needs    Financial resource strain: Not on file    Food insecurity: Worry: Not on file     Inability: Not on file    Transportation needs:     Medical: Not on file     Non-medical: Not on file   Tobacco Use    Smoking status: Former Smoker    Smokeless tobacco: Never Used    Tobacco comment: 1ppw   Substance and Sexual Activity    Alcohol use: Not Currently     Comment: socially    Drug use: No    Sexual activity: Yes     Partners: Female     Comment: denied history of high risk sexual behavior   Lifestyle    Physical activity:     Days per week: Not on file     Minutes per session: Not on file    Stress: Not on file   Relationships    Social connections:     Talks on phone: Not on file     Gets together: Not on file     Attends Jewish service: Not on file     Active member of club or organization: Not on file     Attends meetings of clubs or organizations: Not on file     Relationship status: Not on file    Intimate partner violence:     Fear of current or ex partner: Not on file     Emotionally abused: Not on file     Physically abused: Not on file     Forced sexual activity: Not on file   Other Topics Concern    Not on file   Social History Narrative    No active advance directive    Always uses seat belt    Exercises occasionally, moderate    Five children    Occasional caffeine consumption       Review of Systems   Constitutional: Negative for activity change, chills, fatigue and fever  HENT: Negative for congestion  Eyes: Negative for discharge  Respiratory: Negative for cough, chest tightness and shortness of breath  Cardiovascular: Negative for chest pain, palpitations and leg swelling  Gastrointestinal: Negative for abdominal pain  Genitourinary: Negative for difficulty urinating  Musculoskeletal: Negative for arthralgias and myalgias  Skin: Negative for rash  Allergic/Immunologic: Negative for immunocompromised state  Neurological: Positive for headaches  Negative for dizziness, syncope, weakness and light-headedness     Hematological: Negative for adenopathy  Does not bruise/bleed easily  Psychiatric/Behavioral: Negative for dysphoric mood and sleep disturbance  The patient is not nervous/anxious  Objective:  Vitals:    08/05/19 1121 08/05/19 1145   BP: 144/90 126/80   BP Location: Left arm Left arm   Patient Position: Sitting Sitting   Cuff Size: Standard    Pulse: 68    Resp: 18    SpO2: 98%    Weight: 86 2 kg (190 lb)    Height: 6' (1 829 m)      Body mass index is 25 77 kg/m²  Physical Exam   Constitutional: He is oriented to person, place, and time  He appears well-developed and well-nourished  No distress  Neck: Neck supple  No JVD present  Carotid bruit is not present  Cardiovascular: Normal rate, regular rhythm and normal heart sounds  Pulmonary/Chest: Effort normal and breath sounds normal    Musculoskeletal: He exhibits no edema  Lymphadenopathy:     He has no cervical adenopathy  Neurological: He is alert and oriented to person, place, and time  Skin: Skin is warm and dry  No rash noted  Psychiatric: He has a normal mood and affect  His behavior is normal    Nursing note and vitals reviewed          RESULTS:    Recent Results (from the past 1008 hour(s))   CBC and differential    Collection Time: 08/05/19 12:20 AM   Result Value Ref Range    WBC 3 62 (L) 4 31 - 10 16 Thousand/uL    RBC 4 48 3 88 - 5 62 Million/uL    Hemoglobin 13 9 12 0 - 17 0 g/dL    Hematocrit 41 2 36 5 - 49 3 %    MCV 92 82 - 98 fL    MCH 31 0 26 8 - 34 3 pg    MCHC 33 7 31 4 - 37 4 g/dL    RDW 13 5 11 6 - 15 1 %    MPV 11 2 8 9 - 12 7 fL    Platelets 326 811 - 309 Thousands/uL    nRBC 0 /100 WBCs    Neutrophils Relative 44 43 - 75 %    Immat GRANS % 0 0 - 2 %    Lymphocytes Relative 45 (H) 14 - 44 %    Monocytes Relative 8 4 - 12 %    Eosinophils Relative 2 0 - 6 %    Basophils Relative 1 0 - 1 %    Neutrophils Absolute 1 61 (L) 1 85 - 7 62 Thousands/µL    Immature Grans Absolute 0 01 0 00 - 0 20 Thousand/uL    Lymphocytes Absolute 1 62 0 60 - 4 47 Thousands/µL    Monocytes Absolute 0 27 0 17 - 1 22 Thousand/µL    Eosinophils Absolute 0 08 0 00 - 0 61 Thousand/µL    Basophils Absolute 0 03 0 00 - 0 10 Thousands/µL   Basic metabolic panel    Collection Time: 08/05/19 12:20 AM   Result Value Ref Range    Sodium 140 136 - 145 mmol/L    Potassium 4 1 3 5 - 5 3 mmol/L    Chloride 105 100 - 108 mmol/L    CO2 26 21 - 32 mmol/L    ANION GAP 9 4 - 13 mmol/L    BUN 29 (H) 5 - 25 mg/dL    Creatinine 1 48 (H) 0 60 - 1 30 mg/dL    Glucose 92 65 - 140 mg/dL    Calcium 8 8 8 3 - 10 1 mg/dL    eGFR 55 ml/min/1 73sq m       This note was created with voice recognition software  Phonic, grammatical and spelling errors may be present within the note as a result

## 2019-08-05 NOTE — TELEPHONE ENCOUNTER
Patient was at ER last night because his blood pressure was fluctuating  He did have head pain and they did a ct scan and nothing was found  Did blood work and his kidney levels were up and doctor said it was from the lisiniprol and told him not to take it  He does have amlopidine and they told him it was ok to take  He has enough until Dr Ta Noguera returns but would like to know if it ok to wait until then or come in for appt  Took BP this morning and it is 154/88      Please call back at [phone]

## 2019-08-05 NOTE — ED PROVIDER NOTES
History  Chief Complaint   Patient presents with    Hypertension     patient states he has issues with his blood pressure for about one week  today patient felt pressure in his head and temeples  patient took 2-10mg tablets of lisinopril  Patient is a 70-year-old male that presents emergency department with complaints of high blood pressure  He also states he has headache  Patient states that he was concerned blood pressures causing his headache so he took an extra lisinopril tablet  Patient denies chest pain, nausea vomiting, blurred vision, weakness, numbness, tingling  Prior to Admission Medications   Prescriptions Last Dose Informant Patient Reported? Taking?    CIALIS 20 MG tablet   No No   Sig: Take 1 tablet (20 mg total) by mouth daily as needed for erectile dysfunction   aspirin 81 mg chewable tablet Not Taking at Unknown time  Yes No   Sig: Chew 81 mg daily   lidocaine (LIDODERM) 5 % 8/4/2019 at Unknown time  No Yes   Sig: Apply 1 patch topically daily Remove & Discard patch within 12 hours or as directed by MD   lisinopril (ZESTRIL) 10 mg tablet 8/4/2019 at Unknown time  No Yes   Sig: Take 1 tablet (10 mg total) by mouth daily   multivitamin (THERAGRAN) TABS 8/4/2019 at Unknown time  Yes Yes   Sig: Take 1 tablet by mouth daily   tamsulosin (FLOMAX) 0 4 mg 8/4/2019 at Unknown time  No Yes   Sig: TAKE 1 CAPSULE 30 MINUTES AFTER THE SAME MEAL EACH DAY      Facility-Administered Medications: None       Past Medical History:   Diagnosis Date    Benign prostatic hyperplasia     Esophageal reflux     Vertigo        Past Surgical History:   Procedure Laterality Date    APPENDECTOMY  05/21/2015    CYST REMOVAL      Fatty cyst removed from back       Family History   Problem Relation Age of Onset    Prostate cancer Father     Prostate cancer Maternal Grandfather     Stomach cancer Maternal Aunt         malignant tumor of pharynx    Stomach cancer Maternal Uncle         malignant tumor of pharynx    Mental illness Family     Depression Family     Schizophrenia Family      I have reviewed and agree with the history as documented  Social History     Tobacco Use    Smoking status: Former Smoker    Smokeless tobacco: Never Used    Tobacco comment: 1ppw   Substance Use Topics    Alcohol use: Not Currently     Comment: socially    Drug use: No        Review of Systems   Constitutional: Negative for fever  Eyes: Negative for visual disturbance  Respiratory: Negative for shortness of breath  Cardiovascular: Negative for chest pain  Gastrointestinal: Negative for abdominal pain  Neurological: Positive for headaches  Negative for dizziness and weakness  All other systems reviewed and are negative  Physical Exam  Physical Exam   Constitutional: He is oriented to person, place, and time  He appears well-developed and well-nourished  HENT:   Head: Normocephalic and atraumatic  Right Ear: External ear normal    Left Ear: External ear normal    Nose: Nose normal    Mouth/Throat: Oropharynx is clear and moist    Eyes: Pupils are equal, round, and reactive to light  Conjunctivae and EOM are normal    Neck: Normal range of motion  Cardiovascular: Normal rate, regular rhythm and normal heart sounds  Pulmonary/Chest: Effort normal and breath sounds normal    Abdominal: Soft  Bowel sounds are normal    Musculoskeletal: Normal range of motion  Neurological: He is alert and oriented to person, place, and time  Skin: Skin is warm  Psychiatric: He has a normal mood and affect  His behavior is normal  Judgment and thought content normal    Vitals reviewed        Vital Signs  ED Triage Vitals   Temperature Pulse Respirations Blood Pressure SpO2   08/04/19 2333 08/04/19 2333 08/04/19 2333 08/04/19 2333 08/04/19 2333   97 9 °F (36 6 °C) (!) 47 18 (!) 178/85 99 %      Temp Source Heart Rate Source Patient Position - Orthostatic VS BP Location FiO2 (%)   08/04/19 2333 08/04/19 2333 -- 08/04/19 2333 --   Oral Monitor  Right arm       Pain Score       08/05/19 0013       8           Vitals:    08/04/19 2333 08/04/19 2348   BP: (!) 178/85    Pulse: (!) 47 (!) 50         Visual Acuity      ED Medications  Medications   sodium chloride 0 9 % bolus 1,000 mL (0 mL Intravenous Hold 8/5/19 0022)   acetaminophen (TYLENOL) tablet 650 mg (650 mg Oral Given 8/5/19 0013)       Diagnostic Studies  Results Reviewed     Procedure Component Value Units Date/Time    Basic metabolic panel [[id number]]  (Abnormal) Collected:  08/05/19 0020    Lab Status:  Final result Specimen:  Blood from Arm, Right Updated:  08/05/19 0035     Sodium 140 mmol/L      Potassium 4 1 mmol/L      Chloride 105 mmol/L      CO2 26 mmol/L      ANION GAP 9 mmol/L      BUN 29 mg/dL      Creatinine 1 48 mg/dL      Glucose 92 mg/dL      Calcium 8 8 mg/dL      eGFR 55 ml/min/1 73sq m     Narrative:       [place] guidelines for Chronic Kidney Disease (CKD):     Stage 1 with normal or high GFR (GFR > 90 mL/min/1 73 square meters)    Stage 2 Mild CKD (GFR = 60-89 mL/min/1 73 square meters)    Stage 3A Moderate CKD (GFR = 45-59 mL/min/1 73 square meters)    Stage 3B Moderate CKD (GFR = 30-44 mL/min/1 73 square meters)    Stage 4 Severe CKD (GFR = 15-29 mL/min/1 73 square meters)    Stage 5 End Stage CKD (GFR <15 mL/min/1 73 square meters)  Note: GFR calculation is accurate only with a steady state creatinine    CBC and differential [[id number]]  (Abnormal) Collected:  08/05/19 0020    Lab Status:  Final result Specimen:  Blood from Arm, Right Updated:  08/05/19 0026     WBC 3 62 Thousand/uL      RBC 4 48 Million/uL      Hemoglobin 13 9 g/dL      Hematocrit 41 2 %      MCV 92 fL      MCH 31 0 pg      MCHC 33 7 g/dL      RDW 13 5 %      MPV 11 2 fL      Platelets 994 Thousands/uL      nRBC 0 /100 WBCs      Neutrophils Relative 44 %      Immat GRANS % 0 %      Lymphocytes Relative 45 %      Monocytes Relative 8 % Eosinophils Relative 2 %      Basophils Relative 1 %      Neutrophils Absolute 1 61 Thousands/µL      Immature Grans Absolute 0 01 Thousand/uL      Lymphocytes Absolute 1 62 Thousands/µL      Monocytes Absolute 0 27 Thousand/µL      Eosinophils Absolute 0 08 Thousand/µL      Basophils Absolute 0 03 Thousands/µL                  CT head wo contrast   Final Result by Crow Vang DO (08/05 2548)      No acute intracranial process is seen  Other findings as above  Workstation performed: [id number]                    Procedures  Procedures       ED Course                               MDM  Number of Diagnoses or Management Options  Acute kidney injury [facility]): Hypertension:   Diagnosis management comments: Patient is a 40-year-old male presents emergency department with concerns of elevated blood pressure  He also has headache  He not take any medication for his headache  Patient received Tylenol emergency department headache resolved  CT scan was performed of his head did not show any abnormality  Lab work also demonstrated a slowly increasing creatinine level  Patient has been taking lisinopril  I recommended that he contact his family physician the morning regarding this  Return parameters were discussed  Patient stable for discharge         Amount and/or Complexity of Data Reviewed  Clinical lab tests: ordered and reviewed    Risk of Complications, Morbidity, and/or Mortality  Presenting problems: moderate  Diagnostic procedures: moderate  Management options: moderate    Patient Progress  Patient progress: stable      Disposition  Final diagnoses:   Hypertension   Acute kidney injury ([address] )     Time reflects when diagnosis was documented in both MDM as applicable and the Disposition within this note     Time User Action Codes Description Comment    8/5/2019  1:16 AM [place] Battiest Add [I10] Hypertension     8/5/2019  1:16 AM [place] Add [N17 9] Acute kidney injury ([address] ) ED Disposition     ED Disposition Condition Date/Time Comment    Discharge Good Mon Aug 5, 2019  1:16 AM Akin Juarez discharge to home/self care  Follow-up Information     Follow up With Specialties Details Why Contact Info    Sheela Sever, MD Internal Medicine Schedule an appointment as soon as possible for a visit   [address] [facility]  [address] [facility] [address] [address]  [phone]            Discharge Medication List as of 8/5/2019  1:16 AM      CONTINUE these medications which have NOT CHANGED    Details   lidocaine (LIDODERM) 5 % Apply 1 patch topically daily Remove & Discard patch within 12 hours or as directed by MD, Starting Thu 2/7/2019, Normal      lisinopril (ZESTRIL) 10 mg tablet Take 1 tablet (10 mg total) by mouth daily, Starting Wed 7/31/2019, Normal      multivitamin (THERAGRAN) TABS Take 1 tablet by mouth daily, Historical Med      tamsulosin (FLOMAX) 0 4 mg TAKE 1 CAPSULE 30 MINUTES AFTER THE SAME MEAL EACH DAY, Normal      aspirin 81 mg chewable tablet Chew 81 mg daily, Historical Med      CIALIS 20 MG tablet Take 1 tablet (20 mg total) by mouth daily as needed for erectile dysfunction, Starting Wed 7/3/2019, Normal           No discharge procedures on file      ED Provider  Electronically Signed by           Juanito Marie PA-C  08/05/19 6524

## 2019-08-06 LAB
ATRIAL RATE: 48 BPM
P AXIS: 47 DEGREES
PR INTERVAL: 166 MS
QRS AXIS: 10 DEGREES
QRSD INTERVAL: 86 MS
QT INTERVAL: 442 MS
QTC INTERVAL: 394 MS
T WAVE AXIS: 33 DEGREES
VENTRICULAR RATE: 48 BPM

## 2019-08-06 PROCEDURE — 93010 ELECTROCARDIOGRAM REPORT: CPT | Performed by: INTERNAL MEDICINE

## 2019-08-14 ENCOUNTER — APPOINTMENT (OUTPATIENT)
Dept: LAB | Facility: HOSPITAL | Age: 69
End: 2019-08-14
Payer: MEDICARE

## 2019-08-14 DIAGNOSIS — I10 ESSENTIAL HYPERTENSION: ICD-10-CM

## 2019-08-14 LAB
ANION GAP SERPL CALCULATED.3IONS-SCNC: 7 MMOL/L (ref 4–13)
BASOPHILS # BLD AUTO: 0.03 THOUSANDS/ΜL (ref 0–0.1)
BASOPHILS NFR BLD AUTO: 1 % (ref 0–1)
BUN SERPL-MCNC: 21 MG/DL (ref 5–25)
CALCIUM SERPL-MCNC: 9.6 MG/DL (ref 8.3–10.1)
CHLORIDE SERPL-SCNC: 104 MMOL/L (ref 100–108)
CO2 SERPL-SCNC: 27 MMOL/L (ref 21–32)
CREAT SERPL-MCNC: 1.44 MG/DL (ref 0.6–1.3)
EOSINOPHIL # BLD AUTO: 0.06 THOUSAND/ΜL (ref 0–0.61)
EOSINOPHIL NFR BLD AUTO: 2 % (ref 0–6)
ERYTHROCYTE [DISTWIDTH] IN BLOOD BY AUTOMATED COUNT: 13.4 % (ref 11.6–15.1)
GFR SERPL CREATININE-BSD FRML MDRD: 57 ML/MIN/1.73SQ M
GLUCOSE SERPL-MCNC: 80 MG/DL (ref 65–140)
HCT VFR BLD AUTO: 42.7 % (ref 36.5–49.3)
HGB BLD-MCNC: 14.2 G/DL (ref 12–17)
IMM GRANULOCYTES # BLD AUTO: 0.01 THOUSAND/UL (ref 0–0.2)
IMM GRANULOCYTES NFR BLD AUTO: 0 % (ref 0–2)
LYMPHOCYTES # BLD AUTO: 1.27 THOUSANDS/ΜL (ref 0.6–4.47)
LYMPHOCYTES NFR BLD AUTO: 32 % (ref 14–44)
MCH RBC QN AUTO: 30.5 PG (ref 26.8–34.3)
MCHC RBC AUTO-ENTMCNC: 33.3 G/DL (ref 31.4–37.4)
MCV RBC AUTO: 92 FL (ref 82–98)
MONOCYTES # BLD AUTO: 0.28 THOUSAND/ΜL (ref 0.17–1.22)
MONOCYTES NFR BLD AUTO: 7 % (ref 4–12)
NEUTROPHILS # BLD AUTO: 2.32 THOUSANDS/ΜL (ref 1.85–7.62)
NEUTS SEG NFR BLD AUTO: 58 % (ref 43–75)
NRBC BLD AUTO-RTO: 0 /100 WBCS
PLATELET # BLD AUTO: 198 THOUSANDS/UL (ref 149–390)
PMV BLD AUTO: 11.7 FL (ref 8.9–12.7)
POTASSIUM SERPL-SCNC: 4.1 MMOL/L (ref 3.5–5.3)
RBC # BLD AUTO: 4.66 MILLION/UL (ref 3.88–5.62)
SODIUM SERPL-SCNC: 138 MMOL/L (ref 136–145)
WBC # BLD AUTO: 3.97 THOUSAND/UL (ref 4.31–10.16)

## 2019-08-14 PROCEDURE — 85025 COMPLETE CBC W/AUTO DIFF WBC: CPT

## 2019-08-14 PROCEDURE — 36415 COLL VENOUS BLD VENIPUNCTURE: CPT

## 2019-08-14 PROCEDURE — 80048 BASIC METABOLIC PNL TOTAL CA: CPT

## 2019-08-15 ENCOUNTER — OFFICE VISIT (OUTPATIENT)
Dept: INTERNAL MEDICINE CLINIC | Facility: CLINIC | Age: 69
End: 2019-08-15
Payer: MEDICARE

## 2019-08-15 VITALS
HEART RATE: 65 BPM | OXYGEN SATURATION: 98 % | WEIGHT: 184 LBS | HEIGHT: 72 IN | RESPIRATION RATE: 18 BRPM | DIASTOLIC BLOOD PRESSURE: 74 MMHG | SYSTOLIC BLOOD PRESSURE: 126 MMHG | BODY MASS INDEX: 24.92 KG/M2

## 2019-08-15 DIAGNOSIS — I10 ESSENTIAL HYPERTENSION: Primary | ICD-10-CM

## 2019-08-15 PROCEDURE — 99213 OFFICE O/P EST LOW 20 MIN: CPT | Performed by: INTERNAL MEDICINE

## 2019-08-15 NOTE — PROGRESS NOTES
Assessment/Plan:     Pressure is controlled  Kidney function returning to normal   Continue current medications  Continue hydration  Return in about 3 months (around 11/15/2019)  No problem-specific Assessment & Plan notes found for this encounter  Diagnoses and all orders for this visit:    Essential hypertension  -     CBC and differential; Future  -     Comprehensive metabolic panel; Future          Subjective:      Patient ID: Mary Diaz is a 71 y o  male  Patient comes in today for follow-up  His blood pressure is controlled back on the amlodipine  He feels better  His blood work shows his kidney function is returning to normal   Some mild swelling in the ankles but tolerable  No new complaints        ALLERGIES:  Allergies   Allergen Reactions    Penicillin V Anaphylaxis       CURRENT MEDICATIONS:    Current Outpatient Medications:     amLODIPine (NORVASC) 5 mg tablet, Take 1 tablet (5 mg total) by mouth daily, Disp: 30 tablet, Rfl: 5    aspirin 81 mg chewable tablet, Chew 81 mg daily, Disp: , Rfl:     CIALIS 20 MG tablet, Take 1 tablet (20 mg total) by mouth daily as needed for erectile dysfunction, Disp: 18 tablet, Rfl: 3    lidocaine (LIDODERM) 5 %, Apply 1 patch topically daily Remove & Discard patch within 12 hours or as directed by MD, Disp: 30 patch, Rfl: 1    multivitamin (THERAGRAN) TABS, Take 1 tablet by mouth daily, Disp: , Rfl:     tamsulosin (FLOMAX) 0 4 mg, TAKE 1 CAPSULE 30 MINUTES AFTER THE SAME MEAL EACH DAY, Disp: 90 capsule, Rfl: 3    ACTIVE PROBLEM LIST:  Patient Active Problem List   Diagnosis    Benign prostatic hyperplasia, presence of lower urinary tract symptoms unspecified    Erectile dysfunction    Lumbar radiculopathy    Azotemia    Essential hypertension    Acute kidney injury ([address] )       PAST MEDICAL HISTORY:  Past Medical History:   Diagnosis Date    Benign prostatic hyperplasia     Esophageal reflux     Vertigo        PAST SURGICAL HISTORY:  Past Surgical History:   Procedure Laterality Date    APPENDECTOMY  05/21/2015    CYST REMOVAL      Fatty cyst removed from back       FAMILY HISTORY:  Family History   Problem Relation Age of Onset    Prostate cancer Father     Prostate cancer Maternal Grandfather     Stomach cancer Maternal Aunt         malignant tumor of pharynx    Stomach cancer Maternal Uncle         malignant tumor of pharynx    Mental illness Family     Depression Family     Schizophrenia Family        SOCIAL HISTORY:  Social History     Socioeconomic History    Marital status: /Civil Union     Spouse name: Not on file    Number of children: Not on file    Years of education: Not on file    Highest education level: Not on file   Occupational History    Occupation: Retired   Social Needs    Financial resource strain: Not on file    Food insecurity:     Worry: Not on file     Inability: Not on file   [employer] needs:     Medical: Not on file     Non-medical: Not on file   Tobacco Use    Smoking status: Former Smoker    Smokeless tobacco: Never Used    Tobacco comment: 1ppw   Substance and Sexual Activity    Alcohol use: Not Currently     Comment: socially    Drug use: No    Sexual activity: Yes     Partners: Female     Comment: denied history of high risk sexual behavior   Lifestyle    Physical activity:     Days per week: Not on file     Minutes per session: Not on file    Stress: Not on file   Relationships    Social connections:     Talks on phone: Not on file     Gets together: Not on file     Attends Yazdanism service: Not on file     Active member of club or organization: Not on file     Attends meetings of clubs or organizations: Not on file     Relationship status: Not on file    Intimate partner violence:     Fear of current or ex partner: Not on file     Emotionally abused: Not on file     Physically abused: Not on file     Forced sexual activity: Not on file   Other Topics Concern    Not on file   Social History Narrative    No active advance directive    Always uses seat belt    Exercises occasionally, moderate    Five children    Occasional caffeine consumption       Review of Systems   Respiratory: Negative for shortness of breath  Cardiovascular: Negative for chest pain  Gastrointestinal: Negative for abdominal pain  Objective:  Vitals:    08/15/19 1332   BP: 126/74   BP Location: Left arm   Patient Position: Sitting   Cuff Size: Standard   Pulse: 65   Resp: 18   SpO2: 98%   Weight: 83 5 kg (184 lb)   Height: 6' (1 829 m)     Body mass index is 24 95 kg/m²  Physical Exam   Constitutional: He is oriented to person, place, and time  He appears well-developed and well-nourished  Cardiovascular: Normal rate, regular rhythm and normal heart sounds  Pulmonary/Chest: Effort normal and breath sounds normal    Abdominal: Soft  There is no tenderness  Musculoskeletal: He exhibits no edema  Neurological: He is alert and oriented to person, place, and time  Nursing note and vitals reviewed          RESULTS:    Recent Results (from the past 1008 hour(s))   ECG 12 lead    Collection Time: 08/04/19 11:35 PM   Result Value Ref Range    Ventricular Rate 48 BPM    Atrial Rate 48 BPM    PA Interval 166 ms    QRSD Interval 86 ms    QT Interval 442 ms    QTC Interval 394 ms    P Axis 47 degrees    QRS Axis 10 degrees    T Wave Axis 33 degrees   CBC and differential    Collection Time: 08/05/19 12:20 AM   Result Value Ref Range    WBC 3 62 (L) 4 31 - 10 16 Thousand/uL    RBC 4 48 3 88 - 5 62 Million/uL    Hemoglobin 13 9 12 0 - 17 0 g/dL    Hematocrit 41 2 36 5 - 49 3 %    MCV 92 82 - 98 fL    MCH 31 0 26 8 - 34 3 pg    MCHC 33 7 31 4 - 37 4 g/dL    RDW 13 5 11 6 - 15 1 %    MPV 11 2 8 9 - 12 7 fL    Platelets 346 239 - 758 Thousands/uL    nRBC 0 /100 WBCs    Neutrophils Relative 44 43 - 75 %    Immat GRANS % 0 0 - 2 %    Lymphocytes Relative 45 (H) 14 - 44 %    Monocytes Relative 8 4 - 12 %    Eosinophils Relative 2 0 - 6 %    Basophils Relative 1 0 - 1 %    Neutrophils Absolute 1 61 (L) 1 85 - 7 62 Thousands/µL    Immature Grans Absolute 0 01 0 00 - 0 20 Thousand/uL    Lymphocytes Absolute 1 62 0 60 - 4 47 Thousands/µL    Monocytes Absolute 0 27 0 17 - 1 22 Thousand/µL    Eosinophils Absolute 0 08 0 00 - 0 61 Thousand/µL    Basophils Absolute 0 03 0 00 - 0 10 Thousands/µL   Basic metabolic panel    Collection Time: 08/05/19 12:20 AM   Result Value Ref Range    Sodium 140 136 - 145 mmol/L    Potassium 4 1 3 5 - 5 3 mmol/L    Chloride 105 100 - 108 mmol/L    CO2 26 21 - 32 mmol/L    ANION GAP 9 4 - 13 mmol/L    BUN 29 (H) 5 - 25 mg/dL    Creatinine 1 48 (H) 0 60 - 1 30 mg/dL    Glucose 92 65 - 140 mg/dL    Calcium 8 8 8 3 - 10 1 mg/dL    eGFR 55 ml/min/1 73sq m   Basic metabolic panel    Collection Time: 08/14/19  1:11 PM   Result Value Ref Range    Sodium 138 136 - 145 mmol/L    Potassium 4 1 3 5 - 5 3 mmol/L    Chloride 104 100 - 108 mmol/L    CO2 27 21 - 32 mmol/L    ANION GAP 7 4 - 13 mmol/L    BUN 21 5 - 25 mg/dL    Creatinine 1 44 (H) 0 60 - 1 30 mg/dL    Glucose 80 65 - 140 mg/dL    Calcium 9 6 8 3 - 10 1 mg/dL    eGFR 57 ml/min/1 73sq m   CBC and differential    Collection Time: 08/14/19  1:11 PM   Result Value Ref Range    WBC 3 97 (L) 4 31 - 10 16 Thousand/uL    RBC 4 66 3 88 - 5 62 Million/uL    Hemoglobin 14 2 12 0 - 17 0 g/dL    Hematocrit 42 7 36 5 - 49 3 %    MCV 92 82 - 98 fL    MCH 30 5 26 8 - 34 3 pg    MCHC 33 3 31 4 - 37 4 g/dL    RDW 13 4 11 6 - 15 1 %    MPV 11 7 8 9 - 12 7 fL    Platelets 535 991 - 148 Thousands/uL    nRBC 0 /100 WBCs    Neutrophils Relative 58 43 - 75 %    Immat GRANS % 0 0 - 2 %    Lymphocytes Relative 32 14 - 44 %    Monocytes Relative 7 4 - 12 %    Eosinophils Relative 2 0 - 6 %    Basophils Relative 1 0 - 1 %    Neutrophils Absolute 2 32 1 85 - 7 62 Thousands/µL    Immature Grans Absolute 0 01 0 00 - 0 20 Thousand/uL    Lymphocytes Absolute 1 27 0 60 - 4 47 Thousands/µL    Monocytes Absolute 0 28 0 17 - 1 22 Thousand/µL    Eosinophils Absolute 0 06 0 00 - 0 61 Thousand/µL    Basophils Absolute 0 03 0 00 - 0 10 Thousands/µL       This note was created with voice recognition software  Phonic, grammatical and spelling errors may be present within the note as a result

## 2019-08-19 DIAGNOSIS — I10 ESSENTIAL HYPERTENSION: ICD-10-CM

## 2019-08-19 RX ORDER — LISINOPRIL 5 MG/1
TABLET ORAL
Qty: 30 TABLET | Refills: 3 | OUTPATIENT
Start: 2019-08-19

## 2019-08-20 DIAGNOSIS — I10 ESSENTIAL HYPERTENSION: ICD-10-CM

## 2019-08-20 RX ORDER — AMLODIPINE BESYLATE 5 MG/1
TABLET ORAL
Qty: 90 TABLET | Refills: 3 | Status: SHIPPED | OUTPATIENT
Start: 2019-08-20 | End: 2019-10-16 | Stop reason: SDUPTHER

## 2019-09-12 DIAGNOSIS — N52.9 ERECTILE DYSFUNCTION, UNSPECIFIED ERECTILE DYSFUNCTION TYPE: ICD-10-CM

## 2019-09-13 RX ORDER — TADALAFIL 20 MG
20 TABLET ORAL DAILY PRN
Qty: 18 TABLET | Refills: 3 | Status: SHIPPED | OUTPATIENT
Start: 2019-09-13 | End: 2019-11-06 | Stop reason: ALTCHOICE

## 2019-09-17 DIAGNOSIS — N52.9 ERECTILE DYSFUNCTION, UNSPECIFIED ERECTILE DYSFUNCTION TYPE: ICD-10-CM

## 2019-10-09 ENCOUNTER — APPOINTMENT (OUTPATIENT)
Dept: LAB | Facility: HOSPITAL | Age: 69
End: 2019-10-09
Attending: INTERNAL MEDICINE
Payer: MEDICARE

## 2019-10-09 DIAGNOSIS — N18.2 CKD (CHRONIC KIDNEY DISEASE) STAGE 2, GFR 60-89 ML/MIN: ICD-10-CM

## 2019-10-09 LAB
ANION GAP SERPL CALCULATED.3IONS-SCNC: 6 MMOL/L (ref 4–13)
BASOPHILS # BLD AUTO: 0.02 THOUSANDS/ΜL (ref 0–0.1)
BASOPHILS NFR BLD AUTO: 1 % (ref 0–1)
BILIRUB UR QL STRIP: NEGATIVE
BUN SERPL-MCNC: 18 MG/DL (ref 5–25)
CALCIUM SERPL-MCNC: 9.2 MG/DL (ref 8.3–10.1)
CHLORIDE SERPL-SCNC: 102 MMOL/L (ref 100–108)
CLARITY UR: CLEAR
CO2 SERPL-SCNC: 30 MMOL/L (ref 21–32)
COLOR UR: YELLOW
CREAT SERPL-MCNC: 1.43 MG/DL (ref 0.6–1.3)
CREAT UR-MCNC: 170 MG/DL
EOSINOPHIL # BLD AUTO: 0.09 THOUSAND/ΜL (ref 0–0.61)
EOSINOPHIL NFR BLD AUTO: 2 % (ref 0–6)
ERYTHROCYTE [DISTWIDTH] IN BLOOD BY AUTOMATED COUNT: 13.8 % (ref 11.6–15.1)
GFR SERPL CREATININE-BSD FRML MDRD: 57 ML/MIN/1.73SQ M
GLUCOSE P FAST SERPL-MCNC: 78 MG/DL (ref 65–99)
GLUCOSE UR STRIP-MCNC: NEGATIVE MG/DL
HCT VFR BLD AUTO: 45.2 % (ref 36.5–49.3)
HGB BLD-MCNC: 14.8 G/DL (ref 12–17)
HGB UR QL STRIP.AUTO: NEGATIVE
IMM GRANULOCYTES # BLD AUTO: 0.01 THOUSAND/UL (ref 0–0.2)
IMM GRANULOCYTES NFR BLD AUTO: 0 % (ref 0–2)
KETONES UR STRIP-MCNC: NEGATIVE MG/DL
LEUKOCYTE ESTERASE UR QL STRIP: NEGATIVE
LYMPHOCYTES # BLD AUTO: 1.83 THOUSANDS/ΜL (ref 0.6–4.47)
LYMPHOCYTES NFR BLD AUTO: 46 % (ref 14–44)
MCH RBC QN AUTO: 30.6 PG (ref 26.8–34.3)
MCHC RBC AUTO-ENTMCNC: 32.7 G/DL (ref 31.4–37.4)
MCV RBC AUTO: 93 FL (ref 82–98)
MONOCYTES # BLD AUTO: 0.32 THOUSAND/ΜL (ref 0.17–1.22)
MONOCYTES NFR BLD AUTO: 8 % (ref 4–12)
NEUTROPHILS # BLD AUTO: 1.69 THOUSANDS/ΜL (ref 1.85–7.62)
NEUTS SEG NFR BLD AUTO: 43 % (ref 43–75)
NITRITE UR QL STRIP: NEGATIVE
NRBC BLD AUTO-RTO: 0 /100 WBCS
PH UR STRIP.AUTO: 6.5 [PH]
PLATELET # BLD AUTO: 191 THOUSANDS/UL (ref 149–390)
PMV BLD AUTO: 11.6 FL (ref 8.9–12.7)
POTASSIUM SERPL-SCNC: 3.8 MMOL/L (ref 3.5–5.3)
PROT UR STRIP-MCNC: NEGATIVE MG/DL
PROT UR-MCNC: 8 MG/DL
PROT/CREAT UR: 0.05 MG/G{CREAT} (ref 0–0.1)
RBC # BLD AUTO: 4.84 MILLION/UL (ref 3.88–5.62)
SODIUM SERPL-SCNC: 138 MMOL/L (ref 136–145)
SP GR UR STRIP.AUTO: 1.01 (ref 1–1.03)
UROBILINOGEN UR QL STRIP.AUTO: 0.2 E.U./DL
WBC # BLD AUTO: 3.96 THOUSAND/UL (ref 4.31–10.16)

## 2019-10-09 PROCEDURE — 85025 COMPLETE CBC W/AUTO DIFF WBC: CPT

## 2019-10-09 PROCEDURE — 82570 ASSAY OF URINE CREATININE: CPT

## 2019-10-09 PROCEDURE — 84156 ASSAY OF PROTEIN URINE: CPT

## 2019-10-09 PROCEDURE — 36415 COLL VENOUS BLD VENIPUNCTURE: CPT

## 2019-10-09 PROCEDURE — 80048 BASIC METABOLIC PNL TOTAL CA: CPT

## 2019-10-09 PROCEDURE — 81003 URINALYSIS AUTO W/O SCOPE: CPT

## 2019-10-16 ENCOUNTER — OFFICE VISIT (OUTPATIENT)
Dept: NEPHROLOGY | Facility: CLINIC | Age: 69
End: 2019-10-16
Payer: MEDICARE

## 2019-10-16 VITALS
HEIGHT: 71 IN | RESPIRATION RATE: 16 BRPM | WEIGHT: 190.4 LBS | SYSTOLIC BLOOD PRESSURE: 126 MMHG | TEMPERATURE: 97.5 F | BODY MASS INDEX: 26.65 KG/M2 | DIASTOLIC BLOOD PRESSURE: 80 MMHG | HEART RATE: 78 BPM

## 2019-10-16 DIAGNOSIS — I10 ESSENTIAL HYPERTENSION: ICD-10-CM

## 2019-10-16 DIAGNOSIS — N18.30 CKD (CHRONIC KIDNEY DISEASE) STAGE 3, GFR 30-59 ML/MIN (HCC): Primary | ICD-10-CM

## 2019-10-16 PROCEDURE — 99213 OFFICE O/P EST LOW 20 MIN: CPT | Performed by: INTERNAL MEDICINE

## 2019-10-16 NOTE — ASSESSMENT & PLAN NOTE
Blood pressure is reasonably well control    Importance of blood pressure control and kidney disease also discussed with him

## 2019-10-16 NOTE — ASSESSMENT & PLAN NOTE
His GFR is 57 which I thing may be his baseline  May be secondary to hypertension  I discuss pathophysiology of kidney disease with him at length  Advised hydration and avoidance of any nephrotoxic medicine    Asymptomatic and progressive nature of kidney disease discussed with

## 2019-10-16 NOTE — PROGRESS NOTES
NEPHROLOGY OFFICE FOLLOW UP  Aubree Manzanares 71 y o  male MRN: [id number]    Encounter: [id number] 10/16/2019    REASON FOR VISIT: Aubree Manzanares is a 71 y o  male who is here on 10/16/2019 for Follow-up; Chronic Kidney Disease; and Hypertension    HPI:    Kay Recio came in today for follow-up of CKD  Since I saw him last he was diagnosed hypertension and was started on medication  He is doing well  Still quite active doing regular exercise watching diet  Initially was started on amlodipine then switched to lisinopril because of leg swelling  with Lisinopril his creatinine started going up so he was switch back to the amlodipine  He is overall doing well now  No headache no dizziness no chest pain no palpitation still has a trace leg swelling but overall stable      REVIEW OF SYSTEMS:    Review of Systems   Constitutional: Negative for activity change and fatigue  HENT: Negative for congestion and ear discharge  Eyes: Negative for photophobia and pain  Respiratory: Negative for apnea and choking  Cardiovascular: Negative for chest pain and palpitations  Gastrointestinal: Negative for abdominal distention and blood in stool  Endocrine: Negative for heat intolerance and polyphagia  Genitourinary: Negative for flank pain and urgency  Musculoskeletal: Negative for neck pain and neck stiffness  Skin: Negative for color change and wound  Allergic/Immunologic: Negative for food allergies and immunocompromised state  Neurological: Negative for seizures and facial asymmetry  Hematological: Negative for adenopathy  Does not bruise/bleed easily  Psychiatric/Behavioral: Negative for self-injury and suicidal ideas           PAST MEDICAL HISTORY:  Past Medical History:   Diagnosis Date    Benign prostatic hyperplasia     Chronic kidney disease     Esophageal reflux     Hypertension     Vertigo        PAST SURGICAL HISTORY:  Past Surgical History:   Procedure Laterality Date    APPENDECTOMY  05/21/2015    CYST REMOVAL      Fatty cyst removed from back       SOCIAL HISTORY:  Social History     Substance and Sexual Activity   Alcohol Use Not Currently    Comment: socially     Social History     Substance and Sexual Activity   Drug Use No     Social History     Tobacco Use   Smoking Status Former Smoker   Smokeless Tobacco Never Used   Tobacco Comment    1ppw       FAMILY HISTORY:  Family History   Problem Relation Age of Onset    Prostate cancer Father     Prostate cancer Maternal Grandfather     Stomach cancer Maternal Aunt         malignant tumor of pharynx    Stomach cancer Maternal Uncle         malignant tumor of pharynx    Mental illness Family     Depression Family     Schizophrenia Family     Hypertension Mother     No Known Problems Sister     Dementia Sister        MEDICATIONS:    Current Outpatient Medications:     amLODIPine (NORVASC) 5 mg tablet, Take 1 tablet (5 mg total) by mouth daily, Disp: 30 tablet, Rfl: 5    CIALIS 20 MG tablet, Take 1 tablet (20 mg total) by mouth daily as needed for erectile dysfunction, Disp: 18 tablet, Rfl: 3    lidocaine (LIDODERM) 5 %, Apply 1 patch topically daily Remove & Discard patch within 12 hours or as directed by MD, Disp: 30 patch, Rfl: 1    multivitamin (THERAGRAN) TABS, Take 1 tablet by mouth daily, Disp: , Rfl:     tamsulosin (FLOMAX) 0 4 mg, TAKE 1 CAPSULE 30 MINUTES AFTER THE SAME MEAL EACH DAY, Disp: 90 capsule, Rfl: 3    PHYSICAL EXAM:  Vitals:    10/16/19 1328   BP: 126/80   BP Location: Right arm   Patient Position: Sitting   Pulse: 78   Resp: 16   Temp: 97 5 °F (36 4 °C)   TempSrc: Tympanic   Weight: 86 4 kg (190 lb 6 4 oz)   Height: 5' 11" (1 803 m)     Body mass index is 26 56 kg/m²  Physical Exam   Constitutional: He is oriented to person, place, and time  He appears well-developed  No distress  HENT:   Head: Normocephalic     Mouth/Throat: Oropharynx is clear and moist    Eyes: Conjunctivae are normal  No scleral icterus  Neck: Neck supple  No JVD present  Cardiovascular: Normal rate and normal heart sounds  Pulmonary/Chest: Effort normal  He has no wheezes  Abdominal: Soft  There is no tenderness  Musculoskeletal: Normal range of motion  He exhibits no edema  Neurological: He is alert and oriented to person, place, and time  Skin: Skin is warm  No rash noted  Psychiatric: He has a normal mood and affect   His behavior is normal        LAB RESULTS:  Results for orders placed or performed in visit on 21/43/77   Basic metabolic panel   Result Value Ref Range    Sodium 138 136 - 145 mmol/L    Potassium 3 8 3 5 - 5 3 mmol/L    Chloride 102 100 - 108 mmol/L    CO2 30 21 - 32 mmol/L    ANION GAP 6 4 - 13 mmol/L    BUN 18 5 - 25 mg/dL    Creatinine 1 43 (H) 0 60 - 1 30 mg/dL    Glucose, Fasting 78 65 - 99 mg/dL    Calcium 9 2 8 3 - 10 1 mg/dL    eGFR 57 ml/min/1 73sq m   CBC and differential   Result Value Ref Range    WBC 3 96 (L) 4 31 - 10 16 Thousand/uL    RBC 4 84 3 88 - 5 62 Million/uL    Hemoglobin 14 8 12 0 - 17 0 g/dL    Hematocrit 45 2 36 5 - 49 3 %    MCV 93 82 - 98 fL    MCH 30 6 26 8 - 34 3 pg    MCHC 32 7 31 4 - 37 4 g/dL    RDW 13 8 11 6 - 15 1 %    MPV 11 6 8 9 - 12 7 fL    Platelets 784 195 - 248 Thousands/uL    nRBC 0 /100 WBCs    Neutrophils Relative 43 43 - 75 %    Immat GRANS % 0 0 - 2 %    Lymphocytes Relative 46 (H) 14 - 44 %    Monocytes Relative 8 4 - 12 %    Eosinophils Relative 2 0 - 6 %    Basophils Relative 1 0 - 1 %    Neutrophils Absolute 1 69 (L) 1 85 - 7 62 Thousands/µL    Immature Grans Absolute 0 01 0 00 - 0 20 Thousand/uL    Lymphocytes Absolute 1 83 0 60 - 4 47 Thousands/µL    Monocytes Absolute 0 32 0 17 - 1 22 Thousand/µL    Eosinophils Absolute 0 09 0 00 - 0 61 Thousand/µL    Basophils Absolute 0 02 0 00 - 0 10 Thousands/µL   Protein / creatinine ratio, urine   Result Value Ref Range    Creatinine, Ur 170 0 mg/dL    Protein Urine Random 8 mg/dL    Prot/Creat Ratio, Ur 0  05 0 00 - 0 10   Urinalysis with reflex to microscopic   Result Value Ref Range    Color, UA Yellow     Clarity, UA Clear     Specific [place], UA 1 010 1 003 - 1 030    pH, UA 6 5 4 5, 5 0, 5 5, 6 0, 6 5, 7 0, 7 5, 8 0    Leukocytes, UA Negative Negative    Nitrite, UA Negative Negative    Protein, UA Negative Negative mg/dl    Glucose, UA Negative Negative mg/dl    Ketones, UA Negative Negative mg/dl    Urobilinogen, UA 0 2 0 2, 1 0 E U /dl E U /dl    Bilirubin, UA Negative Negative    Blood, UA Negative Negative       ASSESSMENT and PLAN:      CKD (chronic kidney disease) stage 3, GFR 30-59 ml/min ([facility])  His GFR is 57 which I thing may be his baseline  May be secondary to hypertension  I discuss pathophysiology of kidney disease with him at length  Advised hydration and avoidance of any nephrotoxic medicine  Asymptomatic and progressive nature of kidney disease discussed with    Essential hypertension  Blood pressure is reasonably well control  Importance of blood pressure control and kidney disease also discussed with him      I discussed blood work with him  Also discussed medication with him  I will see him back in 6 months  Will do blood work before his next visit        Portions of the record may have been created with voice recognition software  Occasional wrong word or "sound a like" substitutions may have occurred due to the inherent limitations of voice recognition software  Read the chart carefully and recognize, using context, where substitutions have occurred  If you have any questions, please contact the dictating provider

## 2019-10-17 ENCOUNTER — OFFICE VISIT (OUTPATIENT)
Dept: INTERNAL MEDICINE CLINIC | Facility: CLINIC | Age: 69
End: 2019-10-17
Payer: MEDICARE

## 2019-10-17 VITALS
DIASTOLIC BLOOD PRESSURE: 72 MMHG | BODY MASS INDEX: 26.49 KG/M2 | TEMPERATURE: 97.5 F | SYSTOLIC BLOOD PRESSURE: 130 MMHG | HEIGHT: 71 IN | WEIGHT: 189.2 LBS | OXYGEN SATURATION: 98 % | HEART RATE: 78 BPM

## 2019-10-17 DIAGNOSIS — J20.9 ACUTE BRONCHITIS, UNSPECIFIED ORGANISM: Primary | ICD-10-CM

## 2019-10-17 DIAGNOSIS — I10 ESSENTIAL HYPERTENSION: ICD-10-CM

## 2019-10-17 PROCEDURE — 99213 OFFICE O/P EST LOW 20 MIN: CPT | Performed by: INTERNAL MEDICINE

## 2019-10-17 RX ORDER — AZITHROMYCIN 250 MG/1
TABLET, FILM COATED ORAL
Qty: 6 TABLET | Refills: 0 | Status: SHIPPED | OUTPATIENT
Start: 2019-10-17 | End: 2019-10-21

## 2019-10-17 RX ORDER — AMLODIPINE BESYLATE 5 MG/1
5 TABLET ORAL DAILY
Qty: 30 TABLET | Refills: 5 | Status: SHIPPED | OUTPATIENT
Start: 2019-10-17 | End: 2020-04-08

## 2019-10-17 NOTE — PROGRESS NOTES
Assessment/Plan:     Treat for bronchitis  Continue over-the-counter remedies  States he needed a refill on his amlodipine as well while here  Quality Measures:       No follow-ups on file  No problem-specific Assessment & Plan notes found for this encounter  Diagnoses and all orders for this visit:    Acute bronchitis, unspecified organism  -     azithromycin (ZITHROMAX) 250 mg tablet; 2 tabs today then 1 tab daily for 4 days    Essential hypertension  -     amLODIPine (NORVASC) 5 mg tablet; Take 1 tablet (5 mg total) by mouth daily          Subjective:      Patient ID: Iqra Victoria is a 71 y o  male  Patient comes in today complaining of almost a week of worsening cough and chest congestion  And becoming more productive  Change in sputum color  Wife is also sick  No definite fevers  Trying over-the-counter remedies but not improving and actually worsening        ALLERGIES:  Allergies   Allergen Reactions    Penicillin V Anaphylaxis       CURRENT MEDICATIONS:    Current Outpatient Medications:     amLODIPine (NORVASC) 5 mg tablet, Take 1 tablet (5 mg total) by mouth daily, Disp: 30 tablet, Rfl: 5    CIALIS 20 MG tablet, Take 1 tablet (20 mg total) by mouth daily as needed for erectile dysfunction, Disp: 18 tablet, Rfl: 3    lidocaine (LIDODERM) 5 %, Apply 1 patch topically daily Remove & Discard patch within 12 hours or as directed by MD, Disp: 30 patch, Rfl: 1    multivitamin (THERAGRAN) TABS, Take 1 tablet by mouth daily, Disp: , Rfl:     tamsulosin (FLOMAX) 0 4 mg, TAKE 1 CAPSULE 30 MINUTES AFTER THE SAME MEAL EACH DAY, Disp: 90 capsule, Rfl: 3    azithromycin (ZITHROMAX) 250 mg tablet, 2 tabs today then 1 tab daily for 4 days, Disp: 6 tablet, Rfl: 0    ACTIVE PROBLEM LIST:  Patient Active Problem List   Diagnosis    Benign prostatic hyperplasia, presence of lower urinary tract symptoms unspecified    Erectile dysfunction    Lumbar radiculopathy    Azotemia    Essential hypertension    CKD (chronic kidney disease) stage 3, GFR 30-59 ml/min ([facility])       PAST MEDICAL HISTORY:  Past Medical History:   Diagnosis Date    Benign prostatic hyperplasia     Chronic kidney disease     Esophageal reflux     Hypertension     Vertigo        PAST SURGICAL HISTORY:  Past Surgical History:   Procedure Laterality Date    APPENDECTOMY  05/21/2015    CYST REMOVAL      Fatty cyst removed from back       FAMILY HISTORY:  Family History   Problem Relation Age of Onset    Prostate cancer Father     Prostate cancer Maternal Grandfather     Stomach cancer Maternal Aunt         malignant tumor of pharynx    Stomach cancer Maternal Uncle         malignant tumor of pharynx    Mental illness Family     Depression Family     Schizophrenia Family     Hypertension Mother     No Known Problems Sister     Dementia Sister        SOCIAL HISTORY:  Social History     Socioeconomic History    Marital status: /Civil Union     Spouse name: Not on file    Number of children: Not on file    Years of education: Not on file    Highest education level: Not on file   Occupational History    Occupation: Retired   Social Needs    Financial resource strain: Not on file    Food insecurity:     Worry: Not on file     Inability: Not on file   [employer] needs:     Medical: Not on file     Non-medical: Not on file   Tobacco Use    Smoking status: Former Smoker    Smokeless tobacco: Never Used    Tobacco comment: 1ppw   Substance and Sexual Activity    Alcohol use: Not Currently     Comment: socially    Drug use: No    Sexual activity: Yes     Partners: Female     Comment: denied history of high risk sexual behavior   Lifestyle    Physical activity:     Days per week: Not on file     Minutes per session: Not on file    Stress: Not on file   Relationships    Social connections:     Talks on phone: Not on file     Gets together: Not on file     Attends Sabianist service: Not on file Active member of club or organization: Not on file     Attends meetings of clubs or organizations: Not on file     Relationship status: Not on file    Intimate partner violence:     Fear of current or ex partner: Not on file     Emotionally abused: Not on file     Physically abused: Not on file     Forced sexual activity: Not on file   Other Topics Concern    Not on file   Social History Narrative    No active advance directive    Always uses seat belt    Exercises occasionally, moderate    Five children    Occasional caffeine consumption       Review of Systems   Constitutional: Negative for fever  HENT: Positive for congestion  Respiratory: Positive for cough  Objective:  Vitals:    10/17/19 1300   BP: 130/72   BP Location: Left arm   Patient Position: Sitting   Cuff Size: Large   Pulse: 78   Temp: 97 5 °F (36 4 °C)   TempSrc: Oral   SpO2: 98%   Weight: 85 8 kg (189 lb 3 2 oz)   Height: 5' 11" (1 803 m)     Body mass index is 26 39 kg/m²  Physical Exam   Constitutional: He is oriented to person, place, and time  He appears well-developed and well-nourished  HENT:   Right Ear: External ear normal    Left Ear: External ear normal    Nose: Nose normal    Mouth/Throat: Oropharynx is clear and moist  No oropharyngeal exudate  Eyes: Conjunctivae are normal    Cardiovascular: Normal rate, regular rhythm and normal heart sounds  Pulmonary/Chest: Effort normal and breath sounds normal    Most cough   Lymphadenopathy:     He has no cervical adenopathy  Neurological: He is alert and oriented to person, place, and time  Skin: He is not diaphoretic  Nursing note and vitals reviewed          RESULTS:    Recent Results (from the past 1008 hour(s))   Basic metabolic panel    Collection Time: 10/09/19  9:32 AM   Result Value Ref Range    Sodium 138 136 - 145 mmol/L    Potassium 3 8 3 5 - 5 3 mmol/L    Chloride 102 100 - 108 mmol/L    CO2 30 21 - 32 mmol/L    ANION GAP 6 4 - 13 mmol/L    BUN 18 5 - 25 mg/dL    Creatinine 1 43 (H) 0 60 - 1 30 mg/dL    Glucose, Fasting 78 65 - 99 mg/dL    Calcium 9 2 8 3 - 10 1 mg/dL    eGFR 57 ml/min/1 73sq m   CBC and differential    Collection Time: 10/09/19  9:32 AM   Result Value Ref Range    WBC 3 96 (L) 4 31 - 10 16 Thousand/uL    RBC 4 84 3 88 - 5 62 Million/uL    Hemoglobin 14 8 12 0 - 17 0 g/dL    Hematocrit 45 2 36 5 - 49 3 %    MCV 93 82 - 98 fL    MCH 30 6 26 8 - 34 3 pg    MCHC 32 7 31 4 - 37 4 g/dL    RDW 13 8 11 6 - 15 1 %    MPV 11 6 8 9 - 12 7 fL    Platelets 965 428 - 665 Thousands/uL    nRBC 0 /100 WBCs    Neutrophils Relative 43 43 - 75 %    Immat GRANS % 0 0 - 2 %    Lymphocytes Relative 46 (H) 14 - 44 %    Monocytes Relative 8 4 - 12 %    Eosinophils Relative 2 0 - 6 %    Basophils Relative 1 0 - 1 %    Neutrophils Absolute 1 69 (L) 1 85 - 7 62 Thousands/µL    Immature Grans Absolute 0 01 0 00 - 0 20 Thousand/uL    Lymphocytes Absolute 1 83 0 60 - 4 47 Thousands/µL    Monocytes Absolute 0 32 0 17 - 1 22 Thousand/µL    Eosinophils Absolute 0 09 0 00 - 0 61 Thousand/µL    Basophils Absolute 0 02 0 00 - 0 10 Thousands/µL   Protein / creatinine ratio, urine    Collection Time: 10/09/19 10:04 AM   Result Value Ref Range    Creatinine, Ur 170 0 mg/dL    Protein Urine Random 8 mg/dL    Prot/Creat Ratio, Ur 0 05 0 00 - 0 10   Urinalysis with reflex to microscopic    Collection Time: 10/09/19 10:04 AM   Result Value Ref Range    Color, UA Yellow     Clarity, UA Clear     Specific [place], UA 1 010 1 003 - 1 030    pH, UA 6 5 4 5, 5 0, 5 5, 6 0, 6 5, 7 0, 7 5, 8 0    Leukocytes, UA Negative Negative    Nitrite, UA Negative Negative    Protein, UA Negative Negative mg/dl    Glucose, UA Negative Negative mg/dl    Ketones, UA Negative Negative mg/dl    Urobilinogen, UA 0 2 0 2, 1 0 E U /dl E U /dl    Bilirubin, UA Negative Negative    Blood, UA Negative Negative       This note was created with voice recognition software    Phonic, grammatical and spelling errors may be present within the note as a result

## 2019-10-30 ENCOUNTER — APPOINTMENT (EMERGENCY)
Dept: CT IMAGING | Facility: HOSPITAL | Age: 69
End: 2019-10-30
Payer: MEDICARE

## 2019-10-30 ENCOUNTER — HOSPITAL ENCOUNTER (EMERGENCY)
Facility: HOSPITAL | Age: 69
Discharge: HOME | End: 2019-10-30
Attending: EMERGENCY MEDICINE
Payer: MEDICARE

## 2019-10-30 VITALS
RESPIRATION RATE: 16 BRPM | TEMPERATURE: 98.4 F | BODY MASS INDEX: 26.48 KG/M2 | SYSTOLIC BLOOD PRESSURE: 147 MMHG | WEIGHT: 189.15 LBS | HEIGHT: 71 IN | OXYGEN SATURATION: 99 % | HEART RATE: 60 BPM | DIASTOLIC BLOOD PRESSURE: 68 MMHG

## 2019-10-30 DIAGNOSIS — G44.209 TENSION HEADACHE: Primary | ICD-10-CM

## 2019-10-30 LAB
ALBUMIN SERPL BCP-MCNC: 3.8 G/DL (ref 3.5–5)
ALP SERPL-CCNC: 58 U/L (ref 46–116)
ALT SERPL W P-5'-P-CCNC: 40 U/L (ref 12–78)
ANION GAP SERPL CALCULATED.3IONS-SCNC: 7 MMOL/L (ref 4–13)
APTT PPP: 29 SECONDS (ref 23–37)
AST SERPL W P-5'-P-CCNC: 26 U/L (ref 5–45)
BASOPHILS # BLD AUTO: 0.02 THOUSANDS/ΜL (ref 0–0.1)
BASOPHILS NFR BLD AUTO: 1 % (ref 0–1)
BILIRUB SERPL-MCNC: 0.5 MG/DL (ref 0.2–1)
BUN SERPL-MCNC: 19 MG/DL (ref 5–25)
CALCIUM SERPL-MCNC: 9.1 MG/DL (ref 8.3–10.1)
CHLORIDE SERPL-SCNC: 104 MMOL/L (ref 100–108)
CO2 SERPL-SCNC: 28 MMOL/L (ref 21–32)
CREAT SERPL-MCNC: 1.4 MG/DL (ref 0.6–1.3)
EOSINOPHIL # BLD AUTO: 0.07 THOUSAND/ΜL (ref 0–0.61)
EOSINOPHIL NFR BLD AUTO: 2 % (ref 0–6)
ERYTHROCYTE [DISTWIDTH] IN BLOOD BY AUTOMATED COUNT: 13.5 % (ref 11.6–15.1)
ERYTHROCYTE [SEDIMENTATION RATE] IN BLOOD: 3 MM/HOUR (ref 0–10)
GFR SERPL CREATININE-BSD FRML MDRD: 59 ML/MIN/1.73SQ M
GLUCOSE SERPL-MCNC: 91 MG/DL (ref 65–140)
HCT VFR BLD AUTO: 42.2 % (ref 36.5–49.3)
HGB BLD-MCNC: 14.2 G/DL (ref 12–17)
IMM GRANULOCYTES # BLD AUTO: 0.01 THOUSAND/UL (ref 0–0.2)
IMM GRANULOCYTES NFR BLD AUTO: 0 % (ref 0–2)
INR PPP: 0.98 (ref 0.84–1.19)
LYMPHOCYTES # BLD AUTO: 1.41 THOUSANDS/ΜL (ref 0.6–4.47)
LYMPHOCYTES NFR BLD AUTO: 38 % (ref 14–44)
MCH RBC QN AUTO: 30.6 PG (ref 26.8–34.3)
MCHC RBC AUTO-ENTMCNC: 33.6 G/DL (ref 31.4–37.4)
MCV RBC AUTO: 91 FL (ref 82–98)
MONOCYTES # BLD AUTO: 0.23 THOUSAND/ΜL (ref 0.17–1.22)
MONOCYTES NFR BLD AUTO: 6 % (ref 4–12)
NEUTROPHILS # BLD AUTO: 1.98 THOUSANDS/ΜL (ref 1.85–7.62)
NEUTS SEG NFR BLD AUTO: 53 % (ref 43–75)
NRBC BLD AUTO-RTO: 0 /100 WBCS
PLATELET # BLD AUTO: 180 THOUSANDS/UL (ref 149–390)
PMV BLD AUTO: 11.3 FL (ref 8.9–12.7)
POTASSIUM SERPL-SCNC: 4.1 MMOL/L (ref 3.5–5.3)
PROT SERPL-MCNC: 7.4 G/DL (ref 6.4–8.2)
PROTHROMBIN TIME: 13 SECONDS (ref 11.6–14.5)
RBC # BLD AUTO: 4.64 MILLION/UL (ref 3.88–5.62)
SODIUM SERPL-SCNC: 139 MMOL/L (ref 136–145)
WBC # BLD AUTO: 3.72 THOUSAND/UL (ref 4.31–10.16)

## 2019-10-30 PROCEDURE — 99284 EMERGENCY DEPT VISIT MOD MDM: CPT | Performed by: PHYSICIAN ASSISTANT

## 2019-10-30 PROCEDURE — 85025 COMPLETE CBC W/AUTO DIFF WBC: CPT | Performed by: PHYSICIAN ASSISTANT

## 2019-10-30 PROCEDURE — 99284 EMERGENCY DEPT VISIT MOD MDM: CPT

## 2019-10-30 PROCEDURE — 85730 THROMBOPLASTIN TIME PARTIAL: CPT | Performed by: PHYSICIAN ASSISTANT

## 2019-10-30 PROCEDURE — 36415 COLL VENOUS BLD VENIPUNCTURE: CPT | Performed by: PHYSICIAN ASSISTANT

## 2019-10-30 PROCEDURE — 85652 RBC SED RATE AUTOMATED: CPT | Performed by: PHYSICIAN ASSISTANT

## 2019-10-30 PROCEDURE — 80053 COMPREHEN METABOLIC PANEL: CPT | Performed by: PHYSICIAN ASSISTANT

## 2019-10-30 PROCEDURE — 70450 CT HEAD/BRAIN W/O DYE: CPT

## 2019-10-30 PROCEDURE — 85610 PROTHROMBIN TIME: CPT | Performed by: PHYSICIAN ASSISTANT

## 2019-10-30 RX ORDER — METHOCARBAMOL 750 MG/1
750 TABLET, FILM COATED ORAL EVERY 6 HOURS PRN
Qty: 20 TABLET | Refills: 0 | Status: SHIPPED | OUTPATIENT
Start: 2019-10-30 | End: 2019-12-17 | Stop reason: ALTCHOICE

## 2019-10-30 NOTE — ED PROVIDER NOTES
History  Chief Complaint   Patient presents with    Headache     Pt reports waking up early this morning with a stabbing pain in the right side of his head  Also c/o tingling in his left hand  71 y o  male presents to the ED with chief complaint of headache  Onset of symptoms reported as 3 am this morning  Location of symptoms reported as the right temporal area of the head  Quality is reported as throbbing pain  Severity is reported as moderate  Associated symptoms: Denies nausea or vomiting  Denies fevers, denies neck pain, denies rash, denies blurred vision or loss of vision  Denies unilateral extremity paralysis, paraesthesias or weakness  Denies facial droop or slurred speech  Denies chest pain, denies syncope  Modifiers:  Patient reports he took tylenol which did not relive symptoms    ContexT:  Denies recent fall, injury or trauma to the area  Patient denies that headache was maximal intensity at onset and denies that his headache is worst headache of life  Denies history of migraine headaches  Medical summary: Review of past visit history via EPIC demonstrates patient last seen in ed on 8/4/2019 for evaluation of hypertension         History provided by:  Patient   used: No    Headache   Associated symptoms: no abdominal pain, no back pain, no congestion, no cough, no diarrhea, no dizziness, no drainage, no ear pain, no eye pain, no fatigue, no fever, no hearing loss, no myalgias, no nausea, no neck pain, no neck stiffness, no numbness, no photophobia, no seizures, no sinus pressure, no sore throat, no vomiting and no weakness        Prior to Admission Medications   Prescriptions Last Dose Informant Patient Reported? Taking?    CIALIS 20 MG tablet  Self No No   Sig: Take 1 tablet (20 mg total) by mouth daily as needed for erectile dysfunction   amLODIPine (NORVASC) 5 mg tablet   No No   Sig: Take 1 tablet (5 mg total) by mouth daily   lidocaine (LIDODERM) 5 %  Self No No Sig: Apply 1 patch topically daily Remove & Discard patch within 12 hours or as directed by MD   multivitamin (THERAGRAN) TABS  Self Yes No   Sig: Take 1 tablet by mouth daily   tamsulosin (FLOMAX) 0 4 mg  Self No No   Sig: TAKE 1 CAPSULE 30 MINUTES AFTER THE SAME MEAL EACH DAY      Facility-Administered Medications: None       Past Medical History:   Diagnosis Date    Benign prostatic hyperplasia     Chronic kidney disease     Esophageal reflux     Hypertension     Vertigo        Past Surgical History:   Procedure Laterality Date    APPENDECTOMY  05/21/2015    CYST REMOVAL      Fatty cyst removed from back       Family History   Problem Relation Age of Onset    Prostate cancer Father     Prostate cancer Maternal Grandfather     Stomach cancer Maternal Aunt         malignant tumor of pharynx    Stomach cancer Maternal Uncle         malignant tumor of pharynx    Mental illness Family     Depression Family     Schizophrenia Family     Hypertension Mother     No Known Problems Sister     Dementia Sister      I have reviewed and agree with the history as documented  Social History     Tobacco Use    Smoking status: Former Smoker    Smokeless tobacco: Never Used    Tobacco comment: 1ppw   Substance Use Topics    Alcohol use: Not Currently     Comment: socially    Drug use: No        Review of Systems   Constitutional: Negative for activity change, appetite change, chills, diaphoresis, fatigue, fever and unexpected weight change  HENT: Negative for congestion, dental problem, drooling, ear discharge, ear pain, facial swelling, hearing loss, mouth sores, nosebleeds, postnasal drip, rhinorrhea, sinus pressure, sinus pain, sneezing, sore throat, tinnitus, trouble swallowing and voice change  Eyes: Negative for photophobia, pain, discharge, redness, itching and visual disturbance  Respiratory: Negative for apnea, cough, choking, chest tightness, shortness of breath, wheezing and stridor  Cardiovascular: Negative for chest pain, palpitations and leg swelling  Gastrointestinal: Negative for abdominal distention, abdominal pain, anal bleeding, blood in stool, constipation, diarrhea, nausea and vomiting  Endocrine: Negative for cold intolerance, heat intolerance, polydipsia, polyphagia and polyuria  Genitourinary: Negative for decreased urine volume, difficulty urinating, dysuria, flank pain, frequency, hematuria and urgency  Musculoskeletal: Negative for arthralgias, back pain, gait problem, joint swelling, myalgias, neck pain and neck stiffness  Skin: Negative for color change, pallor, rash and wound  Allergic/Immunologic: Negative for environmental allergies, food allergies and immunocompromised state  Neurological: Positive for headaches  Negative for dizziness, tremors, seizures, syncope, facial asymmetry, speech difficulty, weakness, light-headedness and numbness  Hematological: Negative for adenopathy  Does not bruise/bleed easily  Psychiatric/Behavioral: Negative for agitation, behavioral problems, confusion and hallucinations  The patient is not nervous/anxious  All other systems reviewed and are negative  Physical Exam  Physical Exam   Constitutional: He is oriented to person, place, and time  He appears well-developed and well-nourished  No distress  /68 (BP Location: Left arm)   Pulse 60   Temp 98 4 °F (36 9 °C) (Oral)   Resp 16   Ht 5' 11" (1 803 m)   Wt 85 8 kg (189 lb 2 5 oz)   SpO2 99%   BMI 26 38 kg/m²    HENT:   Head: Normocephalic and atraumatic  Right Ear: External ear normal    Left Ear: External ear normal    Nose: Nose normal    Mouth/Throat: Oropharynx is clear and moist  No oropharyngeal exudate  Eyes: Pupils are equal, round, and reactive to light  Conjunctivae and EOM are normal  Right eye exhibits no discharge  Left eye exhibits no discharge  No scleral icterus  Neck: Normal range of motion  Neck supple  No JVD present   No tracheal deviation present  No thyromegaly present  Cardiovascular: Normal rate, regular rhythm and intact distal pulses  Pulmonary/Chest: Effort normal and breath sounds normal  No stridor  No respiratory distress  He has no wheezes  He has no rales  He exhibits no tenderness  Abdominal: Soft  Bowel sounds are normal  He exhibits no distension and no mass  There is no tenderness  There is no rebound and no guarding  Musculoskeletal: Normal range of motion  He exhibits no edema, tenderness or deformity  Lymphadenopathy:     He has no cervical adenopathy  Neurological: He is alert and oriented to person, place, and time  He displays normal reflexes  No cranial nerve deficit or sensory deficit  He exhibits normal muscle tone  Coordination normal    Skin: Skin is warm and dry  Capillary refill takes less than 2 seconds  No rash noted  He is not diaphoretic  No erythema  No pallor  Psychiatric: He has a normal mood and affect  His behavior is normal  Judgment and thought content normal    Nursing note and vitals reviewed        Vital Signs  ED Triage Vitals [10/30/19 0956]   Temperature Pulse Respirations Blood Pressure SpO2   98 4 °F (36 9 °C) 61 17 138/65 100 %      Temp Source Heart Rate Source Patient Position - Orthostatic VS BP Location FiO2 (%)   Oral Monitor Sitting Right arm --      Pain Score       6           Vitals:    10/30/19 0956 10/30/19 1145 10/30/19 1300   BP: 138/65 141/72 147/68   Pulse: 61 61 60   Patient Position - Orthostatic VS: Sitting Sitting Lying         Visual Acuity  Visual Acuity      Most Recent Value   L Pupil Size (mm)  3   R Pupil Size (mm)  3          ED Medications  Medications - No data to display    Diagnostic Studies  Results Reviewed     Procedure Component Value Units Date/Time    Sedimentation rate, automated [[id number]]  (Normal) Collected:  10/30/19 1036    Lab Status:  Final result Specimen:  Blood from Arm, Right Updated:  10/30/19 1155     Sed Rate 3 mm/hour Comprehensive metabolic panel [[id number]]  (Abnormal) Collected:  10/30/19 1036    Lab Status:  Final result Specimen:  Blood from Arm, Right Updated:  10/30/19 1121     Sodium 139 mmol/L      Potassium 4 1 mmol/L      Chloride 104 mmol/L      CO2 28 mmol/L      ANION GAP 7 mmol/L      BUN 19 mg/dL      Creatinine 1 40 mg/dL      Glucose 91 mg/dL      Calcium 9 1 mg/dL      AST 26 U/L      ALT 40 U/L      Alkaline Phosphatase 58 U/L      Total Protein 7 4 g/dL      Albumin 3 8 g/dL      Total Bilirubin 0 50 mg/dL      eGFR 59 ml/min/1 73sq m     Narrative:       [place] guidelines for Chronic Kidney Disease (CKD):     Stage 1 with normal or high GFR (GFR > 90 mL/min/1 73 square meters)    Stage 2 Mild CKD (GFR = 60-89 mL/min/1 73 square meters)    Stage 3A Moderate CKD (GFR = 45-59 mL/min/1 73 square meters)    Stage 3B Moderate CKD (GFR = 30-44 mL/min/1 73 square meters)    Stage 4 Severe CKD (GFR = 15-29 mL/min/1 73 square meters)    Stage 5 End Stage CKD (GFR <15 mL/min/1 73 square meters)  Note: GFR calculation is accurate only with a steady state creatinine    Protime-INR [[id number]]  (Normal) Collected:  10/30/19 1036    Lab Status:  Final result Specimen:  Blood from Arm, Right Updated:  10/30/19 1056     Protime 13 0 seconds      INR 0 98    APTT [[id number]]  (Normal) Collected:  10/30/19 1036    Lab Status:  Final result Specimen:  Blood from Arm, Right Updated:  10/30/19 1056     PTT 29 seconds     CBC and differential [[id number]]  (Abnormal) Collected:  10/30/19 1036    Lab Status:  Final result Specimen:  Blood from Arm, Right Updated:  10/30/19 1049     WBC 3 72 Thousand/uL      RBC 4 64 Million/uL      Hemoglobin 14 2 g/dL      Hematocrit 42 2 %      MCV 91 fL      MCH 30 6 pg      MCHC 33 6 g/dL      RDW 13 5 %      MPV 11 3 fL      Platelets 513 Thousands/uL      nRBC 0 /100 WBCs      Neutrophils Relative 53 %      Immat GRANS % 0 %      Lymphocytes Relative 38 % Monocytes Relative 6 %      Eosinophils Relative 2 %      Basophils Relative 1 %      Neutrophils Absolute 1 98 Thousands/µL      Immature Grans Absolute 0 01 Thousand/uL      Lymphocytes Absolute 1 41 Thousands/µL      Monocytes Absolute 0 23 Thousand/µL      Eosinophils Absolute 0 07 Thousand/µL      Basophils Absolute 0 02 Thousands/µL                  CT head wo contrast   Final Result by David Nicole MD (10/30 1124)      No acute intracranial hemorrhage, mass effect or edema  Workstation performed: [id number]                    Procedures  Procedures       ED Course                               MDM  Number of Diagnoses or Management Options  Tension headache: new and requires workup  Diagnosis management comments: ddx includes but is not limited to tension headache, migraine headache, cluster headache, sinusitis, SAH, SDH, doubt temporal arteritis due to lack of unilateral temporal location of pain, doubt intracranial hemorrhage, doubt meningitis due to lack of fever/nuchal rigidity  Ct head images independently visualized by me  Radiology report reviewed: no acute intracranial abnormality  Lab results reviewed  CBC mildly low at 3 7  Hemoglobin of 14 2 and hematocrit 42 2 is normal   No anemia  Comprehensive metabolic panel was reviewed, BUN of 19 was normal   Creatinine 1 40 is mildly elevated  Potassium normal at 4 1  INR is normal at 0 98  Sed rate is normal at 3  Doubt temporal arteritis  I discussed all test results with patient at bedside  Patient's headache resolved in the emergency department without any further treatment  Discussed symptoms appear most consistent with tension headache  No signs of temporal arteritis based upon normal surgery  No intracranial hemorrhage on CT scan  Discussed treatment plan including methocarbamol  Discussed outpatient follow-up with primary care physician and Neurology in 3-5 days for further evaluation of symptoms  Patient knows of his mildly elevated creatine in follows with Nephrology routinely  Reviewed reasons to return to ed  Patient verbalized understanding of diagnosis and agreement with discharge plan of care as well as understanding of reasons to return to ed  Amount and/or Complexity of Data Reviewed  Clinical lab tests: ordered and reviewed  Tests in the radiology section of CPT®: ordered and reviewed  Discussion of test results with the performing providers: yes  Review and summarize past medical records: yes  Independent visualization of images, tracings, or specimens: yes    Patient Progress  Patient progress: improved      Disposition  Final diagnoses:   Tension headache     Time reflects when diagnosis was documented in both MDM as applicable and the Disposition within this note     Time User Action Codes Description Comment    10/30/2019 12:47 PM Eusebio Oreilly [D19 [id number]] Tension headache       ED Disposition     ED Disposition Condition Date/Time Comment    Discharge Stable Wed Oct 30, 2019 12:47 PM Lindsey Mason discharge to home/self care              Follow-up Information     Follow up With Specialties Details Why Contact Info Additional Information    Shirley Mclaughlin MD Internal Medicine Call in 1 day for further evaluation of symptoms [address]       [address] [facility] Emergency Department Emergency Medicine Go to  If symptoms worsen [address] [facility] [id number] [phone] [place] ED, [address] [place], [place], [address], MD Neurology Call in 1 day for further evaluation of symptoms [address]  [phone]             Discharge Medication List as of 10/30/2019 12:48 PM      START taking these medications    Details   methocarbamol (ROBAXIN) 750 mg tablet Take 1 tablet (750 mg total) by mouth every 6 (six) hours as needed for muscle spasms for up to 5 days, Starting Wed 10/30/2019, Until Mon 11/4/2019, Print         CONTINUE these medications which have NOT CHANGED    Details   amLODIPine (NORVASC) 5 mg tablet Take 1 tablet (5 mg total) by mouth daily, Starting Thu 10/17/2019, Normal      CIALIS 20 MG tablet Take 1 tablet (20 mg total) by mouth daily as needed for erectile dysfunction, Starting Fri 9/13/2019, Normal      lidocaine (LIDODERM) 5 % Apply 1 patch topically daily Remove & Discard patch within 12 hours or as directed by MD, Starting Thu 2/7/2019, Normal      multivitamin (THERAGRAN) TABS Take 1 tablet by mouth daily, Historical Med      tamsulosin (FLOMAX) 0 4 mg TAKE 1 CAPSULE 30 MINUTES AFTER THE SAME MEAL EACH DAY, Normal           No discharge procedures on file      ED Provider  Electronically Signed by           Elpidio Arroyo PA-C  10/30/19 1891

## 2019-11-06 ENCOUNTER — TELEPHONE (OUTPATIENT)
Dept: INTERNAL MEDICINE CLINIC | Facility: CLINIC | Age: 69
End: 2019-11-06

## 2019-11-06 ENCOUNTER — IMMUNIZATIONS (OUTPATIENT)
Dept: INTERNAL MEDICINE CLINIC | Facility: CLINIC | Age: 69
End: 2019-11-06
Payer: MEDICARE

## 2019-11-06 DIAGNOSIS — N52.9 ERECTILE DYSFUNCTION, UNSPECIFIED ERECTILE DYSFUNCTION TYPE: Primary | ICD-10-CM

## 2019-11-06 DIAGNOSIS — Z23 NEED FOR INFLUENZA VACCINATION: Primary | ICD-10-CM

## 2019-11-06 PROCEDURE — G0008 ADMIN INFLUENZA VIRUS VAC: HCPCS

## 2019-11-06 PROCEDURE — 90662 IIV NO PRSV INCREASED AG IM: CPT

## 2019-11-06 RX ORDER — SILDENAFIL 100 MG/1
100 TABLET, FILM COATED ORAL DAILY PRN
Qty: 18 TABLET | Refills: 5 | Status: SHIPPED | OUTPATIENT
Start: 2019-11-06 | End: 2019-11-06 | Stop reason: SDUPTHER

## 2019-11-06 RX ORDER — SILDENAFIL 100 MG/1
100 TABLET, FILM COATED ORAL DAILY PRN
Qty: 18 TABLET | Refills: 5 | Status: SHIPPED | OUTPATIENT
Start: 2019-11-06 | End: 2020-12-02 | Stop reason: ALTCHOICE

## 2019-11-18 ENCOUNTER — TRANSCRIBE ORDERS (OUTPATIENT)
Dept: ADMINISTRATIVE | Facility: HOSPITAL | Age: 69
End: 2019-11-18

## 2019-11-18 ENCOUNTER — APPOINTMENT (OUTPATIENT)
Dept: LAB | Facility: HOSPITAL | Age: 69
End: 2019-11-18
Payer: MEDICARE

## 2019-11-18 DIAGNOSIS — R63.4 UNEXPLAINED WEIGHT LOSS: ICD-10-CM

## 2019-11-18 DIAGNOSIS — R10.9 ABDOMINAL PAIN, UNSPECIFIED ABDOMINAL LOCATION: Primary | ICD-10-CM

## 2019-11-18 DIAGNOSIS — Z92.89 H/O CT SCAN OF ABDOMEN: Primary | ICD-10-CM

## 2019-11-18 DIAGNOSIS — Z92.89 H/O CT SCAN OF ABDOMEN: ICD-10-CM

## 2019-11-18 LAB
BUN SERPL-MCNC: 20 MG/DL (ref 5–25)
CREAT SERPL-MCNC: 1.32 MG/DL (ref 0.6–1.3)
GFR SERPL CREATININE-BSD FRML MDRD: 63 ML/MIN/1.73SQ M

## 2019-11-18 PROCEDURE — 84520 ASSAY OF UREA NITROGEN: CPT

## 2019-11-18 PROCEDURE — 36415 COLL VENOUS BLD VENIPUNCTURE: CPT

## 2019-11-18 PROCEDURE — 82565 ASSAY OF CREATININE: CPT

## 2019-11-19 ENCOUNTER — HOSPITAL ENCOUNTER (OUTPATIENT)
Dept: CT IMAGING | Facility: CLINIC | Age: 69
Discharge: HOME | End: 2019-11-19
Payer: MEDICARE

## 2019-11-19 DIAGNOSIS — R10.9 ABDOMINAL PAIN, UNSPECIFIED ABDOMINAL LOCATION: ICD-10-CM

## 2019-11-19 PROCEDURE — 74177 CT ABD & PELVIS W/CONTRAST: CPT

## 2019-11-19 RX ADMIN — IOHEXOL 100 ML: 350 INJECTION, SOLUTION INTRAVENOUS at 09:45

## 2019-12-02 ENCOUNTER — TELEPHONE (OUTPATIENT)
Dept: INTERNAL MEDICINE CLINIC | Facility: CLINIC | Age: 69
End: 2019-12-02

## 2019-12-02 NOTE — TELEPHONE ENCOUNTER
Last conversation with the patient was he wanted to try the Viagra  Not sure if that did not work and [facility]  Probably need to call him to sort this out

## 2019-12-02 NOTE — TELEPHONE ENCOUNTER
Viagra was filled 11/08 at the local pharmacy and there is a request for the Cialis to be refilled  Ondina would like to know if they are to fill the Cialis? When calling refer to reference #[id number]  Please advise        Call back #[phone]  Ondina/PERRY [facility] Technician

## 2019-12-17 ENCOUNTER — OFFICE VISIT (OUTPATIENT)
Dept: INTERNAL MEDICINE CLINIC | Facility: CLINIC | Age: 69
End: 2019-12-17
Payer: MEDICARE

## 2019-12-17 VITALS
HEIGHT: 71 IN | HEART RATE: 69 BPM | OXYGEN SATURATION: 98 % | BODY MASS INDEX: 26.46 KG/M2 | DIASTOLIC BLOOD PRESSURE: 88 MMHG | WEIGHT: 189 LBS | SYSTOLIC BLOOD PRESSURE: 142 MMHG | RESPIRATION RATE: 16 BRPM

## 2019-12-17 DIAGNOSIS — I10 ESSENTIAL HYPERTENSION: Primary | ICD-10-CM

## 2019-12-17 PROCEDURE — 99213 OFFICE O/P EST LOW 20 MIN: CPT | Performed by: INTERNAL MEDICINE

## 2019-12-17 NOTE — PROGRESS NOTES
Assessment/Plan:     Would suggest 1st trying splitting up his amlodipine and take half tablet twice a day  If needed we can add another half a tablet to a total of 7 5 milligrams daily  Quality Measures:       Return if symptoms worsen or fail to improve  No problem-specific Assessment & Plan notes found for this encounter  Diagnoses and all orders for this visit:    Essential hypertension          Subjective:      Patient ID: Abel Rodriguez is a 71 y o  male  Patient comes in today because he has noticed his blood pressure is running slightly high  He had trouble over the weekend and a family member who was a nurse, suggested he take another tablet  There is some stress in the family with a recent death in the family  But he is still exercising and following his diet  He is otherwise taking the medicine regularly        ALLERGIES:  Allergies   Allergen Reactions    Penicillin V Anaphylaxis       CURRENT MEDICATIONS:    Current Outpatient Medications:     amLODIPine (NORVASC) 5 mg tablet, Take 1 tablet (5 mg total) by mouth daily, Disp: 30 tablet, Rfl: 5    lidocaine (LIDODERM) 5 %, Apply 1 patch topically daily Remove & Discard patch within 12 hours or as directed by MD, Disp: 30 patch, Rfl: 1    multivitamin (THERAGRAN) TABS, Take 1 tablet by mouth daily, Disp: , Rfl:     sildenafil (VIAGRA) 100 mg tablet, Take 1 tablet (100 mg total) by mouth daily as needed for erectile dysfunction, Disp: 18 tablet, Rfl: 5    tamsulosin (FLOMAX) 0 4 mg, TAKE 1 CAPSULE 30 MINUTES AFTER THE SAME MEAL EACH DAY, Disp: 90 capsule, Rfl: 3    ACTIVE PROBLEM LIST:  Patient Active Problem List   Diagnosis    Benign prostatic hyperplasia, presence of lower urinary tract symptoms unspecified    Erectile dysfunction    Lumbar radiculopathy    Azotemia    Essential hypertension    CKD (chronic kidney disease) stage 3, GFR 30-59 ml/min ([facility])       PAST MEDICAL HISTORY:  Past Medical History:   Diagnosis Date  Benign prostatic hyperplasia     Chronic kidney disease     Esophageal reflux     Hypertension     Vertigo        PAST SURGICAL HISTORY:  Past Surgical History:   Procedure Laterality Date    APPENDECTOMY  05/21/2015    CYST REMOVAL      Fatty cyst removed from back       FAMILY HISTORY:  Family History   Problem Relation Age of Onset    Prostate cancer Father     Prostate cancer Maternal Grandfather     Stomach cancer Maternal Aunt         malignant tumor of pharynx    Stomach cancer Maternal Uncle         malignant tumor of pharynx    Mental illness Family     Depression Family     Schizophrenia Family     Hypertension Mother     No Known Problems Sister     Dementia Sister        SOCIAL HISTORY:  Social History     Socioeconomic History    Marital status: /Civil Union     Spouse name: Not on file    Number of children: Not on file    Years of education: Not on file    Highest education level: Not on file   Occupational History    Occupation: Retired   Social Needs    Financial resource strain: Not on file    Food insecurity:     Worry: Not on file     Inability: Not on file   [employer] needs:     Medical: Not on file     Non-medical: Not on file   Tobacco Use    Smoking status: Former Smoker    Smokeless tobacco: Never Used    Tobacco comment: 1ppw   Substance and Sexual Activity    Alcohol use: Not Currently     Comment: socially    Drug use: No    Sexual activity: Yes     Partners: Female     Comment: denied history of high risk sexual behavior   Lifestyle    Physical activity:     Days per week: Not on file     Minutes per session: Not on file    Stress: Not on file   Relationships    Social connections:     Talks on phone: Not on file     Gets together: Not on file     Attends Evangelical service: Not on file     Active member of club or organization: Not on file     Attends meetings of clubs or organizations: Not on file     Relationship status: Not on file   Jassi Hernandez Intimate partner violence:     Fear of current or ex partner: Not on file     Emotionally abused: Not on file     Physically abused: Not on file     Forced sexual activity: Not on file   Other Topics Concern    Not on file   Social History Narrative    No active advance directive    Always uses seat belt    Exercises occasionally, moderate    Five children    Occasional caffeine consumption       Review of Systems   Respiratory: Negative for shortness of breath  Cardiovascular: Negative for chest pain  Gastrointestinal: Negative for abdominal pain  Objective:  Vitals:    12/17/19 1316   BP: 142/88   BP Location: Left arm   Patient Position: Sitting   Cuff Size: Standard   Pulse: 69   Resp: 16   SpO2: 98%   Weight: 85 7 kg (189 lb)   Height: 5' 11" (1 803 m)     Body mass index is 26 36 kg/m²  Physical Exam   Constitutional: He appears well-developed and well-nourished  Cardiovascular: Normal rate, regular rhythm and normal heart sounds  Pulmonary/Chest: Effort normal and breath sounds normal    Nursing note and vitals reviewed  RESULTS:    Recent Results (from the past 1008 hour(s))   BUN    Collection Time: 11/18/19  8:06 AM   Result Value Ref Range    BUN 20 5 - 25 mg/dL   Creatinine, serum    Collection Time: 11/18/19  8:06 AM   Result Value Ref Range    Creatinine 1 32 (H) 0 60 - 1 30 mg/dL    eGFR 63 ml/min/1 73sq m       This note was created with voice recognition software  Phonic, grammatical and spelling errors may be present within the note as a result

## 2020-01-07 ENCOUNTER — APPOINTMENT (EMERGENCY)
Dept: CT IMAGING | Facility: HOSPITAL | Age: 70
End: 2020-01-07
Payer: MEDICARE

## 2020-01-07 ENCOUNTER — HOSPITAL ENCOUNTER (EMERGENCY)
Facility: HOSPITAL | Age: 70
Discharge: HOME | End: 2020-01-07
Attending: EMERGENCY MEDICINE | Admitting: EMERGENCY MEDICINE
Payer: MEDICARE

## 2020-01-07 ENCOUNTER — TELEPHONE (OUTPATIENT)
Dept: INTERNAL MEDICINE CLINIC | Facility: CLINIC | Age: 70
End: 2020-01-07

## 2020-01-07 VITALS
OXYGEN SATURATION: 99 % | HEIGHT: 72 IN | BODY MASS INDEX: 25.19 KG/M2 | DIASTOLIC BLOOD PRESSURE: 66 MMHG | TEMPERATURE: 98.4 F | SYSTOLIC BLOOD PRESSURE: 133 MMHG | RESPIRATION RATE: 18 BRPM | HEART RATE: 59 BPM | WEIGHT: 186 LBS

## 2020-01-07 DIAGNOSIS — H61.20 CERUMEN IMPACTION: ICD-10-CM

## 2020-01-07 DIAGNOSIS — R42 VERTIGO: Primary | ICD-10-CM

## 2020-01-07 LAB
ALBUMIN SERPL BCP-MCNC: 3.4 G/DL (ref 3.5–5)
ALP SERPL-CCNC: 53 U/L (ref 46–116)
ALT SERPL W P-5'-P-CCNC: 34 U/L (ref 12–78)
ANION GAP SERPL CALCULATED.3IONS-SCNC: 9 MMOL/L (ref 4–13)
AST SERPL W P-5'-P-CCNC: 49 U/L (ref 5–45)
ATRIAL RATE: 54 BPM
BASOPHILS # BLD AUTO: 0.02 THOUSANDS/ΜL (ref 0–0.1)
BASOPHILS NFR BLD AUTO: 1 % (ref 0–1)
BILIRUB SERPL-MCNC: 0.4 MG/DL (ref 0.2–1)
BUN SERPL-MCNC: 21 MG/DL (ref 5–25)
CALCIUM SERPL-MCNC: 8.6 MG/DL (ref 8.3–10.1)
CHLORIDE SERPL-SCNC: 105 MMOL/L (ref 100–108)
CO2 SERPL-SCNC: 24 MMOL/L (ref 21–32)
CREAT SERPL-MCNC: 1.22 MG/DL (ref 0.6–1.3)
EOSINOPHIL # BLD AUTO: 0.06 THOUSAND/ΜL (ref 0–0.61)
EOSINOPHIL NFR BLD AUTO: 2 % (ref 0–6)
ERYTHROCYTE [DISTWIDTH] IN BLOOD BY AUTOMATED COUNT: 13.2 % (ref 11.6–15.1)
GFR SERPL CREATININE-BSD FRML MDRD: 69 ML/MIN/1.73SQ M
GLUCOSE SERPL-MCNC: 94 MG/DL (ref 65–140)
HCT VFR BLD AUTO: 41.6 % (ref 36.5–49.3)
HGB BLD-MCNC: 13.8 G/DL (ref 12–17)
IMM GRANULOCYTES # BLD AUTO: 0.01 THOUSAND/UL (ref 0–0.2)
IMM GRANULOCYTES NFR BLD AUTO: 0 % (ref 0–2)
LYMPHOCYTES # BLD AUTO: 1.48 THOUSANDS/ΜL (ref 0.6–4.47)
LYMPHOCYTES NFR BLD AUTO: 38 % (ref 14–44)
MCH RBC QN AUTO: 30.9 PG (ref 26.8–34.3)
MCHC RBC AUTO-ENTMCNC: 33.2 G/DL (ref 31.4–37.4)
MCV RBC AUTO: 93 FL (ref 82–98)
MONOCYTES # BLD AUTO: 0.28 THOUSAND/ΜL (ref 0.17–1.22)
MONOCYTES NFR BLD AUTO: 7 % (ref 4–12)
NEUTROPHILS # BLD AUTO: 2.01 THOUSANDS/ΜL (ref 1.85–7.62)
NEUTS SEG NFR BLD AUTO: 52 % (ref 43–75)
NRBC BLD AUTO-RTO: 0 /100 WBCS
P AXIS: 53 DEGREES
PLATELET # BLD AUTO: 167 THOUSANDS/UL (ref 149–390)
PMV BLD AUTO: 11.2 FL (ref 8.9–12.7)
POTASSIUM SERPL-SCNC: 5.2 MMOL/L (ref 3.5–5.3)
PR INTERVAL: 158 MS
PROT SERPL-MCNC: 7.1 G/DL (ref 6.4–8.2)
QRS AXIS: 1 DEGREES
QRSD INTERVAL: 88 MS
QT INTERVAL: 420 MS
QTC INTERVAL: 398 MS
RBC # BLD AUTO: 4.47 MILLION/UL (ref 3.88–5.62)
SODIUM SERPL-SCNC: 138 MMOL/L (ref 136–145)
T WAVE AXIS: 30 DEGREES
TROPONIN I SERPL-MCNC: <0.02 NG/ML
VENTRICULAR RATE: 54 BPM
WBC # BLD AUTO: 3.86 THOUSAND/UL (ref 4.31–10.16)

## 2020-01-07 PROCEDURE — 80053 COMPREHEN METABOLIC PANEL: CPT | Performed by: PHYSICIAN ASSISTANT

## 2020-01-07 PROCEDURE — 85025 COMPLETE CBC W/AUTO DIFF WBC: CPT | Performed by: PHYSICIAN ASSISTANT

## 2020-01-07 PROCEDURE — 36415 COLL VENOUS BLD VENIPUNCTURE: CPT | Performed by: PHYSICIAN ASSISTANT

## 2020-01-07 PROCEDURE — 99284 EMERGENCY DEPT VISIT MOD MDM: CPT

## 2020-01-07 PROCEDURE — 84484 ASSAY OF TROPONIN QUANT: CPT | Performed by: PHYSICIAN ASSISTANT

## 2020-01-07 PROCEDURE — 99285 EMERGENCY DEPT VISIT HI MDM: CPT | Performed by: PHYSICIAN ASSISTANT

## 2020-01-07 PROCEDURE — 93010 ELECTROCARDIOGRAM REPORT: CPT | Performed by: INTERNAL MEDICINE

## 2020-01-07 PROCEDURE — 93005 ELECTROCARDIOGRAM TRACING: CPT

## 2020-01-07 PROCEDURE — 70450 CT HEAD/BRAIN W/O DYE: CPT

## 2020-01-07 RX ORDER — MECLIZINE HCL 12.5 MG/1
25 TABLET ORAL 3 TIMES DAILY PRN
Qty: 30 TABLET | Refills: 0 | Status: SHIPPED | OUTPATIENT
Start: 2020-01-07 | End: 2020-04-17 | Stop reason: ALTCHOICE

## 2020-01-07 RX ORDER — DOCUSATE SODIUM 100 MG/1
300 CAPSULE, LIQUID FILLED ORAL ONCE
Status: COMPLETED | OUTPATIENT
Start: 2020-01-07 | End: 2020-01-07

## 2020-01-07 RX ORDER — MECLIZINE HYDROCHLORIDE 25 MG/1
25 TABLET ORAL ONCE
Status: COMPLETED | OUTPATIENT
Start: 2020-01-07 | End: 2020-01-07

## 2020-01-07 RX ADMIN — DOCUSATE SODIUM 300 MG: 100 CAPSULE, LIQUID FILLED ORAL at 21:50

## 2020-01-07 RX ADMIN — CARBAMIDE PEROXIDE - 6.5% 5 DROP: 65 SOLUTION/ DROPS TOPICAL at 20:26

## 2020-01-07 RX ADMIN — MECLIZINE HYDROCHLORIDE 25 MG: 25 TABLET ORAL at 20:13

## 2020-01-07 NOTE — TELEPHONE ENCOUNTER
Patient called stating he has been feeling "off" and having dizzy spells  He said that his bp has been reading [phone] today  Do you have any recommendations for him?

## 2020-01-07 NOTE — TELEPHONE ENCOUNTER
Those readings are still normal   Could try taking allergy medicine like Claritin or Allegra OTC since mild sinus congestion can present with dizziness  If not helping or worsening, should be seen somewhere

## 2020-01-08 NOTE — ED PROVIDER NOTES
History  Chief Complaint   Patient presents with    Vertigo - Recurrent     pt with dx of vertigo, states he has been dizzy all day today  "every time I stand up everything spins  " - cp/sob     Patient is a 61-year-old male presents emergency department with complaints of vertigo  Patient has history of vertigo  He states he woke up this morning with vertigo and took Dramamine  He states typically will take bonine but he didn't have any  He states he rested most the day and vertigo never improved  He denies any chest pain, shortness of breath, numbness, tingling, visual changes  Prior to Admission Medications   Prescriptions Last Dose Informant Patient Reported? Taking?    amLODIPine (NORVASC) 5 mg tablet 1/7/2020 at Unknown time  No Yes   Sig: Take 1 tablet (5 mg total) by mouth daily   lidocaine (LIDODERM) 5 % Past Month at Unknown time Self No Yes   Sig: Apply 1 patch topically daily Remove & Discard patch within 12 hours or as directed by MD   multivitamin (Ketty Rivera) TABS 1/7/2020 at Unknown time Self Yes Yes   Sig: Take 1 tablet by mouth daily   sildenafil (VIAGRA) 100 mg tablet Unknown at Unknown time  No No   Sig: Take 1 tablet (100 mg total) by mouth daily as needed for erectile dysfunction   tamsulosin (FLOMAX) 0 4 mg 1/7/2020 at Unknown time Self No Yes   Sig: TAKE 1 CAPSULE 30 MINUTES AFTER THE SAME MEAL EACH DAY      Facility-Administered Medications: None       Past Medical History:   Diagnosis Date    Benign prostatic hyperplasia     Chronic kidney disease     Esophageal reflux     Hypertension     Vertigo        Past Surgical History:   Procedure Laterality Date    APPENDECTOMY  05/21/2015    CYST REMOVAL      Fatty cyst removed from back       Family History   Problem Relation Age of Onset    Prostate cancer Father     Prostate cancer Maternal Grandfather     Stomach cancer Maternal Aunt         malignant tumor of pharynx    Stomach cancer Maternal Uncle         malignant tumor of pharynx    Mental illness Family     Depression Family     Schizophrenia Family     Hypertension Mother     No Known Problems Sister     Dementia Sister      I have reviewed and agree with the history as documented  Social History     Tobacco Use    Smoking status: Former Smoker    Smokeless tobacco: Never Used    Tobacco comment: 1ppw   Substance Use Topics    Alcohol use: Not Currently     Comment: socially    Drug use: No        Review of Systems   Constitutional: Negative for fever  HENT: Negative for congestion  Respiratory: Negative for shortness of breath  Cardiovascular: Negative for chest pain  Neurological: Positive for dizziness  All other systems reviewed and are negative  Physical Exam  Physical Exam   Constitutional: He is oriented to person, place, and time  He appears well-developed and well-nourished  HENT:   Head: Normocephalic and atraumatic  Cerumen impaction   Eyes: Pupils are equal, round, and reactive to light  Conjunctivae and EOM are normal    Neck: Normal range of motion  Cardiovascular: Normal rate, regular rhythm and normal heart sounds  Pulmonary/Chest: Effort normal and breath sounds normal    Abdominal: Soft  Bowel sounds are normal    Neurological: He is alert and oriented to person, place, and time  Skin: Skin is warm  Capillary refill takes less than 2 seconds  Psychiatric: He has a normal mood and affect  His behavior is normal  Judgment and thought content normal    Vitals reviewed        Vital Signs  ED Triage Vitals [01/07/20 1940]   Temperature Pulse Respirations Blood Pressure SpO2   98 4 °F (36 9 °C) 59 18 133/66 98 %      Temp src Heart Rate Source Patient Position - Orthostatic VS BP Location FiO2 (%)   -- -- -- -- --      Pain Score       No Pain           Vitals:    01/07/20 1940   BP: 133/66   Pulse: 59         Visual Acuity      ED Medications  Medications   carbamide peroxide (DEBROX) 6 5 % otic solution 5 drop (5 drops Right Ear Given 1/7/20 2026)   meclizine (ANTIVERT) tablet 25 mg (25 mg Oral Given 1/7/20 2013)   docusate sodium (COLACE) capsule 300 mg (300 mg Oral Given 1/7/20 2150)       Diagnostic Studies  Results Reviewed     Procedure Component Value Units Date/Time    Troponin I [[id number]]  (Normal) Collected:  01/07/20 2018    Lab Status:  Final result Specimen:  Blood from Arm, Right Updated:  01/07/20 2047     Troponin I <0 02 ng/mL     Comprehensive metabolic panel [[id number]]  (Abnormal) Collected:  01/07/20 2018    Lab Status:  Final result Specimen:  Blood from Arm, Right Updated:  01/07/20 2043     Sodium 138 mmol/L      Potassium 5 2 mmol/L      Chloride 105 mmol/L      CO2 24 mmol/L      ANION GAP 9 mmol/L      BUN 21 mg/dL      Creatinine 1 22 mg/dL      Glucose 94 mg/dL      Calcium 8 6 mg/dL      AST 49 U/L      ALT 34 U/L      Alkaline Phosphatase 53 U/L      Total Protein 7 1 g/dL      Albumin 3 4 g/dL      Total Bilirubin 0 40 mg/dL      eGFR 69 ml/min/1 73sq m     Narrative:       [place] guidelines for Chronic Kidney Disease (CKD):     Stage 1 with normal or high GFR (GFR > 90 mL/min/1 73 square meters)    Stage 2 Mild CKD (GFR = 60-89 mL/min/1 73 square meters)    Stage 3A Moderate CKD (GFR = 45-59 mL/min/1 73 square meters)    Stage 3B Moderate CKD (GFR = 30-44 mL/min/1 73 square meters)    Stage 4 Severe CKD (GFR = 15-29 mL/min/1 73 square meters)    Stage 5 End Stage CKD (GFR <15 mL/min/1 73 square meters)  Note: GFR calculation is accurate only with a steady state creatinine    CBC and differential [[id number]]  (Abnormal) Collected:  01/07/20 2018    Lab Status:  Final result Specimen:  Blood from Arm, Right Updated:  01/07/20 2022     WBC 3 86 Thousand/uL      RBC 4 47 Million/uL      Hemoglobin 13 8 g/dL      Hematocrit 41 6 %      MCV 93 fL      MCH 30 9 pg      MCHC 33 2 g/dL      RDW 13 2 %      MPV 11 2 fL      Platelets 220 Thousands/uL      nRBC 0 /100 WBCs      Neutrophils Relative 52 %      Immat GRANS % 0 %      Lymphocytes Relative 38 %      Monocytes Relative 7 %      Eosinophils Relative 2 %      Basophils Relative 1 %      Neutrophils Absolute 2 01 Thousands/µL      Immature Grans Absolute 0 01 Thousand/uL      Lymphocytes Absolute 1 48 Thousands/µL      Monocytes Absolute 0 28 Thousand/µL      Eosinophils Absolute 0 06 Thousand/µL      Basophils Absolute 0 02 Thousands/µL                  CT head wo contrast   Final Result by Bonnie Cole MD (01/07 2041)      No acute intracranial abnormality  Workstation performed: [id number]                    Procedures  ECG 12 Lead Documentation Only  Date/Time: 1/7/2020 11:38 PM  Performed by: Jose Enrique Young PA-C  Authorized by: Jose Enrique Young PA-C     Indications / Diagnosis:  Vertigo  ECG reviewed by me, the ED Provider: yes    Patient location:  ED  Previous ECG:     Previous ECG:  Compared to current    Similarity:  No change  Interpretation:     Interpretation: normal    Rate:     ECG rate:  54    ECG rate assessment: bradycardic    Rhythm:     Rhythm: sinus bradycardia    Ectopy:     Ectopy: none    QRS:     QRS axis:  Normal    QRS intervals:  Normal  Conduction:     Conduction: normal    ST segments:     ST segments:  Normal  T waves:     T waves: normal               ED Course           Identification of Seniors at Risk      Most Recent Value   (ISAR) Identification of Seniors at Risk   Before the illness or injury that brought you to the Emergency, did you need someone to help you on a regular basis? 0 Filed at: 01/07/2020 1939   In the last 24 hours, have you needed more help than usual?  0 Filed at: 01/07/2020 1939   Have you been hospitalized for one or more nights during the past 6 months? 0 Filed at: 01/07/2020 1939   In general, do you see well?  0 Filed at: 01/07/2020 1939   In general, do you have serious problems with your memory?   0 Filed at: 01/07/2020 1939   Do you take more than three different medications every day? 1 Filed at: 01/07/2020 1939   ISAR Score  1 Filed at: 01/07/2020 1939                          MDM  Number of Diagnoses or Management Options  Cerumen impaction:   Vertigo:   Diagnosis management comments: Patient is a 40-year-old male presents emergency department complaints of vertigo  In the emergency department, patient's CT scan of his head which was normal   Lab evaluations performed is unremarkable  ACS workup was performed and was unremarkable  Patient does have cerumen impaction of the right ear  This was disimpacted  Patient felt immediate relief  Patient's dizziness resolved  Very unlikely that this is a central cause of dizziness  Prescription for meclizine was given  Return parameters were discussed  Patient stable for discharge  Amount and/or Complexity of Data Reviewed  Clinical lab tests: ordered and reviewed  Tests in the radiology section of CPT®: ordered and reviewed  Independent visualization of images, tracings, or specimens: yes    Risk of Complications, Morbidity, and/or Mortality  Presenting problems: moderate  Diagnostic procedures: moderate  Management options: moderate    Patient Progress  Patient progress: stable        Disposition  Final diagnoses:   Vertigo   Cerumen impaction     Time reflects when diagnosis was documented in both MDM as applicable and the Disposition within this note     Time User Action Codes Description Comment    1/7/2020 10:45 PM Britni Lynch Add [R42] Vertigo     1/7/2020 10:45 PM Britni Lynch Add [H61 20] Cerumen impaction       ED Disposition     ED Disposition Condition Date/Time Comment    Discharge Good Tue Jan 7, 2020 [id number] [address] discharge to home/self care              Follow-up Information     Follow up With Specialties Details Why Contact Info    Eden Rosas MD Internal Medicine Schedule an appointment as soon as possible for a visit   [address] [place] [address] [facility]            Discharge Medication List as of 1/7/2020 10:46 PM      START taking these medications    Details   meclizine (ANTIVERT) 12 5 MG tablet Take 2 tablets (25 mg total) by mouth 3 (three) times a day as needed for dizziness, Starting Tue 1/7/2020, Normal         CONTINUE these medications which have NOT CHANGED    Details   amLODIPine (NORVASC) 5 mg tablet Take 1 tablet (5 mg total) by mouth daily, Starting Thu 10/17/2019, Normal      lidocaine (LIDODERM) 5 % Apply 1 patch topically daily Remove & Discard patch within 12 hours or as directed by MD, Starting Thu 2/7/2019, Normal      multivitamin (THERAGRAN) TABS Take 1 tablet by mouth daily, Historical Med      tamsulosin (FLOMAX) 0 4 mg TAKE 1 CAPSULE 30 MINUTES AFTER THE SAME MEAL EACH DAY, Normal      sildenafil (VIAGRA) 100 mg tablet Take 1 tablet (100 mg total) by mouth daily as needed for erectile dysfunction, Starting Wed 11/6/2019, Normal           No discharge procedures on file      ED Provider  Electronically Signed by           Bhakti Quesada PA-C  01/07/20 5623

## 2020-03-27 ENCOUNTER — NURSE TRIAGE (OUTPATIENT)
Dept: OTHER | Facility: OTHER | Age: 70
End: 2020-03-27

## 2020-03-27 NOTE — TELEPHONE ENCOUNTER
Regarding: Cornavirus  ----- Message from [facility] sent at 3/27/2020  2:19 AM EDT -----  " I don't currently have a fever but I do have burning in my chest and a dry cough sometimes " I will call my PCP in the morning as well " Liver disease Atrial fibrillation, unspecified type

## 2020-03-30 NOTE — TELEPHONE ENCOUNTER
Spoke to pt, cough has subsided  Temp went up to 101, tylenol brought it down  No fever today, no cough or congestion   Will call if fever comes back or symptoms worsen

## 2020-03-31 ENCOUNTER — TELEMEDICINE (OUTPATIENT)
Dept: INTERNAL MEDICINE CLINIC | Facility: CLINIC | Age: 70
End: 2020-03-31
Payer: MEDICARE

## 2020-03-31 DIAGNOSIS — R50.9 FEVER, UNSPECIFIED FEVER CAUSE: ICD-10-CM

## 2020-03-31 DIAGNOSIS — R52 BODY ACHES: Primary | ICD-10-CM

## 2020-03-31 PROCEDURE — 99442 PR PHYS/QHP TELEPHONE EVALUATION 11-20 MIN: CPT | Performed by: PHYSICIAN ASSISTANT

## 2020-03-31 NOTE — PROGRESS NOTES
Virtual Regular Visit    Problem List Items Addressed This Visit     None               Reason for visit is acute visit  Encounter provider Juan R Umaña PA-C    Provider located at [address] [place], [place] [id number]      Recent Visits  No visits were found meeting these conditions  Showing recent visits within past 7 days and meeting all other requirements     Today's Visits  Date Type Provider Dept   03/31/20 Telemedicine [facility]   Showing today's visits and meeting all other requirements     Future Appointments  Date Type Provider Dept   03/31/20 Telemedicine Juan R Umaña PA-C [address] [place] [address] Internal Med   Showing future appointments within next 150 days and meeting all other requirements        The patient was identified by name and date of birth  Samantha Hayden was informed that this is a telemedicine visit and that the visit is being conducted through [employer] and patient was informed that this is not a secure, HIPAA-complaint platform  he agrees to proceed     My office door was closed  No one else was in the room  He acknowledged consent and understanding of privacy and security of the video platform  The patient has agreed to participate and understands they can discontinue the visit at any time  Patient is aware this is a billable service  Richy Caballero is a 71 y o  male   Patient called complaining of body aches, low-grade fever and mild cough for past 3 days  Today he has not had any fever  Biggest concern is that his wife works at [employer] on the platform and potentially has been around infected individuals  He actually feels as if he is getting better other than the achiness  No diarrhea, no headache        Past Medical History:   Diagnosis Date    Benign prostatic hyperplasia     Chronic kidney disease     Esophageal reflux     Hypertension     Vertigo        Past Surgical History:   Procedure Laterality Date    APPENDECTOMY  05/21/2015    CYST REMOVAL      Fatty cyst removed from back       Current Outpatient Medications   Medication Sig Dispense Refill    amLODIPine (NORVASC) 5 mg tablet Take 1 tablet (5 mg total) by mouth daily 30 tablet 5    lidocaine (LIDODERM) 5 % Apply 1 patch topically daily Remove & Discard patch within 12 hours or as directed by MD 30 patch 1    multivitamin (THERAGRAN) TABS Take 1 tablet by mouth daily      tamsulosin (FLOMAX) 0 4 mg TAKE 1 CAPSULE 30 MINUTES AFTER THE SAME MEAL EACH DAY 90 capsule 3    meclizine (ANTIVERT) 12 5 MG tablet Take 2 tablets (25 mg total) by mouth 3 (three) times a day as needed for dizziness (Patient not taking: Reported on 3/31/2020) 30 tablet 0    sildenafil (VIAGRA) 100 mg tablet Take 1 tablet (100 mg total) by mouth daily as needed for erectile dysfunction (Patient not taking: Reported on 3/31/2020) 18 tablet 5     No current facility-administered medications for this visit  Allergies   Allergen Reactions    Penicillin V Anaphylaxis       Review of Systems   Constitutional: Positive for chills, fatigue and fever  HENT: Negative for congestion, postnasal drip, rhinorrhea and sinus pressure  Respiratory: Positive for cough  Negative for chest tightness, shortness of breath and wheezing  Cardiovascular: Negative for chest pain, palpitations and leg swelling  Gastrointestinal: Negative for diarrhea and nausea  Musculoskeletal: Positive for myalgias  Neurological: Negative for dizziness, light-headedness and headaches  Psychiatric/Behavioral: Negative for sleep disturbance  Physical Exam   Constitutional: He is oriented to person, place, and time  He appears well-developed and well-nourished  Patient appears in no acute distress  No coughing during interview  No conversational dyspnea  Pulmonary/Chest: No respiratory distress     Neurological: He is alert and oriented to person, place, and time  Psychiatric: He has a normal mood and affect  His behavior is normal         I spent 15 minutes with the patient during this visit  Eran Perez was seen today for virtual regular visit  Diagnoses and all orders for this visit:    Body aches    Fever, unspecified fever cause      Rest, increase fluids  Tylenol for fever or aches as per package instructions  Stay hydrated  Status call if not improving  Continue to quarantine  Must be fever free without Tylenol for 3 days

## 2020-04-02 ENCOUNTER — TELEPHONE (OUTPATIENT)
Dept: INTERNAL MEDICINE CLINIC | Facility: CLINIC | Age: 70
End: 2020-04-02

## 2020-04-02 DIAGNOSIS — J20.9 ACUTE BRONCHITIS, UNSPECIFIED ORGANISM: Primary | ICD-10-CM

## 2020-04-02 RX ORDER — AZITHROMYCIN 250 MG/1
TABLET, FILM COATED ORAL
Qty: 6 TABLET | Refills: 0 | Status: SHIPPED | OUTPATIENT
Start: 2020-04-02 | End: 2020-04-06

## 2020-04-04 ENCOUNTER — TELEPHONE (OUTPATIENT)
Dept: OTHER | Facility: OTHER | Age: 70
End: 2020-04-04

## 2020-04-05 ENCOUNTER — APPOINTMENT (EMERGENCY)
Dept: RADIOLOGY | Facility: HOSPITAL | Age: 70
End: 2020-04-05
Payer: MEDICARE

## 2020-04-05 ENCOUNTER — HOSPITAL ENCOUNTER (EMERGENCY)
Facility: HOSPITAL | Age: 70
Discharge: HOME | End: 2020-04-05
Attending: EMERGENCY MEDICINE | Admitting: EMERGENCY MEDICINE
Payer: MEDICARE

## 2020-04-05 ENCOUNTER — NURSE TRIAGE (OUTPATIENT)
Dept: OTHER | Facility: OTHER | Age: 70
End: 2020-04-05

## 2020-04-05 VITALS
RESPIRATION RATE: 17 BRPM | TEMPERATURE: 97.6 F | DIASTOLIC BLOOD PRESSURE: 83 MMHG | HEIGHT: 72 IN | BODY MASS INDEX: 25.17 KG/M2 | OXYGEN SATURATION: 99 % | WEIGHT: 185.85 LBS | HEART RATE: 73 BPM | SYSTOLIC BLOOD PRESSURE: 141 MMHG

## 2020-04-05 DIAGNOSIS — R07.89 CHEST DISCOMFORT: ICD-10-CM

## 2020-04-05 DIAGNOSIS — H53.9 VISUAL DISTURBANCE: Primary | ICD-10-CM

## 2020-04-05 DIAGNOSIS — R68.89 FLU-LIKE SYMPTOMS: ICD-10-CM

## 2020-04-05 LAB
ALBUMIN SERPL BCP-MCNC: 3.5 G/DL (ref 3.5–5)
ALP SERPL-CCNC: 60 U/L (ref 46–116)
ALT SERPL W P-5'-P-CCNC: 63 U/L (ref 12–78)
ANION GAP SERPL CALCULATED.3IONS-SCNC: 9 MMOL/L (ref 4–13)
AST SERPL W P-5'-P-CCNC: 43 U/L (ref 5–45)
ATRIAL RATE: 77 BPM
BASOPHILS # BLD AUTO: 0.01 THOUSANDS/ΜL (ref 0–0.1)
BASOPHILS NFR BLD AUTO: 0 % (ref 0–1)
BILIRUB SERPL-MCNC: 0.7 MG/DL (ref 0.2–1)
BUN SERPL-MCNC: 17 MG/DL (ref 5–25)
CALCIUM SERPL-MCNC: 8.8 MG/DL (ref 8.3–10.1)
CHLORIDE SERPL-SCNC: 98 MMOL/L (ref 100–108)
CO2 SERPL-SCNC: 27 MMOL/L (ref 21–32)
CREAT SERPL-MCNC: 1.59 MG/DL (ref 0.6–1.3)
EOSINOPHIL # BLD AUTO: 0.01 THOUSAND/ΜL (ref 0–0.61)
EOSINOPHIL NFR BLD AUTO: 0 % (ref 0–6)
ERYTHROCYTE [DISTWIDTH] IN BLOOD BY AUTOMATED COUNT: 13.1 % (ref 11.6–15.1)
GFR SERPL CREATININE-BSD FRML MDRD: 50 ML/MIN/1.73SQ M
GLUCOSE SERPL-MCNC: 99 MG/DL (ref 65–140)
HCT VFR BLD AUTO: 43.3 % (ref 36.5–49.3)
HGB BLD-MCNC: 14.4 G/DL (ref 12–17)
IMM GRANULOCYTES # BLD AUTO: 0.02 THOUSAND/UL (ref 0–0.2)
IMM GRANULOCYTES NFR BLD AUTO: 1 % (ref 0–2)
LYMPHOCYTES # BLD AUTO: 1.56 THOUSANDS/ΜL (ref 0.6–4.47)
LYMPHOCYTES NFR BLD AUTO: 37 % (ref 14–44)
MCH RBC QN AUTO: 30.3 PG (ref 26.8–34.3)
MCHC RBC AUTO-ENTMCNC: 33.3 G/DL (ref 31.4–37.4)
MCV RBC AUTO: 91 FL (ref 82–98)
MONOCYTES # BLD AUTO: 0.32 THOUSAND/ΜL (ref 0.17–1.22)
MONOCYTES NFR BLD AUTO: 8 % (ref 4–12)
NEUTROPHILS # BLD AUTO: 2.29 THOUSANDS/ΜL (ref 1.85–7.62)
NEUTS SEG NFR BLD AUTO: 54 % (ref 43–75)
NRBC BLD AUTO-RTO: 0 /100 WBCS
P AXIS: 67 DEGREES
PLATELET # BLD AUTO: 200 THOUSANDS/UL (ref 149–390)
PMV BLD AUTO: 11.1 FL (ref 8.9–12.7)
POTASSIUM SERPL-SCNC: 3.8 MMOL/L (ref 3.5–5.3)
PR INTERVAL: 170 MS
PROT SERPL-MCNC: 7.7 G/DL (ref 6.4–8.2)
QRS AXIS: 7 DEGREES
QRSD INTERVAL: 88 MS
QT INTERVAL: 378 MS
QTC INTERVAL: 427 MS
RBC # BLD AUTO: 4.75 MILLION/UL (ref 3.88–5.62)
SODIUM SERPL-SCNC: 134 MMOL/L (ref 136–145)
T WAVE AXIS: 42 DEGREES
VENTRICULAR RATE: 77 BPM
WBC # BLD AUTO: 4.21 THOUSAND/UL (ref 4.31–10.16)

## 2020-04-05 PROCEDURE — 80053 COMPREHEN METABOLIC PANEL: CPT | Performed by: PHYSICIAN ASSISTANT

## 2020-04-05 PROCEDURE — 99284 EMERGENCY DEPT VISIT MOD MDM: CPT | Performed by: PHYSICIAN ASSISTANT

## 2020-04-05 PROCEDURE — 93010 ELECTROCARDIOGRAM REPORT: CPT | Performed by: INTERNAL MEDICINE

## 2020-04-05 PROCEDURE — 93005 ELECTROCARDIOGRAM TRACING: CPT

## 2020-04-05 PROCEDURE — 85025 COMPLETE CBC W/AUTO DIFF WBC: CPT | Performed by: PHYSICIAN ASSISTANT

## 2020-04-05 PROCEDURE — 36415 COLL VENOUS BLD VENIPUNCTURE: CPT | Performed by: PHYSICIAN ASSISTANT

## 2020-04-05 PROCEDURE — 99284 EMERGENCY DEPT VISIT MOD MDM: CPT

## 2020-04-05 PROCEDURE — 71045 X-RAY EXAM CHEST 1 VIEW: CPT

## 2020-04-06 ENCOUNTER — TELEMEDICINE (OUTPATIENT)
Dept: INTERNAL MEDICINE CLINIC | Facility: CLINIC | Age: 70
End: 2020-04-06
Payer: MEDICARE

## 2020-04-06 DIAGNOSIS — Z20.822 SUSPECTED COVID-19 VIRUS INFECTION: Primary | ICD-10-CM

## 2020-04-06 PROCEDURE — 99213 OFFICE O/P EST LOW 20 MIN: CPT | Performed by: PHYSICIAN ASSISTANT

## 2020-04-08 ENCOUNTER — TELEPHONE (OUTPATIENT)
Dept: NEPHROLOGY | Facility: CLINIC | Age: 70
End: 2020-04-08

## 2020-04-08 DIAGNOSIS — I10 ESSENTIAL HYPERTENSION: ICD-10-CM

## 2020-04-08 RX ORDER — AMLODIPINE BESYLATE 5 MG/1
TABLET ORAL
Qty: 90 TABLET | Refills: 1 | Status: SHIPPED | OUTPATIENT
Start: 2020-04-08 | End: 2020-07-02 | Stop reason: SDUPTHER

## 2020-04-14 ENCOUNTER — TELEMEDICINE (OUTPATIENT)
Dept: INTERNAL MEDICINE CLINIC | Facility: CLINIC | Age: 70
End: 2020-04-14
Payer: MEDICARE

## 2020-04-14 DIAGNOSIS — Z20.822 SUSPECTED COVID-19 VIRUS INFECTION: Primary | ICD-10-CM

## 2020-04-14 PROCEDURE — 99213 OFFICE O/P EST LOW 20 MIN: CPT | Performed by: PHYSICIAN ASSISTANT

## 2020-04-17 ENCOUNTER — TELEMEDICINE (OUTPATIENT)
Dept: INTERNAL MEDICINE CLINIC | Facility: CLINIC | Age: 70
End: 2020-04-17
Payer: MEDICARE

## 2020-04-17 VITALS — WEIGHT: 185 LBS | BODY MASS INDEX: 25.06 KG/M2 | HEIGHT: 72 IN

## 2020-04-17 DIAGNOSIS — Z20.822 SUSPECTED COVID-19 VIRUS INFECTION: Primary | ICD-10-CM

## 2020-04-17 PROCEDURE — 99212 OFFICE O/P EST SF 10 MIN: CPT | Performed by: PHYSICIAN ASSISTANT

## 2020-05-26 ENCOUNTER — TELEPHONE (OUTPATIENT)
Dept: NEPHROLOGY | Facility: CLINIC | Age: 70
End: 2020-05-26

## 2020-05-26 DIAGNOSIS — N18.30 CKD (CHRONIC KIDNEY DISEASE) STAGE 3, GFR 30-59 ML/MIN (HCC): Primary | ICD-10-CM

## 2020-05-27 ENCOUNTER — APPOINTMENT (OUTPATIENT)
Dept: LAB | Facility: HOSPITAL | Age: 70
End: 2020-05-27
Attending: INTERNAL MEDICINE
Payer: MEDICARE

## 2020-05-27 DIAGNOSIS — N18.30 CKD (CHRONIC KIDNEY DISEASE) STAGE 3, GFR 30-59 ML/MIN (HCC): ICD-10-CM

## 2020-05-27 LAB
25(OH)D3 SERPL-MCNC: 40.2 NG/ML (ref 30–100)
ANION GAP SERPL CALCULATED.3IONS-SCNC: 10 MMOL/L (ref 4–13)
BASOPHILS # BLD AUTO: 0.03 THOUSANDS/ΜL (ref 0–0.1)
BASOPHILS NFR BLD AUTO: 1 % (ref 0–1)
BUN SERPL-MCNC: 20 MG/DL (ref 5–25)
CALCIUM SERPL-MCNC: 8.7 MG/DL (ref 8.3–10.1)
CHLORIDE SERPL-SCNC: 108 MMOL/L (ref 100–108)
CO2 SERPL-SCNC: 25 MMOL/L (ref 21–32)
CREAT SERPL-MCNC: 1.32 MG/DL (ref 0.6–1.3)
EOSINOPHIL # BLD AUTO: 0.08 THOUSAND/ΜL (ref 0–0.61)
EOSINOPHIL NFR BLD AUTO: 2 % (ref 0–6)
ERYTHROCYTE [DISTWIDTH] IN BLOOD BY AUTOMATED COUNT: 14.3 % (ref 11.6–15.1)
GFR SERPL CREATININE-BSD FRML MDRD: 63 ML/MIN/1.73SQ M
GLUCOSE SERPL-MCNC: 83 MG/DL (ref 65–140)
HCT VFR BLD AUTO: 41.8 % (ref 36.5–49.3)
HGB BLD-MCNC: 13.7 G/DL (ref 12–17)
IMM GRANULOCYTES # BLD AUTO: 0.01 THOUSAND/UL (ref 0–0.2)
IMM GRANULOCYTES NFR BLD AUTO: 0 % (ref 0–2)
LYMPHOCYTES # BLD AUTO: 1.81 THOUSANDS/ΜL (ref 0.6–4.47)
LYMPHOCYTES NFR BLD AUTO: 44 % (ref 14–44)
MCH RBC QN AUTO: 30.4 PG (ref 26.8–34.3)
MCHC RBC AUTO-ENTMCNC: 32.8 G/DL (ref 31.4–37.4)
MCV RBC AUTO: 93 FL (ref 82–98)
MONOCYTES # BLD AUTO: 0.28 THOUSAND/ΜL (ref 0.17–1.22)
MONOCYTES NFR BLD AUTO: 7 % (ref 4–12)
NEUTROPHILS # BLD AUTO: 1.95 THOUSANDS/ΜL (ref 1.85–7.62)
NEUTS SEG NFR BLD AUTO: 46 % (ref 43–75)
NRBC BLD AUTO-RTO: 0 /100 WBCS
PHOSPHATE SERPL-MCNC: 3.1 MG/DL (ref 2.3–4.1)
PLATELET # BLD AUTO: 195 THOUSANDS/UL (ref 149–390)
PMV BLD AUTO: 12.3 FL (ref 8.9–12.7)
POTASSIUM SERPL-SCNC: 4 MMOL/L (ref 3.5–5.3)
PTH-INTACT SERPL-MCNC: 44.8 PG/ML (ref 18.4–80.1)
RBC # BLD AUTO: 4.51 MILLION/UL (ref 3.88–5.62)
SODIUM SERPL-SCNC: 143 MMOL/L (ref 136–145)
WBC # BLD AUTO: 4.16 THOUSAND/UL (ref 4.31–10.16)

## 2020-05-27 PROCEDURE — 82306 VITAMIN D 25 HYDROXY: CPT

## 2020-05-27 PROCEDURE — 83970 ASSAY OF PARATHORMONE: CPT

## 2020-05-27 PROCEDURE — 80048 BASIC METABOLIC PNL TOTAL CA: CPT

## 2020-05-27 PROCEDURE — 84100 ASSAY OF PHOSPHORUS: CPT

## 2020-05-27 PROCEDURE — 36415 COLL VENOUS BLD VENIPUNCTURE: CPT

## 2020-05-27 PROCEDURE — 85025 COMPLETE CBC W/AUTO DIFF WBC: CPT

## 2020-05-28 ENCOUNTER — APPOINTMENT (OUTPATIENT)
Dept: LAB | Facility: HOSPITAL | Age: 70
End: 2020-05-28
Attending: INTERNAL MEDICINE
Payer: MEDICARE

## 2020-05-28 ENCOUNTER — TRANSCRIBE ORDERS (OUTPATIENT)
Dept: ADMINISTRATIVE | Facility: HOSPITAL | Age: 70
End: 2020-05-28

## 2020-05-28 DIAGNOSIS — N18.30 CHRONIC KIDNEY DISEASE, STAGE III (MODERATE) (HCC): Primary | ICD-10-CM

## 2020-05-28 LAB
BACTERIA UR QL AUTO: ABNORMAL /HPF
BILIRUB UR QL STRIP: NEGATIVE
CLARITY UR: CLEAR
COLOR UR: YELLOW
CREAT UR-MCNC: 252 MG/DL
GLUCOSE UR STRIP-MCNC: NEGATIVE MG/DL
HGB UR QL STRIP.AUTO: NEGATIVE
HYALINE CASTS #/AREA URNS LPF: ABNORMAL /LPF
KETONES UR STRIP-MCNC: NEGATIVE MG/DL
LEUKOCYTE ESTERASE UR QL STRIP: NEGATIVE
MUCOUS THREADS UR QL AUTO: ABNORMAL
NITRITE UR QL STRIP: NEGATIVE
NON-SQ EPI CELLS URNS QL MICRO: ABNORMAL /HPF
PH UR STRIP.AUTO: 6 [PH]
PROT UR STRIP-MCNC: NEGATIVE MG/DL
PROT UR-MCNC: 13 MG/DL
PROT/CREAT UR: 0.05 MG/G{CREAT} (ref 0–0.1)
RBC #/AREA URNS AUTO: ABNORMAL /HPF
SP GR UR STRIP.AUTO: 1.02 (ref 1–1.03)
UROBILINOGEN UR QL STRIP.AUTO: 0.2 E.U./DL
WBC #/AREA URNS AUTO: ABNORMAL /HPF

## 2020-05-28 PROCEDURE — 81001 URINALYSIS AUTO W/SCOPE: CPT

## 2020-05-28 PROCEDURE — 84156 ASSAY OF PROTEIN URINE: CPT

## 2020-05-28 PROCEDURE — 82570 ASSAY OF URINE CREATININE: CPT

## 2020-06-01 ENCOUNTER — TELEPHONE (OUTPATIENT)
Dept: NEPHROLOGY | Facility: CLINIC | Age: 70
End: 2020-06-01

## 2020-06-02 ENCOUNTER — TELEPHONE (OUTPATIENT)
Dept: NEPHROLOGY | Facility: CLINIC | Age: 70
End: 2020-06-02

## 2020-06-02 ENCOUNTER — TELEMEDICINE (OUTPATIENT)
Dept: NEPHROLOGY | Facility: CLINIC | Age: 70
End: 2020-06-02
Payer: MEDICARE

## 2020-06-02 VITALS
BODY MASS INDEX: 20.83 KG/M2 | WEIGHT: 180 LBS | HEART RATE: 70 BPM | DIASTOLIC BLOOD PRESSURE: 75 MMHG | SYSTOLIC BLOOD PRESSURE: 141 MMHG | HEIGHT: 78 IN | RESPIRATION RATE: 16 BRPM

## 2020-06-02 DIAGNOSIS — E83.9 CHRONIC KIDNEY DISEASE-MINERAL AND BONE DISORDER: ICD-10-CM

## 2020-06-02 DIAGNOSIS — M54.16 LUMBAR RADICULOPATHY: ICD-10-CM

## 2020-06-02 DIAGNOSIS — I10 ESSENTIAL HYPERTENSION: Primary | ICD-10-CM

## 2020-06-02 DIAGNOSIS — N40.0 BENIGN PROSTATIC HYPERPLASIA, PRESENCE OF LOWER URINARY TRACT SYMPTOMS UNSPECIFIED: ICD-10-CM

## 2020-06-02 DIAGNOSIS — N18.9 CHRONIC KIDNEY DISEASE-MINERAL AND BONE DISORDER: ICD-10-CM

## 2020-06-02 DIAGNOSIS — N18.30 CKD (CHRONIC KIDNEY DISEASE) STAGE 3, GFR 30-59 ML/MIN (HCC): ICD-10-CM

## 2020-06-02 DIAGNOSIS — M89.9 CHRONIC KIDNEY DISEASE-MINERAL AND BONE DISORDER: ICD-10-CM

## 2020-06-02 PROCEDURE — 99214 OFFICE O/P EST MOD 30 MIN: CPT | Performed by: INTERNAL MEDICINE

## 2020-06-03 ENCOUNTER — APPOINTMENT (OUTPATIENT)
Dept: LAB | Facility: HOSPITAL | Age: 70
End: 2020-06-03
Payer: MEDICARE

## 2020-06-03 ENCOUNTER — TRANSCRIBE ORDERS (OUTPATIENT)
Dept: ADMINISTRATIVE | Facility: HOSPITAL | Age: 70
End: 2020-06-03

## 2020-06-03 DIAGNOSIS — Z12.5 SPECIAL SCREENING FOR MALIGNANT NEOPLASM OF PROSTATE: Primary | ICD-10-CM

## 2020-06-03 DIAGNOSIS — Z12.5 SPECIAL SCREENING FOR MALIGNANT NEOPLASM OF PROSTATE: ICD-10-CM

## 2020-06-03 LAB — PSA SERPL-MCNC: 3.6 NG/ML (ref 0–4)

## 2020-06-03 PROCEDURE — G0103 PSA SCREENING: HCPCS

## 2020-06-22 ENCOUNTER — OFFICE VISIT (OUTPATIENT)
Dept: INTERNAL MEDICINE CLINIC | Facility: CLINIC | Age: 70
End: 2020-06-22
Payer: MEDICARE

## 2020-06-22 VITALS
SYSTOLIC BLOOD PRESSURE: 130 MMHG | HEIGHT: 78 IN | OXYGEN SATURATION: 98 % | RESPIRATION RATE: 16 BRPM | BODY MASS INDEX: 22.33 KG/M2 | HEART RATE: 74 BPM | WEIGHT: 193 LBS | TEMPERATURE: 98.3 F | DIASTOLIC BLOOD PRESSURE: 80 MMHG

## 2020-06-22 DIAGNOSIS — I10 ESSENTIAL HYPERTENSION: ICD-10-CM

## 2020-06-22 DIAGNOSIS — K21.9 GASTROESOPHAGEAL REFLUX DISEASE, ESOPHAGITIS PRESENCE NOT SPECIFIED: ICD-10-CM

## 2020-06-22 DIAGNOSIS — N18.30 CKD (CHRONIC KIDNEY DISEASE) STAGE 3, GFR 30-59 ML/MIN (HCC): ICD-10-CM

## 2020-06-22 DIAGNOSIS — Z00.00 ENCOUNTER FOR MEDICARE ANNUAL WELLNESS EXAM: Primary | ICD-10-CM

## 2020-06-22 DIAGNOSIS — N40.0 BENIGN PROSTATIC HYPERPLASIA, PRESENCE OF LOWER URINARY TRACT SYMPTOMS UNSPECIFIED: ICD-10-CM

## 2020-06-22 PROCEDURE — 3008F BODY MASS INDEX DOCD: CPT | Performed by: PHYSICIAN ASSISTANT

## 2020-06-22 PROCEDURE — 3075F SYST BP GE 130 - 139MM HG: CPT | Performed by: PHYSICIAN ASSISTANT

## 2020-06-22 PROCEDURE — 1036F TOBACCO NON-USER: CPT | Performed by: PHYSICIAN ASSISTANT

## 2020-06-22 PROCEDURE — 3079F DIAST BP 80-89 MM HG: CPT | Performed by: PHYSICIAN ASSISTANT

## 2020-06-22 PROCEDURE — G0439 PPPS, SUBSEQ VISIT: HCPCS | Performed by: PHYSICIAN ASSISTANT

## 2020-06-22 PROCEDURE — 1170F FXNL STATUS ASSESSED: CPT | Performed by: PHYSICIAN ASSISTANT

## 2020-06-22 PROCEDURE — 1125F AMNT PAIN NOTED PAIN PRSNT: CPT | Performed by: PHYSICIAN ASSISTANT

## 2020-06-22 PROCEDURE — 99214 OFFICE O/P EST MOD 30 MIN: CPT | Performed by: PHYSICIAN ASSISTANT

## 2020-06-22 PROCEDURE — 1160F RVW MEDS BY RX/DR IN RCRD: CPT | Performed by: PHYSICIAN ASSISTANT

## 2020-06-22 RX ORDER — PANTOPRAZOLE SODIUM 40 MG/1
40 TABLET, DELAYED RELEASE ORAL DAILY
Qty: 30 TABLET | Refills: 1 | Status: SHIPPED | OUTPATIENT
Start: 2020-06-22 | End: 2020-10-27 | Stop reason: ALTCHOICE

## 2020-07-02 DIAGNOSIS — I10 ESSENTIAL HYPERTENSION: ICD-10-CM

## 2020-07-02 RX ORDER — AMLODIPINE BESYLATE 5 MG/1
5 TABLET ORAL DAILY
Qty: 90 TABLET | Refills: 1 | Status: SHIPPED | OUTPATIENT
Start: 2020-07-02 | End: 2020-07-07 | Stop reason: SDUPTHER

## 2020-07-07 DIAGNOSIS — I10 ESSENTIAL HYPERTENSION: ICD-10-CM

## 2020-07-07 RX ORDER — AMLODIPINE BESYLATE 5 MG/1
5 TABLET ORAL DAILY
Qty: 90 TABLET | Refills: 1 | Status: SHIPPED | OUTPATIENT
Start: 2020-07-07 | End: 2020-07-08 | Stop reason: SDUPTHER

## 2020-07-08 DIAGNOSIS — I10 ESSENTIAL HYPERTENSION: ICD-10-CM

## 2020-07-08 RX ORDER — AMLODIPINE BESYLATE 5 MG/1
5 TABLET ORAL DAILY
Qty: 90 TABLET | Refills: 1 | Status: SHIPPED | OUTPATIENT
Start: 2020-07-08 | End: 2020-12-23

## 2020-07-14 DIAGNOSIS — K21.9 GASTROESOPHAGEAL REFLUX DISEASE, ESOPHAGITIS PRESENCE NOT SPECIFIED: ICD-10-CM

## 2020-07-14 NOTE — TELEPHONE ENCOUNTER
Did this help? Is he taking it on a regular basis, is his heartburn better? This was not planned to be in every day medication

## 2020-07-20 RX ORDER — PANTOPRAZOLE SODIUM 40 MG/1
TABLET, DELAYED RELEASE ORAL
Qty: 30 TABLET | Refills: 1 | OUTPATIENT
Start: 2020-07-20

## 2020-10-27 ENCOUNTER — APPOINTMENT (OUTPATIENT)
Dept: LAB | Facility: CLINIC | Age: 70
End: 2020-10-27
Payer: MEDICARE

## 2020-10-27 ENCOUNTER — OFFICE VISIT (OUTPATIENT)
Dept: INTERNAL MEDICINE CLINIC | Facility: CLINIC | Age: 70
End: 2020-10-27
Payer: MEDICARE

## 2020-10-27 VITALS
SYSTOLIC BLOOD PRESSURE: 122 MMHG | BODY MASS INDEX: 22.1 KG/M2 | OXYGEN SATURATION: 99 % | HEIGHT: 78 IN | RESPIRATION RATE: 16 BRPM | HEART RATE: 65 BPM | DIASTOLIC BLOOD PRESSURE: 80 MMHG | WEIGHT: 191 LBS | TEMPERATURE: 98.6 F

## 2020-10-27 DIAGNOSIS — I10 ESSENTIAL HYPERTENSION: Primary | ICD-10-CM

## 2020-10-27 DIAGNOSIS — N18.30 CKD (CHRONIC KIDNEY DISEASE) STAGE 3, GFR 30-59 ML/MIN (HCC): ICD-10-CM

## 2020-10-27 DIAGNOSIS — N18.30 STAGE 3 CHRONIC KIDNEY DISEASE, UNSPECIFIED WHETHER STAGE 3A OR 3B CKD (HCC): ICD-10-CM

## 2020-10-27 DIAGNOSIS — I10 ESSENTIAL HYPERTENSION: ICD-10-CM

## 2020-10-27 LAB
ALBUMIN SERPL BCP-MCNC: 4.2 G/DL (ref 3.5–5)
ALP SERPL-CCNC: 59 U/L (ref 46–116)
ALT SERPL W P-5'-P-CCNC: 34 U/L (ref 12–78)
ANION GAP SERPL CALCULATED.3IONS-SCNC: 2 MMOL/L (ref 4–13)
AST SERPL W P-5'-P-CCNC: 20 U/L (ref 5–45)
BILIRUB SERPL-MCNC: 0.5 MG/DL (ref 0.2–1)
BUN SERPL-MCNC: 17 MG/DL (ref 5–25)
CALCIUM SERPL-MCNC: 9.7 MG/DL (ref 8.3–10.1)
CHLORIDE SERPL-SCNC: 108 MMOL/L (ref 100–108)
CHOLEST SERPL-MCNC: 184 MG/DL (ref 50–200)
CO2 SERPL-SCNC: 30 MMOL/L (ref 21–32)
CREAT SERPL-MCNC: 1.38 MG/DL (ref 0.6–1.3)
GFR SERPL CREATININE-BSD FRML MDRD: 59 ML/MIN/1.73SQ M
GLUCOSE P FAST SERPL-MCNC: 79 MG/DL (ref 65–99)
HDLC SERPL-MCNC: 102 MG/DL
LDLC SERPL CALC-MCNC: 69 MG/DL (ref 0–100)
NONHDLC SERPL-MCNC: 82 MG/DL
POTASSIUM SERPL-SCNC: 4 MMOL/L (ref 3.5–5.3)
PROT SERPL-MCNC: 8 G/DL (ref 6.4–8.2)
SODIUM SERPL-SCNC: 140 MMOL/L (ref 136–145)
TRIGL SERPL-MCNC: 66 MG/DL

## 2020-10-27 PROCEDURE — 80061 LIPID PANEL: CPT

## 2020-10-27 PROCEDURE — 36415 COLL VENOUS BLD VENIPUNCTURE: CPT

## 2020-10-27 PROCEDURE — 80053 COMPREHEN METABOLIC PANEL: CPT

## 2020-10-27 PROCEDURE — 99213 OFFICE O/P EST LOW 20 MIN: CPT | Performed by: PHYSICIAN ASSISTANT

## 2020-11-25 ENCOUNTER — APPOINTMENT (OUTPATIENT)
Dept: LAB | Facility: CLINIC | Age: 70
End: 2020-11-25
Payer: MEDICARE

## 2020-11-25 DIAGNOSIS — M89.9 CHRONIC KIDNEY DISEASE-MINERAL AND BONE DISORDER: ICD-10-CM

## 2020-11-25 DIAGNOSIS — N18.30 CKD (CHRONIC KIDNEY DISEASE) STAGE 3, GFR 30-59 ML/MIN (HCC): ICD-10-CM

## 2020-11-25 DIAGNOSIS — N18.9 CHRONIC KIDNEY DISEASE-MINERAL AND BONE DISORDER: ICD-10-CM

## 2020-11-25 DIAGNOSIS — E83.9 CHRONIC KIDNEY DISEASE-MINERAL AND BONE DISORDER: ICD-10-CM

## 2020-11-25 LAB
25(OH)D3 SERPL-MCNC: 27.5 NG/ML (ref 30–100)
ANION GAP SERPL CALCULATED.3IONS-SCNC: 5 MMOL/L (ref 4–13)
BASOPHILS # BLD AUTO: 0.03 THOUSANDS/ΜL (ref 0–0.1)
BASOPHILS NFR BLD AUTO: 1 % (ref 0–1)
BILIRUB UR QL STRIP: NEGATIVE
BUN SERPL-MCNC: 16 MG/DL (ref 5–25)
CALCIUM SERPL-MCNC: 9.4 MG/DL (ref 8.3–10.1)
CHLORIDE SERPL-SCNC: 104 MMOL/L (ref 100–108)
CLARITY UR: CLEAR
CO2 SERPL-SCNC: 29 MMOL/L (ref 21–32)
COLOR UR: YELLOW
CREAT SERPL-MCNC: 1.46 MG/DL (ref 0.6–1.3)
CREAT UR-MCNC: 300 MG/DL
EOSINOPHIL # BLD AUTO: 0.07 THOUSAND/ΜL (ref 0–0.61)
EOSINOPHIL NFR BLD AUTO: 2 % (ref 0–6)
ERYTHROCYTE [DISTWIDTH] IN BLOOD BY AUTOMATED COUNT: 13.6 % (ref 11.6–15.1)
GFR SERPL CREATININE-BSD FRML MDRD: 56 ML/MIN/1.73SQ M
GLUCOSE P FAST SERPL-MCNC: 89 MG/DL (ref 65–99)
GLUCOSE UR STRIP-MCNC: NEGATIVE MG/DL
HCT VFR BLD AUTO: 43.9 % (ref 36.5–49.3)
HGB BLD-MCNC: 14.7 G/DL (ref 12–17)
HGB UR QL STRIP.AUTO: NEGATIVE
IMM GRANULOCYTES # BLD AUTO: 0.01 THOUSAND/UL (ref 0–0.2)
IMM GRANULOCYTES NFR BLD AUTO: 0 % (ref 0–2)
KETONES UR STRIP-MCNC: NEGATIVE MG/DL
LEUKOCYTE ESTERASE UR QL STRIP: NEGATIVE
LYMPHOCYTES # BLD AUTO: 1.97 THOUSANDS/ΜL (ref 0.6–4.47)
LYMPHOCYTES NFR BLD AUTO: 48 % (ref 14–44)
MCH RBC QN AUTO: 30.6 PG (ref 26.8–34.3)
MCHC RBC AUTO-ENTMCNC: 33.5 G/DL (ref 31.4–37.4)
MCV RBC AUTO: 92 FL (ref 82–98)
MONOCYTES # BLD AUTO: 0.28 THOUSAND/ΜL (ref 0.17–1.22)
MONOCYTES NFR BLD AUTO: 7 % (ref 4–12)
NEUTROPHILS # BLD AUTO: 1.72 THOUSANDS/ΜL (ref 1.85–7.62)
NEUTS SEG NFR BLD AUTO: 42 % (ref 43–75)
NITRITE UR QL STRIP: NEGATIVE
NRBC BLD AUTO-RTO: 0 /100 WBCS
PH UR STRIP.AUTO: 7 [PH]
PHOSPHATE SERPL-MCNC: 2.8 MG/DL (ref 2.3–4.1)
PLATELET # BLD AUTO: 199 THOUSANDS/UL (ref 149–390)
PMV BLD AUTO: 11.6 FL (ref 8.9–12.7)
POTASSIUM SERPL-SCNC: 3.7 MMOL/L (ref 3.5–5.3)
PROT UR STRIP-MCNC: NEGATIVE MG/DL
PROT UR-MCNC: 14 MG/DL
PROT/CREAT UR: 0.05 MG/G{CREAT} (ref 0–0.1)
PTH-INTACT SERPL-MCNC: 53.3 PG/ML (ref 18.4–80.1)
RBC # BLD AUTO: 4.8 MILLION/UL (ref 3.88–5.62)
SODIUM SERPL-SCNC: 138 MMOL/L (ref 136–145)
SP GR UR STRIP.AUTO: 1.02 (ref 1–1.03)
UROBILINOGEN UR QL STRIP.AUTO: 1 E.U./DL
WBC # BLD AUTO: 4.08 THOUSAND/UL (ref 4.31–10.16)

## 2020-11-25 PROCEDURE — 81003 URINALYSIS AUTO W/O SCOPE: CPT

## 2020-11-25 PROCEDURE — 82306 VITAMIN D 25 HYDROXY: CPT

## 2020-11-25 PROCEDURE — 83970 ASSAY OF PARATHORMONE: CPT

## 2020-11-25 PROCEDURE — 84100 ASSAY OF PHOSPHORUS: CPT

## 2020-11-25 PROCEDURE — 85025 COMPLETE CBC W/AUTO DIFF WBC: CPT

## 2020-11-25 PROCEDURE — 82570 ASSAY OF URINE CREATININE: CPT

## 2020-11-25 PROCEDURE — 84156 ASSAY OF PROTEIN URINE: CPT

## 2020-11-25 PROCEDURE — 80048 BASIC METABOLIC PNL TOTAL CA: CPT

## 2020-11-25 PROCEDURE — 36415 COLL VENOUS BLD VENIPUNCTURE: CPT

## 2020-12-02 ENCOUNTER — OFFICE VISIT (OUTPATIENT)
Dept: NEPHROLOGY | Facility: CLINIC | Age: 70
End: 2020-12-02
Payer: MEDICARE

## 2020-12-02 VITALS
RESPIRATION RATE: 16 BRPM | WEIGHT: 196.2 LBS | TEMPERATURE: 96.9 F | DIASTOLIC BLOOD PRESSURE: 70 MMHG | HEART RATE: 68 BPM | BODY MASS INDEX: 26.57 KG/M2 | SYSTOLIC BLOOD PRESSURE: 130 MMHG | HEIGHT: 72 IN

## 2020-12-02 DIAGNOSIS — N18.30 STAGE 3 CHRONIC KIDNEY DISEASE, UNSPECIFIED WHETHER STAGE 3A OR 3B CKD (HCC): Primary | ICD-10-CM

## 2020-12-02 DIAGNOSIS — M89.9 CHRONIC KIDNEY DISEASE-MINERAL AND BONE DISORDER: ICD-10-CM

## 2020-12-02 DIAGNOSIS — I10 ESSENTIAL HYPERTENSION: ICD-10-CM

## 2020-12-02 DIAGNOSIS — E83.9 CHRONIC KIDNEY DISEASE-MINERAL AND BONE DISORDER: ICD-10-CM

## 2020-12-02 DIAGNOSIS — N18.9 CHRONIC KIDNEY DISEASE-MINERAL AND BONE DISORDER: ICD-10-CM

## 2020-12-02 DIAGNOSIS — M54.16 LUMBAR RADICULOPATHY: ICD-10-CM

## 2020-12-02 PROCEDURE — 99214 OFFICE O/P EST MOD 30 MIN: CPT | Performed by: INTERNAL MEDICINE

## 2020-12-22 DIAGNOSIS — I10 ESSENTIAL HYPERTENSION: ICD-10-CM

## 2020-12-23 RX ORDER — AMLODIPINE BESYLATE 5 MG/1
TABLET ORAL
Qty: 90 TABLET | Refills: 3 | Status: SHIPPED | OUTPATIENT
Start: 2020-12-23 | End: 2021-12-17

## 2021-01-05 DIAGNOSIS — N40.0 BENIGN PROSTATIC HYPERPLASIA, PRESENCE OF LOWER URINARY TRACT SYMPTOMS UNSPECIFIED: ICD-10-CM

## 2021-01-05 RX ORDER — TAMSULOSIN HYDROCHLORIDE 0.4 MG/1
0.4 CAPSULE ORAL
Qty: 90 CAPSULE | Refills: 3 | Status: SHIPPED | OUTPATIENT
Start: 2021-01-05

## 2021-01-06 ENCOUNTER — OFFICE VISIT (OUTPATIENT)
Dept: URGENT CARE | Facility: CLINIC | Age: 71
End: 2021-01-06
Payer: MEDICARE

## 2021-01-06 VITALS
TEMPERATURE: 97.5 F | HEIGHT: 72 IN | HEART RATE: 68 BPM | SYSTOLIC BLOOD PRESSURE: 140 MMHG | WEIGHT: 185.4 LBS | DIASTOLIC BLOOD PRESSURE: 80 MMHG | RESPIRATION RATE: 18 BRPM | OXYGEN SATURATION: 99 % | BODY MASS INDEX: 25.11 KG/M2

## 2021-01-06 DIAGNOSIS — K59.00 CONSTIPATION, UNSPECIFIED CONSTIPATION TYPE: ICD-10-CM

## 2021-01-06 DIAGNOSIS — R42 DIZZINESS AND GIDDINESS: ICD-10-CM

## 2021-01-06 DIAGNOSIS — R42 DIZZINESS AND GIDDINESS: Primary | ICD-10-CM

## 2021-01-06 PROCEDURE — U0003 INFECTIOUS AGENT DETECTION BY NUCLEIC ACID (DNA OR RNA); SEVERE ACUTE RESPIRATORY SYNDROME CORONAVIRUS 2 (SARS-COV-2) (CORONAVIRUS DISEASE [COVID-19]), AMPLIFIED PROBE TECHNIQUE, MAKING USE OF HIGH THROUGHPUT TECHNOLOGIES AS DESCRIBED BY CMS-2020-01-R: HCPCS | Performed by: PHYSICIAN ASSISTANT

## 2021-01-06 PROCEDURE — U0005 INFEC AGEN DETEC AMPLI PROBE: HCPCS | Performed by: PHYSICIAN ASSISTANT

## 2021-01-06 PROCEDURE — 99213 OFFICE O/P EST LOW 20 MIN: CPT | Performed by: PHYSICIAN ASSISTANT

## 2021-01-06 PROCEDURE — G0463 HOSPITAL OUTPT CLINIC VISIT: HCPCS | Performed by: PHYSICIAN ASSISTANT

## 2021-01-06 RX ORDER — MECLIZINE HYDROCHLORIDE 25 MG/1
25 TABLET ORAL 3 TIMES DAILY PRN
Qty: 30 TABLET | Refills: 0 | Status: SHIPPED | OUTPATIENT
Start: 2021-01-06 | End: 2021-03-08 | Stop reason: ALTCHOICE

## 2021-01-06 RX ORDER — DOCUSATE SODIUM 100 MG/1
100 CAPSULE, LIQUID FILLED ORAL 2 TIMES DAILY
Qty: 20 CAPSULE | Refills: 0 | Status: SHIPPED | OUTPATIENT
Start: 2021-01-06 | End: 2021-01-25 | Stop reason: ALTCHOICE

## 2021-01-06 NOTE — PATIENT INSTRUCTIONS
Because of how you describe the dizziness, this appears to be an exacerbation the vertigo you were diagnosed with in the past    It sounds to me like the blood on the toilet paper after you wipe is consistent with your external hemorrhoids  These could be exacerbated from the episode of constipation you have been having  I would like you to follow-up with your family care doctor in 3-5 days to undergo blood work and make sure that your hemoglobin is okay  Like we talked about, if you develop any worsening symptoms such as worsening headache, worsening stomach pain or increased bleeding from the rectum go immediately to the ER  Also go to the ER if you develop a light headed sensation or worsening dizziness unrelieved by meclizine  We talked about how the bleeding could possibly be internal bleeding but that this is less likely with your history of hemorrhoids and the volume of blood in it on the tissue when you wipe  Also the bright red color is suggestive of lower GI bleeding  Increase your fiber and water intake  Use the Colace twice a day for 10 days  Constipation   WHAT YOU NEED TO KNOW:   Constipation is when you have hard, dry bowel movements, or you go longer than usual between bowel movements  DISCHARGE INSTRUCTIONS:   Call your doctor if:   · You have blood in your bowel movements  · You have a fever and abdominal pain with the constipation  · Your constipation gets worse  · You start to vomit  · You have questions or concerns about your condition or care  Medicines:   · Medicine  such as a laxative may help relax and loosen your intestines to help you have a bowel movement  Your provider may recommend you only use laxatives for a short time  Long-term use may make your bowels dependent on the medicine  · Take your medicine as directed  Contact your healthcare provider if you think your medicine is not helping or if you have side effects   Tell him of her if you are allergic to any medicine  Keep a list of the medicines, vitamins, and herbs you take  Include the amounts, and when and why you take them  Bring the list or the pill bottles to follow-up visits  Carry your medicine list with you in case of an emergency  Relieve constipation:   · A suppository  may be used to help soften your bowel movements  This may make them easier to pass  A suppository is guided into your rectum through your anus  · An enema  is liquid medicine used to clear bowel movement from your rectum  The medicine is put into your rectum through your anus  Prevent constipation:   · Drink liquids as directed  You may need to drink extra liquids to help soften and move your bowels  Ask how much liquid to drink each day and which liquids are best for you  · Eat high-fiber foods  This may help decrease constipation by adding bulk to your bowel movements  High-fiber foods include fruit, vegetables, whole-grain breads and cereals, and beans  Your healthcare provider or dietitian can help you create a high-fiber meal plan  Your provider may also recommend a fiber supplement if you cannot get enough fiber from food  · Exercise regularly  Regular physical activity can help stimulate your intestines  Walking is a good exercise to prevent or relieve constipation  Ask which exercises are best for you  · Schedule a time each day to have a bowel movement  This may help train your body to have regular bowel movements  Bend forward while you are on the toilet to help move the bowel movement out  Sit on the toilet for at least 10 minutes, even if you do not have a bowel movement  · Talk to your healthcare provider about your medicines  Certain medicines, such as opioids, can cause constipation  Your provider may be able to make medicine changes  For example, he or she may change the kind of medicine, or change when you take it      Follow up with your healthcare provider as directed: Write down your questions so you remember to ask them during your visits  © Copyright [address] Information is for End User's use only and may not be sold, redistributed or otherwise used for commercial purposes  All illustrations and images included in CareNotes® are the copyrighted property of A D A M , Inc  or Marques Andrews  The above information is an  only  It is not intended as medical advice for individual conditions or treatments  Talk to your doctor, nurse or pharmacist before following any medical regimen to see if it is safe and effective for you

## 2021-01-06 NOTE — PROGRESS NOTES
[facility]        NAME: Kathye Mohs is a 79 y o  male  : 1950    MRN: [id number]  DATE: 2021  TIME: 1:02 PM    Assessment and Plan   Dizziness and giddiness [R42]  1  Dizziness and giddiness  meclizine (ANTIVERT) 25 mg tablet    Novel Coronavirus (COVID-19), PCR [facility] - Collected at [facility]   2  Constipation, unspecified constipation type  docusate sodium (COLACE) 100 mg capsule     Spent the majority of the visit educating the patient on when he would need to go to the ER  It appears that the bleeding is from external hemorrhoids and that the dizziness is from vertigo  Educated the patient on the possibility of internal bleeding and that he should go to the ER if any worsening symptoms  Instructed the patient to see his family care doctor in 3-5 days and also consider follow-up with GI  Patient Instructions   Patient Instructions     Because of how you describe the dizziness, this appears to be an exacerbation the vertigo you were diagnosed with in the past    It sounds to me like the blood on the toilet paper after you wipe is consistent with your external hemorrhoids  These could be exacerbated from the episode of constipation you have been having  I would like you to follow-up with your family care doctor in 3-5 days to undergo blood work and make sure that your hemoglobin is okay  Like we talked about, if you develop any worsening symptoms such as worsening headache, worsening stomach pain or increased bleeding from the rectum go immediately to the ER  Also go to the ER if you develop a light headed sensation or worsening dizziness unrelieved by meclizine  We talked about how the bleeding could possibly be internal bleeding but that this is less likely with your history of hemorrhoids and the volume of blood in it on the tissue when you wipe  Also the bright red color is suggestive of lower GI bleeding  Increase your fiber and water intake   Use the Colace twice a day for 10 days  Constipation   WHAT YOU NEED TO KNOW:   Constipation is when you have hard, dry bowel movements, or you go longer than usual between bowel movements  DISCHARGE INSTRUCTIONS:   Call your doctor if:   · You have blood in your bowel movements  · You have a fever and abdominal pain with the constipation  · Your constipation gets worse  · You start to vomit  · You have questions or concerns about your condition or care  Medicines:   · Medicine  such as a laxative may help relax and loosen your intestines to help you have a bowel movement  Your provider may recommend you only use laxatives for a short time  Long-term use may make your bowels dependent on the medicine  · Take your medicine as directed  Contact your healthcare provider if you think your medicine is not helping or if you have side effects  Tell him of her if you are allergic to any medicine  Keep a list of the medicines, vitamins, and herbs you take  Include the amounts, and when and why you take them  Bring the list or the pill bottles to follow-up visits  Carry your medicine list with you in case of an emergency  Relieve constipation:   · A suppository  may be used to help soften your bowel movements  This may make them easier to pass  A suppository is guided into your rectum through your anus  · An enema  is liquid medicine used to clear bowel movement from your rectum  The medicine is put into your rectum through your anus  Prevent constipation:   · Drink liquids as directed  You may need to drink extra liquids to help soften and move your bowels  Ask how much liquid to drink each day and which liquids are best for you  · Eat high-fiber foods  This may help decrease constipation by adding bulk to your bowel movements  High-fiber foods include fruit, vegetables, whole-grain breads and cereals, and beans   Your healthcare provider or dietitian can help you create a high-fiber meal plan  Your provider may also recommend a fiber supplement if you cannot get enough fiber from food  · Exercise regularly  Regular physical activity can help stimulate your intestines  Walking is a good exercise to prevent or relieve constipation  Ask which exercises are best for you  · Schedule a time each day to have a bowel movement  This may help train your body to have regular bowel movements  Bend forward while you are on the toilet to help move the bowel movement out  Sit on the toilet for at least 10 minutes, even if you do not have a bowel movement  · Talk to your healthcare provider about your medicines  Certain medicines, such as opioids, can cause constipation  Your provider may be able to make medicine changes  For example, he or she may change the kind of medicine, or change when you take it  Follow up with your healthcare provider as directed:  Write down your questions so you remember to ask them during your visits  © Copyright [facility] [address] Information is for End User's use only and may not be sold, redistributed or otherwise used for commercial purposes  All illustrations and images included in CareNotes® are the copyrighted property of A D A M , Inc  or [address] [place] [address]  The above information is an  only  It is not intended as medical advice for individual conditions or treatments  Talk to your doctor, nurse or pharmacist before following any medical regimen to see if it is safe and effective for you  Follow up with PCP in 3-5 days  Proceed to  ER if symptoms worsen  Chief Complaint     Chief Complaint   Patient presents with    Headache     Pt c/o headache that comes and goes x 5 days, dizzy this morning, stomach ache and some rectal bleeding x 2 days  History of Present Illness       The patient is a 40-year-old male with a past medical history of bleeding hemroids, HTN,GERD and vertigo    Presenting with a headache on and off and x5 days  Pt denies normally getting headaches  The pt says that this is strange for him  He reports feeling dizzy this morning  He tried taking two meclazine which are prescribed for his vertigo which relieved the symptoms  He has started noticing blood when he wipes for the last two days  Pt reports the blood appearing bright red when he was wiping  Pt also reports a red color  in the stool which is normal for him as he drinks beet juice daily  Pt was feeling constipated on 1/4/2020 and took a laxative  He reports increased straining and a decreased amount than he normally would go  Pt is still experiencing the headache currently  Pt did not take anything for it  Review of Systems   Review of Systems   Constitutional: Negative for activity change, appetite change, chills, diaphoresis and fever  HENT: Negative for congestion, rhinorrhea and sore throat  Respiratory: Negative for cough, chest tightness and shortness of breath  Cardiovascular: Negative for chest pain and palpitations  Gastrointestinal: Positive for abdominal pain (mild generalized pain and increased flatus) and blood in stool (BRB when wiping on the tissue paper)  Negative for diarrhea, nausea and vomiting  Musculoskeletal: Negative for arthralgias and myalgias  Skin: Negative for color change and pallor  Neurological: Positive for dizziness (similar to his vertigo- room spinning ) and headaches           Current Medications       Current Outpatient Medications:     amLODIPine (NORVASC) 5 mg tablet, TAKE 1 TABLET DAILY, Disp: 90 tablet, Rfl: 3    lidocaine (LIDODERM) 5 %, Apply 1 patch topically daily Remove & Discard patch within 12 hours or as directed by MD, Disp: 30 patch, Rfl: 1    multivitamin (THERAGRAN) TABS, Take 1 tablet by mouth daily, Disp: , Rfl:     tamsulosin (FLOMAX) 0 4 mg, Take 1 capsule (0 4 mg total) by mouth daily with dinner, Disp: 90 capsule, Rfl: 3    docusate sodium (COLACE) 100 mg capsule, Take 1 capsule (100 mg total) by mouth 2 (two) times a day, Disp: 20 capsule, Rfl: 0    meclizine (ANTIVERT) 25 mg tablet, Take 1 tablet (25 mg total) by mouth 3 (three) times a day as needed for dizziness, Disp: 30 tablet, Rfl: 0    Current Allergies     Allergies as of 01/06/2021 - Reviewed 01/06/2021   Allergen Reaction Noted    Penicillin v Anaphylaxis 07/06/2017            The following portions of the patient's history were reviewed and updated as appropriate: allergies, current medications, past family history, past medical history, past social history, past surgical history and problem list      Past Medical History:   Diagnosis Date    Benign prostatic hyperplasia     Chronic kidney disease     COVID-19 03/2020    Esophageal reflux     Hypertension     Vertigo        Past Surgical History:   Procedure Laterality Date    APPENDECTOMY  05/21/2015    CYST REMOVAL      Fatty cyst removed from back       Family History   Problem Relation Age of Onset    Prostate cancer Father     Prostate cancer Maternal Grandfather     Stomach cancer Maternal Aunt         malignant tumor of pharynx    Stomach cancer Maternal Uncle         malignant tumor of pharynx    Mental illness Family     Depression Family     Schizophrenia Family     Hypertension Mother     No Known Problems Sister     Dementia Sister          Medications have been verified  Objective   /80   Pulse 68   Temp 97 5 °F (36 4 °C)   Resp 18   Ht 6' (1 829 m)   Wt 84 1 kg (185 lb 6 4 oz)   SpO2 99%   BMI 25 14 kg/m²        Physical Exam     Physical Exam  Vitals signs reviewed  Constitutional:       General: He is not in acute distress  Appearance: Normal appearance  He is normal weight  He is not ill-appearing, toxic-appearing or diaphoretic  HENT:      Head: Normocephalic and atraumatic  Nose: Nose normal    Eyes:      General: No scleral icterus  Right eye: No discharge           Left eye: No discharge  Extraocular Movements: Extraocular movements intact  Conjunctiva/sclera: Conjunctivae normal       Pupils: Pupils are equal, round, and reactive to light  Neck:      Musculoskeletal: Normal range of motion  Cardiovascular:      Rate and Rhythm: Normal rate and regular rhythm  Heart sounds: Normal heart sounds  No murmur  No friction rub  No gallop  Pulmonary:      Effort: Pulmonary effort is normal  No respiratory distress  Breath sounds: Normal breath sounds  No stridor  No wheezing, rhonchi or rales  Chest:      Chest wall: No tenderness  Abdominal:      General: Abdomen is flat  Bowel sounds are normal  There is no distension  Palpations: Abdomen is soft  There is no mass  Tenderness: There is no abdominal tenderness  There is no right CVA tenderness, left CVA tenderness, guarding or rebound  Hernia: No hernia is present  Lymphadenopathy:      Cervical: No cervical adenopathy  Skin:     General: Skin is warm and dry  Capillary Refill: Capillary refill takes less than 2 seconds  Neurological:      General: No focal deficit present  Mental Status: He is alert and oriented to person, place, and time  Mental status is at baseline  Cranial Nerves: No cranial nerve deficit  Sensory: No sensory deficit  Motor: No weakness        Coordination: Coordination normal       Gait: Gait normal       Deep Tendon Reflexes: Reflexes normal

## 2021-01-07 ENCOUNTER — TELEPHONE (OUTPATIENT)
Dept: URGENT CARE | Facility: CLINIC | Age: 71
End: 2021-01-07

## 2021-01-07 LAB — SARS-COV-2 RNA SPEC QL NAA+PROBE: NOT DETECTED

## 2021-01-11 ENCOUNTER — OFFICE VISIT (OUTPATIENT)
Dept: INTERNAL MEDICINE CLINIC | Facility: CLINIC | Age: 71
End: 2021-01-11
Payer: MEDICARE

## 2021-01-11 VITALS
RESPIRATION RATE: 16 BRPM | DIASTOLIC BLOOD PRESSURE: 76 MMHG | BODY MASS INDEX: 26.71 KG/M2 | HEIGHT: 72 IN | WEIGHT: 197.2 LBS | HEART RATE: 73 BPM | OXYGEN SATURATION: 98 % | TEMPERATURE: 98.2 F | SYSTOLIC BLOOD PRESSURE: 122 MMHG

## 2021-01-11 DIAGNOSIS — K62.5 RECTAL BLEED: ICD-10-CM

## 2021-01-11 DIAGNOSIS — K60.2 ANAL FISSURE: Primary | ICD-10-CM

## 2021-01-11 PROBLEM — K63.5 COLON POLYPS: Status: ACTIVE | Noted: 2021-01-11

## 2021-01-11 PROCEDURE — 99213 OFFICE O/P EST LOW 20 MIN: CPT | Performed by: PHYSICIAN ASSISTANT

## 2021-01-11 NOTE — PROGRESS NOTES
Assessment/Plan:   Patient Instructions   Follow up in 2 weeks for PE    stay hydrated  Use stool softeners as needed  Quality Measures: BMI Counseling: Body mass index is 26 75 kg/m²  The BMI is above normal  Nutrition recommendations include decreasing portion sizes, encouraging healthy choices of fruits and vegetables, consuming healthier snacks and moderation in carbohydrate intake  Exercise recommendations include exercising 3-5 times per week  Return in about 2 weeks (around 1/25/2021) for Annual physical          Diagnoses and all orders for this visit:    Anal fissure  -     CBC and differential; Future  -     Comprehensive metabolic panel; Future    Rectal bleed  -     CBC and differential; Future  -     Comprehensive metabolic panel; Future          Subjective:      Patient ID: Iqra Victoria is a 79 y o  male  Acute visit     Last week patient states he just did not feel well  Had a nonspecific headache  He also is constipated  Had not moved his bowels in several days  He took a "natural" laxative, finally moved his bowels however strain to do so and afterwards noted some blood in the toilet water and on the stool  He also noted some blood when he wiped on the toilet paper  This continued for about a day with some mild rectal spasm but then improved and he has not had any since  No further bleeding  No black stools  No nausea vomiting or diarrhea  No fever or chills  Appetite is good  Always concerned something is wrong        ALLERGIES:  Allergies   Allergen Reactions    Penicillin V Anaphylaxis       CURRENT MEDICATIONS:    Current Outpatient Medications:     amLODIPine (NORVASC) 5 mg tablet, TAKE 1 TABLET DAILY, Disp: 90 tablet, Rfl: 3    docusate sodium (COLACE) 100 mg capsule, Take 1 capsule (100 mg total) by mouth 2 (two) times a day, Disp: 20 capsule, Rfl: 0    lidocaine (LIDODERM) 5 %, Apply 1 patch topically daily Remove & Discard patch within 12 hours or as directed by MD, Disp: 30 patch, Rfl: 1    meclizine (ANTIVERT) 25 mg tablet, Take 1 tablet (25 mg total) by mouth 3 (three) times a day as needed for dizziness, Disp: 30 tablet, Rfl: 0    multivitamin (THERAGRAN) TABS, Take 1 tablet by mouth daily, Disp: , Rfl:     tamsulosin (FLOMAX) 0 4 mg, Take 1 capsule (0 4 mg total) by mouth daily with dinner, Disp: 90 capsule, Rfl: 3    ACTIVE PROBLEM LIST:  Patient Active Problem List   Diagnosis    Benign prostatic hyperplasia, presence of lower urinary tract symptoms unspecified    Erectile dysfunction    Lumbar radiculopathy    Azotemia    Essential hypertension    CKD (chronic kidney disease) stage 3, GFR 30-59 ml/min    Vertigo    Cerumen impaction    Chronic kidney disease-mineral and bone disorder    Colon polyps       PAST MEDICAL HISTORY:  Past Medical History:   Diagnosis Date    Benign prostatic hyperplasia     Chronic kidney disease     COVID-19 03/2020    Esophageal reflux     Hypertension     Vertigo        PAST SURGICAL HISTORY:  Past Surgical History:   Procedure Laterality Date    APPENDECTOMY  05/21/2015    CYST REMOVAL      Fatty cyst removed from back       FAMILY HISTORY:  Family History   Problem Relation Age of Onset    Prostate cancer Father     Prostate cancer Maternal Grandfather     Stomach cancer Maternal Aunt         malignant tumor of pharynx    Stomach cancer Maternal Uncle         malignant tumor of pharynx    Mental illness Family     Depression Family     Schizophrenia Family     Hypertension Mother     No Known Problems Sister     Dementia Sister        SOCIAL HISTORY:  Social History     Socioeconomic History    Marital status: /Civil Union     Spouse name: Not on file    Number of children: Not on file    Years of education: Not on file    Highest education level: Not on file   Occupational History    Occupation: Retired   Social Needs    Financial resource strain: Not on file   Faye-Ning insecurity     Worry: Not on file     Inability: Not on file    Transportation needs     Medical: Not on file     Non-medical: Not on file   Tobacco Use    Smoking status: Former Smoker    Smokeless tobacco: Never Used    Tobacco comment: 1ppw   Substance and Sexual Activity    Alcohol use: Yes     Frequency: Monthly or less     Comment: socially    Drug use: No    Sexual activity: Yes     Partners: Female     Comment: denied history of high risk sexual behavior   Lifestyle    Physical activity     Days per week: Not on file     Minutes per session: Not on file    Stress: Not on file   Relationships    Social connections     Talks on phone: Not on file     Gets together: Not on file     Attends Sabianism service: Not on file     Active member of club or organization: Not on file     Attends meetings of clubs or organizations: Not on file     Relationship status: Not on file    Intimate partner violence     Fear of current or ex partner: Not on file     Emotionally abused: Not on file     Physically abused: Not on file     Forced sexual activity: Not on file   Other Topics Concern    Not on file   Social History Narrative    No active advance directive    Always uses seat belt    Exercises occasionally, moderate    Five children    Occasional caffeine consumption       Review of Systems   Constitutional: Negative for activity change, chills, fatigue and fever  HENT: Negative for congestion  Eyes: Negative for discharge  Respiratory: Negative for cough, chest tightness and shortness of breath  Cardiovascular: Negative for chest pain, palpitations and leg swelling  Gastrointestinal: Positive for anal bleeding, constipation and rectal pain  Negative for abdominal pain  Genitourinary: Negative for difficulty urinating  Musculoskeletal: Negative for arthralgias and myalgias  Skin: Negative for rash  Allergic/Immunologic: Negative for immunocompromised state     Neurological: Positive for headaches  Negative for dizziness, syncope, weakness and light-headedness  Hematological: Negative for adenopathy  Does not bruise/bleed easily  Psychiatric/Behavioral: Negative for dysphoric mood and sleep disturbance  The patient is not nervous/anxious  Objective:  Vitals:    01/11/21 1542   BP: 122/76   BP Location: Right arm   Patient Position: Sitting   Cuff Size: Standard   Pulse: 73   Resp: 16   Temp: 98 2 °F (36 8 °C)   TempSrc: Tympanic   SpO2: 98%   Weight: 89 4 kg (197 lb 3 2 oz)   Height: 6' (1 829 m)     Body mass index is 26 75 kg/m²  Physical Exam  Vitals signs and nursing note reviewed  Constitutional:       General: He is not in acute distress  Appearance: He is well-developed  HENT:      Head: Normocephalic and atraumatic  Eyes:      Pupils: Pupils are equal, round, and reactive to light  Neck:      Musculoskeletal: Neck supple  Thyroid: No thyromegaly  Vascular: No carotid bruit or JVD  Cardiovascular:      Rate and Rhythm: Normal rate and regular rhythm  Heart sounds: Normal heart sounds  Pulmonary:      Effort: Pulmonary effort is normal  No respiratory distress  Breath sounds: Normal breath sounds  Abdominal:      General: Abdomen is flat  Bowel sounds are normal       Tenderness: There is no abdominal tenderness  Musculoskeletal:      Right lower leg: No edema  Left lower leg: No edema  Lymphadenopathy:      Cervical: No cervical adenopathy  Skin:     General: Skin is warm and dry  Findings: No rash  Neurological:      General: No focal deficit present  Mental Status: He is alert and oriented to person, place, and time  Mental status is at baseline     Psychiatric:         Mood and Affect: Mood normal          Behavior: Behavior normal            RESULTS:    Recent Results (from the past 1008 hour(s))   Novel Coronavirus (COVID-19), PCR [facility] - Collected at [facility]    Collection Time: 01/06/21  1:03 PM Specimen: Nose; Nares   Result Value Ref Range    SARS-CoV-2  Not Detected Not Detected       This note was created with voice recognition software  Phonic, grammatical and spelling errors may be present within the note as a result

## 2021-01-21 ENCOUNTER — LAB (OUTPATIENT)
Dept: LAB | Facility: HOSPITAL | Age: 71
End: 2021-01-21
Payer: MEDICARE

## 2021-01-21 DIAGNOSIS — K60.2 ANAL FISSURE: ICD-10-CM

## 2021-01-21 DIAGNOSIS — K62.5 RECTAL BLEED: ICD-10-CM

## 2021-01-21 LAB
ALBUMIN SERPL BCP-MCNC: 3.8 G/DL (ref 3.5–5)
ALP SERPL-CCNC: 53 U/L (ref 46–116)
ALT SERPL W P-5'-P-CCNC: 38 U/L (ref 12–78)
ANION GAP SERPL CALCULATED.3IONS-SCNC: 6 MMOL/L (ref 4–13)
AST SERPL W P-5'-P-CCNC: 23 U/L (ref 5–45)
BASOPHILS # BLD AUTO: 0.02 THOUSANDS/ΜL (ref 0–0.1)
BASOPHILS NFR BLD AUTO: 1 % (ref 0–1)
BILIRUB SERPL-MCNC: 0.5 MG/DL (ref 0.2–1)
BUN SERPL-MCNC: 18 MG/DL (ref 5–25)
CALCIUM SERPL-MCNC: 9.4 MG/DL (ref 8.3–10.1)
CHLORIDE SERPL-SCNC: 103 MMOL/L (ref 100–108)
CO2 SERPL-SCNC: 31 MMOL/L (ref 21–32)
CREAT SERPL-MCNC: 1.51 MG/DL (ref 0.6–1.3)
EOSINOPHIL # BLD AUTO: 0.07 THOUSAND/ΜL (ref 0–0.61)
EOSINOPHIL NFR BLD AUTO: 2 % (ref 0–6)
ERYTHROCYTE [DISTWIDTH] IN BLOOD BY AUTOMATED COUNT: 13.4 % (ref 11.6–15.1)
GFR SERPL CREATININE-BSD FRML MDRD: 53 ML/MIN/1.73SQ M
GLUCOSE P FAST SERPL-MCNC: 96 MG/DL (ref 65–99)
HCT VFR BLD AUTO: 43.2 % (ref 36.5–49.3)
HGB BLD-MCNC: 14.6 G/DL (ref 12–17)
IMM GRANULOCYTES # BLD AUTO: 0.01 THOUSAND/UL (ref 0–0.2)
IMM GRANULOCYTES NFR BLD AUTO: 0 % (ref 0–2)
LYMPHOCYTES # BLD AUTO: 1.87 THOUSANDS/ΜL (ref 0.6–4.47)
LYMPHOCYTES NFR BLD AUTO: 46 % (ref 14–44)
MCH RBC QN AUTO: 30.7 PG (ref 26.8–34.3)
MCHC RBC AUTO-ENTMCNC: 33.8 G/DL (ref 31.4–37.4)
MCV RBC AUTO: 91 FL (ref 82–98)
MONOCYTES # BLD AUTO: 0.26 THOUSAND/ΜL (ref 0.17–1.22)
MONOCYTES NFR BLD AUTO: 7 % (ref 4–12)
NEUTROPHILS # BLD AUTO: 1.73 THOUSANDS/ΜL (ref 1.85–7.62)
NEUTS SEG NFR BLD AUTO: 44 % (ref 43–75)
NRBC BLD AUTO-RTO: 0 /100 WBCS
PLATELET # BLD AUTO: 206 THOUSANDS/UL (ref 149–390)
PMV BLD AUTO: 11 FL (ref 8.9–12.7)
POTASSIUM SERPL-SCNC: 4 MMOL/L (ref 3.5–5.3)
PROT SERPL-MCNC: 7.4 G/DL (ref 6.4–8.2)
RBC # BLD AUTO: 4.76 MILLION/UL (ref 3.88–5.62)
SODIUM SERPL-SCNC: 140 MMOL/L (ref 136–145)
WBC # BLD AUTO: 3.96 THOUSAND/UL (ref 4.31–10.16)

## 2021-01-21 PROCEDURE — 36415 COLL VENOUS BLD VENIPUNCTURE: CPT

## 2021-01-21 PROCEDURE — 80053 COMPREHEN METABOLIC PANEL: CPT

## 2021-01-21 PROCEDURE — 85025 COMPLETE CBC W/AUTO DIFF WBC: CPT

## 2021-01-25 ENCOUNTER — OFFICE VISIT (OUTPATIENT)
Dept: INTERNAL MEDICINE CLINIC | Facility: CLINIC | Age: 71
End: 2021-01-25
Payer: MEDICARE

## 2021-01-25 VITALS
DIASTOLIC BLOOD PRESSURE: 74 MMHG | WEIGHT: 198.2 LBS | RESPIRATION RATE: 16 BRPM | TEMPERATURE: 97.1 F | SYSTOLIC BLOOD PRESSURE: 132 MMHG | HEART RATE: 70 BPM | HEIGHT: 72 IN | BODY MASS INDEX: 26.84 KG/M2 | OXYGEN SATURATION: 98 %

## 2021-01-25 DIAGNOSIS — N40.0 BENIGN PROSTATIC HYPERPLASIA, PRESENCE OF LOWER URINARY TRACT SYMPTOMS UNSPECIFIED: ICD-10-CM

## 2021-01-25 DIAGNOSIS — N18.30 STAGE 3 CHRONIC KIDNEY DISEASE, UNSPECIFIED WHETHER STAGE 3A OR 3B CKD (HCC): ICD-10-CM

## 2021-01-25 DIAGNOSIS — Z00.00 ENCOUNTER FOR MEDICARE ANNUAL WELLNESS EXAM: Primary | ICD-10-CM

## 2021-01-25 DIAGNOSIS — I10 ESSENTIAL HYPERTENSION: ICD-10-CM

## 2021-01-25 PROCEDURE — G0439 PPPS, SUBSEQ VISIT: HCPCS | Performed by: PHYSICIAN ASSISTANT

## 2021-01-25 PROCEDURE — 1123F ACP DISCUSS/DSCN MKR DOCD: CPT | Performed by: PHYSICIAN ASSISTANT

## 2021-01-25 NOTE — PROGRESS NOTES
Assessment and Plan:     Problem List Items Addressed This Visit     None           Preventive health issues were discussed with patient, and age appropriate screening tests were ordered as noted in patient's After Visit Summary  Personalized health advice and appropriate referrals for health education or preventive services given if needed, as noted in patient's After Visit Summary  History of Present Illness:     Patient presents for Welcome to Medicare visit  Patient Care Team:  Cate Dalal MD as PCP - General  MD Samantha Dalal MD     Review of Systems:     Review of Systems   Constitutional: Negative for activity change, chills, fatigue and fever  HENT: Negative for congestion  Eyes: Negative for discharge  Respiratory: Negative for cough, chest tightness and shortness of breath  Cardiovascular: Negative for chest pain, palpitations and leg swelling  Gastrointestinal: Negative for abdominal pain, blood in stool, constipation, diarrhea, nausea and vomiting  Endocrine: Negative for polydipsia, polyphagia and polyuria  Genitourinary: Negative for difficulty urinating  Musculoskeletal: Negative for arthralgias and myalgias  Skin: Negative for rash  Allergic/Immunologic: Negative for immunocompromised state  Neurological: Negative for dizziness, syncope, weakness, light-headedness and headaches  Hematological: Negative for adenopathy  Does not bruise/bleed easily  Psychiatric/Behavioral: Negative for dysphoric mood, sleep disturbance and suicidal ideas  The patient is not nervous/anxious         Problem List:     Patient Active Problem List   Diagnosis    Benign prostatic hyperplasia, presence of lower urinary tract symptoms unspecified    Erectile dysfunction    Lumbar radiculopathy    Azotemia    Essential hypertension    CKD (chronic kidney disease) stage 3, GFR 30-59 ml/min    Vertigo    Cerumen impaction    Chronic kidney disease-mineral and bone disorder    Colon polyps      Past Medical and Surgical History:     Past Medical History:   Diagnosis Date    Benign prostatic hyperplasia     Chronic kidney disease     COVID-19 03/2020    Esophageal reflux     Hypertension     Vertigo      Past Surgical History:   Procedure Laterality Date    APPENDECTOMY  05/21/2015    CYST REMOVAL      Fatty cyst removed from back      Family History:     Family History   Problem Relation Age of Onset    Prostate cancer Father     Prostate cancer Maternal Grandfather     Stomach cancer Maternal Aunt         malignant tumor of pharynx    Stomach cancer Maternal Uncle         malignant tumor of pharynx    Mental illness Family     Depression Family     Schizophrenia Family     Hypertension Mother     No Known Problems Sister     Dementia Sister       Social History:        Social History     Socioeconomic History    Marital status: /Civil Union     Spouse name: None    Number of children: None    Years of education: None    Highest education level: None   Occupational History    Occupation: Retired   Social Needs    Financial resource strain: None    Food insecurity     Worry: None     Inability: None    Transportation needs     Medical: None     Non-medical: None   Tobacco Use    Smoking status: Former Smoker    Smokeless tobacco: Never Used    Tobacco comment: 1ppw   Substance and Sexual Activity    Alcohol use: Yes     Frequency: Monthly or less     Comment: socially    Drug use: No    Sexual activity: Yes     Partners: Female     Comment: denied history of high risk sexual behavior   Lifestyle    Physical activity     Days per week: None     Minutes per session: None    Stress: None   Relationships    Social connections     Talks on phone: None     Gets together: None     Attends Synagogue service: None     Active member of club or organization: None     Attends meetings of clubs or organizations: None     Relationship status: None  Intimate partner violence     Fear of current or ex partner: None     Emotionally abused: None     Physically abused: None     Forced sexual activity: None   Other Topics Concern    None   Social History Narrative    No active advance directive    Always uses seat belt    Exercises occasionally, moderate    Five children    Occasional caffeine consumption      Medications and Allergies:     Current Outpatient Medications   Medication Sig Dispense Refill    amLODIPine (NORVASC) 5 mg tablet TAKE 1 TABLET DAILY 90 tablet 3    lidocaine (LIDODERM) 5 % Apply 1 patch topically daily Remove & Discard patch within 12 hours or as directed by MD 30 patch 1    meclizine (ANTIVERT) 25 mg tablet Take 1 tablet (25 mg total) by mouth 3 (three) times a day as needed for dizziness 30 tablet 0    multivitamin (THERAGRAN) TABS Take 1 tablet by mouth daily      tamsulosin (FLOMAX) 0 4 mg Take 1 capsule (0 4 mg total) by mouth daily with dinner 90 capsule 3    docusate sodium (COLACE) 100 mg capsule Take 1 capsule (100 mg total) by mouth 2 (two) times a day (Patient not taking: Reported on 1/25/2021) 20 capsule 0     No current facility-administered medications for this visit  Allergies   Allergen Reactions    Penicillin V Anaphylaxis      Immunizations:     Immunization History   Administered Date(s) Administered    Influenza, high dose seasonal 0 7 mL 11/06/2019      Health Maintenance:         Topic Date Due    Colonoscopy Surveillance  03/15/2019    Colorectal Cancer Screening  04/18/2028    Hepatitis C Screening  Completed         Topic Date Due    DTaP,Tdap,and Td Vaccines (1 - Tdap) 07/10/1971    Pneumococcal Vaccine: 65+ Years (1 of 1 - PPSV23) 07/10/2015    Influenza Vaccine (1) 09/01/2020      Medicare Screening Tests and Risk Assessments:     Jassi Lai is here for his Subsequent Wellness visit  Health Risk Assessment:   Patient rates overall health as good   Patient feels that their physical health rating is same  Eyesight was rated as slightly worse  Hearing was rated as same  Patient feels that their emotional and mental health rating is same  Pain experienced in the last 7 days has been a lot  Patient's pain rating has been 1/10  Patient states that he has experienced no weight loss or gain in last 6 months  Depression Screening:   PHQ-2 Score: 0      Fall Risk Screening: In the past year, patient has experienced: no history of falling in past year      Home Safety:  Patient does not have trouble with stairs inside or outside of their home  Patient has working smoke alarms and has working carbon monoxide detector  Home safety hazards include: none  Nutrition:   Current diet is Regular  Medications:   Patient is currently taking over-the-counter supplements  OTC medications include: see medication list  Patient is able to manage medications  Activities of Daily Living (ADLs)/Instrumental Activities of Daily Living (IADLs):   Walk and transfer into and out of bed and chair?: Yes  Dress and groom yourself?: Yes    Bathe or shower yourself?: Yes    Feed yourself? Yes  Do your laundry/housekeeping?: Yes  Manage your money, pay your bills and track your expenses?: Yes  Make your own meals?: Yes    Do your own shopping?: Yes    Previous Hospitalizations:   Any hospitalizations or ED visits within the last 12 months?: No      Advance Care Planning:   Living will: Yes    Advanced directive: Yes    Five wishes given: Yes      Comments:  Will bring in copy of 5 wishes    Cognitive Screening:   Provider or family/friend/caregiver concerned regarding cognition?: No    PREVENTIVE SCREENINGS      Cardiovascular Screening:    General: Screening Current      Diabetes Screening:     General: Screening Current      Colorectal Cancer Screening:     General: Screening Current      Prostate Cancer Screening:    General: Screening Current      Osteoporosis Screening:    General: Screening Not Indicated Abdominal Aortic Aneurysm (AAA) Screening:    Risk factors include: age between 73-69 yo and tobacco use        General: Screening Not Indicated      Lung Cancer Screening:     General: Screening Not Indicated      Hepatitis C Screening:    General: Screening Current    No exam data present     Physical Exam:     /74 (BP Location: Right arm, Patient Position: Sitting, Cuff Size: Standard)   Pulse 70   Temp (!) 97 1 °F (36 2 °C) (Tympanic)   Resp 16   Ht 6' (1 829 m)   Wt 89 9 kg (198 lb 3 2 oz)   SpO2 98%   BMI 26 88 kg/m²     Physical Exam  Vitals signs and nursing note reviewed  Constitutional:       Appearance: Normal appearance  He is well-developed  He is not ill-appearing  HENT:      Head: Normocephalic and atraumatic  Right Ear: Tympanic membrane, ear canal and external ear normal       Left Ear: Tympanic membrane, ear canal and external ear normal       Nose:      Comments:   Nasal septum deviates to the right with blockage to inhalation     Mouth/Throat:      Mouth: Mucous membranes are moist       Pharynx: Oropharynx is clear  No oropharyngeal exudate or posterior oropharyngeal erythema  Eyes:      General: No scleral icterus  Right eye: No discharge  Left eye: No discharge  Extraocular Movements: Extraocular movements intact  Conjunctiva/sclera: Conjunctivae normal       Pupils: Pupils are equal, round, and reactive to light  Comments:  Bilateral partial arcus senilis   Neck:      Musculoskeletal: Normal range of motion and neck supple  No neck rigidity or muscular tenderness  Thyroid: No thyromegaly  Vascular: No carotid bruit or JVD  Cardiovascular:      Rate and Rhythm: Normal rate and regular rhythm  Pulses: Normal pulses  Heart sounds: No murmur  Pulmonary:      Effort: Pulmonary effort is normal  No respiratory distress  Breath sounds: Normal breath sounds  Chest:      Chest wall: No tenderness     Abdominal: General: Abdomen is flat  Bowel sounds are normal  There is no distension  Palpations: Abdomen is soft  There is no hepatomegaly, splenomegaly or mass  Tenderness: There is no abdominal tenderness  There is no right CVA tenderness, left CVA tenderness, guarding or rebound  Hernia: No hernia is present  Comments:  Midline suprapubic scar   Genitourinary:     Rectum: Guaiac result negative  Comments:  Un circumcised adult male  No lesions of the phallus, scrotum, or testes  No inguinal hernias  Rectal exam:  Normal rectal tone  Prostate mildly enlarged without dominant nodule  Hemoccult negative  Musculoskeletal:         General: No swelling or tenderness  Right lower leg: No edema  Left lower leg: No edema  Lymphadenopathy:      Cervical: No cervical adenopathy  Skin:     General: Skin is warm and dry  Findings: No rash  Neurological:      General: No focal deficit present  Mental Status: He is alert  Mental status is at baseline  He is disoriented  Cranial Nerves: No cranial nerve deficit  Sensory: No sensory deficit  Motor: No weakness  Coordination: Coordination normal       Gait: Gait normal       Deep Tendon Reflexes: Reflexes normal    Psychiatric:         Mood and Affect: Mood normal          Behavior: Behavior normal          Thought Content:  Thought content normal          Judgment: Judgment normal           Ev Maddox PA-C

## 2021-01-25 NOTE — PATIENT INSTRUCTIONS

## 2021-02-16 ENCOUNTER — VBI (OUTPATIENT)
Dept: ADMINISTRATIVE | Facility: OTHER | Age: 71
End: 2021-02-16

## 2021-03-08 ENCOUNTER — OFFICE VISIT (OUTPATIENT)
Dept: INTERNAL MEDICINE CLINIC | Facility: CLINIC | Age: 71
End: 2021-03-08
Payer: MEDICARE

## 2021-03-08 VITALS
RESPIRATION RATE: 18 BRPM | TEMPERATURE: 96.8 F | SYSTOLIC BLOOD PRESSURE: 130 MMHG | WEIGHT: 195.2 LBS | HEART RATE: 70 BPM | HEIGHT: 72 IN | BODY MASS INDEX: 26.44 KG/M2 | OXYGEN SATURATION: 98 % | DIASTOLIC BLOOD PRESSURE: 78 MMHG

## 2021-03-08 DIAGNOSIS — I10 ESSENTIAL HYPERTENSION: Primary | ICD-10-CM

## 2021-03-08 DIAGNOSIS — N18.30 STAGE 3 CHRONIC KIDNEY DISEASE, UNSPECIFIED WHETHER STAGE 3A OR 3B CKD (HCC): ICD-10-CM

## 2021-03-08 PROCEDURE — 99213 OFFICE O/P EST LOW 20 MIN: CPT | Performed by: PHYSICIAN ASSISTANT

## 2021-03-08 NOTE — PATIENT INSTRUCTIONS
No change in current medical management  Will schedule follow-up in 6 months with repeat labs for that visit, sooner as needed

## 2021-03-08 NOTE — PROGRESS NOTES
Assessment/Plan:      Patient Instructions   No change in current medical management  Will schedule follow-up in 6 months with repeat labs for that visit, sooner as needed  Quality Measures:       Return in about 6 months (around 9/8/2021) for Next scheduled follow up  Diagnoses and all orders for this visit:    Essential hypertension  -     CBC and differential; Future  -     Comprehensive metabolic panel; Future  -     Lipid panel; Future    Stage 3 chronic kidney disease, unspecified whether stage 3a or 3b CKD    Other orders  -     Diclofenac Sodium (VOLTAREN) 1 %; APPLY 2 GRAMS TO THE AFFECTED AREA(S) BY TOPICAL ROUTE 4 TIMES PER DAY          Subjective:      Patient ID: Abel Rodriguez is a 79 y o  male  Follow-up    Hypertension:  Good control on current medications  Eduarda cp, palp, sob, edema, HA, dizziness, diaphoresis, syncope, visual disturbance  CKD: Follows with Nephrology on a regular basis  Has upcoming nephrology appointment  Remains active, exercising regularly both weight training and cardio  No other focal concerns        ALLERGIES:  Allergies   Allergen Reactions    Penicillin V Anaphylaxis       CURRENT MEDICATIONS:    Current Outpatient Medications:     amLODIPine (NORVASC) 5 mg tablet, TAKE 1 TABLET DAILY, Disp: 90 tablet, Rfl: 3    Diclofenac Sodium (VOLTAREN) 1 %, APPLY 2 GRAMS TO THE AFFECTED AREA(S) BY TOPICAL ROUTE 4 TIMES PER DAY, Disp: , Rfl:     multivitamin (THERAGRAN) TABS, Take 1 tablet by mouth daily, Disp: , Rfl:     tamsulosin (FLOMAX) 0 4 mg, Take 1 capsule (0 4 mg total) by mouth daily with dinner, Disp: 90 capsule, Rfl: 3    ACTIVE PROBLEM LIST:  Patient Active Problem List   Diagnosis    Benign prostatic hyperplasia, presence of lower urinary tract symptoms unspecified    Erectile dysfunction    Lumbar radiculopathy    Azotemia    Essential hypertension    CKD (chronic kidney disease) stage 3, GFR 30-59 ml/min    Vertigo    Cerumen impaction    Chronic kidney disease-mineral and bone disorder    Colon polyps       PAST MEDICAL HISTORY:  Past Medical History:   Diagnosis Date    Benign prostatic hyperplasia     Chronic kidney disease     COVID-19 03/2020    Esophageal reflux     Hypertension     Vertigo        PAST SURGICAL HISTORY:  Past Surgical History:   Procedure Laterality Date    APPENDECTOMY  05/21/2015    CYST REMOVAL      Fatty cyst removed from back       FAMILY HISTORY:  Family History   Problem Relation Age of Onset    Prostate cancer Father     Prostate cancer Maternal Grandfather     Stomach cancer Maternal Aunt         malignant tumor of pharynx    Stomach cancer Maternal Uncle         malignant tumor of pharynx    Mental illness Family     Depression Family     Schizophrenia Family     Hypertension Mother     No Known Problems Sister     Dementia Sister        SOCIAL HISTORY:  Social History     Socioeconomic History    Marital status: /Civil Union     Spouse name: Not on file    Number of children: Not on file    Years of education: Not on file    Highest education level: Not on file   Occupational History    Occupation: Retired   Social Needs    Financial resource strain: Not on file    Food insecurity     Worry: Not on file     Inability: Not on file   [employer] needs     Medical: Not on file     Non-medical: Not on file   Tobacco Use    Smoking status: Former Smoker    Smokeless tobacco: Never Used    Tobacco comment: 1ppw   Substance and Sexual Activity    Alcohol use: Yes     Frequency: Monthly or less     Comment: socially    Drug use: No    Sexual activity: Yes     Partners: Female     Comment: denied history of high risk sexual behavior   Lifestyle    Physical activity     Days per week: Not on file     Minutes per session: Not on file    Stress: Not on file   Relationships    Social connections     Talks on phone: Not on file     Gets together: Not on file     Attends Zoroastrian service: Not on file     Active member of club or organization: Not on file     Attends meetings of clubs or organizations: Not on file     Relationship status: Not on file    Intimate partner violence     Fear of current or ex partner: Not on file     Emotionally abused: Not on file     Physically abused: Not on file     Forced sexual activity: Not on file   Other Topics Concern    Not on file   Social History Narrative    No active advance directive    Always uses seat belt    Exercises occasionally, moderate    Five children    Occasional caffeine consumption      Regular dental care  Brushes teeth twice daily       Review of Systems   Constitutional: Negative for activity change, chills, fatigue and fever  HENT: Negative for congestion  Eyes: Negative for discharge  Respiratory: Negative for cough, chest tightness and shortness of breath  Cardiovascular: Negative for chest pain, palpitations and leg swelling  Gastrointestinal: Negative for abdominal pain, blood in stool, constipation, diarrhea and nausea  Endocrine: Negative for polydipsia, polyphagia and polyuria  Genitourinary: Negative for difficulty urinating  Musculoskeletal: Negative for arthralgias and myalgias  Skin: Negative for rash  Allergic/Immunologic: Negative for immunocompromised state  Neurological: Negative for dizziness, syncope, weakness, light-headedness and headaches  Hematological: Negative for adenopathy  Does not bruise/bleed easily  Psychiatric/Behavioral: Negative for dysphoric mood, sleep disturbance and suicidal ideas  The patient is not nervous/anxious            Objective:  Vitals:    03/08/21 1045 03/08/21 1109   BP: 140/72 130/78   BP Location: Right arm Left arm   Patient Position: Sitting Sitting   Cuff Size: Standard    Pulse: 70    Resp: 18    Temp: (!) 96 8 °F (36 °C)    TempSrc: Tympanic    SpO2: 98%    Weight: 88 5 kg (195 lb 3 2 oz)    Height: 6' (1 829 m)      Body mass index is 26 47 kg/m²  Physical Exam  Vitals signs and nursing note reviewed  Constitutional:       General: He is not in acute distress  Appearance: He is well-developed  HENT:      Head: Normocephalic and atraumatic  Eyes:      Pupils: Pupils are equal, round, and reactive to light  Neck:      Musculoskeletal: Neck supple  Thyroid: No thyromegaly  Vascular: No carotid bruit or JVD  Cardiovascular:      Rate and Rhythm: Normal rate and regular rhythm  Heart sounds: Normal heart sounds  Pulmonary:      Effort: Pulmonary effort is normal  No respiratory distress  Breath sounds: Normal breath sounds  Musculoskeletal:      Right lower leg: No edema  Left lower leg: No edema  Lymphadenopathy:      Cervical: No cervical adenopathy  Skin:     General: Skin is warm and dry  Findings: No rash  Neurological:      General: No focal deficit present  Mental Status: He is alert and oriented to person, place, and time  Mental status is at baseline  Psychiatric:         Mood and Affect: Mood normal          Behavior: Behavior normal            RESULTS:    No results found for this or any previous visit (from the past 1008 hour(s))  This note was created with voice recognition software  Phonic, grammatical and spelling errors may be present within the note as a result

## 2021-03-09 DIAGNOSIS — Z23 ENCOUNTER FOR IMMUNIZATION: ICD-10-CM

## 2021-04-07 ENCOUNTER — TELEMEDICINE (OUTPATIENT)
Dept: INTERNAL MEDICINE CLINIC | Facility: CLINIC | Age: 71
End: 2021-04-07
Payer: MEDICARE

## 2021-04-07 DIAGNOSIS — Z91.89 AT INCREASED RISK OF EXPOSURE TO COVID-19 VIRUS: Primary | ICD-10-CM

## 2021-04-07 PROCEDURE — 99213 OFFICE O/P EST LOW 20 MIN: CPT | Performed by: PHYSICIAN ASSISTANT

## 2021-04-07 NOTE — PROGRESS NOTES
Virtual Regular Visit      Assessment/Plan:   patient will go to local urgent care for rapid COVID-19 testing  He will call me with results  Problem List Items Addressed This Visit     None      Visit Diagnoses     At increased risk of exposure to COVID-19 virus    -  Primary               Reason for visit is   Chief Complaint   Patient presents with   [phone] Virtual Regular Visit        Encounter provider Hitesh Urrutia PA-C    Provider located at [address] [facility]  [address] [facility]  [facility] [address]  [address] [facility] [place] [address] [phone]      Recent Visits  No visits were found meeting these conditions  Showing recent visits within past 7 days and meeting all other requirements     Today's Visits  Date Type Provider Dept   04/07/21 Telemedicine Hitesh Urrutia PA-C [facility] [address] today's visits and meeting all other requirements     Future Appointments  No visits were found meeting these conditions  Showing future appointments within next 150 days and meeting all other requirements        The patient was identified by name and date of birth  Sandilars Lucas was informed that this is a telemedicine visit and that the visit is being conducted through [facility] [place] and patient was informed that this is not a secure, HIPAA-compliant platform  He agrees to proceed     My office door was closed  No one else was in the room  He acknowledged consent and understanding of privacy and security of the video platform  The patient has agreed to participate and understands they can discontinue the visit at any time  Patient is aware this is a billable service  Maryann Méndez is a 79 y o  male   Acute visit     COVID concerns  Patient's wife works for [employer] and currently 5 of her teammates have tested positive for COVID-19 disease  According to this patient his wife has not been around them without wearing a mask  She however is complaining of ongoing cough but no other symptoms  The patient is complaining of congestion  No fever  Worries about having COVID-19 disease  Patient most likely had this disease during the winter when his wife was positive  Patient reports he can smell and can taste  No nausea, vomiting, diarrhea, abdominal pain  No shortness of breath  Past Medical History:   Diagnosis Date    Benign prostatic hyperplasia     Chronic kidney disease     COVID-19 03/2020    Esophageal reflux     Hypertension     Vertigo        Past Surgical History:   Procedure Laterality Date    APPENDECTOMY  05/21/2015    CYST REMOVAL      Fatty cyst removed from back       Current Outpatient Medications   Medication Sig Dispense Refill    amLODIPine (NORVASC) 5 mg tablet TAKE 1 TABLET DAILY 90 tablet 3    Diclofenac Sodium (VOLTAREN) 1 % APPLY 2 GRAMS TO THE AFFECTED AREA(S) BY TOPICAL ROUTE 4 TIMES PER DAY      multivitamin (THERAGRAN) TABS Take 1 tablet by mouth daily      tamsulosin (FLOMAX) 0 4 mg Take 1 capsule (0 4 mg total) by mouth daily with dinner 90 capsule 3     No current facility-administered medications for this visit  Allergies   Allergen Reactions    Penicillin V Anaphylaxis       Review of Systems   Constitutional: Negative for activity change, chills, fatigue and fever  HENT: Negative for congestion  Eyes: Negative for discharge  Respiratory: Positive for cough  Negative for chest tightness and shortness of breath  Cardiovascular: Negative for chest pain, palpitations and leg swelling  Gastrointestinal: Negative for abdominal pain  Genitourinary: Negative for difficulty urinating  Musculoskeletal: Negative for arthralgias and myalgias  Skin: Negative for rash  Allergic/Immunologic: Negative for immunocompromised state  Neurological: Negative for dizziness, syncope, weakness, light-headedness and headaches  Hematological: Negative for adenopathy   Does not bruise/bleed easily  Psychiatric/Behavioral: Negative for dysphoric mood  The patient is not nervous/anxious  Video Exam    There were no vitals filed for this visit  Physical Exam  Vitals signs and nursing note reviewed  Constitutional:       General: He is not in acute distress  Appearance: Normal appearance  He is well-developed  He is not ill-appearing  Comments: Appears in no acute distress  No coughing  No evidence of conversational dyspnea  Pulmonary:      Effort: No respiratory distress  Neurological:      General: No focal deficit present  Mental Status: He is alert and oriented to person, place, and time  Psychiatric:         Mood and Affect: Mood normal          Behavior: Behavior normal           I spent 10 minutes directly with the patient during this visit      [address] [place] [address] acknowledges that he has consented to an online visit or consultation  He understands that the online visit is based solely on information provided by him, and that, in the absence of a face-to-face physical evaluation by the physician, the diagnosis he receives is both limited and provisional in terms of accuracy and completeness  This is not intended to replace a full medical face-to-face evaluation by the physician  Kezia Lemon understands and accepts these terms

## 2021-04-09 ENCOUNTER — TELEPHONE (OUTPATIENT)
Dept: INTERNAL MEDICINE CLINIC | Facility: CLINIC | Age: 71
End: 2021-04-09

## 2021-04-09 NOTE — TELEPHONE ENCOUNTER
Patient is requesting a script for lidocaine 5% patches  He is having back and knee pain and he uses them for this       [facility]    Call back #[phone]

## 2021-04-09 NOTE — TELEPHONE ENCOUNTER
No,  Most likely not covered by his insurance  He could try to call whoever had ordered them originally  Or he could try over-the-counter salon pause with lidocaine patches which are very similar

## 2021-04-12 DIAGNOSIS — M54.9 BACK PAIN, UNSPECIFIED BACK LOCATION, UNSPECIFIED BACK PAIN LATERALITY, UNSPECIFIED CHRONICITY: Primary | ICD-10-CM

## 2021-04-12 RX ORDER — LIDOCAINE 50 MG/G
1 PATCH TOPICAL DAILY
Qty: 5 PATCH | Refills: 5 | Status: SHIPPED | OUTPATIENT
Start: 2021-04-12 | End: 2021-04-14 | Stop reason: SDUPTHER

## 2021-04-12 NOTE — TELEPHONE ENCOUNTER
This is covered by his insurance as per Pt  It is in historical 2019 ordered by Dr Amari Juarez  Can we order ?

## 2021-04-14 ENCOUNTER — IMMUNIZATIONS (OUTPATIENT)
Dept: FAMILY MEDICINE CLINIC | Facility: HOSPITAL | Age: 71
End: 2021-04-14

## 2021-04-14 DIAGNOSIS — Z23 ENCOUNTER FOR IMMUNIZATION: Primary | ICD-10-CM

## 2021-04-14 DIAGNOSIS — M54.9 BACK PAIN, UNSPECIFIED BACK LOCATION, UNSPECIFIED BACK PAIN LATERALITY, UNSPECIFIED CHRONICITY: ICD-10-CM

## 2021-04-14 PROCEDURE — 91300 SARS-COV-2 / COVID-19 MRNA VACCINE (PFIZER-BIONTECH) 30 MCG: CPT

## 2021-04-14 PROCEDURE — 0001A SARS-COV-2 / COVID-19 MRNA VACCINE (PFIZER-BIONTECH) 30 MCG: CPT

## 2021-04-14 RX ORDER — LIDOCAINE 50 MG/G
1 PATCH TOPICAL DAILY
Qty: 50 PATCH | Refills: 5 | Status: SHIPPED | OUTPATIENT
Start: 2021-04-14

## 2021-04-15 ENCOUNTER — TELEPHONE (OUTPATIENT)
Dept: INTERNAL MEDICINE CLINIC | Facility: CLINIC | Age: 71
End: 2021-04-15

## 2021-04-15 NOTE — TELEPHONE ENCOUNTER
Pt asking about what to take OTC for his headache, chills  Body aches  After getting covid shot yesterday

## 2021-05-06 ENCOUNTER — IMMUNIZATIONS (OUTPATIENT)
Dept: FAMILY MEDICINE CLINIC | Facility: HOSPITAL | Age: 71
End: 2021-05-06

## 2021-05-06 DIAGNOSIS — Z23 ENCOUNTER FOR IMMUNIZATION: Primary | ICD-10-CM

## 2021-05-06 PROCEDURE — 91300 SARS-COV-2 / COVID-19 MRNA VACCINE (PFIZER-BIONTECH) 30 MCG: CPT

## 2021-05-06 PROCEDURE — 0002A SARS-COV-2 / COVID-19 MRNA VACCINE (PFIZER-BIONTECH) 30 MCG: CPT

## 2021-05-28 ENCOUNTER — TELEPHONE (OUTPATIENT)
Dept: NEPHROLOGY | Facility: CLINIC | Age: 71
End: 2021-05-28

## 2021-06-01 ENCOUNTER — APPOINTMENT (OUTPATIENT)
Dept: LAB | Facility: HOSPITAL | Age: 71
End: 2021-06-01
Attending: INTERNAL MEDICINE
Payer: MEDICARE

## 2021-06-01 DIAGNOSIS — N18.30 STAGE 3 CHRONIC KIDNEY DISEASE, UNSPECIFIED WHETHER STAGE 3A OR 3B CKD (HCC): ICD-10-CM

## 2021-06-01 DIAGNOSIS — N18.9 CHRONIC KIDNEY DISEASE-MINERAL AND BONE DISORDER: ICD-10-CM

## 2021-06-01 DIAGNOSIS — M89.9 CHRONIC KIDNEY DISEASE-MINERAL AND BONE DISORDER: ICD-10-CM

## 2021-06-01 DIAGNOSIS — E83.9 CHRONIC KIDNEY DISEASE-MINERAL AND BONE DISORDER: ICD-10-CM

## 2021-06-01 LAB
25(OH)D3 SERPL-MCNC: 24.7 NG/ML (ref 30–100)
ANION GAP SERPL CALCULATED.3IONS-SCNC: 7 MMOL/L (ref 4–13)
BASOPHILS # BLD AUTO: 0.03 THOUSANDS/ΜL (ref 0–0.1)
BASOPHILS NFR BLD AUTO: 1 % (ref 0–1)
BILIRUB UR QL STRIP: NEGATIVE
BUN SERPL-MCNC: 18 MG/DL (ref 5–25)
CALCIUM SERPL-MCNC: 9 MG/DL (ref 8.3–10.1)
CHLORIDE SERPL-SCNC: 103 MMOL/L (ref 100–108)
CLARITY UR: CLEAR
CO2 SERPL-SCNC: 30 MMOL/L (ref 21–32)
COLOR UR: COLORLESS
CREAT SERPL-MCNC: 1.25 MG/DL (ref 0.6–1.3)
CREAT UR-MCNC: 24.3 MG/DL
EOSINOPHIL # BLD AUTO: 0.08 THOUSAND/ΜL (ref 0–0.61)
EOSINOPHIL NFR BLD AUTO: 2 % (ref 0–6)
ERYTHROCYTE [DISTWIDTH] IN BLOOD BY AUTOMATED COUNT: 13.3 % (ref 11.6–15.1)
GFR SERPL CREATININE-BSD FRML MDRD: 67 ML/MIN/1.73SQ M
GLUCOSE P FAST SERPL-MCNC: 93 MG/DL (ref 65–99)
GLUCOSE UR STRIP-MCNC: NEGATIVE MG/DL
HCT VFR BLD AUTO: 43.3 % (ref 36.5–49.3)
HGB BLD-MCNC: 14.4 G/DL (ref 12–17)
HGB UR QL STRIP.AUTO: NEGATIVE
IMM GRANULOCYTES # BLD AUTO: 0.01 THOUSAND/UL (ref 0–0.2)
IMM GRANULOCYTES NFR BLD AUTO: 0 % (ref 0–2)
KETONES UR STRIP-MCNC: NEGATIVE MG/DL
LEUKOCYTE ESTERASE UR QL STRIP: NEGATIVE
LYMPHOCYTES # BLD AUTO: 1.59 THOUSANDS/ΜL (ref 0.6–4.47)
LYMPHOCYTES NFR BLD AUTO: 44 % (ref 14–44)
MCH RBC QN AUTO: 30.3 PG (ref 26.8–34.3)
MCHC RBC AUTO-ENTMCNC: 33.3 G/DL (ref 31.4–37.4)
MCV RBC AUTO: 91 FL (ref 82–98)
MONOCYTES # BLD AUTO: 0.29 THOUSAND/ΜL (ref 0.17–1.22)
MONOCYTES NFR BLD AUTO: 8 % (ref 4–12)
NEUTROPHILS # BLD AUTO: 1.59 THOUSANDS/ΜL (ref 1.85–7.62)
NEUTS SEG NFR BLD AUTO: 45 % (ref 43–75)
NITRITE UR QL STRIP: NEGATIVE
NRBC BLD AUTO-RTO: 0 /100 WBCS
PH UR STRIP.AUTO: 6.5 [PH]
PHOSPHATE SERPL-MCNC: 2.8 MG/DL (ref 2.3–4.1)
PLATELET # BLD AUTO: 222 THOUSANDS/UL (ref 149–390)
PMV BLD AUTO: 10.7 FL (ref 8.9–12.7)
POTASSIUM SERPL-SCNC: 3.8 MMOL/L (ref 3.5–5.3)
PROT UR STRIP-MCNC: NEGATIVE MG/DL
PROT UR-MCNC: <6 MG/DL
PROT/CREAT UR: <0.25 MG/G{CREAT} (ref 0–0.1)
PTH-INTACT SERPL-MCNC: 63.3 PG/ML (ref 18.4–80.1)
RBC # BLD AUTO: 4.75 MILLION/UL (ref 3.88–5.62)
SODIUM SERPL-SCNC: 140 MMOL/L (ref 136–145)
SP GR UR STRIP.AUTO: <=1.005 (ref 1–1.03)
UROBILINOGEN UR QL STRIP.AUTO: 0.2 E.U./DL
WBC # BLD AUTO: 3.59 THOUSAND/UL (ref 4.31–10.16)

## 2021-06-01 PROCEDURE — 85025 COMPLETE CBC W/AUTO DIFF WBC: CPT

## 2021-06-01 PROCEDURE — 82570 ASSAY OF URINE CREATININE: CPT

## 2021-06-01 PROCEDURE — 83970 ASSAY OF PARATHORMONE: CPT

## 2021-06-01 PROCEDURE — 84156 ASSAY OF PROTEIN URINE: CPT

## 2021-06-01 PROCEDURE — 82306 VITAMIN D 25 HYDROXY: CPT

## 2021-06-01 PROCEDURE — 84100 ASSAY OF PHOSPHORUS: CPT

## 2021-06-01 PROCEDURE — 80048 BASIC METABOLIC PNL TOTAL CA: CPT

## 2021-06-01 PROCEDURE — 36415 COLL VENOUS BLD VENIPUNCTURE: CPT

## 2021-06-01 PROCEDURE — 81003 URINALYSIS AUTO W/O SCOPE: CPT

## 2021-06-03 ENCOUNTER — OFFICE VISIT (OUTPATIENT)
Dept: NEPHROLOGY | Facility: CLINIC | Age: 71
End: 2021-06-03
Payer: MEDICARE

## 2021-06-03 VITALS
HEART RATE: 68 BPM | HEIGHT: 71 IN | TEMPERATURE: 97.1 F | BODY MASS INDEX: 27.13 KG/M2 | WEIGHT: 193.8 LBS | DIASTOLIC BLOOD PRESSURE: 70 MMHG | RESPIRATION RATE: 16 BRPM | SYSTOLIC BLOOD PRESSURE: 130 MMHG

## 2021-06-03 DIAGNOSIS — G89.29 CHRONIC PAIN OF LEFT KNEE: ICD-10-CM

## 2021-06-03 DIAGNOSIS — M25.562 CHRONIC PAIN OF LEFT KNEE: ICD-10-CM

## 2021-06-03 DIAGNOSIS — N18.31 STAGE 3A CHRONIC KIDNEY DISEASE (HCC): Primary | ICD-10-CM

## 2021-06-03 DIAGNOSIS — E83.9 CHRONIC KIDNEY DISEASE-MINERAL AND BONE DISORDER: ICD-10-CM

## 2021-06-03 DIAGNOSIS — I10 ESSENTIAL HYPERTENSION: ICD-10-CM

## 2021-06-03 DIAGNOSIS — M89.9 CHRONIC KIDNEY DISEASE-MINERAL AND BONE DISORDER: ICD-10-CM

## 2021-06-03 DIAGNOSIS — N18.9 CHRONIC KIDNEY DISEASE-MINERAL AND BONE DISORDER: ICD-10-CM

## 2021-06-03 DIAGNOSIS — N40.0 BENIGN PROSTATIC HYPERPLASIA, PRESENCE OF LOWER URINARY TRACT SYMPTOMS UNSPECIFIED: ICD-10-CM

## 2021-06-03 PROCEDURE — 99214 OFFICE O/P EST MOD 30 MIN: CPT | Performed by: INTERNAL MEDICINE

## 2021-06-03 NOTE — ASSESSMENT & PLAN NOTE
Lab Results   Component Value Date    EGFR 67 06/01/2021    EGFR 53 01/21/2021    EGFR 56 11/25/2020    CREATININE 1 25 06/01/2021    CREATININE 1 51 (H) 01/21/2021    CREATININE 1 46 (H) 11/25/2020    renal function is quite better with hydration  Advised to continue hydration    Will monitor him

## 2021-06-03 NOTE — PATIENT INSTRUCTIONS

## 2021-06-03 NOTE — PROGRESS NOTES
NEPHROLOGY OFFICE FOLLOW UP  Delroy Summers 79 y o  male MRN: [id number]    Encounter: [id number] 6/3/2021    REASON FOR VISIT: Delroy Summers is a 79 y o  male who is here on 6/3/2021 for Chronic Kidney Disease and Follow-up    HPI:     Mariangel Kaur came in today for follow-up of CKD  Since I saw him last he had COVID-19 infection but stayed home as was doing quite well  Drink quite a bit of liquid    He denies any acute complaint    Has some knee pain but no other new complaint     No chest pain no palpitation or shortness of breath     No nausea no vomiting      REVIEW OF SYSTEMS:    Review of Systems   Constitutional: Negative for activity change and fatigue  HENT: Negative for congestion and ear discharge  Eyes: Negative for photophobia and pain  Respiratory: Negative for apnea and choking  Cardiovascular: Negative for chest pain and palpitations  Gastrointestinal: Negative for abdominal distention and blood in stool  Endocrine: Negative for heat intolerance and polyphagia  Genitourinary: Negative for flank pain and urgency  Musculoskeletal: Negative for neck pain and neck stiffness  Skin: Negative for color change and wound  Allergic/Immunologic: Negative for food allergies and immunocompromised state  Neurological: Negative for seizures and facial asymmetry  Hematological: Negative for adenopathy  Does not bruise/bleed easily  Psychiatric/Behavioral: Negative for self-injury and suicidal ideas           PAST MEDICAL HISTORY:  Past Medical History:   Diagnosis Date    Benign prostatic hyperplasia     Chronic kidney disease     COVID-19 03/2020    Esophageal reflux     Hypertension     Vertigo        PAST SURGICAL HISTORY:  Past Surgical History:   Procedure Laterality Date    APPENDECTOMY  05/21/2015    CYST REMOVAL      Fatty cyst removed from back       SOCIAL HISTORY:  Social History     Substance and Sexual Activity   Alcohol Use Yes    Frequency: Monthly or less Comment: socially     Social History     Substance and Sexual Activity   Drug Use No     Social History     Tobacco Use   Smoking Status Former Smoker   Smokeless Tobacco Never Used   Tobacco Comment    1ppw       FAMILY HISTORY:  Family History   Problem Relation Age of Onset    Prostate cancer Father     Prostate cancer Maternal Grandfather     Stomach cancer Maternal Aunt         malignant tumor of pharynx    Stomach cancer Maternal Uncle         malignant tumor of pharynx    Mental illness Family     Depression Family     Schizophrenia Family     Hypertension Mother     No Known Problems Sister     Dementia Sister        MEDICATIONS:    Current Outpatient Medications:     amLODIPine (NORVASC) 5 mg tablet, TAKE 1 TABLET DAILY, Disp: 90 tablet, Rfl: 3    lidocaine (LIDODERM) 5 %, Apply 1 patch topically daily Remove & Discard patch within 12 hours or as directed by MD, Disp: 50 patch, Rfl: 5    multivitamin (THERAGRAN) TABS, Take 1 tablet by mouth daily, Disp: , Rfl:     tamsulosin (FLOMAX) 0 4 mg, Take 1 capsule (0 4 mg total) by mouth daily with dinner, Disp: 90 capsule, Rfl: 3    Diclofenac Sodium (VOLTAREN) 1 %, APPLY 2 GRAMS TO THE AFFECTED AREA(S) BY TOPICAL ROUTE 4 TIMES PER DAY, Disp: , Rfl:     PHYSICAL EXAM:  Vitals:    06/03/21 0952   BP: 130/70   BP Location: Right arm   Patient Position: Sitting   Pulse: 68   Resp: 16   Temp: (!) 97 1 °F (36 2 °C)   TempSrc: Temporal   Weight: 87 9 kg (193 lb 12 8 oz)   Height: 5' 11" (1 803 m)     Body mass index is 27 03 kg/m²  Physical Exam  Constitutional:       General: He is not in acute distress  Appearance: He is well-developed  HENT:      Head: Normocephalic  Eyes:      General: No scleral icterus  Conjunctiva/sclera: Conjunctivae normal       Pupils: Pupils are equal, round, and reactive to light  Neck:      Musculoskeletal: Neck supple  No neck rigidity  Vascular: No JVD     Cardiovascular:      Rate and Rhythm: Normal rate and regular rhythm  Heart sounds: Normal heart sounds  Pulmonary:      Effort: Pulmonary effort is normal       Breath sounds: Normal breath sounds  No wheezing  Abdominal:      General: Bowel sounds are normal       Palpations: Abdomen is soft  Tenderness: There is no abdominal tenderness  Musculoskeletal: Normal range of motion  General: No swelling  Skin:     General: Skin is warm  Findings: No rash  Neurological:      General: No focal deficit present  Mental Status: He is alert and oriented to person, place, and time     Psychiatric:         Behavior: Behavior normal          LAB RESULTS:  Results for orders placed or performed in visit on 19/16/44   Basic metabolic panel   Result Value Ref Range    Sodium 140 136 - 145 mmol/L    Potassium 3 8 3 5 - 5 3 mmol/L    Chloride 103 100 - 108 mmol/L    CO2 30 21 - 32 mmol/L    ANION GAP 7 4 - 13 mmol/L    BUN 18 5 - 25 mg/dL    Creatinine 1 25 0 60 - 1 30 mg/dL    Glucose, Fasting 93 65 - 99 mg/dL    Calcium 9 0 8 3 - 10 1 mg/dL    eGFR 67 ml/min/1 73sq m   CBC and differential   Result Value Ref Range    WBC 3 59 (L) 4 31 - 10 16 Thousand/uL    RBC 4 75 3 88 - 5 62 Million/uL    Hemoglobin 14 4 12 0 - 17 0 g/dL    Hematocrit 43 3 36 5 - 49 3 %    MCV 91 82 - 98 fL    MCH 30 3 26 8 - 34 3 pg    MCHC 33 3 31 4 - 37 4 g/dL    RDW 13 3 11 6 - 15 1 %    MPV 10 7 8 9 - 12 7 fL    Platelets 602 405 - 335 Thousands/uL    nRBC 0 /100 WBCs    Neutrophils Relative 45 43 - 75 %    Immat GRANS % 0 0 - 2 %    Lymphocytes Relative 44 14 - 44 %    Monocytes Relative 8 4 - 12 %    Eosinophils Relative 2 0 - 6 %    Basophils Relative 1 0 - 1 %    Neutrophils Absolute 1 59 (L) 1 85 - 7 62 Thousands/µL    Immature Grans Absolute 0 01 0 00 - 0 20 Thousand/uL    Lymphocytes Absolute 1 59 0 60 - 4 47 Thousands/µL    Monocytes Absolute 0 29 0 17 - 1 22 Thousand/µL    Eosinophils Absolute 0 08 0 00 - 0 61 Thousand/µL    Basophils Absolute 0 03 0 00 - 0 10 Thousands/µL   Phosphorus   Result Value Ref Range    Phosphorus 2 8 2 3 - 4 1 mg/dL   Protein / creatinine ratio, urine   Result Value Ref Range    Creatinine, Ur 24 3 mg/dL    Protein Urine Random <6 mg/dL    Prot/Creat Ratio, Ur <0 25 (H) 0 00 - 0 10   PTH, intact   Result Value Ref Range    PTH 63 3 18 4 - 80 1 pg/mL   UA (URINE) with reflex to Scope   Result Value Ref Range    Color, UA Colorless     Clarity, UA Clear     Specific Gravity, UA <=1 005 1 003 - 1 030    pH, UA 6 5 4 5, 5 0, 5 5, 6 0, 6 5, 7 0, 7 5, 8 0    Leukocytes, UA Negative Negative    Nitrite, UA Negative Negative    Protein, UA Negative Negative mg/dl    Glucose, UA Negative Negative mg/dl    Ketones, UA Negative Negative mg/dl    Urobilinogen, UA 0 2 0 2, 1 0 E U /dl E U /dl    Bilirubin, UA Negative Negative    Blood, UA Negative Negative   Vitamin D 25 hydroxy   Result Value Ref Range    Vit D, 25-Hydroxy 24 7 (L) 30 0 - 100 0 ng/mL       ASSESSMENT and PLAN:      CKD (chronic kidney disease) stage 3, GFR 30-59 ml/min ([facility])  Lab Results   Component Value Date    EGFR 67 06/01/2021    EGFR 53 01/21/2021    EGFR 56 11/25/2020    CREATININE 1 25 06/01/2021    CREATININE 1 51 (H) 01/21/2021    CREATININE 1 46 (H) 11/25/2020    renal function is quite better with hydration  Advised to continue hydration    Will monitor him    Chronic kidney disease-mineral and bone disorder  Lab Results   Component Value Date    EGFR 67 06/01/2021    EGFR 53 01/21/2021    EGFR 56 11/25/2020    CREATININE 1 25 06/01/2021    CREATININE 1 51 (H) 01/21/2021    CREATININE 1 46 (H) 11/25/2020    PTH and phosphorus along with vitamin-D are within acceptable range and will continue to monitor    Essential hypertension   Very well controlled with present medication    Benign prostatic hyperplasia, presence of lower urinary tract symptoms unspecified   Quite asymptomatic with tamsulosin which will continue    Pain in left knee   Chronic in nature and overall stable  Advised not to take nonsteroidal pain killer       everything discussed at length with the patient  I will see him back in 6 months will get blood and urine test before that visit        Portions of the record may have been created with voice recognition software  Occasional wrong word or "sound a like" substitutions may have occurred due to the inherent limitations of voice recognition software  Read the chart carefully and recognize, using context, where substitutions have occurred  If you have any questions, please contact the dictating provider

## 2021-06-03 NOTE — ASSESSMENT & PLAN NOTE
Lab Results   Component Value Date    EGFR 67 06/01/2021    EGFR 53 01/21/2021    EGFR 56 11/25/2020    CREATININE 1 25 06/01/2021    CREATININE 1 51 (H) 01/21/2021    CREATININE 1 46 (H) 11/25/2020    PTH and phosphorus along with vitamin-D are within acceptable range and will continue to monitor

## 2021-09-13 ENCOUNTER — APPOINTMENT (OUTPATIENT)
Dept: LAB | Facility: HOSPITAL | Age: 71
End: 2021-09-13
Payer: MEDICARE

## 2021-09-13 ENCOUNTER — OFFICE VISIT (OUTPATIENT)
Dept: INTERNAL MEDICINE CLINIC | Facility: CLINIC | Age: 71
End: 2021-09-13
Payer: MEDICARE

## 2021-09-13 VITALS
OXYGEN SATURATION: 98 % | BODY MASS INDEX: 26.52 KG/M2 | DIASTOLIC BLOOD PRESSURE: 82 MMHG | HEART RATE: 68 BPM | TEMPERATURE: 97.3 F | SYSTOLIC BLOOD PRESSURE: 118 MMHG | HEIGHT: 71 IN | RESPIRATION RATE: 16 BRPM | WEIGHT: 189.4 LBS

## 2021-09-13 DIAGNOSIS — I10 ESSENTIAL HYPERTENSION: ICD-10-CM

## 2021-09-13 DIAGNOSIS — N18.31 STAGE 3A CHRONIC KIDNEY DISEASE (HCC): ICD-10-CM

## 2021-09-13 DIAGNOSIS — I10 ESSENTIAL HYPERTENSION: Primary | ICD-10-CM

## 2021-09-13 LAB
ALBUMIN SERPL BCP-MCNC: 3.9 G/DL (ref 3.5–5)
ALP SERPL-CCNC: 60 U/L (ref 46–116)
ALT SERPL W P-5'-P-CCNC: 35 U/L (ref 12–78)
ANION GAP SERPL CALCULATED.3IONS-SCNC: 10 MMOL/L (ref 4–13)
AST SERPL W P-5'-P-CCNC: 22 U/L (ref 5–45)
BASOPHILS # BLD AUTO: 0.02 THOUSANDS/ΜL (ref 0–0.1)
BASOPHILS NFR BLD AUTO: 1 % (ref 0–1)
BILIRUB SERPL-MCNC: 0.47 MG/DL (ref 0.2–1)
BUN SERPL-MCNC: 15 MG/DL (ref 5–25)
CALCIUM SERPL-MCNC: 9 MG/DL (ref 8.3–10.1)
CHLORIDE SERPL-SCNC: 104 MMOL/L (ref 100–108)
CHOLEST SERPL-MCNC: 206 MG/DL (ref 50–200)
CO2 SERPL-SCNC: 27 MMOL/L (ref 21–32)
CREAT SERPL-MCNC: 1.34 MG/DL (ref 0.6–1.3)
EOSINOPHIL # BLD AUTO: 0.07 THOUSAND/ΜL (ref 0–0.61)
EOSINOPHIL NFR BLD AUTO: 2 % (ref 0–6)
ERYTHROCYTE [DISTWIDTH] IN BLOOD BY AUTOMATED COUNT: 13.4 % (ref 11.6–15.1)
GFR SERPL CREATININE-BSD FRML MDRD: 61 ML/MIN/1.73SQ M
GLUCOSE P FAST SERPL-MCNC: 91 MG/DL (ref 65–99)
HCT VFR BLD AUTO: 43.5 % (ref 36.5–49.3)
HDLC SERPL-MCNC: 87 MG/DL
HGB BLD-MCNC: 14.6 G/DL (ref 12–17)
IMM GRANULOCYTES # BLD AUTO: 0.01 THOUSAND/UL (ref 0–0.2)
IMM GRANULOCYTES NFR BLD AUTO: 0 % (ref 0–2)
LDLC SERPL CALC-MCNC: 100 MG/DL (ref 0–100)
LYMPHOCYTES # BLD AUTO: 1.66 THOUSANDS/ΜL (ref 0.6–4.47)
LYMPHOCYTES NFR BLD AUTO: 45 % (ref 14–44)
MCH RBC QN AUTO: 30.1 PG (ref 26.8–34.3)
MCHC RBC AUTO-ENTMCNC: 33.6 G/DL (ref 31.4–37.4)
MCV RBC AUTO: 90 FL (ref 82–98)
MONOCYTES # BLD AUTO: 0.31 THOUSAND/ΜL (ref 0.17–1.22)
MONOCYTES NFR BLD AUTO: 8 % (ref 4–12)
NEUTROPHILS # BLD AUTO: 1.64 THOUSANDS/ΜL (ref 1.85–7.62)
NEUTS SEG NFR BLD AUTO: 44 % (ref 43–75)
NONHDLC SERPL-MCNC: 119 MG/DL
NRBC BLD AUTO-RTO: 0 /100 WBCS
PLATELET # BLD AUTO: 199 THOUSANDS/UL (ref 149–390)
PMV BLD AUTO: 11.2 FL (ref 8.9–12.7)
POTASSIUM SERPL-SCNC: 4.4 MMOL/L (ref 3.5–5.3)
PROT SERPL-MCNC: 7.4 G/DL (ref 6.4–8.2)
RBC # BLD AUTO: 4.85 MILLION/UL (ref 3.88–5.62)
SODIUM SERPL-SCNC: 141 MMOL/L (ref 136–145)
TRIGL SERPL-MCNC: 95 MG/DL
WBC # BLD AUTO: 3.71 THOUSAND/UL (ref 4.31–10.16)

## 2021-09-13 PROCEDURE — 80053 COMPREHEN METABOLIC PANEL: CPT

## 2021-09-13 PROCEDURE — 85025 COMPLETE CBC W/AUTO DIFF WBC: CPT

## 2021-09-13 PROCEDURE — 36415 COLL VENOUS BLD VENIPUNCTURE: CPT

## 2021-09-13 PROCEDURE — 80061 LIPID PANEL: CPT

## 2021-09-13 PROCEDURE — 99214 OFFICE O/P EST MOD 30 MIN: CPT | Performed by: PHYSICIAN ASSISTANT

## 2021-09-13 RX ORDER — TADALAFIL 20 MG/1
TABLET ORAL
COMMUNITY

## 2021-09-13 NOTE — PROGRESS NOTES
Assessment/Plan:   Patient Instructions   Have labs done that are in the EMR  From March  I will discuss results with you when available  No change in current medications  Schedule follow-up in 6 months, that visit to be Medicare wellness with physical   Sooner as needed  Quality Measures:       Return in about 6 months (around 3/13/2022) for Medicare Annual Wellness with Physical, Regular Visit + Medicare Annual Wellness  Diagnoses and all orders for this visit:    Essential hypertension    Stage 3a chronic kidney disease ([facility] [address] )    Other orders  -     tadalafil (CIALIS) 20 MG tablet; tadalafil 20 mg tablet          Subjective:      Patient ID: Manan Valentin is a 70 y o  male  Follow-up     Patient for got to do his labs for this visit as he just moved and misplaced his lab request   He will have them done today and I will discuss results with him  He did note that he had some pomegrate juice and Beat juice this morning about 1 hour ago  Hypertension:  Very good control on his current medication  Eduarda cp, palp, sob, edema, HA, dizziness, diaphoresis, syncope, visual disturbance  Patient goes to the gym and exercises on almost a daily basis  Watches his diet closely  CKD 3:  Follows with Nephrology  Renal functions are now back to normal   These were reviewed with patient  No focal concerns today        ALLERGIES:  Allergies   Allergen Reactions    Penicillin V Anaphylaxis       CURRENT MEDICATIONS:    Current Outpatient Medications:     amLODIPine (NORVASC) 5 mg tablet, TAKE 1 TABLET DAILY, Disp: 90 tablet, Rfl: 3    lidocaine (LIDODERM) 5 %, Apply 1 patch topically daily Remove & Discard patch within 12 hours or as directed by MD, Disp: 50 patch, Rfl: 5    multivitamin (THERAGRAN) TABS, Take 1 tablet by mouth daily, Disp: , Rfl:     tadalafil (CIALIS) 20 MG tablet, tadalafil 20 mg tablet, Disp: , Rfl:     tamsulosin (FLOMAX) 0 4 mg, Take 1 capsule (0 4 mg total) by mouth daily with dinner, Disp: 90 capsule, Rfl: 3    ACTIVE PROBLEM LIST:  Patient Active Problem List   Diagnosis    Benign prostatic hyperplasia, presence of lower urinary tract symptoms unspecified    Erectile dysfunction    Lumbar radiculopathy    Essential hypertension    CKD (chronic kidney disease) stage 3, GFR 30-59 ml/min ([facility])    Vertigo    Cerumen impaction    Chronic kidney disease-mineral and bone disorder    Colon polyps    Pain in left knee       PAST MEDICAL HISTORY:  Past Medical History:   Diagnosis Date    Benign prostatic hyperplasia     Chronic kidney disease     COVID-19 03/2020    Esophageal reflux     Hypertension     Vertigo        PAST SURGICAL HISTORY:  Past Surgical History:   Procedure Laterality Date    APPENDECTOMY  05/21/2015    CYST REMOVAL      Fatty cyst removed from back       FAMILY HISTORY:  Family History   Problem Relation Age of Onset    Prostate cancer Father     Prostate cancer Maternal Grandfather     Stomach cancer Maternal Aunt         malignant tumor of pharynx    Stomach cancer Maternal Uncle         malignant tumor of pharynx    Mental illness Family     Depression Family     Schizophrenia Family     Hypertension Mother     No Known Problems Sister     Dementia Sister        SOCIAL HISTORY:  Social History     Socioeconomic History    Marital status: /Civil Union     Spouse name: Not on file    Number of children: Not on file    Years of education: Not on file    Highest education level: Not on file   Occupational History    Occupation: Retired   Tobacco Use    Smoking status: Former Smoker    Smokeless tobacco: Never Used    Tobacco comment: 1ppw   Vaping Use    Vaping Use: Never used   Substance and Sexual Activity    Alcohol use: Yes     Comment: socially    Drug use: No    Sexual activity: Yes     Partners: Female     Comment: denied history of high risk sexual behavior   Other Topics Concern    Not on file   Social History Narrative    No active advance directive    Always uses seat belt    Exercises occasionally, moderate    Five children    Occasional caffeine consumption      Regular dental care  Brushes teeth twice daily     Social Determinants of Health     Financial Resource Strain:     Difficulty of Paying Living Expenses:    Food Insecurity:     Worried About Running Out of Food in the Last Year:     [address] in the Last Year:    Transportation Needs:     Lack of Transportation (Medical):  Lack of Transportation (Non-Medical):    Physical Activity:     Days of Exercise per Week:     Minutes of Exercise per Session:    Stress:     Feeling of Stress :    Social Connections:     Frequency of Communication with Friends and Family:     Frequency of Social Gatherings with Friends and Family:     Attends Gnosticism Services:     Active Member of Clubs or Organizations:     Attends Club or Organization Meetings:     Marital Status:    Intimate Partner Violence:     Fear of Current or Ex-Partner:     Emotionally Abused:     Physically Abused:     Sexually Abused:        Review of Systems   Constitutional: Negative for activity change, chills, fatigue and fever  HENT: Negative for congestion  Eyes: Negative for discharge  Respiratory: Negative for cough, chest tightness and shortness of breath  Cardiovascular: Negative for chest pain, palpitations and leg swelling  Gastrointestinal: Negative for abdominal pain, constipation, diarrhea, nausea and vomiting  Endocrine: Negative for polydipsia, polyphagia and polyuria  Genitourinary: Negative for difficulty urinating  Musculoskeletal: Negative for arthralgias and myalgias  Skin: Negative for rash  Allergic/Immunologic: Negative for immunocompromised state  Neurological: Negative for dizziness, syncope, weakness, light-headedness and headaches  Hematological: Negative for adenopathy  Does not bruise/bleed easily  Psychiatric/Behavioral: Negative for dysphoric mood, sleep disturbance and suicidal ideas  The patient is not nervous/anxious  Objective:  Vitals:    09/13/21 1036   BP: 118/82   BP Location: Right arm   Patient Position: Sitting   Cuff Size: Adult   Pulse: 68   Resp: 16   Temp: (!) 97 3 °F (36 3 °C)   TempSrc: Tympanic   SpO2: 98%   Weight: 85 9 kg (189 lb 6 4 oz)   Height: 5' 11" (1 803 m)     Body mass index is 26 42 kg/m²  Physical Exam  Vitals and nursing note reviewed  Constitutional:       General: He is not in acute distress  Appearance: He is well-developed  He is not ill-appearing  HENT:      Head: Normocephalic and atraumatic  Eyes:      Extraocular Movements: Extraocular movements intact  Pupils: Pupils are equal, round, and reactive to light  Neck:      Thyroid: No thyromegaly  Vascular: No carotid bruit or JVD  Cardiovascular:      Rate and Rhythm: Normal rate and regular rhythm  Heart sounds: Normal heart sounds  Pulmonary:      Effort: Pulmonary effort is normal  No respiratory distress  Breath sounds: Normal breath sounds  Musculoskeletal:      Cervical back: Neck supple  Right lower leg: No edema  Left lower leg: No edema  Lymphadenopathy:      Cervical: No cervical adenopathy  Skin:     General: Skin is warm and dry  Findings: No rash  Neurological:      General: No focal deficit present  Mental Status: He is alert and oriented to person, place, and time  Mental status is at baseline  Psychiatric:         Mood and Affect: Mood normal          Behavior: Behavior normal            RESULTS:    No results found for this or any previous visit (from the past 1008 hour(s))  This note was created with voice recognition software  Phonic, grammatical and spelling errors may be present within the note as a result

## 2021-10-12 ENCOUNTER — TELEPHONE (OUTPATIENT)
Dept: NEPHROLOGY | Facility: CLINIC | Age: 71
End: 2021-10-12

## 2021-11-26 ENCOUNTER — APPOINTMENT (OUTPATIENT)
Dept: LAB | Facility: HOSPITAL | Age: 71
End: 2021-11-26
Payer: MEDICARE

## 2021-11-26 ENCOUNTER — TELEPHONE (OUTPATIENT)
Dept: NEPHROLOGY | Facility: CLINIC | Age: 71
End: 2021-11-26

## 2021-11-26 DIAGNOSIS — N18.31 STAGE 3A CHRONIC KIDNEY DISEASE (HCC): ICD-10-CM

## 2021-11-26 DIAGNOSIS — N18.9 CHRONIC KIDNEY DISEASE-MINERAL AND BONE DISORDER: ICD-10-CM

## 2021-11-26 DIAGNOSIS — E83.9 CHRONIC KIDNEY DISEASE-MINERAL AND BONE DISORDER: ICD-10-CM

## 2021-11-26 DIAGNOSIS — M89.9 CHRONIC KIDNEY DISEASE-MINERAL AND BONE DISORDER: ICD-10-CM

## 2021-11-26 LAB
25(OH)D3 SERPL-MCNC: 31.3 NG/ML (ref 30–100)
ANION GAP SERPL CALCULATED.3IONS-SCNC: 7 MMOL/L (ref 4–13)
BASOPHILS # BLD AUTO: 0.02 THOUSANDS/ΜL (ref 0–0.1)
BASOPHILS NFR BLD AUTO: 1 % (ref 0–1)
BILIRUB UR QL STRIP: NEGATIVE
BUN SERPL-MCNC: 17 MG/DL (ref 5–25)
CALCIUM SERPL-MCNC: 8.7 MG/DL (ref 8.3–10.1)
CHLORIDE SERPL-SCNC: 103 MMOL/L (ref 100–108)
CLARITY UR: CLEAR
CO2 SERPL-SCNC: 30 MMOL/L (ref 21–32)
COLOR UR: YELLOW
CREAT SERPL-MCNC: 1.55 MG/DL (ref 0.6–1.3)
CREAT UR-MCNC: 170 MG/DL
EOSINOPHIL # BLD AUTO: 0.09 THOUSAND/ΜL (ref 0–0.61)
EOSINOPHIL NFR BLD AUTO: 2 % (ref 0–6)
ERYTHROCYTE [DISTWIDTH] IN BLOOD BY AUTOMATED COUNT: 13.7 % (ref 11.6–15.1)
GFR SERPL CREATININE-BSD FRML MDRD: 51 ML/MIN/1.73SQ M
GLUCOSE P FAST SERPL-MCNC: 94 MG/DL (ref 65–99)
GLUCOSE UR STRIP-MCNC: NEGATIVE MG/DL
HCT VFR BLD AUTO: 44.9 % (ref 36.5–49.3)
HGB BLD-MCNC: 14.8 G/DL (ref 12–17)
HGB UR QL STRIP.AUTO: NEGATIVE
IMM GRANULOCYTES # BLD AUTO: 0 THOUSAND/UL (ref 0–0.2)
IMM GRANULOCYTES NFR BLD AUTO: 0 % (ref 0–2)
KETONES UR STRIP-MCNC: NEGATIVE MG/DL
LEUKOCYTE ESTERASE UR QL STRIP: NEGATIVE
LYMPHOCYTES # BLD AUTO: 1.67 THOUSANDS/ΜL (ref 0.6–4.47)
LYMPHOCYTES NFR BLD AUTO: 44 % (ref 14–44)
MCH RBC QN AUTO: 30.4 PG (ref 26.8–34.3)
MCHC RBC AUTO-ENTMCNC: 33 G/DL (ref 31.4–37.4)
MCV RBC AUTO: 92 FL (ref 82–98)
MONOCYTES # BLD AUTO: 0.3 THOUSAND/ΜL (ref 0.17–1.22)
MONOCYTES NFR BLD AUTO: 8 % (ref 4–12)
NEUTROPHILS # BLD AUTO: 1.69 THOUSANDS/ΜL (ref 1.85–7.62)
NEUTS SEG NFR BLD AUTO: 45 % (ref 43–75)
NITRITE UR QL STRIP: NEGATIVE
NRBC BLD AUTO-RTO: 0 /100 WBCS
PH UR STRIP.AUTO: 6 [PH]
PHOSPHATE SERPL-MCNC: 2.9 MG/DL (ref 2.3–4.1)
PLATELET # BLD AUTO: 176 THOUSANDS/UL (ref 149–390)
PMV BLD AUTO: 10.8 FL (ref 8.9–12.7)
POTASSIUM SERPL-SCNC: 3.9 MMOL/L (ref 3.5–5.3)
PROT UR STRIP-MCNC: NEGATIVE MG/DL
PROT UR-MCNC: 8 MG/DL
PROT/CREAT UR: 0.05 MG/G{CREAT} (ref 0–0.1)
PTH-INTACT SERPL-MCNC: 45.1 PG/ML (ref 18.4–80.1)
RBC # BLD AUTO: 4.87 MILLION/UL (ref 3.88–5.62)
SODIUM SERPL-SCNC: 140 MMOL/L (ref 136–145)
SP GR UR STRIP.AUTO: 1.02 (ref 1–1.03)
UROBILINOGEN UR QL STRIP.AUTO: 0.2 E.U./DL
WBC # BLD AUTO: 3.77 THOUSAND/UL (ref 4.31–10.16)

## 2021-11-26 PROCEDURE — 82306 VITAMIN D 25 HYDROXY: CPT

## 2021-11-26 PROCEDURE — 84100 ASSAY OF PHOSPHORUS: CPT

## 2021-11-26 PROCEDURE — 84156 ASSAY OF PROTEIN URINE: CPT

## 2021-11-26 PROCEDURE — 81003 URINALYSIS AUTO W/O SCOPE: CPT

## 2021-11-26 PROCEDURE — 85025 COMPLETE CBC W/AUTO DIFF WBC: CPT

## 2021-11-26 PROCEDURE — 82570 ASSAY OF URINE CREATININE: CPT

## 2021-11-26 PROCEDURE — 80048 BASIC METABOLIC PNL TOTAL CA: CPT

## 2021-11-26 PROCEDURE — 83970 ASSAY OF PARATHORMONE: CPT

## 2021-11-26 PROCEDURE — 36415 COLL VENOUS BLD VENIPUNCTURE: CPT

## 2021-11-29 ENCOUNTER — TELEPHONE (OUTPATIENT)
Dept: NEPHROLOGY | Facility: CLINIC | Age: 71
End: 2021-11-29

## 2021-11-29 ENCOUNTER — TELEPHONE (OUTPATIENT)
Dept: OTHER | Facility: OTHER | Age: 71
End: 2021-11-29

## 2021-11-29 ENCOUNTER — OFFICE VISIT (OUTPATIENT)
Dept: NEPHROLOGY | Facility: CLINIC | Age: 71
End: 2021-11-29
Payer: MEDICARE

## 2021-11-29 VITALS
DIASTOLIC BLOOD PRESSURE: 70 MMHG | SYSTOLIC BLOOD PRESSURE: 140 MMHG | BODY MASS INDEX: 25.55 KG/M2 | HEIGHT: 72 IN | HEART RATE: 62 BPM | WEIGHT: 188.6 LBS | TEMPERATURE: 96.6 F | RESPIRATION RATE: 16 BRPM

## 2021-11-29 DIAGNOSIS — M89.9 CHRONIC KIDNEY DISEASE-MINERAL AND BONE DISORDER: ICD-10-CM

## 2021-11-29 DIAGNOSIS — I10 ESSENTIAL HYPERTENSION: ICD-10-CM

## 2021-11-29 DIAGNOSIS — M54.16 LUMBAR RADICULOPATHY: ICD-10-CM

## 2021-11-29 DIAGNOSIS — N18.9 CHRONIC KIDNEY DISEASE-MINERAL AND BONE DISORDER: ICD-10-CM

## 2021-11-29 DIAGNOSIS — E83.9 CHRONIC KIDNEY DISEASE-MINERAL AND BONE DISORDER: ICD-10-CM

## 2021-11-29 DIAGNOSIS — N18.31 STAGE 3A CHRONIC KIDNEY DISEASE (HCC): Primary | ICD-10-CM

## 2021-11-29 PROCEDURE — 99214 OFFICE O/P EST MOD 30 MIN: CPT | Performed by: INTERNAL MEDICINE

## 2021-11-30 ENCOUNTER — HOSPITAL ENCOUNTER (OUTPATIENT)
Dept: CT IMAGING | Facility: HOSPITAL | Age: 71
Discharge: HOME | End: 2021-11-30
Attending: INTERNAL MEDICINE
Payer: MEDICARE

## 2021-11-30 ENCOUNTER — OFFICE VISIT (OUTPATIENT)
Dept: INTERNAL MEDICINE CLINIC | Facility: CLINIC | Age: 71
End: 2021-11-30
Payer: MEDICARE

## 2021-11-30 VITALS
OXYGEN SATURATION: 99 % | BODY MASS INDEX: 25.6 KG/M2 | HEIGHT: 72 IN | TEMPERATURE: 98.3 F | SYSTOLIC BLOOD PRESSURE: 142 MMHG | WEIGHT: 189 LBS | DIASTOLIC BLOOD PRESSURE: 78 MMHG | HEART RATE: 58 BPM

## 2021-11-30 DIAGNOSIS — R10.32 ABDOMINAL PAIN, LLQ: Primary | ICD-10-CM

## 2021-11-30 DIAGNOSIS — N18.31 STAGE 3A CHRONIC KIDNEY DISEASE (HCC): ICD-10-CM

## 2021-11-30 DIAGNOSIS — R10.32 ABDOMINAL PAIN, LLQ: ICD-10-CM

## 2021-11-30 PROCEDURE — G1004 CDSM NDSC: HCPCS

## 2021-11-30 PROCEDURE — 99214 OFFICE O/P EST MOD 30 MIN: CPT | Performed by: INTERNAL MEDICINE

## 2021-11-30 PROCEDURE — 74176 CT ABD & PELVIS W/O CONTRAST: CPT

## 2021-11-30 RX ORDER — CIPROFLOXACIN 500 MG/1
500 TABLET, FILM COATED ORAL EVERY 12 HOURS SCHEDULED
Qty: 20 TABLET | Refills: 0 | Status: SHIPPED | OUTPATIENT
Start: 2021-11-30 | End: 2021-11-30

## 2021-11-30 RX ORDER — METRONIDAZOLE 500 MG/1
500 TABLET ORAL EVERY 8 HOURS SCHEDULED
Qty: 30 TABLET | Refills: 0 | Status: SHIPPED | OUTPATIENT
Start: 2021-11-30 | End: 2021-11-30 | Stop reason: CLARIF

## 2021-12-01 ENCOUNTER — TELEPHONE (OUTPATIENT)
Dept: INTERNAL MEDICINE CLINIC | Facility: CLINIC | Age: 71
End: 2021-12-01

## 2021-12-08 ENCOUNTER — OFFICE VISIT (OUTPATIENT)
Dept: INTERNAL MEDICINE CLINIC | Facility: CLINIC | Age: 71
End: 2021-12-08
Payer: MEDICARE

## 2021-12-08 VITALS
WEIGHT: 190 LBS | RESPIRATION RATE: 16 BRPM | TEMPERATURE: 98.6 F | OXYGEN SATURATION: 97 % | HEIGHT: 72 IN | SYSTOLIC BLOOD PRESSURE: 140 MMHG | HEART RATE: 68 BPM | BODY MASS INDEX: 25.73 KG/M2 | DIASTOLIC BLOOD PRESSURE: 70 MMHG

## 2021-12-08 DIAGNOSIS — K57.32 DIVERTICULITIS OF LARGE INTESTINE WITHOUT PERFORATION OR ABSCESS WITHOUT BLEEDING: Primary | ICD-10-CM

## 2021-12-08 PROCEDURE — 99213 OFFICE O/P EST LOW 20 MIN: CPT | Performed by: INTERNAL MEDICINE

## 2021-12-17 DIAGNOSIS — I10 ESSENTIAL HYPERTENSION: ICD-10-CM

## 2021-12-17 RX ORDER — AMLODIPINE BESYLATE 5 MG/1
TABLET ORAL
Qty: 90 TABLET | Refills: 3 | Status: SHIPPED | OUTPATIENT
Start: 2021-12-17 | End: 2022-02-04 | Stop reason: SDUPTHER

## 2022-01-14 ENCOUNTER — NURSE TRIAGE (OUTPATIENT)
Dept: OTHER | Facility: OTHER | Age: 72
End: 2022-01-14

## 2022-01-14 DIAGNOSIS — Z20.828 SARS-ASSOCIATED CORONAVIRUS EXPOSURE: Primary | ICD-10-CM

## 2022-01-14 NOTE — TELEPHONE ENCOUNTER
Reason for Disposition   [1] COVID-19 infection suspected by caller or triager AND [2] mild symptoms (cough, fever, or others) AND [3] has not gotten tested yet    Answer Assessment - Initial Assessment Questions  1  COVID-19 DIAGNOSIS: "Who made your COVID-19 diagnosis?" "Was it confirmed by a positive lab test?" If not diagnosed by a HCP, ask "Are there lots of cases (community spread) where you live?" Note: See public health department website, if unsure  n/a  2  COVID-19 EXPOSURE: "Was there any known exposure to COVID before the symptoms began?" [facility] Definition of close contact: within 6 feet (2 meters) for a total of 15 minutes or more over a 24-hour period  Unsure   3  ONSET: "When did the COVID-19 symptoms start?"       Yesterday   4  WORST SYMPTOM: "What is your worst symptom?" (e g , cough, fever, shortness of breath, muscle aches)      Headache  5  COUGH: "Do you have a cough?" If Yes, ask: "How bad is the cough?"        mp  6  FEVER: "Do you have a fever?" If Yes, ask: "What is your temperature, how was it measured, and when did it start?"      no  7  RESPIRATORY STATUS: "Describe your breathing?" (e g , shortness of breath, wheezing, unable to speak)       normal  8  BETTER-SAME-WORSE: "Are you getting better, staying the same or getting worse compared to yesterday?"  If getting worse, ask, "In what way?"      worse  9  HIGH RISK DISEASE: "Do you have any chronic medical problems?" (e g , asthma, heart or lung disease, weak immune system, obesity, etc )      High blood pressure   10  VACCINE: "Have you gotten the COVID-19 vaccine?" If Yes ask: "Which one, how many shots, when did you get it?"        Yes 2 pfizer    11  PREGNANCY: "Is there any chance you are pregnant?" "When was your last menstrual period?"        no  12   OTHER SYMPTOMS: "Do you have any other symptoms?"  (e g , chills, fatigue, headache, loss of smell or taste, muscle pain, sore throat; new loss of smell or taste especially support the diagnosis of COVID-19)        Runny nose    Protocols used: CORONAVIRUS (COVID-19) DIAGNOSED OR SUSPECTED-ADULT-AH

## 2022-01-14 NOTE — TELEPHONE ENCOUNTER
Regarding: Covid [facility] Symptomatic Headache  ----- Message from [facility] sent at 1/14/2022 11:55 AM EST -----  "I have a headache and a runny nose  "

## 2022-01-15 PROCEDURE — U0005 INFEC AGEN DETEC AMPLI PROBE: HCPCS | Performed by: INTERNAL MEDICINE

## 2022-01-15 PROCEDURE — U0003 INFECTIOUS AGENT DETECTION BY NUCLEIC ACID (DNA OR RNA); SEVERE ACUTE RESPIRATORY SYNDROME CORONAVIRUS 2 (SARS-COV-2) (CORONAVIRUS DISEASE [COVID-19]), AMPLIFIED PROBE TECHNIQUE, MAKING USE OF HIGH THROUGHPUT TECHNOLOGIES AS DESCRIBED BY CMS-2020-01-R: HCPCS | Performed by: INTERNAL MEDICINE

## 2022-01-18 ENCOUNTER — TELEMEDICINE (OUTPATIENT)
Dept: INTERNAL MEDICINE CLINIC | Facility: CLINIC | Age: 72
End: 2022-01-18
Payer: MEDICARE

## 2022-01-18 VITALS — BODY MASS INDEX: 25.73 KG/M2 | HEIGHT: 72 IN | WEIGHT: 190 LBS

## 2022-01-18 DIAGNOSIS — U07.1 COVID: Primary | ICD-10-CM

## 2022-01-18 PROCEDURE — 99213 OFFICE O/P EST LOW 20 MIN: CPT | Performed by: INTERNAL MEDICINE

## 2022-01-18 RX ORDER — OMEPRAZOLE 20 MG/1
CAPSULE, DELAYED RELEASE ORAL AS NEEDED
COMMUNITY
Start: 2021-12-16

## 2022-01-18 NOTE — PROGRESS NOTES
COVID-19 Outpatient Progress Note    Assessment/Plan:    Problem List Items Addressed This Visit     None      Visit Diagnoses     COVID    -  Primary         Disposition:     Patient has COVID-19 infection  Based off CDC guidelines, they were recommended to isolate for 5 days from the date of the positive test  If they remain asymptomatic, isolation may be ended followed by 5 days of wearing a mask when around othes to minimize risk of infecting others  If they have a fever, continue to stay home until fever resolves for at least 24 hours  I recommended continued isolation until at least 24 hours have passed since recovery defined as resolution of fever without the use of fever-reducing medications AND improvement in COVID symptoms AND 10 days have passed since onset of symptoms (or 10 days have passed since date of first positive viral diagnostic test for asymptomatic patients)  I have spent 10 minutes directly with the patient  Greater than 50% of this time was spent in counseling/coordination of care regarding: instructions for management  Encounter provider Jl Jaimes MD    Provider located at [address] [address] [facility] [address]  [address] [place] [id number] [phone]    Recent Visits  No visits were found meeting these conditions  Showing recent visits within past 7 days and meeting all other requirements  Today's Visits  Date Type Provider Dept   01/18/22 Radha Maurer MD [facility] [address] today's visits and meeting all other requirements  Future Appointments  No visits were found meeting these conditions  Showing future appointments within next 150 days and meeting all other requirements     This virtual check-in was done via [address] and patient was informed that this is a secure, HIPAA-compliant platform  He agrees to proceed      Patient agrees to participate in a virtual check in via telephone or video visit instead of presenting to the office to address urgent/immediate medical needs  Patient is aware this is a billable service  After connecting through [facility], the patient was identified by name and date of birth  Benoit Salter was informed that this was a telemedicine visit and that the exam was being conducted confidentially over secure lines  My office door was closed  No one else was in the room  Benoit Salter acknowledged consent and understanding of privacy and security of the telemedicine visit  I informed the patient that I have reviewed his record in Epic and presented the opportunity for him to ask any questions regarding the visit today  The patient agreed to participate  Verification of patient location:  Patient is located in the following state in which I hold an active license: [place]    Subjective:   Benoit Salter is a 70 y o  male who has been screened for COVID-19  Patient's symptoms include rhinorrhea, diarrhea and headache      - Date of symptom onset: 1/14/2022  - Date of positive COVID-19 test: 1/15/2022  Type of test: PCR  COVID-19 vaccination status: Fully vaccinated (primary series)    Yamilka Rivas has been staying home and has isolated themselves in his home  He is taking care to not share personal items and is cleaning all surfaces that are touched often, like counters, tabletops, and doorknobs using household cleaning sprays or wipes  He is wearing a mask when he leaves his room       Lab Results   Component Value Date    SARSCOV2 Positive (A) 01/15/2022    SARSCOV2 Not Detected 01/06/2021     Past Medical History:   Diagnosis Date    Benign prostatic hyperplasia     Chronic kidney disease     COVID-19 03/2020    Esophageal reflux     Hypertension     Vertigo      Past Surgical History:   Procedure Laterality Date    APPENDECTOMY  05/21/2015    CYST REMOVAL      Fatty cyst removed from back     Current Outpatient Medications   Medication Sig Dispense Refill    amLODIPine (NORVASC) 5 mg tablet TAKE 1 TABLET DAILY 90 tablet 3    lidocaine (LIDODERM) 5 % Apply 1 patch topically daily Remove & Discard patch within 12 hours or as directed by MD (Patient taking differently: Apply 1 patch topically as needed Remove & Discard patch within 12 hours or as directed by MD ) 50 patch 5    multivitamin (THERAGRAN) TABS Take 1 tablet by mouth daily      omeprazole (PriLOSEC) 20 mg delayed release capsule       tadalafil (CIALIS) 20 MG tablet tadalafil 20 mg tablet      tamsulosin (FLOMAX) 0 4 mg Take 1 capsule (0 4 mg total) by mouth daily with dinner 90 capsule 3     No current facility-administered medications for this visit  Allergies   Allergen Reactions    Penicillin V Anaphylaxis       Review of Systems   HENT: Positive for rhinorrhea  Gastrointestinal: Positive for diarrhea  Neurological: Positive for headaches  All other systems reviewed and are negative  Objective:    Vitals:    01/18/22 1332   Weight: 86 2 kg (190 lb)   Height: 5' 11 5" (1 816 m)       Physical Exam  Vitals reviewed  Pulmonary:      Effort: Pulmonary effort is normal    Neurological:      Mental Status: He is alert and oriented to person, place, and time  Psychiatric:         Mood and Affect: Mood normal          VIRTUAL VISIT [address]  verbally agrees to participate in [employer]  Pt is aware that [employer] could be limited without vital signs or the ability to perform a full hands-on physical exam  Tomas Merida understands he or the provider may request at any time to terminate the video visit and request the patient to seek care or treatment in person

## 2022-01-22 DIAGNOSIS — Z11.59 SCREENING FOR VIRAL DISEASE: Primary | ICD-10-CM

## 2022-01-22 PROCEDURE — U0003 INFECTIOUS AGENT DETECTION BY NUCLEIC ACID (DNA OR RNA); SEVERE ACUTE RESPIRATORY SYNDROME CORONAVIRUS 2 (SARS-COV-2) (CORONAVIRUS DISEASE [COVID-19]), AMPLIFIED PROBE TECHNIQUE, MAKING USE OF HIGH THROUGHPUT TECHNOLOGIES AS DESCRIBED BY CMS-2020-01-R: HCPCS | Performed by: INTERNAL MEDICINE

## 2022-01-22 PROCEDURE — U0005 INFEC AGEN DETEC AMPLI PROBE: HCPCS | Performed by: INTERNAL MEDICINE

## 2022-01-25 ENCOUNTER — APPOINTMENT (OUTPATIENT)
Dept: NON INVASIVE DIAGNOSTICS | Facility: HOSPITAL | Age: 72
End: 2022-01-25
Payer: MEDICARE

## 2022-01-25 ENCOUNTER — HOSPITAL ENCOUNTER (OUTPATIENT)
Facility: HOSPITAL | Age: 72
Setting detail: OBSERVATION
Discharge: HOME | End: 2022-01-25
Attending: EMERGENCY MEDICINE | Admitting: INTERNAL MEDICINE
Payer: MEDICARE

## 2022-01-25 ENCOUNTER — APPOINTMENT (OUTPATIENT)
Dept: MRI IMAGING | Facility: HOSPITAL | Age: 72
End: 2022-01-25
Payer: MEDICARE

## 2022-01-25 ENCOUNTER — APPOINTMENT (EMERGENCY)
Dept: CT IMAGING | Facility: HOSPITAL | Age: 72
End: 2022-01-25
Payer: MEDICARE

## 2022-01-25 VITALS
OXYGEN SATURATION: 96 % | HEIGHT: 71 IN | WEIGHT: 193 LBS | BODY MASS INDEX: 27.02 KG/M2 | SYSTOLIC BLOOD PRESSURE: 171 MMHG | DIASTOLIC BLOOD PRESSURE: 87 MMHG | RESPIRATION RATE: 20 BRPM | HEART RATE: 67 BPM | TEMPERATURE: 97.9 F

## 2022-01-25 DIAGNOSIS — R29.90 STROKE-LIKE SYMPTOMS: ICD-10-CM

## 2022-01-25 DIAGNOSIS — R20.0 HAND NUMBNESS: Primary | ICD-10-CM

## 2022-01-25 DIAGNOSIS — R20.8 NUMBNESS OF LEFT FOOT: ICD-10-CM

## 2022-01-25 PROBLEM — I16.0 HYPERTENSIVE URGENCY: Status: ACTIVE | Noted: 2019-03-26

## 2022-01-25 LAB
2HR DELTA HS TROPONIN: 1 NG/L
4HR DELTA HS TROPONIN: 0 NG/L
ANION GAP SERPL CALCULATED.3IONS-SCNC: 7 MMOL/L (ref 4–13)
AORTIC ROOT: 3.3 CM
APICAL FOUR CHAMBER EJECTION FRACTION: 70 %
APTT PPP: 29 SECONDS (ref 23–37)
ASCENDING AORTA: 3.6 CM (ref 2.08–3.12)
ATRIAL RATE: 73 BPM
BUN SERPL-MCNC: 18 MG/DL (ref 5–25)
CALCIUM SERPL-MCNC: 8.7 MG/DL (ref 8.3–10.1)
CARDIAC TROPONIN I PNL SERPL HS: 3 NG/L
CARDIAC TROPONIN I PNL SERPL HS: 3 NG/L
CARDIAC TROPONIN I PNL SERPL HS: 4 NG/L
CHLORIDE SERPL-SCNC: 104 MMOL/L (ref 100–108)
CO2 SERPL-SCNC: 30 MMOL/L (ref 21–32)
CREAT SERPL-MCNC: 1.46 MG/DL (ref 0.6–1.3)
ERYTHROCYTE [DISTWIDTH] IN BLOOD BY AUTOMATED COUNT: 12.8 % (ref 11.6–15.1)
FRACTIONAL SHORTENING: 40 % (ref 28–44)
GFR SERPL CREATININE-BSD FRML MDRD: 47 ML/MIN/1.73SQ M
GLUCOSE SERPL-MCNC: 116 MG/DL (ref 65–140)
HCT VFR BLD AUTO: 42.6 % (ref 36.5–49.3)
HGB BLD-MCNC: 14.3 G/DL (ref 12–17)
INR PPP: 1.05 (ref 0.84–1.19)
INTERVENTRICULAR SEPTUM IN DIASTOLE (PARASTERNAL SHORT AXIS VIEW): 0.8 CM (ref 0.54–1.02)
LEFT ATRIUM AREA SYSTOLE SINGLE PLANE A4C: 14.1 CM2
LEFT ATRIUM SIZE: 2.6 CM
LEFT INTERNAL DIMENSION IN SYSTOLE: 2.9 CM (ref 2.1–4)
LEFT VENTRICULAR INTERNAL DIMENSION IN DIASTOLE: 4.8 CM (ref 5.42–8.07)
LEFT VENTRICULAR POSTERIOR WALL IN END DIASTOLE: 0.8 CM (ref 0.53–1.01)
LEFT VENTRICULAR STROKE VOLUME: 77 ML
MCH RBC QN AUTO: 30.2 PG (ref 26.8–34.3)
MCHC RBC AUTO-ENTMCNC: 33.6 G/DL (ref 31.4–37.4)
MCV RBC AUTO: 90 FL (ref 82–98)
P AXIS: 70 DEGREES
PLATELET # BLD AUTO: 181 THOUSANDS/UL (ref 149–390)
PMV BLD AUTO: 11 FL (ref 8.9–12.7)
POTASSIUM SERPL-SCNC: 3.6 MMOL/L (ref 3.5–5.3)
PR INTERVAL: 156 MS
PROTHROMBIN TIME: 13.3 SECONDS (ref 11.6–14.5)
QRS AXIS: 10 DEGREES
QRSD INTERVAL: 92 MS
QT INTERVAL: 396 MS
QTC INTERVAL: 436 MS
RBC # BLD AUTO: 4.74 MILLION/UL (ref 3.88–5.62)
RIGHT ATRIUM AREA SYSTOLE A4C: 16.1 CM2
RIGHT VENTRICLE ID DIMENSION: 4.2 CM
SL CV LV EF: 55
SL CV PED ECHO LEFT VENTRICLE DIASTOLIC VOLUME (MOD BIPLANE) 2D: 110 ML
SL CV PED ECHO LEFT VENTRICLE SYSTOLIC VOLUME (MOD BIPLANE) 2D: 33 ML
SODIUM SERPL-SCNC: 141 MMOL/L (ref 136–145)
T WAVE AXIS: 39 DEGREES
TR MAX PG: 16 MMHG
TRICUSPID VALVE PEAK REGURGITATION VELOCITY: 2.03 M/S
VENTRICULAR RATE: 73 BPM
WBC # BLD AUTO: 3.39 THOUSAND/UL (ref 4.31–10.16)
Z-SCORE OF ASCENDING AORTA: 3.82
Z-SCORE OF INTERVENTRICULAR SEPTUM IN END DIASTOLE: 0.15
Z-SCORE OF LEFT VENTRICULAR DIMENSION IN END SYSTOLE: -3.2
Z-SCORE OF LEFT VENTRICULAR POSTERIOR WALL IN END DIASTOLE: 0.26

## 2022-01-25 PROCEDURE — 99285 EMERGENCY DEPT VISIT HI MDM: CPT | Performed by: PHYSICIAN ASSISTANT

## 2022-01-25 PROCEDURE — 93308 TTE F-UP OR LMTD: CPT

## 2022-01-25 PROCEDURE — 99285 EMERGENCY DEPT VISIT HI MDM: CPT | Performed by: EMERGENCY MEDICINE

## 2022-01-25 PROCEDURE — 93325 DOPPLER ECHO COLOR FLOW MAPG: CPT

## 2022-01-25 PROCEDURE — 84484 ASSAY OF TROPONIN QUANT: CPT

## 2022-01-25 PROCEDURE — 85730 THROMBOPLASTIN TIME PARTIAL: CPT | Performed by: EMERGENCY MEDICINE

## 2022-01-25 PROCEDURE — 70551 MRI BRAIN STEM W/O DYE: CPT

## 2022-01-25 PROCEDURE — 70496 CT ANGIOGRAPHY HEAD: CPT

## 2022-01-25 PROCEDURE — 93005 ELECTROCARDIOGRAM TRACING: CPT

## 2022-01-25 PROCEDURE — 93321 DOPPLER ECHO F-UP/LMTD STD: CPT | Performed by: INTERNAL MEDICINE

## 2022-01-25 PROCEDURE — 97162 PT EVAL MOD COMPLEX 30 MIN: CPT

## 2022-01-25 PROCEDURE — 93308 TTE F-UP OR LMTD: CPT | Performed by: INTERNAL MEDICINE

## 2022-01-25 PROCEDURE — G1004 CDSM NDSC: HCPCS

## 2022-01-25 PROCEDURE — 70498 CT ANGIOGRAPHY NECK: CPT

## 2022-01-25 PROCEDURE — 84484 ASSAY OF TROPONIN QUANT: CPT | Performed by: EMERGENCY MEDICINE

## 2022-01-25 PROCEDURE — 99285 EMERGENCY DEPT VISIT HI MDM: CPT

## 2022-01-25 PROCEDURE — 36415 COLL VENOUS BLD VENIPUNCTURE: CPT | Performed by: EMERGENCY MEDICINE

## 2022-01-25 PROCEDURE — 97165 OT EVAL LOW COMPLEX 30 MIN: CPT

## 2022-01-25 PROCEDURE — 85610 PROTHROMBIN TIME: CPT | Performed by: EMERGENCY MEDICINE

## 2022-01-25 PROCEDURE — 93321 DOPPLER ECHO F-UP/LMTD STD: CPT

## 2022-01-25 PROCEDURE — 93010 ELECTROCARDIOGRAM REPORT: CPT | Performed by: INTERNAL MEDICINE

## 2022-01-25 PROCEDURE — 93325 DOPPLER ECHO COLOR FLOW MAPG: CPT | Performed by: INTERNAL MEDICINE

## 2022-01-25 PROCEDURE — 85027 COMPLETE CBC AUTOMATED: CPT | Performed by: EMERGENCY MEDICINE

## 2022-01-25 PROCEDURE — 80048 BASIC METABOLIC PNL TOTAL CA: CPT | Performed by: EMERGENCY MEDICINE

## 2022-01-25 PROCEDURE — 99217 PR OBSERVATION CARE DISCHARGE MANAGEMENT: CPT | Performed by: STUDENT IN AN ORGANIZED HEALTH CARE EDUCATION/TRAINING PROGRAM

## 2022-01-25 RX ORDER — ASPIRIN 81 MG/1
81 TABLET, CHEWABLE ORAL DAILY
Qty: 30 TABLET | Refills: 0 | Status: SHIPPED | OUTPATIENT
Start: 2022-01-26 | End: 2022-08-08

## 2022-01-25 RX ORDER — LORAZEPAM 2 MG/ML
0.5 INJECTION INTRAMUSCULAR ONCE AS NEEDED
Status: COMPLETED | OUTPATIENT
Start: 2022-01-25 | End: 2022-01-25

## 2022-01-25 RX ORDER — ATORVASTATIN CALCIUM 40 MG/1
40 TABLET, FILM COATED ORAL EVERY EVENING
Status: DISCONTINUED | OUTPATIENT
Start: 2022-01-25 | End: 2022-01-25 | Stop reason: HOSPADM

## 2022-01-25 RX ORDER — PANTOPRAZOLE SODIUM 40 MG/1
40 TABLET, DELAYED RELEASE ORAL
Status: DISCONTINUED | OUTPATIENT
Start: 2022-01-25 | End: 2022-01-25 | Stop reason: HOSPADM

## 2022-01-25 RX ORDER — HEPARIN SODIUM 5000 [USP'U]/ML
5000 INJECTION, SOLUTION INTRAVENOUS; SUBCUTANEOUS EVERY 8 HOURS SCHEDULED
Status: DISCONTINUED | OUTPATIENT
Start: 2022-01-25 | End: 2022-01-25 | Stop reason: HOSPADM

## 2022-01-25 RX ORDER — ASPIRIN 81 MG/1
81 TABLET, CHEWABLE ORAL DAILY
Status: DISCONTINUED | OUTPATIENT
Start: 2022-01-25 | End: 2022-01-25 | Stop reason: HOSPADM

## 2022-01-25 RX ORDER — ATORVASTATIN CALCIUM 40 MG/1
40 TABLET, FILM COATED ORAL EVERY EVENING
Qty: 30 TABLET | Refills: 0 | Status: SHIPPED | OUTPATIENT
Start: 2022-01-25 | End: 2022-02-04 | Stop reason: SDUPTHER

## 2022-01-25 RX ORDER — TAMSULOSIN HYDROCHLORIDE 0.4 MG/1
0.4 CAPSULE ORAL
Status: DISCONTINUED | OUTPATIENT
Start: 2022-01-25 | End: 2022-01-25 | Stop reason: HOSPADM

## 2022-01-25 RX ADMIN — ASPIRIN 81 MG: 81 TABLET, CHEWABLE ORAL at 09:06

## 2022-01-25 RX ADMIN — IOHEXOL 100 ML: 350 INJECTION, SOLUTION INTRAVENOUS at 05:18

## 2022-01-25 RX ADMIN — LORAZEPAM 0.5 MG: 2 INJECTION INTRAMUSCULAR; INTRAVENOUS at 09:06

## 2022-01-25 NOTE — CONSULTS
Consultation - Neurology   Delroy Summers 70 y o  male MRN: [id number]  Unit/Bed#: FT 02 Encounter: [id number]      Assessment/Plan     * Stroke-like symptoms  Assessment & Plan  70year old male with BPH, GERD, HTN, CKD3, recent Covid-19 infection admitted with complaints of numbness of L middle finger and bottom of L foot  Stroke alert activated and patient not an IV tPA candidate due to being outside of appropriate time window and not and IR intervention candidate due to no target identified on CTA head and neck  Patient was recommended to be given loading dose of ASA and Plavix and admitted to stroke pathway  MRI brain without contrast completed this morning and is normal  Suspect etiology of symptoms likely hypertensive urgency but cannot entirely exclude TIA given underlying risk factors and prior TIA  Plan:   - Continue on acute ischemic stroke pathway  - MRI brain without contrast completed and is normal   - Echocardiogram pending    - Hemoglobin A1c and fasting lipid panel pending    - Monitor on telemetry  - Continue on ASA 81 mg QD  Would recommend continue on this dose given concern for possible TIA  - Continue on atorvastatin 40 mg QPM    - Goal normotension   - Goal normothermia and euglycemia    - PT/OT  - Stroke education    - Monitor exam and notify with changes  Hypertensive urgency  Assessment & Plan  - Goal normotension   - Management as per medicine team     CKD (chronic kidney disease) stage 3, GFR 30-59 ml/min [facility])  Assessment & Plan  Lab Results   Component Value Date    EGFR 47 01/25/2022    EGFR 51 11/26/2021    EGFR 61 09/13/2021    CREATININE 1 46 (H) 01/25/2022    CREATININE 1 55 (H) 11/26/2021    CREATININE 1 34 (H) 09/13/2021     - Avoid nephrotoxins    - Trend BMP  Delroy Summers will need follow up in in 6 weeks with neurovascular attending or advance practitioner  He will not require outpatient neurological testing      History of Present Illness     Reason for Consult / Principal Problem: Hand numbness  HPI: Yahir Warren is a 70 y o male with BPH, GERD, HTN, CKD, recent Covid-19 infection who presented to the hospital with L middle finger and L foot numbness  To review, patient with HTN as well as CKD3 and follows with PCP and nephrology teams as an outpatient  He was seen by PCP in September 2021 and noted to have good control of BP  He was noted to have back pain and was seen by nephrology team in November 2021 for this and pain was not felt to be secondary to any kidney issues but was more felt to possibly be secondary to possible lumbar radiculopathy and he was instructed to see PCP regarding this  He saw PCP and symptoms were felt to be secondary to diverticulitis as patient had prior history of this and he was started empirically on antibiotics and underwent CT abdomen and pelvis without contrast which demonstrated no bowel obstruction or acute inflammatory changes, left renal cyst, and right > left fat-containing inguinal hernias    Symptoms were noted to resolve  Patient was noted to develop HA, diarrhea and rhinorrhea on January 14, 2022 and he was tested for Covid-19 which was positive  He presented to the hospital on January 25, 2022 after he woke up with numbness of the bottom of his left foot and also his L middle finger  Stroke alert was activated and patient underwent CTH which demonstrated no evidence of acute intracranial process and CTA head and neck with and without contrast which demonstrated trace atherosclerotic disease left cavernous segment of the internal carotid arteries as well as minimal atherosclerotic disease left carotid bifurcation, no arterial occlusion or significant stenosis  Neurology team responded immediately via telephone  Patient was not an IV tPA candidate due to being outside of IV tPA window and not an IR candidate as no large vessel occlusion noted   Possible stat MRI for wakeup stroke protocol was considered but on return from CT, patient's symptoms were noted to have improved significantly and only "slightly" present by that time  This morning, patient notes that symptoms have completely resolved  He notes that last night before he went to bed, he felt "off" and took his BP and it was elevated with SBP in the 150s-160s  He notes that when he woke up this morning, he continued to feel unwell and had numbness/tingling in the bottom of his L foot and also his L middle finger  He notes that he took his BP and his SBP was in the 170s and he decided that he needed to come to the hospital for evaluation  He notes he did have a prior TIA in the early 2000s and symptoms were similar to this but notes that they were more severe at the time  He does not take any antiplatelet at home, but notes he had previously been on ASA but stopped this  He notes he is feeling well and at his baseline this morning       Consult to Neurology  Consult performed by: Beth Barnes PA-C  Consult ordered by: Julianna Hagen PA-C          Review of Systems    Historical Information   Past Medical History:   Diagnosis Date    Benign prostatic hyperplasia     Chronic kidney disease     COVID-19 03/2020    Esophageal reflux     Hypertension     Vertigo      Past Surgical History:   Procedure Laterality Date    APPENDECTOMY  05/21/2015    CYST REMOVAL      Fatty cyst removed from back     Social History   Social History     Substance and Sexual Activity   Alcohol Use Yes    Comment: socially     Social History     Substance and Sexual Activity   Drug Use No     E-Cigarette/Vaping    E-Cigarette Use Never User      E-Cigarette/Vaping Substances     Social History     Tobacco Use   Smoking Status Former Smoker   Smokeless Tobacco Never Used   Tobacco Comment    1ppw     Family History:   Family History   Problem Relation Age of Onset    Prostate cancer Father     Prostate cancer Maternal Grandfather     Stomach cancer Maternal Aunt         malignant tumor of pharynx    Stomach cancer Maternal Uncle         malignant tumor of pharynx    Mental illness Family     Depression Family     Schizophrenia Family     Hypertension Mother     No Known Problems Sister     Dementia Sister        Review of previous medical records was completed  Please see HPI  Meds/Allergies   Scheduled Meds:  Current Facility-Administered Medications   Medication Dose Route Frequency Provider Last Rate    aspirin  81 mg Oral Daily [place], [place]      atorvastatin  40 mg Oral QPM [facility] FLOWER madsen      heparin (porcine)  5,000 Units Subcutaneous [place], [place]      LORazepam  0 5 mg Intravenous Once PRN Anil Garcia PA-C      pantoprazole  40 mg Oral Early Morning [place], [place]      tamsulosin  0 4 mg Oral Daily With Alfonza Runner, PA-C       Continuous Infusions:   PRN Meds:  LORazepam      Allergies   Allergen Reactions    Penicillin V Anaphylaxis       Objective   Vitals:Blood pressure 144/90, pulse 70, temperature 97 9 °F (36 6 °C), temperature source Temporal, resp  rate 22, height 5' 11 5" (1 816 m), weight 87 6 kg (193 lb 2 oz), SpO2 99 %  ,Body mass index is 26 56 kg/m²  No intake or output data in the 24 hours ending 01/25/22 0853    Invasive Devices:    Invasive Devices  Report    None                 Physical Exam  Neurologic Exam    Lab Results:  Recent Results (from the past 24 hour(s))   Basic metabolic panel    Collection Time: 01/25/22  5:05 AM   Result Value Ref Range    Sodium 141 136 - 145 mmol/L    Potassium 3 6 3 5 - 5 3 mmol/L    Chloride 104 100 - 108 mmol/L    CO2 30 21 - 32 mmol/L    ANION GAP 7 4 - 13 mmol/L    BUN 18 5 - 25 mg/dL    Creatinine 1 46 (H) 0 60 - 1 30 mg/dL    Glucose 116 65 - 140 mg/dL    Calcium 8 7 8 3 - 10 1 mg/dL    eGFR 47 ml/min/1 73sq m   CBC and Platelet    Collection Time: 01/25/22  5:05 AM   Result Value Ref Range    WBC 3 39 (L) 4 31 - 10 16 Thousand/uL    RBC 4 74 3 88 - 5 62 Million/uL    Hemoglobin 14 3 12 0 - 17 0 g/dL    Hematocrit 42 6 36 5 - 49 3 %    MCV 90 82 - 98 fL    MCH 30 2 26 8 - 34 3 pg    MCHC 33 6 31 4 - 37 4 g/dL    RDW 12 8 11 6 - 15 1 %    Platelets 070 399 - 787 Thousands/uL    MPV 11 0 8 9 - 12 7 fL   Protime-INR    Collection Time: 01/25/22  5:05 AM   Result Value Ref Range    Protime 13 3 11 6 - 14 5 seconds    INR 1 05 0 84 - 1 19   APTT    Collection Time: 01/25/22  5:05 AM   Result Value Ref Range    PTT 29 23 - 37 seconds   HS Troponin 0hr (reflex protocol)    Collection Time: 01/25/22  5:05 AM   Result Value Ref Range    hs TnI 0hr 3 "Refer to ACS Flowchart"- see link ng/L   HS Troponin I 2hr    Collection Time: 01/25/22  6:45 AM   Result Value Ref Range    hs TnI 2hr 4 "Refer to ACS Flowchart"- see link ng/L    Delta 2hr hsTnI 1 ng/L       Imaging Studies: I have personally reviewed pertinent reports  and I have personally reviewed pertinent films in PACS  CTH- NO CT evidence of acute intracranial process or significant interval change  CTA head and neck with and without contrast- Trace atherosclerotic disease left cavernous segment of the internal carotid arteries  Minimal atherosclerotic disease left carotid bifurcation  No arterial occlusion or significant stenosis   MRI brain without contrast- Normal     VTE Prophylaxis: Heparin

## 2022-01-25 NOTE — ASSESSMENT & PLAN NOTE
· Per documentation, had blood pressure >350 systolic prior to ED arrival  · Now stable, add amlodipine for hypertension management  · Stable for discharge

## 2022-01-25 NOTE — DISCHARGE SUMMARY
[facility]  Discharge- [facility] 1950, 70 y o  male MRN: [id number]  Unit/Bed#: FT 02 Encounter: [id number]  Primary Care Provider: Jamilah Torre MD   Date and time admitted to hospital: 1/25/2022  4:48 AM    * Stroke-like symptoms  Assessment & Plan  · Stroke like symptoms, likely in setting of hypertensive urgency  · MRI brain negative, CTA head/neck showing trace internal carotid stenosis  · Start aspirin and statin  · Follow up with neurology as an outpatient     Hypertensive urgency  Assessment & Plan  · Per documentation, had blood pressure >357 systolic prior to ED arrival  · Now stable, add amlodipine for hypertension management  · Stable for discharge    CKD (chronic kidney disease) stage 3, GFR 30-59 ml/min [facility])  Assessment & Plan  Lab Results   Component Value Date    EGFR 47 01/25/2022    EGFR 51 11/26/2021    EGFR 61 09/13/2021    CREATININE 1 46 (H) 01/25/2022    CREATININE 1 55 (H) 11/26/2021    CREATININE 1 34 (H) 09/13/2021     · Creatinine 1 46; baseline appears to be around 1 4-1 5  · Stable for discharge     Benign prostatic hyperplasia, presence of lower urinary tract symptoms unspecified  Assessment & Plan  · Continue home tamsulosin      History of covid  Diagnosed on 1/14/22  Past quarantine precautions  No longer active       Medical Problems             Resolved Problems  Date Reviewed: 1/25/2022    None              Discharging Physician / Practitioner: Jose L Holguin MD  PCP: Jamilah Torre MD  Admission Date:   Admission Orders (From admission, onward)     Ordered        01/25/22 0554  Place in Observation  Once                      Discharge Date: 01/25/22    Consultations During Hospital Stay:  · IP CONSULT TO NEUROLOGY  ·     Procedures Performed:   · imaging    Significant Findings / Test Results:   Principal Problem:    Stroke-like symptoms  Active Problems:    Hypertensive urgency    Benign prostatic hyperplasia, presence of lower urinary tract symptoms unspecified    CKD (chronic kidney disease) stage 3, GFR 30-59 ml/min ([facility])  Resolved Problems:    * No resolved hospital problems  *  ·   ·     Incidental Findings:   · na     Test Results Pending at Discharge (will require follow up):   · ECHO      Outpatient Tests Requested:  · Follow up with neurology and PCP     Complications:  Na     Reason for Admission: stroke like symptoms     Hospital Course:   Laith Damian is a 70 y o  male patient who originally presented to the hospital on 1/25/2022 due to stroke like symptoms  Benign imaging findings  Stable for discharge  Condition at Discharge: good    Discharge Day Visit / Exam:   * Please refer to separate progress note for these details *    Discussion with Family: Updated  (wife) at bedside  Discharge instructions/Information to patient and family:   See after visit summary for information provided to patient and family  Provisions for Follow-Up Care:  See after visit summary for information related to follow-up care and any pertinent home health orders  Disposition:   Home    Planned Readmission: none     Discharge Statement:  I spent 30+ minutes discharging the patient  This time was spent on the day of discharge  I had direct contact with the patient on the day of discharge  Greater than 50% of the total time was spent examining patient, answering all patient questions, arranging and discussing plan of care with patient as well as directly providing post-discharge instructions  Additional time then spent on discharge activities  Discharge Medications:  See after visit summary for reconciled discharge medications provided to patient and/or family        **Please Note: This note may have been constructed using a voice recognition system**

## 2022-01-25 NOTE — ED PROVIDER NOTES
History  Chief Complaint   Patient presents with    Hypertension     covid+ pt presents with c/o HTN, COVID and new onset tingling in b/l feet      HPI  69 yo M diagnosed with COVID-19 on 1/15/21, hypertension presents with left hand and foot tingling that started when he woke up this morning just prior to arrival   He went to bed at 11:00 p m  He denies any weakness, confusion, facial droop, difficulty speaking  States that he checked his blood pressure and it was 245 systolic  He took an extra amlodipine without improvement  Prior to Admission Medications   Prescriptions Last Dose Informant Patient Reported? Taking?    amLODIPine (NORVASC) 5 mg tablet   No No   Sig: TAKE 1 TABLET DAILY   lidocaine (LIDODERM) 5 %  Self No No   Sig: Apply 1 patch topically daily Remove & Discard patch within 12 hours or as directed by MD   Patient taking differently: Apply 1 patch topically as needed Remove & Discard patch within 12 hours or as directed by MD    multivitamin (THERAGRAN) TABS  Self Yes No   Sig: Take 1 tablet by mouth daily   omeprazole (PriLOSEC) 20 mg delayed release capsule   Yes No   tadalafil (CIALIS) 20 MG tablet  Self Yes No   Sig: tadalafil 20 mg tablet   tamsulosin (FLOMAX) 0 4 mg  Self No No   Sig: Take 1 capsule (0 4 mg total) by mouth daily with dinner      Facility-Administered Medications: None       Past Medical History:   Diagnosis Date    Benign prostatic hyperplasia     Chronic kidney disease     COVID-19 03/2020    Esophageal reflux     Hypertension     Vertigo        Past Surgical History:   Procedure Laterality Date    APPENDECTOMY  05/21/2015    CYST REMOVAL      Fatty cyst removed from back       Family History   Problem Relation Age of Onset    Prostate cancer Father     Prostate cancer Maternal Grandfather     Stomach cancer Maternal Aunt         malignant tumor of pharynx    Stomach cancer Maternal Uncle         malignant tumor of pharynx    Mental illness Family     Depression Family     Schizophrenia Family     Hypertension Mother     No Known Problems Sister     Dementia Sister      I have reviewed and agree with the history as documented  E-Cigarette/Vaping    E-Cigarette Use Never User      E-Cigarette/Vaping Substances     Social History     Tobacco Use    Smoking status: Former Smoker    Smokeless tobacco: Never Used    Tobacco comment: 1ppw   Vaping Use    Vaping Use: Never used   Substance Use Topics    Alcohol use: Yes     Comment: socially    Drug use: No       Review of Systems   Constitutional: Negative for chills and fever  HENT: Negative for dental problem and ear pain  Eyes: Negative for pain and redness  Respiratory: Negative for cough and shortness of breath  Cardiovascular: Negative for chest pain and palpitations  Gastrointestinal: Negative for abdominal pain and nausea  Endocrine: Negative for polydipsia and polyphagia  Genitourinary: Negative for dysuria and frequency  Musculoskeletal: Negative for arthralgias and joint swelling  Skin: Negative for color change and rash  Neurological: Positive for numbness  Negative for dizziness and headaches  Psychiatric/Behavioral: Negative for behavioral problems and confusion  All other systems reviewed and are negative  Physical Exam  Physical Exam  Vitals and nursing note reviewed  Constitutional:       General: He is not in acute distress  Appearance: He is well-developed  He is not diaphoretic  HENT:      Head: Atraumatic  Right Ear: External ear normal       Left Ear: External ear normal       Nose: Nose normal    Eyes:      Conjunctiva/sclera: Conjunctivae normal       Pupils: Pupils are equal, round, and reactive to light  Neck:      Vascular: No JVD  Cardiovascular:      Rate and Rhythm: Normal rate and regular rhythm  Heart sounds: Normal heart sounds  No murmur heard        Pulmonary:      Effort: Pulmonary effort is normal  No respiratory distress  Breath sounds: Normal breath sounds  No wheezing  Abdominal:      General: Bowel sounds are normal  There is no distension  Palpations: Abdomen is soft  Tenderness: There is no abdominal tenderness  Musculoskeletal:         General: Normal range of motion  Cervical back: Normal range of motion and neck supple  Skin:     General: Skin is warm and dry  Capillary Refill: Capillary refill takes less than 2 seconds  Neurological:      Mental Status: He is alert and oriented to person, place, and time  Cranial Nerves: No cranial nerve deficit  Comments: CN II-XII intact grossly, no focal deficits  Normal strength and sensation in R upper and lower extremities, decreased sensation to pinprick L upper and L lower extremities   Normal FNF and rapid alternating hand movements   Psychiatric:         Behavior: Behavior normal          Vital Signs  ED Triage Vitals   Temperature Pulse Respirations Blood Pressure SpO2   01/25/22 0530 01/25/22 0455 01/25/22 0455 01/25/22 0455 01/25/22 0455   97 9 °F (36 6 °C) 73 20 160/82 99 %      Temp Source Heart Rate Source Patient Position - Orthostatic VS BP Location FiO2 (%)   01/25/22 0530 01/25/22 0455 01/25/22 0455 01/25/22 0455 --   Temporal Monitor Lying Right arm       Pain Score       --                  Vitals:    01/25/22 0455 01/25/22 0530   BP: 160/82 165/81   Pulse: 73 63   Patient Position - Orthostatic VS: Lying          Visual Acuity  Visual Acuity      Most Recent Value   L Pupil Size (mm) 3   R Pupil Size (mm) 3          ED Medications  Medications   iohexol (OMNIPAQUE) 350 MG/ML injection (MULTI-DOSE) 100 mL (100 mL Intravenous Given 1/25/22 0518)       Diagnostic Studies  Results Reviewed     Procedure Component Value Units Date/Time    HS Troponin I 2hr [[id number]]     Lab Status: No result Specimen: Blood     HS Troponin 0hr (reflex protocol) [[id number]] Collected: 01/25/22 0505    Lab Status: Final result Specimen: Blood from Arm, Left Updated: 01/25/22 0542     hs TnI 0hr 3 ng/L     Basic metabolic panel [[id number]]  (Abnormal) Collected: 01/25/22 0505    Lab Status: Final result Specimen: Blood from Arm, Left Updated: 01/25/22 0530     Sodium 141 mmol/L      Potassium 3 6 mmol/L      Chloride 104 mmol/L      CO2 30 mmol/L      ANION GAP 7 mmol/L      BUN 18 mg/dL      Creatinine 1 46 mg/dL      Glucose 116 mg/dL      Calcium 8 7 mg/dL      eGFR 47 ml/min/1 73sq m     Narrative:      [facility] guidelines for Chronic Kidney Disease (CKD):     Stage 1 with normal or high GFR (GFR > 90 mL/min/1 73 square meters)    Stage 2 Mild CKD (GFR = 60-89 mL/min/1 73 square meters)    Stage 3A Moderate CKD (GFR = 45-59 mL/min/1 73 square meters)    Stage 3B Moderate CKD (GFR = 30-44 mL/min/1 73 square meters)    Stage 4 Severe CKD (GFR = 15-29 mL/min/1 73 square meters)    Stage 5 End Stage CKD (GFR <15 mL/min/1 73 square meters)  Note: GFR calculation is accurate only with a steady state creatinine    Protime-INR [[id number]]  (Normal) Collected: 01/25/22 0505    Lab Status: Final result Specimen: Blood from Arm, Left Updated: 01/25/22 0529     Protime 13 3 seconds      INR 1 05    APTT [[id number]]  (Normal) Collected: 01/25/22 0505    Lab Status: Final result Specimen: Blood from Arm, Left Updated: 01/25/22 0529     PTT 29 seconds     CBC and Platelet [[id number]]  (Abnormal) Collected: 01/25/22 0505    Lab Status: Final result Specimen: Blood from Arm, Left Updated: 01/25/22 0515     WBC 3 39 Thousand/uL      RBC 4 74 Million/uL      Hemoglobin 14 3 g/dL      Hematocrit 42 6 %      MCV 90 fL      MCH 30 2 pg      MCHC 33 6 g/dL      RDW 12 8 %      Platelets 974 Thousands/uL      MPV 11 0 fL                  CTA stroke alert (head/neck)   Final Result by Rosa Maria Lim MD (01/25 3855)      CTA head:      Trace arthrosclerotic disease left cavernous segment of the internal carotid arteries  No arterial occlusion or significant stenosis  CTA neck:      Minimal atherosclerotic disease left carotid bifurcation  No arterial occlusion or significant stenosis  Additional stable chronic findings as above  After attempting to call the ED department for direct communication unsuccessfully, pertinent findings on this study were conveyed by myself to Alexandro Valles on 1/25/2022 at 05:28 via [employer] with prompt response  Workstation performed: [id number]         CT stroke alert brain   Final Result by Chen Farley MD (01/25 5360)      No CT evidence of acute intracranial process or significant interval change  After attempting to call the ED department for direct communication unsuccessfully, findings on this study were conveyed by myself to Alexandro Valles on 1/25/2022 at 05:28 via [employer] with prompt response  Workstation performed: [id number]                    Procedures  Procedures         ED Course                  Stroke Assessment     Row Name 01/25/22 0508             NIH Stroke Scale    Interval Baseline      Level of Consciousness (1a ) 0      LOC Questions (1b ) 0      LOC Commands (1c ) 0      Best Gaze (2 ) 0      Visual (3 ) 0      Facial Palsy (4 ) 0      Motor Arm, Left (5a ) 0      Motor Arm, Right (5b ) 0      Motor Leg, Left (6a ) 0      Motor Leg, Right (6b ) 0      Limb Ataxia (7 ) 0      Sensory (8 ) 1      Best Language (9 ) 0      Dysarthria (10 ) 0      Extinction and Inattention (11 ) (Formerly Neglect) 0      Total 1                Most Recent Value   TPA Decision Options    TPA Decision Patient not a TPA candidate  Patient is not a candidate options Unclear time of onset outside appropriate time window  MDM  Patient presents with left hand and foot numbness that started when he woke up this morning  Given the time that he went to sleep, patient is out of tPA window    He is not a tPA candidate at this time  CTA head and neck is negative for acute abnormality  Discussed with Dr Miguel Almazan of Neurology, recommends admission via the stroke pathway, aspirin, Plavix  Disposition  Final diagnoses:   Hand numbness   Numbness of left foot     Time reflects when diagnosis was documented in both MDM as applicable and the Disposition within this note     Time User Action Codes Description Comment    1/25/2022  5:01 AM [facility] Add [R20 0] Hand numbness     1/25/2022  5:53 AM [facility] Add [R20 8] Numbness of left foot       ED Disposition     ED Disposition Condition Date/Time Comment    Admit Stable Tue Jan 25, 2022  5:54 AM Case was discussed with Gopi Mireles and the patient's admission status was agreed to be Admission Status: observation status to the service of Dr Ophelia Sacks   Follow-up Information    None         Patient's Medications   Discharge Prescriptions    No medications on file       No discharge procedures on file      PDMP Review     None          ED Provider  Electronically Signed by           Marixa Gusman MD  01/25/22 7015

## 2022-01-25 NOTE — QUICK NOTE
Stroke Alert Note   Pauly Pond 70 y o  male  MRN: [id number]   Unit/Bed#: FT 02 Encounter: [id number]     Stroke alert text received at: 5:04am  Call from  received at: 5:06am  Neurology response by phone was immediate    70 y/oleft-handed man with history of HTN and CKD, who was diagnosed with COVID on 1/25/2022, presented as a stroke alert after waking up with left hand and foot numbness  Pt reported being normal when he went to bed at 11pm, but noticed symptoms upon awakening at 4am   Numbness described as pins and needles feeling  Decreased sensation found in these locations on ED attending exam, with no other findings on exam     NIHSSS 1    CT head wo: unremarkable  No apparent acute findings  CTA head/neck: no LVO, no significant stenosis    IV tPA was not given due to being outside the window for IV tPA and low NIHSS  Briefly considered obtaining STAT MRI for wakeup stroke protocol after learning he is left-handed, but by the time he returned from CT, his symptoms had improved significantly, and were apparently only "slightly" there by that time  Symptoms and location atypical for stroke and would consider neurovascular compression while he was sleeping, however, also unusual to have that in 2 locations at once  - Admit on stroke pathway for now  Can consider canceling once he is evaluated by neurology in person, depending on any additional information obtained  - Recommend loading with aspirin 325mg once, then continuing 81 mg daily, and with plavix 300mg once, followed by 75mg daily  - start lipitor 40mg Qday  - MRI brain wo, echo, lipid panel, HbA1c  - permissive HTN to 220/110 for 24 hours, monitor on telemetry  - normothermia, euglycemia  - avoid hypotonic fluids          Jere Kerns MD

## 2022-01-25 NOTE — ED NOTES
Dr Dakota Dougherty at the bedside and spoke with the pt   Pt to be discharged     Lex Walsh RN  01/25/22 0174

## 2022-01-25 NOTE — ASSESSMENT & PLAN NOTE
Lab Results   Component Value Date    EGFR 47 01/25/2022    EGFR 51 11/26/2021    EGFR 61 09/13/2021    CREATININE 1 46 (H) 01/25/2022    CREATININE 1 55 (H) 11/26/2021    CREATININE 1 34 (H) 09/13/2021     - Avoid nephrotoxins    - Trend BMP

## 2022-01-25 NOTE — Clinical Note
accompanied Juan Pablo Burgos to the emergency department on 1/25/2022  Return date if applicable: If you have any questions or concerns, please don't hesitate to call        Stone Veronica RN

## 2022-01-25 NOTE — ASSESSMENT & PLAN NOTE
Patient reports going to bed around 11:00 p m  Last night, and woke this AM around 3 with hand and foot tingling  Denies fever, chill, recent sick contact, headache, visual disturbance, dizzy/lightheadedness, syncope, weakness, confusion, facial droop, difficulty speaking, difficulty swallowing, chest pain, palpitations, SOB, ABD pain, N/V/D, lower extremity pain/swelling, or other symptoms      /81   Pulse 63   Temp 97 9 °F (36 6 °C) (Temporal)   Resp 18   Ht 5' 11 5" (1 816 m)   Wt 87 6 kg (193 lb 2 oz)   SpO2 100%   BMI 26 56 kg/m²     · Patient reports going to bed around 11:00PM Last night, and woke this AM around 0300 with left hand and foot tingling (reports left hand tingling from Palm into middle finger, and left foot tingling on medial surface)  · Physical exam without acute neurologic deficit  · CT and CTA head negative for acute process  · No acute electrolyte abnormalities; not hypoglycemic  · Troponin 3  · EKG NSR 73  · ED spoke with Neurology who recommended admission under stroke pathway  · Will initiate stroke pathway  · ECHO and MRI ordered (patient reluctant, but agreeable to MRI; prn Ativan ordered to be given prior to procedure)  · Tele monitoring, daily ASA and atorvastatin for stroke pathway, permissive hypertension and neuro checks per stroke pathway  · Neurology consulted

## 2022-01-25 NOTE — Clinical Note
accompanied Barbra Loyola to the emergency department on 1/25/2022  Return date if applicable: If you have any questions or concerns, please don't hesitate to call        Marixa Gusman MD

## 2022-01-25 NOTE — Clinical Note
Piter Hylton accompanied Ericka Ralph to the emergency department on 1/25/2022  Return date if applicable: 36/19/6833        If you have any questions or concerns, please don't hesitate to call        Jason Mcconnell RN

## 2022-01-25 NOTE — Clinical Note
accompanied Марина Soria to the emergency department on 1/25/2022  Return date if applicable: If you have any questions or concerns, please don't hesitate to call        Julieth Reese MD

## 2022-01-25 NOTE — Clinical Note
Kaylene Cortez accompanied Kalani Gonzalez to the emergency department on 1/25/2022  Return date if applicable: 87/19/1504        If you have any questions or concerns, please don't hesitate to call        Shamika Torre RN

## 2022-01-25 NOTE — Clinical Note
Micah Fan accompanied Jaylyn Garrett to the emergency department on 1/25/2022  Return date if applicable: 73/32/2558        If you have any questions or concerns, please don't hesitate to call        Mirna Jj RN

## 2022-01-25 NOTE — ASSESSMENT & PLAN NOTE
Lab Results   Component Value Date    EGFR 47 01/25/2022    EGFR 51 11/26/2021    EGFR 61 09/13/2021    CREATININE 1 46 (H) 01/25/2022    CREATININE 1 55 (H) 11/26/2021    CREATININE 1 34 (H) 09/13/2021     · Creatinine 1 46; baseline appears to be around 1 4-1 5  · Avoid nephrotoxic agents  · Trend BMP

## 2022-01-25 NOTE — ASSESSMENT & PLAN NOTE
· /81 HR 63  · Holding home HTN medications allowing for permissive hypertension per stroke pathway  · Monitor BP per stroke pathway

## 2022-01-25 NOTE — Clinical Note
Marko Cisse accompanied Ericka Ralph to the emergency department on 1/25/2022  Return date if applicable: 85/31/7074        If you have any questions or concerns, please don't hesitate to call        Jason Mcconnell RN

## 2022-01-25 NOTE — Clinical Note
Marquis Matson accompanied Elli Menard to the emergency department on 1/25/2022  Return date if applicable: 50/50/1177        If you have any questions or concerns, please don't hesitate to call        Era Patel RN

## 2022-01-25 NOTE — Clinical Note
accompanied Princess Calzada to the emergency department on 1/25/2022  Return date if applicable: If you have any questions or concerns, please don't hesitate to call        Luis Gamez MD

## 2022-01-25 NOTE — Clinical Note
Cristobal Conte accompanied Selena Adam to the emergency department on 1/25/2022  Return date if applicable: 16/54/5780        If you have any questions or concerns, please don't hesitate to call        Jayme Callahan RN

## 2022-01-25 NOTE — H&P
[address] [facility] 1950, 70 y o  male MRN: [id number]  Unit/Bed#: FT 02 Encounter: [id number]  Primary Care Provider: Eva Bumpers, MD   Date and time admitted to hospital: 1/25/2022  4:48 AM    Stroke-like symptoms  Assessment & Plan  Patient reports going to bed around 11:00 p m  Last night, and woke this AM around 3 with hand and foot tingling  Denies fever, chill, recent sick contact, headache, visual disturbance, dizzy/lightheadedness, syncope, weakness, confusion, facial droop, difficulty speaking, difficulty swallowing, chest pain, palpitations, SOB, ABD pain, N/V/D, lower extremity pain/swelling, or other symptoms      /81   Pulse 63   Temp 97 9 °F (36 6 °C) (Temporal)   Resp 18   Ht 5' 11 5" (1 816 m)   Wt 87 6 kg (193 lb 2 oz)   SpO2 100%   BMI 26 56 kg/m²     · Patient reports going to bed around 11:00PM Last night, and woke this AM around 0300 with left hand and foot tingling (reports left hand tingling from Palm into middle finger, and left foot tingling on medial surface)  · Physical exam without acute neurologic deficit  · CT and CTA head negative for acute process  · No acute electrolyte abnormalities; not hypoglycemic  · Troponin 3  · EKG NSR 73  · ED spoke with Neurology who recommended admission under stroke pathway  · Will initiate stroke pathway  · ECHO and MRI ordered (patient reluctant, but agreeable to MRI; prn Ativan ordered to be given prior to procedure)  · Tele monitoring, daily ASA and atorvastatin for stroke pathway, permissive hypertension and neuro checks per stroke pathway  · Neurology consulted    Essential hypertension  Assessment & Plan  · /81 HR 63  · Holding home HTN medications allowing for permissive hypertension per stroke pathway  · Monitor BP per stroke pathway    CKD (chronic kidney disease) stage 3, GFR 30-59 ml/min [facility])  Assessment & Plan  Lab Results   Component Value Date    EGFR 47 01/25/2022    EGFR 51 11/26/2021    EGFR 61 09/13/2021    CREATININE 1 46 (H) 01/25/2022    CREATININE 1 55 (H) 11/26/2021    CREATININE 1 34 (H) 09/13/2021     · Creatinine 1 46; baseline appears to be around 1 4-1 5  · Avoid nephrotoxic agents  · Trend BMP    Benign prostatic hyperplasia, presence of lower urinary tract symptoms unspecified  Assessment & Plan  · Continue home tamsulosin    VTE Pharmacologic Prophylaxis: VTE Score: 3 Moderate Risk (Score 3-4) - Pharmacological DVT Prophylaxis Ordered: heparin  Code Status: Level 1 - Full Code Level 1 Full Code   Discussion with family: update in AM      Anticipated Length of Stay: Patient will be admitted on an observation basis with an anticipated length of stay of less than 2 midnights secondary to Stroke-like symptoms  Total Time for Visit, including Counseling / Coordination of Care: 30 minutes Greater than 50% of this total time spent on direct patient counseling and coordination of care  Chief Complaint:  Left hand and foot numbness/tingling    History of Present Illness:  Anna Ibrahim is a 70 y o  male with a PMH of CKD, HTN, and BPH who presents with left hand and foot numbness/tingling  Patient reports going to bed around 11:00 p m  Last night, and woke this AM around 3 with hand and foot tingling  Denies fever, chill, recent sick contact, headache, visual disturbance, dizzy/lightheadedness, syncope, weakness, confusion, facial droop, difficulty speaking, difficulty swallowing, chest pain, palpitations, SOB, ABD pain, N/V/D, lower extremity pain/swelling, or other symptoms  All questions answered at the bedside the patient's satisfaction  Review of Systems:  Review of Systems   Constitutional: Negative for activity change, appetite change, chills, diaphoresis, fatigue and fever  HENT: Negative for congestion, ear pain, nosebleeds and trouble swallowing  Eyes: Negative for pain and visual disturbance     Respiratory: Negative for apnea, cough, chest tightness, shortness of breath and wheezing  Cardiovascular: Negative for chest pain, palpitations and leg swelling  Gastrointestinal: Negative for abdominal distention, abdominal pain, blood in stool, constipation, diarrhea, nausea and vomiting  Endocrine: Negative for cold intolerance, heat intolerance and polyuria  Genitourinary: Negative for difficulty urinating, dysuria, flank pain and hematuria  Musculoskeletal: Negative for arthralgias, neck pain and neck stiffness  Skin: Negative for color change, rash and wound  Neurological: Positive for numbness (left hand and foot)  Negative for dizziness, tremors, syncope, weakness, light-headedness and headaches  Hematological: Negative for adenopathy  All other systems reviewed and are negative  Past Medical and Surgical History:   Past Medical History:   Diagnosis Date    Benign prostatic hyperplasia     Chronic kidney disease     COVID-19 03/2020    Esophageal reflux     Hypertension     Vertigo        Past Surgical History:   Procedure Laterality Date    APPENDECTOMY  05/21/2015    CYST REMOVAL      Fatty cyst removed from back       Meds/Allergies:  Prior to Admission medications    Medication Sig Start Date End Date Taking?  Authorizing Provider   amLODIPine (NORVASC) 5 mg tablet TAKE 1 TABLET DAILY 12/17/21   Christopher Cloud MD   lidocaine (LIDODERM) 5 % Apply 1 patch topically daily Remove & Discard patch within 12 hours or as directed by MD  Patient taking differently: Apply 1 patch topically as needed Remove & Discard patch within 12 hours or as directed by MD  4/14/21   Colletta Adams, PA-C   multivitamin [facility]) TABS Take 1 tablet by mouth daily    Historical Provider, MD   omeprazole (PriLOSEC) 20 mg delayed release capsule  12/16/21   Historical Provider, MD   tadalafil (CIALIS) 20 MG tablet tadalafil 20 mg tablet    Historical Provider, MD   tamsulosin (FLOMAX) 0 4 mg Take 1 capsule (0 4 mg total) by mouth daily with dinner 1/5/21 Orville Goldsmith PA-C     I have reviewed home medications with patient personally  Allergies: Allergies   Allergen Reactions    Penicillin V Anaphylaxis       Social History:  Marital Status: /Civil Union   Occupation: N/A  Patient Pre-hospital Living Situation: Home  Patient Pre-hospital Level of Mobility: walks  Patient Pre-hospital Diet Restrictions: None  Substance Use History:   Social History     Substance and Sexual Activity   Alcohol Use Yes    Comment: socially     Social History     Tobacco Use   Smoking Status Former Smoker   Smokeless Tobacco Never Used   Tobacco Comment    1ppw     Social History     Substance and Sexual Activity   Drug Use No       Family History:  Family History   Problem Relation Age of Onset    Prostate cancer Father     Prostate cancer Maternal Grandfather     Stomach cancer Maternal Aunt         malignant tumor of pharynx    Stomach cancer Maternal Uncle         malignant tumor of pharynx    Mental illness Family     Depression Family     Schizophrenia Family     Hypertension Mother     No Known Problems Sister     Dementia Sister        Physical Exam:     Vitals:   Blood Pressure: 165/81 (01/25/22 0530)  Pulse: 63 (01/25/22 0530)  Temperature: 97 9 °F (36 6 °C) (01/25/22 0530)  Temp Source: Temporal (01/25/22 0530)  Respirations: 18 (01/25/22 0530)  Height: 5' 11 5" (181 6 cm) (01/25/22 0455)  Weight - Scale: 87 6 kg (193 lb 2 oz) (01/25/22 0455)  SpO2: 100 % (01/25/22 0530)    Physical Exam  Vitals and nursing note reviewed  Constitutional:       General: He is not in acute distress  Appearance: Normal appearance  HENT:      Head: Normocephalic and atraumatic  Right Ear: External ear normal       Left Ear: External ear normal       Nose: Nose normal       Mouth/Throat:      Mouth: Mucous membranes are moist    Eyes:      Extraocular Movements: Extraocular movements intact  Pupils: Pupils are equal, round, and reactive to light  Cardiovascular:      Rate and Rhythm: Normal rate and regular rhythm  Pulses: Normal pulses  Heart sounds: Normal heart sounds  No murmur heard  Pulmonary:      Effort: Pulmonary effort is normal  No respiratory distress  Breath sounds: Normal breath sounds  No wheezing or rales  Chest:      Chest wall: No tenderness  Abdominal:      General: Bowel sounds are normal  There is no distension  Palpations: Abdomen is soft  There is no mass  Tenderness: There is no abdominal tenderness  There is no guarding  Musculoskeletal:         General: No swelling or tenderness  Cervical back: Normal range of motion and neck supple  No rigidity or tenderness  Right lower leg: No edema  Left lower leg: No edema  Comments: 5/5 in BL upper and lower extremities    Skin:     General: Skin is warm and dry  Capillary Refill: Capillary refill takes less than 2 seconds  Findings: No lesion or rash  Neurological:      General: No focal deficit present  Mental Status: He is alert and oriented to person, place, and time  Motor: No weakness  Psychiatric:         Mood and Affect: Mood normal          Behavior: Behavior normal          Thought Content:  Thought content normal           Additional Data:     Lab Results:  Results from last 7 days   Lab Units 01/25/22  0505   WBC Thousand/uL 3 39*   HEMOGLOBIN g/dL 14 3   HEMATOCRIT % 42 6   PLATELETS Thousands/uL 181     Results from last 7 days   Lab Units 01/25/22  0505   SODIUM mmol/L 141   POTASSIUM mmol/L 3 6   CHLORIDE mmol/L 104   CO2 mmol/L 30   BUN mg/dL 18   CREATININE mg/dL 1 46*   ANION GAP mmol/L 7   CALCIUM mg/dL 8 7   GLUCOSE RANDOM mg/dL 116     Results from last 7 days   Lab Units 01/25/22  0505   INR  1 05                   Imaging: Reviewed radiology reports from this admission including: CT and CTA head/neck  CTA stroke alert (head/neck)   Final Result by Carlos Alberto Zamudio MD (01/25 1696)      CTA head:      Trace arthrosclerotic disease left cavernous segment of the internal carotid arteries  No arterial occlusion or significant stenosis  CTA neck:      Minimal atherosclerotic disease left carotid bifurcation  No arterial occlusion or significant stenosis  Additional stable chronic findings as above  After attempting to call the ED department for direct communication unsuccessfully, pertinent findings on this study were conveyed by myself to [address] on 1/25/2022 at 05:28 via [address] with prompt response  Workstation performed: [id number]         CT stroke alert brain   Final Result by Blair Elizabeth MD (01/25 0416)      No CT evidence of acute intracranial process or significant interval change  After attempting to call the ED department for direct communication unsuccessfully, findings on this study were conveyed by myself to Alexandro ARMANDO Valles on 1/25/2022 at 05:28 via [address] with prompt response  Workstation performed: [id number]         MRI Inpatient Order    (Results Pending)       EKG and Other Studies Reviewed on Admission:   · EKG: NSR  HR 73     ** Please Note: This note has been constructed using a voice recognition system   **

## 2022-01-25 NOTE — Clinical Note
accompanied Remberto Beck to the emergency department on 1/25/2022  Return date if applicable: If you have any questions or concerns, please don't hesitate to call        Maya Gold MD

## 2022-01-25 NOTE — ASSESSMENT & PLAN NOTE
Lab Results   Component Value Date    EGFR 47 01/25/2022    EGFR 51 11/26/2021    EGFR 61 09/13/2021    CREATININE 1 46 (H) 01/25/2022    CREATININE 1 55 (H) 11/26/2021    CREATININE 1 34 (H) 09/13/2021     · Creatinine 1 46; baseline appears to be around 1 4-1 5  · Stable for discharge

## 2022-01-25 NOTE — ASSESSMENT & PLAN NOTE
70year old male with BPH, GERD, HTN, CKD3, recent Covid-19 infection admitted with complaints of numbness of L middle finger and bottom of L foot  Stroke alert activated and patient not an IV tPA candidate due to being outside of appropriate time window and not and IR intervention candidate due to no target identified on CTA head and neck  Patient was recommended to be given loading dose of ASA and Plavix and admitted to stroke pathway  MRI brain without contrast completed this morning and is normal  Suspect etiology of symptoms likely hypertensive urgency but cannot entirely exclude TIA given underlying risk factors and prior TIA  Plan:   - Continue on acute ischemic stroke pathway  - MRI brain without contrast completed and is normal   - Echocardiogram pending    - Hemoglobin A1c and fasting lipid panel pending    - Monitor on telemetry  - Continue on ASA 81 mg QD  Would recommend continue on this dose given concern for possible TIA  - Continue on atorvastatin 40 mg QPM    - Goal normotension   - Goal normothermia and euglycemia    - PT/OT  - Stroke education    - Monitor exam and notify with changes

## 2022-01-25 NOTE — PHYSICAL THERAPY NOTE
Physical Therapy Evaluation   Time in: 831  Time out: 846  Total evaluation time: 15 minutes    Patient's Name: Kezia Lemon    Admitting Diagnosis  Hypertension [I10]    Problem List  Patient Active Problem List   Diagnosis    Benign prostatic hyperplasia, presence of lower urinary tract symptoms unspecified    Erectile dysfunction    Lumbar radiculopathy    Essential hypertension    CKD (chronic kidney disease) stage 3, GFR 30-59 ml/min ([facility])    Vertigo    Cerumen impaction    Chronic kidney disease-mineral and bone disorder    Colon polyps    Pain in left knee    Stroke-like symptoms       Past Medical History  Past Medical History:   Diagnosis Date    Benign prostatic hyperplasia     Chronic kidney disease     COVID-19 03/2020    Esophageal reflux     Hypertension     Vertigo        Past Surgical History  Past Surgical History:   Procedure Laterality Date    APPENDECTOMY  05/21/2015    CYST REMOVAL      Fatty cyst removed from back       PT performed at least 2 patient identifiers during session: Name and wristband         01/25/22 0846   PT Last Visit   PT Visit Date 01/25/22   Note Type   Note type Evaluation   Pain Assessment   Pain Assessment Tool 0-10   Pain Score No Pain   Restrictions/Precautions   Weight Bearing Precautions Per Order No   Braces or Orthoses Other (Comment)  (none per pt)   Other Precautions Multiple lines;Telemetry   Home Living   Type of Home House  ([facility])   Home Layout Two level;Bed/bath upstairs  (1 ROZ, FOS to bed/bath)   Bathroom Shower/Tub Tub/shower unit   Bathroom Toilet Standard   Bathroom Equipment   (no DME at baseline)   [address]   (no AD at baseline)   Additional Comments Patient ambulatory with no AD at baseline   Prior Function   Level of [place] Independent with ADLs and functional mobility   Lives With Spouse   Receives Help From Family   ADL Assistance Independent   IADLs Independent   Falls in the last 6 months 0   Vocational Retired   Comments (+)   pt physically active, goes to the gym PTA   General   Family/Caregiver Present No   Cognition   Overall Cognitive Status WFL   Arousal/Participation Alert   Attention Within functional limits   Orientation Level Oriented X4   Memory Within functional limits   Following Commands Follows all commands and directions without difficulty   Comments pt agreeable to PT evaluation   RUE Assessment   RUE Assessment WFL   LUE Assessment   LUE Assessment WFL   RLE Assessment   RLE Assessment WFL  (symmetrical 5/5)   LLE Assessment   LLE Assessment WFL  (symmetrical 5/5)   Vision-Basic Assessment   Current Vision Wears glasses all the time  ("most of the time")   Patient Visual Report Other (Comment)  (pt denies acute visual changes)   Coordination   Sensation X  (diminished light touch L hand, "90%")   Light Touch   RLE Light Touch Grossly intact   LLE Light Touch Grossly intact   Bed Mobility   Supine to Sit 6  Modified independent   Additional items HOB elevated   Transfers   Sit to Stand 6  Modified independent   Stand to Sit 6  Modified independent   Ambulation/Elevation   Gait pattern WNL  (no LOB)   Gait Assistance 6  Modified independent   Assistive Device None   Distance 200'   Stair Management Assistance   (pt demo'ed high marching; denies concerns for stairs at d/c)   Balance   Static Sitting Normal   Dynamic Sitting Good   Static Standing Good   Dynamic Standing Fair +   Ambulatory Fair +   Endurance Deficit   Endurance Deficit No   Activity Tolerance   Activity Tolerance Patient tolerated treatment well   Medical Staff Made Aware care coordination with ROSY Otto   Nurse Made Aware MAYRA Sanders   Assessment   Assessment Pt is 70 y o  male seen for moderate-complexity PT evaluation on 1/25/2022 s/p admit to [facility] on 1/25/2022 w/ stroke alert after waking up with left hand and foot numbness   PT was consulted to assess pt's functional mobility and d/c needs  Order placed for PT eval and tx, w/ up w/ A order  PTA, pt resides c wife in [facility]; ambulatory without AD; retired  At time of eval, pt performing all phases of mobility at mod I level  Pertinent PMHx and current co-morbidities affecting pt's physical performance at time of assessment include: HTN, CKD stage 3, stroke like symptoms, lumbar radiculopathy, vertigo, chronic kidney disease-mineral and bone disorder, L knee pain  Personal factors affecting pt at time of IE include: lives in 2 story house and stairs to enter home  Please find objective findings from PT assessment regarding body systems outlined above  The following objective measures performed on IE: Barthel Index: 95/100, Modified [place]: 1 (no significant disability) and AM-PAC 6-Clicks: 60/61  Pt's clinical presentation is currently evolving seen in pt's presentation of pending brain MRI, neuro consult, medical management, pending brain MRI to r/o CVA  From PT/mobility standpoint, pt appears to be functioning close to or at mobility baseline, therefore no further immediate skilled PT needs are warranted at this time  Pt currently performing all phases of functional mobility at independent level without need for AD  Recommend pt continue to mobilize ad hayden while here  Recommend pt return to previous living environment once medically cleared  Will sign off, D/C PT  Please reconsult if further immediate skilled PT needs are warranted     Goals   Patient Goals to get back to the gym   Plan   PT Frequency   (D/C PT)   Recommendation   PT Discharge Recommendation No rehabilitation needs   Equipment Recommended   (none at this time)   [place] 8 in Bed Without Bedrails 4   Lying on Back to Sitting on Edge of Flat Bed 4   Moving Bed to Chair 4   Standing Up From Chair 4   Walk in Room 4   Climb 3-5 Stairs 4   Basic Mobility Inpatient Raw Score 24   Basic Mobility Standardized Score 57 68   Highest Level Of Mobility -[facility] Goal 8: Walk 250 feet or more   Modified Luce Scale   Modified Luce Scale 1   Barthel Index   Feeding 10   Bathing 5   Grooming Score 5   Dressing Score 10   Bladder Score 10   Bowels Score 10   Toilet Use Score 10   Transfers (Bed/Chair) Score 15   Mobility (Level Surface) Score 15   Stairs Score 5  (Based on high marching/clinical observations)   Barthel Index Score 95       Mulugeta Mode, PT, DPT

## 2022-01-25 NOTE — Clinical Note
Jovani Sutton accompanied Jhoan Zapata to the emergency department on 1/25/2022  Return date if applicable: 06/39/4591        If you have any questions or concerns, please don't hesitate to call        Larry Wyatt RN

## 2022-01-25 NOTE — ED NOTES
Discharged to home after speaking with Dr Dennise Nunes  Pt verbalized understanding of instructions  Alert oriented x4   In no apparent distress     Raj Warren RN  01/25/22 9541

## 2022-01-25 NOTE — ASSESSMENT & PLAN NOTE
· Stroke like symptoms, likely in setting of hypertensive urgency  · MRI brain negative, CTA head/neck showing trace internal carotid stenosis  · Start aspirin and statin  · Follow up with neurology as an outpatient

## 2022-01-25 NOTE — SPEECH THERAPY NOTE
Consult received and chart reviewed  Per chart review and discussion with MD, pt passed RN dysphagia assessment and is tolerating regular diet with thin liquids  Formal speech/swallow evaluation does not appear to be indicated at this time  If new concerns arise, please reconsult  Thank you

## 2022-01-25 NOTE — OCCUPATIONAL THERAPY NOTE
Occupational Therapy Evaluation Note        Patient Name: Delroy BARRIGAR'R Date: 1/25/2022 01/25/22 0800   OT Last Visit   OT Visit Date 01/25/22   Note Type   Note type Evaluation   Restrictions/Precautions   Weight Bearing Precautions Per Order No   Braces or Orthoses Other (Comment)  (none per pt)   Other Precautions Multiple lines;Telemetry   Pain Assessment   Pain Assessment Tool 0-10   Pain Score No Pain   Home Living   Type of Home House  ([facility])   Home Layout Two level;Bed/bath upstairs  (1 ROZ, FOS to bed/bath)   Bathroom Shower/Tub Tub/shower unit   Bathroom Toilet Standard   Bathroom Equipment Other (Comment)  (no DME at baseline)   [address] Other (Comment)  (no AD at baseline)   Additional Comments Patient ambulatory with no AD at baseline   Prior Function   Level of [place] Independent with ADLs and functional mobility   Lives With Spouse   Receives Help From Family   ADL Assistance Independent   IADLs Independent   Falls in the last 6 months 0   Vocational Retired   Comments (+)   pt physically active, goes to the gym PTA   Lifestyle   Autonomy Per patient report, he lives with his spouse in a two-story home with 1 ROZ and a FOS to the bedroom and bathroom on the second level  At baseline, patient reports that he is independent in both ADLs and IADLs and is ambulatory with no AD     Reciprocal Relationships Supportive spouse   Service to Others Retired-    Intrinsic Gratification Going to the gym   Psychosocial   Psychosocial (WDL) [address]  7  Independent   Grooming Assistance 7  Independent   UB Bathing Assistance 7  [address] [facility] 7  k [address] 7  Independent   Additional Comments ADL assist levels based on pt's functional performance during OT evaluation   Bed Mobility   Supine to Sit 6  Modified independent   Additional items HOB elevated   Additional Comments Patient received lying supine on stretcher upon OT arrival; at end of session: pt seated EOB w/ all needs within reach and pt set up for breakfast   Transfers   Sit to Stand 6  Modified independent   Stand to Sit 6  Modified independent   Additional Comments Performed functional transfers using no AD   Functional Mobility   Functional Mobility 6  Modified independent   Additional Comments Ambulation through pt room and hallway with no overt LOB or SOB   Additional items   (no AD)   Balance   Static Sitting Normal   Dynamic Sitting Good   Static Standing Good   Dynamic Standing Fair +   Ambulatory Fair +   Activity Tolerance   Activity Tolerance Patient tolerated treatment well   Medical Staff Made Aware PT Maria Antonia   Nurse Made Aware MAYRA Dixon   RUE Assessment   RUE Assessment WFL  (AROM and strength WFL and equal bilaterally)   RUE Strength   RUE Overall Strength Within Functional Limits - strength 5/5   LUE Assessment   LUE Assessment WFL  (AROM and strength WFL and equal bilaterally)   LUE Strength   LUE Overall Strength Within Functional Limits - strength 5/5   Hand Function   Gross Motor Coordination Functional   Fine Motor Coordination Functional   Sensation   Additional Comments Patient reports slight tingling in L palm into digit III, pt reports "about 90% better"   Vision-Basic Assessment   Current Vision Wears glasses all the time  ("most of the time")   Patient Visual Report Other (Comment)  (pt denies acute visual changes)   Cognition   Overall Cognitive Status [facility]   Arousal/Participation Alert; Responsive; Cooperative   Attention Within functional limits   Orientation Level Oriented X4   Memory Within functional limits   Following Commands Follows all commands and directions without difficulty   Comments Patient agreeable to OT evaluation   Assessment   Limitation (No noted deficits affecting occupational performance)   Prognosis Good   Assessment Patient is a 70 y o  male seen for OT evaluation s/p admit to [address] [facility] on 1/25/2022 w/<principal problem not specified>  Commorbidities affecting patient's functional performance at time of assessment include: stroke-like symptoms, essential HTN, CKD stage 3, and benign prostatic hyperplasia, presence of lower urinary tract symptoms unspecified  Patient  has a past medical history of Benign prostatic hyperplasia, Chronic kidney disease, COVID-19 (03/2020), Esophageal reflux, Hypertension, and Vertigo  Orders placed for OT evaluation and treatment  Performed at least two patient identifiers during session including name and wristband  Prior to admission, patient was living with his spouse in a two-story townhome with 1 ROZ and a FOS to the bedroom and bathroom  At baseline, patient reports that he is independent in both ADLs and IADLs  No noted personal factors affecting patient at time of initial evaluation  Upon evaluation, patient requires independent assist for UB ADLs, independent assist for LB ADLs, transfers and functional ambulation in room and bathroom with modified independent assist, with no AD  Patient is alert and oriented x 4  No noted deficits affecting occupational performance at time of IE  Patient appears to be functioning at baseline  No further acute OT needs identified at this time to warrant continuation of services  D/C OT services  From OT standpoint, recommendation at time of d/c would be Home with family support       Goals   Patient Goals to get back to the gym   Recommendation   OT Discharge Recommendation No rehabilitation needs   AM-PAC Daily Activity Inpatient   Lower Body Dressing 4   Bathing 4   Toileting 4   Upper Body Dressing 4   Grooming 4   Eating 4   Daily Activity Raw Score 24   Daily Activity Standardized Score (Calc for Raw Score >=11) 57 54   AM-PAC Applied Cognition Inpatient   Following a Speech/Presentation 4   Understanding Ordinary Conversation 4   Taking Medications 4   Remembering Where Things Are Placed or Put Away 4   Remembering List of 4-5 Errands 4   Taking Care of Complicated Tasks 4   Applied Cognition Raw Score 24   Applied Cognition Standardized Score 62 21   Barthel Index   Feeding 10   Bathing 5   Grooming Score 5   Dressing Score 10   Bladder Score 10   Bowels Score 10   Toilet Use Score 10   Transfers (Bed/Chair) Score 15   Mobility (Level Surface) Score 15   Stairs Score 5  (Based on high marching/clinical observations)   Barthel Index Score 95   Modified Navarro Scale   Modified Navarro Scale 1     Vanesa Deras, OTR/L

## 2022-01-26 ENCOUNTER — TRANSITIONAL CARE MANAGEMENT (OUTPATIENT)
Dept: INTERNAL MEDICINE CLINIC | Facility: CLINIC | Age: 72
End: 2022-01-26

## 2022-01-27 ENCOUNTER — TELEPHONE (OUTPATIENT)
Dept: INTERNAL MEDICINE CLINIC | Facility: CLINIC | Age: 72
End: 2022-01-27

## 2022-01-27 NOTE — TELEPHONE ENCOUNTER
Have him take 2 tablets of the amlodipine 5 mg daily or equivalent to 10 mg daily and monitor his blood pressure

## 2022-02-02 ENCOUNTER — TELEPHONE (OUTPATIENT)
Dept: NEUROLOGY | Facility: CLINIC | Age: 72
End: 2022-02-02

## 2022-02-02 NOTE — TELEPHONE ENCOUNTER
1st Attempt LVM to [facility] HFU appt  HFU/SLMO/ATT/AP/Stroke-like Symptoms/DC1/25    Note from Chart:      Chasity Xiao will need follow up in in 6 weeks with neurovascular attending or advance practitioner  He will not require outpatient neurological testing

## 2022-02-03 ENCOUNTER — TELEPHONE (OUTPATIENT)
Dept: NEUROLOGY | Facility: CLINIC | Age: 72
End: 2022-02-03

## 2022-02-07 ENCOUNTER — CONSULT (OUTPATIENT)
Dept: NEUROLOGY | Facility: CLINIC | Age: 72
End: 2022-02-07
Payer: MEDICARE

## 2022-02-07 VITALS
BODY MASS INDEX: 25.33 KG/M2 | OXYGEN SATURATION: 98 % | HEART RATE: 62 BPM | HEIGHT: 72 IN | SYSTOLIC BLOOD PRESSURE: 122 MMHG | DIASTOLIC BLOOD PRESSURE: 70 MMHG | WEIGHT: 187 LBS | TEMPERATURE: 98.4 F

## 2022-02-07 DIAGNOSIS — R29.90 STROKE-LIKE SYMPTOMS: ICD-10-CM

## 2022-02-07 DIAGNOSIS — I16.0 HYPERTENSIVE URGENCY: Primary | ICD-10-CM

## 2022-02-07 PROCEDURE — 99214 OFFICE O/P EST MOD 30 MIN: CPT | Performed by: PSYCHIATRY & NEUROLOGY

## 2022-02-07 NOTE — PROGRESS NOTES
Consultation - Neurology   Anabella Hurst 70 y o  male MRN: [id number]  Unit/Bed#:  Encounter: [id number]      Physician Requesting Consult: No att  providers found  Chief complaint     HPI:    Review of Systems:  Review of Systems   Constitutional: Negative  Negative for appetite change and fever  HENT: Negative  Negative for hearing loss, tinnitus, trouble swallowing and voice change  Eyes: Negative  Negative for photophobia and pain  Respiratory: Negative  Negative for shortness of breath  Cardiovascular: Negative  Negative for palpitations  Gastrointestinal: Negative  Negative for nausea and vomiting  Endocrine: Negative  Negative for cold intolerance  Genitourinary: Negative  Negative for dysuria, frequency and urgency  Musculoskeletal: Positive for back pain  Negative for myalgias and neck pain  Skin: Negative  Negative for rash  Neurological: Negative  Negative for dizziness, tremors, seizures, syncope, facial asymmetry, speech difficulty, weakness, light-headedness, numbness and headaches  Hematological: Negative  Does not bruise/bleed easily  Psychiatric/Behavioral: Negative  Negative for confusion, hallucinations and sleep disturbance         Historical Information   Past Medical History:   Diagnosis Date    Benign prostatic hyperplasia     Chronic kidney disease     COVID-19 03/2020    Esophageal reflux     Hypertension     Vertigo      Past Surgical History:   Procedure Laterality Date    APPENDECTOMY  05/21/2015    CYST REMOVAL      Fatty cyst removed from back     Social History   Social History     Tobacco Use   Smoking Status Former Smoker   Smokeless Tobacco Never Used   Tobacco Comment    1ppw     Social History     Substance and Sexual Activity   Alcohol Use Yes    Comment: socially     Social History     Substance and Sexual Activity   Drug Use No       Family History:   Family History   Problem Relation Age of Onset    Prostate cancer Father    Daquan Abt Prostate cancer Maternal Grandfather     Stomach cancer Maternal Aunt         malignant tumor of pharynx    Stomach cancer Maternal Uncle         malignant tumor of pharynx    Mental illness Family     Depression Family     Schizophrenia Family     Hypertension Mother     No Known Problems Sister     Dementia Sister        Allergies   Allergen Reactions    Penicillin V Anaphylaxis       Meds:  All current active meds have been reviewed    Scheduled Meds:  PRN Meds:     Physical Exam:       Objective   Vitals:   Vitals:    02/07/22 1626   BP: 122/70   BP Location: Left arm   Patient Position: Sitting   Cuff Size: Standard   Pulse: 62   SpO2: 98%   Weight: 84 8 kg (187 lb)   Height: 6' (1 829 m)   ,Body mass index is 25 36 kg/m²  General appearance: Cooperative in no acute distress  Head & neck head is atraumatic and normocephalic  Neck is supple with full range of motion  Cardiovascular: Carotid arteries-no carotid bruits  Assessment:      Plan:              Krunal Brewster  2/7/2022,4:29 PM    Dictation voice to text software has been used in the creation of this document

## 2022-02-07 NOTE — PROGRESS NOTES
Progress Note - Neurology   Hitesh Wood 70 y o  male MRN: [id number]  Unit/Bed#:  Encounter: [id number]      Subjective:   Patient is here for as a hospital follow-up was recently seen by myself in the emergency department for left claudia sensory dysfunction felt to be possibly secondary to TIA versus hypertensive urgency  Patient was also suffering from COVID infection at that time and had a workup in the form of a CTA of the head and neck which showed no acute intracranial abnormalities but showed evidence of mild diffuse atherosclerotic disease  Patient's symptoms had resolved upon my evaluation an MRI of the brain done showed no evidence of any diffusion defects  Subsequently the patient has not had any recurrent symptoms claims his blood pressure is under good control and remains on aspirin/statin therapy  He denies any new neurological symptoms at this time  ROS:   Review of Systems   Constitutional: Negative  Negative for appetite change and fever  HENT: Negative  Negative for hearing loss, tinnitus, trouble swallowing and voice change  Eyes: Negative  Negative for photophobia and pain  Respiratory: Negative  Negative for shortness of breath  Cardiovascular: Negative  Negative for palpitations  Gastrointestinal: Negative  Negative for nausea and vomiting  Endocrine: Negative  Negative for cold intolerance  Genitourinary: Negative  Negative for dysuria, frequency and urgency  Musculoskeletal: Negative  Negative for myalgias and neck pain  Skin: Negative  Negative for rash  Neurological: Negative  Negative for dizziness, tremors, seizures, syncope, facial asymmetry, speech difficulty, weakness, light-headedness, numbness and headaches  Hematological: Negative  Does not bruise/bleed easily  Psychiatric/Behavioral: Negative  Negative for confusion, hallucinations and sleep disturbance  MA review of systems was reviewed by myself      Vitals:   Vitals:    02/07/22 1626   BP: 122/70   BP Location: Left arm   Patient Position: Sitting   Cuff Size: Standard   Pulse: 62   Temp: 98 4 °F (36 9 °C)   TempSrc: Temporal   SpO2: 98%   Weight: 84 8 kg (187 lb)   Height: 6' (1 829 m)   ,Body mass index is 25 36 kg/m²  MEDS:      Current Outpatient Medications:     amLODIPine (NORVASC) 10 mg tablet, Take 1 tablet (10 mg total) by mouth daily, Disp: 90 tablet, Rfl: 1    aspirin 81 mg chewable tablet, Chew 1 tablet (81 mg total) daily, Disp: 30 tablet, Rfl: 0    atorvastatin (LIPITOR) 40 mg tablet, Take 1 tablet (40 mg total) by mouth every evening, Disp: 90 tablet, Rfl: 1    lidocaine (LIDODERM) 5 %, Apply 1 patch topically daily Remove & Discard patch within 12 hours or as directed by MD (Patient taking differently: Apply 1 patch topically as needed Remove & Discard patch within 12 hours or as directed by MD ), Disp: 50 patch, Rfl: 5    multivitamin (THERAGRAN) TABS, Take 1 tablet by mouth daily, Disp: , Rfl:     omeprazole (PriLOSEC) 20 mg delayed release capsule, , Disp: , Rfl:     tadalafil (CIALIS) 20 MG tablet, tadalafil 20 mg tablet (Patient not taking: Reported on 2/7/2022), Disp: , Rfl:     tamsulosin (FLOMAX) 0 4 mg, Take 1 capsule (0 4 mg total) by mouth daily with dinner (Patient not taking: Reported on 2/7/2022 ), Disp: 90 capsule, Rfl: 3  :    Physical Exam:  General appearance: alert, appears stated age and cooperative  Head: Normocephalic, without obvious abnormality, atraumatic    On examination patient is alert awake oriented, speech is fluent, cranial nerves 2-12 intact, and on motor and sensory exam there is no evidence of any drift or focal weakness, no evidence of any sensory loss was noted, no evidence of any dysmetria was noted, does not extinguish to double simultaneous stimuli and his gait is normal based  Lab Results: I have personally reviewed pertinent reports  Imaging Studies: I have personally reviewed pertinent reports  Assessment:  1  TIA  2  Hypertensive urgency, improved  Plan: At this time a lengthy discussion occurred with the patient regarding adequate blood pressure and blood sugar control, cardiovascular fitness, home exercise program, continuing aspirin/atorvastatin on a regular basis, reviewed his CTA and MRI results with him again, and patient will follow up with his PCP for further management  He is advised to return back to see us on a p r n  basis  Patient had several questions all of which were answered to his satisfaction  Counseling / Coordination of Care  Total time spent today 25 minutes  Greater than 50% of total time was spent with the patient and / or family counseling and / or coordination of care  2/7/2022,5:00 PM    Dictation voice to text software has been used in the creation of this document  Please consider this in light of any contextual or grammatical errors

## 2022-02-14 ENCOUNTER — TELEPHONE (OUTPATIENT)
Dept: INTERNAL MEDICINE CLINIC | Facility: CLINIC | Age: 72
End: 2022-02-14

## 2022-02-14 DIAGNOSIS — I10 ESSENTIAL HYPERTENSION: ICD-10-CM

## 2022-02-14 RX ORDER — AMLODIPINE BESYLATE 10 MG/1
10 TABLET ORAL DAILY
Qty: 90 TABLET | Refills: 1 | Status: SHIPPED | OUTPATIENT
Start: 2022-02-14

## 2022-02-14 NOTE — TELEPHONE ENCOUNTER
Patient would like amlodipine 10 mg tablet, take 1 tablet (10 mg total) by mouth daily, sent to [facility]   He did get the script that was sent in on 02/04  90 day supply    Call back #[phone]

## 2022-02-25 ENCOUNTER — HOSPITAL ENCOUNTER (EMERGENCY)
Facility: HOSPITAL | Age: 72
Discharge: HOME | End: 2022-02-25
Attending: EMERGENCY MEDICINE
Payer: MEDICARE

## 2022-02-25 ENCOUNTER — APPOINTMENT (EMERGENCY)
Dept: CT IMAGING | Facility: HOSPITAL | Age: 72
End: 2022-02-25
Payer: MEDICARE

## 2022-02-25 VITALS
RESPIRATION RATE: 16 BRPM | TEMPERATURE: 97.8 F | SYSTOLIC BLOOD PRESSURE: 149 MMHG | DIASTOLIC BLOOD PRESSURE: 70 MMHG | OXYGEN SATURATION: 98 % | BODY MASS INDEX: 26.28 KG/M2 | WEIGHT: 193.78 LBS | HEART RATE: 75 BPM

## 2022-02-25 DIAGNOSIS — J34.9 SINUS DISEASE: Primary | ICD-10-CM

## 2022-02-25 LAB
ALBUMIN SERPL BCP-MCNC: 3.8 G/DL (ref 3.5–5)
ALP SERPL-CCNC: 59 U/L (ref 46–116)
ALT SERPL W P-5'-P-CCNC: 39 U/L (ref 12–78)
ANION GAP SERPL CALCULATED.3IONS-SCNC: 9 MMOL/L (ref 4–13)
AST SERPL W P-5'-P-CCNC: 29 U/L (ref 5–45)
BASOPHILS # BLD AUTO: 0.02 THOUSANDS/ΜL (ref 0–0.1)
BASOPHILS NFR BLD AUTO: 1 % (ref 0–1)
BILIRUB SERPL-MCNC: 0.69 MG/DL (ref 0.2–1)
BUN SERPL-MCNC: 24 MG/DL (ref 5–25)
CALCIUM SERPL-MCNC: 8.8 MG/DL (ref 8.3–10.1)
CHLORIDE SERPL-SCNC: 104 MMOL/L (ref 100–108)
CO2 SERPL-SCNC: 27 MMOL/L (ref 21–32)
CREAT SERPL-MCNC: 1.2 MG/DL (ref 0.6–1.3)
EOSINOPHIL # BLD AUTO: 0.04 THOUSAND/ΜL (ref 0–0.61)
EOSINOPHIL NFR BLD AUTO: 1 % (ref 0–6)
ERYTHROCYTE [DISTWIDTH] IN BLOOD BY AUTOMATED COUNT: 13.3 % (ref 11.6–15.1)
ERYTHROCYTE [SEDIMENTATION RATE] IN BLOOD: 2 MM/HOUR (ref 0–19)
GFR SERPL CREATININE-BSD FRML MDRD: 60 ML/MIN/1.73SQ M
GLUCOSE SERPL-MCNC: 96 MG/DL (ref 65–140)
HCT VFR BLD AUTO: 39.6 % (ref 36.5–49.3)
HGB BLD-MCNC: 13.7 G/DL (ref 12–17)
IMM GRANULOCYTES # BLD AUTO: 0 THOUSAND/UL (ref 0–0.2)
IMM GRANULOCYTES NFR BLD AUTO: 0 % (ref 0–2)
LYMPHOCYTES # BLD AUTO: 1.24 THOUSANDS/ΜL (ref 0.6–4.47)
LYMPHOCYTES NFR BLD AUTO: 40 % (ref 14–44)
MCH RBC QN AUTO: 30.5 PG (ref 26.8–34.3)
MCHC RBC AUTO-ENTMCNC: 34.6 G/DL (ref 31.4–37.4)
MCV RBC AUTO: 88 FL (ref 82–98)
MONOCYTES # BLD AUTO: 0.26 THOUSAND/ΜL (ref 0.17–1.22)
MONOCYTES NFR BLD AUTO: 8 % (ref 4–12)
NEUTROPHILS # BLD AUTO: 1.58 THOUSANDS/ΜL (ref 1.85–7.62)
NEUTS SEG NFR BLD AUTO: 50 % (ref 43–75)
NRBC BLD AUTO-RTO: 0 /100 WBCS
PLATELET # BLD AUTO: 179 THOUSANDS/UL (ref 149–390)
PMV BLD AUTO: 10.7 FL (ref 8.9–12.7)
POTASSIUM SERPL-SCNC: 3.8 MMOL/L (ref 3.5–5.3)
PROT SERPL-MCNC: 7.1 G/DL (ref 6.4–8.2)
RBC # BLD AUTO: 4.49 MILLION/UL (ref 3.88–5.62)
SODIUM SERPL-SCNC: 140 MMOL/L (ref 136–145)
WBC # BLD AUTO: 3.14 THOUSAND/UL (ref 4.31–10.16)

## 2022-02-25 PROCEDURE — 99284 EMERGENCY DEPT VISIT MOD MDM: CPT

## 2022-02-25 PROCEDURE — 70450 CT HEAD/BRAIN W/O DYE: CPT

## 2022-02-25 PROCEDURE — 85025 COMPLETE CBC W/AUTO DIFF WBC: CPT | Performed by: EMERGENCY MEDICINE

## 2022-02-25 PROCEDURE — 80053 COMPREHEN METABOLIC PANEL: CPT | Performed by: EMERGENCY MEDICINE

## 2022-02-25 PROCEDURE — 36415 COLL VENOUS BLD VENIPUNCTURE: CPT | Performed by: EMERGENCY MEDICINE

## 2022-02-25 PROCEDURE — 99284 EMERGENCY DEPT VISIT MOD MDM: CPT | Performed by: EMERGENCY MEDICINE

## 2022-02-25 PROCEDURE — 85652 RBC SED RATE AUTOMATED: CPT | Performed by: EMERGENCY MEDICINE

## 2022-02-25 RX ORDER — FLUTICASONE PROPIONATE 50 MCG
1 SPRAY, SUSPENSION (ML) NASAL DAILY
Qty: 16 G | Refills: 0 | Status: SHIPPED | OUTPATIENT
Start: 2022-02-25

## 2022-02-25 NOTE — ED PROVIDER NOTES
History  Chief Complaint   Patient presents with    Headache     headache on the top of his head, started yesterday morning when he woke up  Pt states it feels tingly up through his head      HPI  69 yo M with PMH htn presents with headache that started yesterday and resolved, returned after waking up this morning  States that it feels like his sinuses are congested, pain in his forehead and into his scalp on the left  Pain feels like a pressure in forehead and tingling sensation in scalp, mild  Has been blowing his nose frequently  No numbness, weakness, confusion  No visual changes or pain with eating  No neck pain or fevers  Prior to Admission Medications   Prescriptions Last Dose Informant Patient Reported? Taking?    amLODIPine (NORVASC) 10 mg tablet   No No   Sig: Take 1 tablet (10 mg total) by mouth daily   aspirin 81 mg chewable tablet   No No   Sig: Chew 1 tablet (81 mg total) daily   atorvastatin (LIPITOR) 40 mg tablet   No No   Sig: Take 1 tablet (40 mg total) by mouth every evening   lidocaine (LIDODERM) 5 %  Self No No   Sig: Apply 1 patch topically daily Remove & Discard patch within 12 hours or as directed by MD   Patient taking differently: Apply 1 patch topically as needed Remove & Discard patch within 12 hours or as directed by MD    multivitamin (THERAGRAN) TABS  Self Yes No   Sig: Take 1 tablet by mouth daily   omeprazole (PriLOSEC) 20 mg delayed release capsule   Yes No   tadalafil (CIALIS) 20 MG tablet  Self Yes No   Sig: tadalafil 20 mg tablet   Patient not taking: Reported on 2/7/2022   tamsulosin (FLOMAX) 0 4 mg  Self No No   Sig: Take 1 capsule (0 4 mg total) by mouth daily with dinner   Patient not taking: Reported on 2/7/2022       Facility-Administered Medications: None       Past Medical History:   Diagnosis Date    Benign prostatic hyperplasia     Chronic kidney disease     COVID-19 03/2020    Esophageal reflux     Hypertension     Vertigo        Past Surgical History: Procedure Laterality Date    APPENDECTOMY  05/21/2015    CYST REMOVAL      Fatty cyst removed from back       Family History   Problem Relation Age of Onset    Prostate cancer Father     Prostate cancer Maternal Grandfather     Stomach cancer Maternal Aunt         malignant tumor of pharynx    Stomach cancer Maternal Uncle         malignant tumor of pharynx    Mental illness Family     Depression Family     Schizophrenia Family     Hypertension Mother     No Known Problems Sister     Dementia Sister      I have reviewed and agree with the history as documented  E-Cigarette/Vaping    E-Cigarette Use Never User      E-Cigarette/Vaping Substances     Social History     Tobacco Use    Smoking status: Former Smoker    Smokeless tobacco: Never Used    Tobacco comment: 1ppw   Vaping Use    Vaping Use: Never used   Substance Use Topics    Alcohol use: Yes     Comment: socially    Drug use: No       Review of Systems   Constitutional: Negative for chills and fever  HENT: Positive for congestion, sinus pressure and sinus pain  Negative for dental problem and ear pain  Eyes: Negative for pain and redness  Respiratory: Negative for cough and shortness of breath  Cardiovascular: Negative for chest pain and palpitations  Gastrointestinal: Negative for abdominal pain and nausea  Endocrine: Negative for polydipsia and polyphagia  Genitourinary: Negative for dysuria and frequency  Musculoskeletal: Negative for arthralgias and joint swelling  Skin: Negative for color change and rash  Neurological: Positive for headaches  Negative for dizziness  Psychiatric/Behavioral: Negative for behavioral problems and confusion  All other systems reviewed and are negative  Physical Exam  Physical Exam  Vitals and nursing note reviewed  Constitutional:       General: He is not in acute distress  Appearance: He is well-developed  He is not diaphoretic  HENT:      Head: Atraumatic  Comments: TTP over frontal sinuses     Right Ear: External ear normal       Left Ear: External ear normal       Nose: Nose normal    Eyes:      Conjunctiva/sclera: Conjunctivae normal       Pupils: Pupils are equal, round, and reactive to light  Neck:      Vascular: No JVD  Cardiovascular:      Rate and Rhythm: Normal rate and regular rhythm  Heart sounds: Normal heart sounds  No murmur heard  Pulmonary:      Effort: Pulmonary effort is normal  No respiratory distress  Breath sounds: Normal breath sounds  No wheezing  Abdominal:      General: Bowel sounds are normal  There is no distension  Palpations: Abdomen is soft  Tenderness: There is no abdominal tenderness  Musculoskeletal:         General: Normal range of motion  Cervical back: Normal range of motion and neck supple  Skin:     General: Skin is warm and dry  Capillary Refill: Capillary refill takes less than 2 seconds  Neurological:      General: No focal deficit present  Mental Status: He is alert and oriented to person, place, and time  Mental status is at baseline  Cranial Nerves: No cranial nerve deficit  Sensory: No sensory deficit  Motor: No weakness        Coordination: Coordination normal       Gait: Gait normal    Psychiatric:         Behavior: Behavior normal          Vital Signs  ED Triage Vitals [02/25/22 0923]   Temperature Pulse Respirations Blood Pressure SpO2   97 8 °F (36 6 °C) 75 16 149/70 98 %      Temp src Heart Rate Source Patient Position - Orthostatic VS BP Location FiO2 (%)   -- -- -- -- --      Pain Score       --           Vitals:    02/25/22 0923   BP: 149/70   Pulse: 75         Visual Acuity      ED Medications  Medications - No data to display    Diagnostic Studies  Results Reviewed     Procedure Component Value Units Date/Time    Comprehensive metabolic panel [[id number]] Collected: 02/25/22 0946    Lab Status: Final result Specimen: Blood from Arm, Left Updated: 02/25/22 1004     Sodium 140 mmol/L      Potassium 3 8 mmol/L      Chloride 104 mmol/L      CO2 27 mmol/L      ANION GAP 9 mmol/L      BUN 24 mg/dL      Creatinine 1 20 mg/dL      Glucose 96 mg/dL      Calcium 8 8 mg/dL      AST 29 U/L      ALT 39 U/L      Alkaline Phosphatase 59 U/L      Total Protein 7 1 g/dL      Albumin 3 8 g/dL      Total Bilirubin 0 69 mg/dL      eGFR 60 ml/min/1 73sq m     Narrative:      National Kidney Disease Foundation guidelines for Chronic Kidney Disease (CKD):     Stage 1 with normal or high GFR (GFR > 90 mL/min/1 73 square meters)    Stage 2 Mild CKD (GFR = 60-89 mL/min/1 73 square meters)    Stage 3A Moderate CKD (GFR = 45-59 mL/min/1 73 square meters)    Stage 3B Moderate CKD (GFR = 30-44 mL/min/1 73 square meters)    Stage 4 Severe CKD (GFR = 15-29 mL/min/1 73 square meters)    Stage 5 End Stage CKD (GFR <15 mL/min/1 73 square meters)  Note: GFR calculation is accurate only with a steady state creatinine    Sedimentation rate, automated [[id number]]  (Normal) Collected: 02/25/22 0946    Lab Status: Final result Specimen: Blood from Arm, Left Updated: 02/25/22 0953     Sed Rate 2 mm/hour     CBC and differential [[id number]]  (Abnormal) Collected: 02/25/22 0946    Lab Status: Final result Specimen: Blood from Arm, Left Updated: 02/25/22 0950     WBC 3 14 Thousand/uL      RBC 4 49 Million/uL      Hemoglobin 13 7 g/dL      Hematocrit 39 6 %      MCV 88 fL      MCH 30 5 pg      MCHC 34 6 g/dL      RDW 13 3 %      MPV 10 7 fL      Platelets 513 Thousands/uL      nRBC 0 /100 WBCs      Neutrophils Relative 50 %      Immat GRANS % 0 %      Lymphocytes Relative 40 %      Monocytes Relative 8 %      Eosinophils Relative 1 %      Basophils Relative 1 %      Neutrophils Absolute 1 58 Thousands/µL      Immature Grans Absolute 0 00 Thousand/uL      Lymphocytes Absolute 1 24 Thousands/µL      Monocytes Absolute 0 26 Thousand/µL      Eosinophils Absolute 0 04 Thousand/µL      Basophils Absolute 0 02 Thousands/µL                  CT head without contrast   Final Result by Yesica Quintanilla MD (02/25 0611)      No acute intracranial abnormality  Workstation performed: [id number]                    Procedures  Procedures         ED Course                                             [facility]  Patient presents with sinus pressure/headache on left  Normal neuro exam  Headache is not maximal in onset or worst of life  No neck pain/stiffness, no fevers, doubt SAH or meningitis  ESR negative, doubt temporal arteritis  CT shows sinus mucosal disease, will rx nasal sprays and ENT follow up  Disposition  Final diagnoses:   Sinus disease     Time reflects when diagnosis was documented in both MDM as applicable and the Disposition within this note     Time User Action Codes Description Comment    2/25/2022 10:09 AM Debra Oreilly [J34 9] Sinus disease       ED Disposition     ED Disposition Condition Date/Time Comment    Discharge Stable Fri Feb 25, 2022 10:09 AM Romana Garcia discharge to home/self care  Follow-up Information     Follow up With Specialties Details Why Contact Info    Sherri Maya MD Otolaryngology Call  for ear nose throat follow up [address]  [address] [facility] [address]  [phone]            Patient's Medications   Discharge Prescriptions    FLUTICASONE (FLONASE) 50 MCG/ACT NASAL SPRAY    1 spray into each nostril daily       Start Date: 2/25/2022 End Date: --       Order Dose: 1 spray       Quantity: 16 g    Refills: 0    SODIUM CHLORIDE (OCEAN) 0 65 % NASAL SPRAY    1 spray into each nostril as needed for rhinitis       Start Date: 2/25/2022 End Date: --       Order Dose: 1 spray       Quantity: 15 mL    Refills: 0       No discharge procedures on file      PDMP Review     None          ED Provider  Electronically Signed by           Sheba Chavira MD  02/25/22 1024

## 2022-02-25 NOTE — ED NOTES
Patient transported to [address] [place] [address], [address] [place] [address]  02/25/22 5711

## 2022-03-18 PROBLEM — H61.20 CERUMEN IMPACTION: Status: RESOLVED | Noted: 2020-01-07 | Resolved: 2022-03-18

## 2022-03-18 PROBLEM — I16.0 HYPERTENSIVE URGENCY: Status: RESOLVED | Noted: 2019-03-26 | Resolved: 2022-03-18

## 2022-03-18 PROBLEM — I10 PRIMARY HYPERTENSION: Status: ACTIVE | Noted: 2022-03-18

## 2022-03-30 ENCOUNTER — OFFICE VISIT (OUTPATIENT)
Dept: INTERNAL MEDICINE CLINIC | Facility: CLINIC | Age: 72
End: 2022-03-30
Payer: MEDICARE

## 2022-03-30 VITALS
SYSTOLIC BLOOD PRESSURE: 120 MMHG | HEART RATE: 60 BPM | WEIGHT: 188.8 LBS | BODY MASS INDEX: 25.57 KG/M2 | HEIGHT: 72 IN | DIASTOLIC BLOOD PRESSURE: 68 MMHG | OXYGEN SATURATION: 99 %

## 2022-03-30 DIAGNOSIS — Z00.00 ENCOUNTER FOR MEDICARE ANNUAL WELLNESS EXAM: Primary | ICD-10-CM

## 2022-03-30 DIAGNOSIS — Z13.31 DEPRESSION SCREEN: ICD-10-CM

## 2022-03-30 DIAGNOSIS — I10 PRIMARY HYPERTENSION: ICD-10-CM

## 2022-03-30 DIAGNOSIS — N18.31 STAGE 3A CHRONIC KIDNEY DISEASE (HCC): ICD-10-CM

## 2022-03-30 DIAGNOSIS — E78.2 MIXED HYPERLIPIDEMIA: ICD-10-CM

## 2022-03-30 DIAGNOSIS — M65.331 TRIGGER MIDDLE FINGER OF RIGHT HAND: ICD-10-CM

## 2022-03-30 PROCEDURE — G0439 PPPS, SUBSEQ VISIT: HCPCS | Performed by: PHYSICIAN ASSISTANT

## 2022-03-30 PROCEDURE — 99214 OFFICE O/P EST MOD 30 MIN: CPT | Performed by: PHYSICIAN ASSISTANT

## 2022-03-30 PROCEDURE — G0444 DEPRESSION SCREEN ANNUAL: HCPCS | Performed by: PHYSICIAN ASSISTANT

## 2022-03-30 NOTE — PROGRESS NOTES
Assessment/Plan:   Continue current medications  Have labs done and I will discuss the results with you when available  After reviewing those labs I will put in new labs for follow-up visit in 6 months  Please contact Nephrology to have an appointment scheduled in the end of May as we discussed  Quality Measures:   Depression Screening and Follow-up Plan: Patient was screened for depression during today's encounter  They screened negative with a PHQ-2 score of 0  Return in about 6 months (around 9/30/2022) for Next scheduled follow up  Diagnoses and all orders for this visit:    Encounter for Medicare annual wellness exam    Primary hypertension  -     CBC and differential; Future  -     Comprehensive metabolic panel; Future    Stage 3a chronic kidney disease (HCC)    Mixed hyperlipidemia  -     Lipid panel; Future    Trigger middle finger of right hand  -     Ambulatory referral to Orthopedic Surgery; Future          Subjective:      Patient ID: Betzy Chaudhry is a 70 y o  male  Follow-up, patient did not have labs for this visit  He will do so and then I will review them with him  Patient present with his wife  Hypertension:  Good control on his current medication  Eduarda cp, palp, sob, edema, HA, dizziness, diaphoresis, syncope, visual disturbance  Hyperlipidemia:  On atorvastatin 40 mg daily  Denies side effects  Await lab results  CKD 3A: Most recent labs showed normal BUN and creatinine  GFR at 60  Asymptomatic  Has developed a trigger finger of his long finger right hand  Has been present for about 2 months  He has to open the finger from the flexed position  Admits he goes to the gym frequently and lifts weights  No other focal concerns today  EMR has been reviewed, clarified, and updated with patient today        ALLERGIES:  Allergies   Allergen Reactions    Penicillin V Anaphylaxis       CURRENT MEDICATIONS:    Current Outpatient Medications:    amLODIPine (NORVASC) 10 mg tablet, Take 1 tablet (10 mg total) by mouth daily, Disp: 90 tablet, Rfl: 1    aspirin 81 mg chewable tablet, Chew 1 tablet (81 mg total) daily, Disp: 30 tablet, Rfl: 0    atorvastatin (LIPITOR) 40 mg tablet, Take 1 tablet (40 mg total) by mouth every evening, Disp: 90 tablet, Rfl: 1    fluticasone (FLONASE) 50 mcg/act nasal spray, 1 spray into each nostril daily, Disp: 16 g, Rfl: 0    lidocaine (LIDODERM) 5 %, Apply 1 patch topically daily Remove & Discard patch within 12 hours or as directed by MD (Patient taking differently: Apply 1 patch topically as needed Remove & Discard patch within 12 hours or as directed by MD ), Disp: 50 patch, Rfl: 5    multivitamin (THERAGRAN) TABS, Take 1 tablet by mouth daily, Disp: , Rfl:     omeprazole (PriLOSEC) 20 mg delayed release capsule, , Disp: , Rfl:     tadalafil (CIALIS) 20 MG tablet, tadalafil 20 mg tablet, Disp: , Rfl:     tamsulosin (FLOMAX) 0 4 mg, Take 1 capsule (0 4 mg total) by mouth daily with dinner, Disp: 90 capsule, Rfl: 3    ACTIVE PROBLEM LIST:  Patient Active Problem List   Diagnosis    Benign prostatic hyperplasia, presence of lower urinary tract symptoms unspecified    Erectile dysfunction    Lumbar radiculopathy    CKD (chronic kidney disease) stage 3, GFR 30-59 ml/min ([facility])    Vertigo    Chronic kidney disease-mineral and bone disorder    Colon polyps    Pain in left knee    Stroke-like symptoms    Primary hypertension       PAST MEDICAL HISTORY:  Past Medical History:   Diagnosis Date    Benign prostatic hyperplasia     Chronic kidney disease     COVID-19 03/2020    Esophageal reflux     Hypertension     Hypertensive urgency 3/26/2019    Vertigo        PAST SURGICAL HISTORY:  Past Surgical History:   Procedure Laterality Date    APPENDECTOMY  05/21/2015    CYST REMOVAL      Fatty cyst removed from back       FAMILY HISTORY:  Family History   Problem Relation Age of Onset    Prostate cancer Father  Prostate cancer Maternal Grandfather     Stomach cancer Maternal Aunt         malignant tumor of pharynx    Stomach cancer Maternal Uncle         malignant tumor of pharynx    Mental illness Family     Depression Family     Schizophrenia Family     Hypertension Mother     No Known Problems Sister     Dementia Sister        SOCIAL HISTORY:  Social History     Socioeconomic History    Marital status: /Civil Union     Spouse name: Not on file    Number of children: Not on file    Years of education: Not on file    Highest education level: Not on file   Occupational History    Occupation: Retired   Tobacco Use    Smoking status: Former Smoker    Smokeless tobacco: Never Used    Tobacco comment: 1ppw   Vaping Use    Vaping Use: Never used   Substance and Sexual Activity    Alcohol use: Yes     Comment: socially    Drug use: No    Sexual activity: Yes     Partners: Female     Comment: denied history of high risk sexual behavior   Other Topics Concern    Not on file   Social History Narrative    No active advance directive    Always uses seat belt    Exercises occasionally, moderate    Five children    Occasional caffeine consumption      Regular dental care  Brushes teeth twice daily     Social Determinants of Health     Financial Resource Strain: Not on file   Food Insecurity: Not on file   Transportation Needs: Not on file   Physical Activity: Not on file   Stress: Not on file   Social Connections: Not on file   Intimate Partner Violence: Not on file   Housing Stability: Not on file       Review of Systems   Constitutional: Negative for activity change, chills, fatigue and fever  HENT: Negative for congestion  Eyes: Negative for discharge  Respiratory: Negative for cough, chest tightness and shortness of breath  Cardiovascular: Negative for chest pain, palpitations and leg swelling     Gastrointestinal: Negative for abdominal pain, blood in stool, constipation, diarrhea, nausea and vomiting  Endocrine: Negative for polydipsia, polyphagia and polyuria  Genitourinary: Negative for difficulty urinating  Musculoskeletal: Negative for arthralgias and myalgias  Skin: Negative for rash  Allergic/Immunologic: Negative for immunocompromised state  Neurological: Negative for dizziness, syncope, weakness, light-headedness and headaches  Hematological: Negative for adenopathy  Does not bruise/bleed easily  Psychiatric/Behavioral: Negative for dysphoric mood, sleep disturbance and suicidal ideas  The patient is not nervous/anxious  Objective:  Vitals:    03/30/22 1402   BP: 120/68   BP Location: Left arm   Patient Position: Sitting   Cuff Size: Adult   Pulse: 60   SpO2: 99%   Weight: 85 6 kg (188 lb 12 8 oz)   Height: 6' (1 829 m)     Body mass index is 25 61 kg/m²  Physical Exam  Vitals and nursing note reviewed  Constitutional:       General: He is not in acute distress  Appearance: He is well-developed  HENT:      Head: Normocephalic and atraumatic  Eyes:      Extraocular Movements: Extraocular movements intact  Pupils: Pupils are equal, round, and reactive to light  Neck:      Thyroid: No thyromegaly  Vascular: No carotid bruit or JVD  Cardiovascular:      Rate and Rhythm: Normal rate and regular rhythm  Heart sounds: Normal heart sounds  Pulmonary:      Effort: Pulmonary effort is normal  No respiratory distress  Breath sounds: Normal breath sounds  Musculoskeletal:      Cervical back: Neck supple  Right lower leg: No edema  Left lower leg: No edema  Comments: Trigger finger right long finger   Lymphadenopathy:      Cervical: No cervical adenopathy  Skin:     General: Skin is warm and dry  Findings: No rash  Neurological:      General: No focal deficit present  Mental Status: He is alert and oriented to person, place, and time  Mental status is at baseline     Psychiatric:         Mood and Affect: Mood normal          Behavior: Behavior normal            RESULTS:    Recent Results (from the past 1008 hour(s))   Sedimentation rate, automated    Collection Time: 02/25/22  9:46 AM   Result Value Ref Range    Sed Rate 2 0 - 19 mm/hour   CBC and differential    Collection Time: 02/25/22  9:46 AM   Result Value Ref Range    WBC 3 14 (L) 4 31 - 10 16 Thousand/uL    RBC 4 49 3 88 - 5 62 Million/uL    Hemoglobin 13 7 12 0 - 17 0 g/dL    Hematocrit 39 6 36 5 - 49 3 %    MCV 88 82 - 98 fL    MCH 30 5 26 8 - 34 3 pg    MCHC 34 6 31 4 - 37 4 g/dL    RDW 13 3 11 6 - 15 1 %    MPV 10 7 8 9 - 12 7 fL    Platelets 533 877 - 015 Thousands/uL    nRBC 0 /100 WBCs    Neutrophils Relative 50 43 - 75 %    Immat GRANS % 0 0 - 2 %    Lymphocytes Relative 40 14 - 44 %    Monocytes Relative 8 4 - 12 %    Eosinophils Relative 1 0 - 6 %    Basophils Relative 1 0 - 1 %    Neutrophils Absolute 1 58 (L) 1 85 - 7 62 Thousands/µL    Immature Grans Absolute 0 00 0 00 - 0 20 Thousand/uL    Lymphocytes Absolute 1 24 0 60 - 4 47 Thousands/µL    Monocytes Absolute 0 26 0 17 - 1 22 Thousand/µL    Eosinophils Absolute 0 04 0 00 - 0 61 Thousand/µL    Basophils Absolute 0 02 0 00 - 0 10 Thousands/µL   Comprehensive metabolic panel    Collection Time: 02/25/22  9:46 AM   Result Value Ref Range    Sodium 140 136 - 145 mmol/L    Potassium 3 8 3 5 - 5 3 mmol/L    Chloride 104 100 - 108 mmol/L    CO2 27 21 - 32 mmol/L    ANION GAP 9 4 - 13 mmol/L    BUN 24 5 - 25 mg/dL    Creatinine 1 20 0 60 - 1 30 mg/dL    Glucose 96 65 - 140 mg/dL    Calcium 8 8 8 3 - 10 1 mg/dL    AST 29 5 - 45 U/L    ALT 39 12 - 78 U/L    Alkaline Phosphatase 59 46 - 116 U/L    Total Protein 7 1 6 4 - 8 2 g/dL    Albumin 3 8 3 5 - 5 0 g/dL    Total Bilirubin 0 69 0 20 - 1 00 mg/dL    eGFR 60 ml/min/1 73sq m       This note was created with voice recognition software  Phonic, grammatical and spelling errors may be present within the note as a result

## 2022-03-30 NOTE — PATIENT INSTRUCTIONS
Continue current medications  Have labs done and I will discuss the results with you when available  After reviewing those labs I will put in new labs for follow-up visit in 6 months  Please contact Nephrology to have an appointment scheduled in the end of May as we discussed  Medicare Preventive Visit Patient Instructions  Thank you for completing your Welcome to Medicare Visit or Medicare Annual Wellness Visit today  Your next wellness visit will be due in one year (3/31/2023)  The screening/preventive services that you may require over the next 5-10 years are detailed below  Some tests may not apply to you based off risk factors and/or age  Screening tests ordered at today's visit but not completed yet may show as past due  Also, please note that scanned in results may not display below  Preventive Screenings:  Service Recommendations Previous Testing/Comments   Colorectal Cancer Screening  · Colonoscopy    · Fecal Occult Blood Test (FOBT)/Fecal Immunochemical Test (FIT)  · Fecal DNA/Cologuard Test  · Flexible Sigmoidoscopy Age: 54-65 years old   Colonoscopy: every 10 years (May be performed more frequently if at higher risk)  OR  FOBT/FIT: every 1 year  OR  Cologuard: every 3 years  OR  Sigmoidoscopy: every 5 years  Screening may be recommended earlier than age 48 if at higher risk for colorectal cancer  Also, an individualized decision between you and your healthcare provider will decide whether screening between the ages of 74-80 would be appropriate   Colonoscopy: 11/20/2019  FOBT/FIT: Not on file  Cologuard: Not on file  Sigmoidoscopy: Not on file    Screening Current     Prostate Cancer Screening Individualized decision between patient and health care provider in men between ages of 53-78   Medicare will cover every 12 months beginning on the day after your 50th birthday PSA: 3 6 ng/mL           Hepatitis C Screening Once for adults born between 1945 and 1965  More frequently in patients at high risk for Hepatitis C Hep C Antibody: 06/06/2019    Screening Current   Diabetes Screening 1-2 times per year if you're at risk for diabetes or have pre-diabetes Fasting glucose: 94 mg/dL   A1C: No results in last 5 years    Screening Current   Cholesterol Screening Once every 5 years if you don't have a lipid disorder  May order more often based on risk factors  Lipid panel: 09/13/2021    Screening Not Indicated  History Lipid Disorder      Other Preventive Screenings Covered by Medicare:  1  Abdominal Aortic Aneurysm (AAA) Screening: covered once if your at risk  You're considered to be at risk if you have a family history of AAA or a male between the age of 73-68 who smoking at least 100 cigarettes in your lifetime  2  Lung Cancer Screening: covers low dose CT scan once per year if you meet all of the following conditions: (1) Age 50-69; (2) No signs or symptoms of lung cancer; (3) Current smoker or have quit smoking within the last 15 years; (4) You have a tobacco smoking history of at least 30 pack years (packs per day x number of years you smoked); (5) You get a written order from a healthcare provider  3  Glaucoma Screening: covered annually if you're considered high risk: (1) You have diabetes OR (2) Family history of glaucoma OR (3)  aged 48 and older OR (3)  American aged 72 and older  3  Osteoporosis Screening: covered every 2 years if you meet one of the following conditions: (1) Have a vertebral abnormality; (2) On glucocorticoid therapy for more than 3 months; (3) Have primary hyperparathyroidism; (4) On osteoporosis medications and need to assess response to drug therapy  5  HIV Screening: covered annually if you're between the age of 12-76  Also covered annually if you are younger than 13 and older than 72 with risk factors for HIV infection  For pregnant patients, it is covered up to 3 times per pregnancy      Immunizations:  Immunization Recommendations   Influenza Vaccine Annual influenza vaccination during flu season is recommended for all persons aged >= 6 months who do not have contraindications   Pneumococcal Vaccine (Prevnar and Pneumovax)  * Prevnar = PCV13  * Pneumovax = PPSV23 Adults 25-60 years old: 1-3 doses may be recommended based on certain risk factors  Adults 72 years old: Prevnar (PCV13) vaccine recommended followed by Pneumovax (PPSV23) vaccine  If already received PPSV23 since turning 65, then PCV13 recommended at least one year after PPSV23 dose  Hepatitis B Vaccine 3 dose series if at intermediate or high risk (ex: diabetes, end stage renal disease, liver disease)   Tetanus (Td) Vaccine - COST NOT COVERED BY MEDICARE PART B Following completion of primary series, a booster dose should be given every 10 years to maintain immunity against tetanus  Td may also be given as tetanus wound prophylaxis  Tdap Vaccine - COST NOT COVERED BY MEDICARE PART B Recommended at least once for all adults  For pregnant patients, recommended with each pregnancy  Shingles Vaccine (Shingrix) - COST NOT COVERED BY MEDICARE PART B  2 shot series recommended in those aged 48 and above     Health Maintenance Due:      Topic Date Due    Colorectal Cancer Screening  11/20/2022    Hepatitis C Screening  Completed     Immunizations Due:      Topic Date Due    DTaP,Tdap,and Td Vaccines (1 - Tdap) Never done    Pneumococcal Vaccine: 65+ Years (1 of 1 - PPSV23) Never done     Advance Directives   What are advance directives? Advance directives are legal documents that state your wishes and plans for medical care  These plans are made ahead of time in case you lose your ability to make decisions for yourself  Advance directives can apply to any medical decision, such as the treatments you want, and if you want to donate organs  What are the types of advance directives? There are many types of advance directives, and each state has rules about how to use them   You may choose a combination of any of the following:  · Living will: This is a written record of the treatment you want  You can also choose which treatments you do not want, which to limit, and which to stop at a certain time  This includes surgery, medicine, IV fluid, and tube feedings  · Durable power of  for healthcare [facility]): This is a written record that states who you want to make healthcare choices for you when you are unable to make them for yourself  This person, called a proxy, is usually a family member or a friend  You may choose more than 1 proxy  · Do not resuscitate (DNR) order:  A DNR order is used in case your heart stops beating or you stop breathing  It is a request not to have certain forms of treatment, such as CPR  A DNR order may be included in other types of advance directives  · Medical directive: This covers the care that you want if you are in a coma, near death, or unable to make decisions for yourself  You can list the treatments you want for each condition  Treatment may include pain medicine, surgery, blood transfusions, dialysis, IV or tube feedings, and a ventilator (breathing machine)  · Values history: This document has questions about your views, beliefs, and how you feel and think about life  This information can help others choose the care that you would choose  Why are advance directives important? An advance directive helps you control your care  Although spoken wishes may be used, it is better to have your wishes written down  Spoken wishes can be misunderstood, or not followed  Treatments may be given even if you do not want them  An advance directive may make it easier for your family to make difficult choices about your care  Weight Management   Why it is important to manage your weight:  Being overweight increases your risk of health conditions such as heart disease, high blood pressure, type 2 diabetes, and certain types of cancer   It can also increase your risk for osteoarthritis, sleep apnea, and other respiratory problems  Aim for a slow, steady weight loss  Even a small amount of weight loss can lower your risk of health problems  How to lose weight safely:  A safe and healthy way to lose weight is to eat fewer calories and get regular exercise  You can lose up about 1 pound a week by decreasing the number of calories you eat by 500 calories each day  Healthy meal plan for weight management:  A healthy meal plan includes a variety of foods, contains fewer calories, and helps you stay healthy  A healthy meal plan includes the following:  · Eat whole-grain foods more often  A healthy meal plan should contain fiber  Fiber is the part of grains, fruits, and vegetables that is not broken down by your body  Whole-grain foods are healthy and provide extra fiber in your diet  Some examples of whole-grain foods are whole-wheat breads and pastas, oatmeal, brown rice, and bulgur  · Eat a variety of vegetables every day  Include dark, leafy greens such as spinach, kale, dena greens, and mustard greens  Eat yellow and orange vegetables such as carrots, sweet potatoes, and winter squash  · Eat a variety of fruits every day  Choose fresh or canned fruit (canned in its own juice or light syrup) instead of juice  Fruit juice has very little or no fiber  · Eat low-fat dairy foods  Drink fat-free (skim) milk or 1% milk  Eat fat-free yogurt and low-fat cottage cheese  Try low-fat cheeses such as mozzarella and other reduced-fat cheeses  · Choose meat and other protein foods that are low in fat  Choose beans or other legumes such as split peas or lentils  Choose fish, skinless poultry (chicken or turkey), or lean cuts of red meat (beef or pork)  Before you cook meat or poultry, cut off any visible fat  · Use less fat and oil  Try baking foods instead of frying them  Add less fat, such as margarine, sour cream, regular salad dressing and mayonnaise to foods   Eat fewer high-fat foods  Some examples of high-fat foods include french fries, doughnuts, ice cream, and cakes  · Eat fewer sweets  Limit foods and drinks that are high in sugar  This includes candy, cookies, regular soda, and sweetened drinks  Exercise:  Exercise at least 30 minutes per day on most days of the week  Some examples of exercise include walking, biking, dancing, and swimming  You can also fit in more physical activity by taking the stairs instead of the elevator or parking farther away from stores  Ask your healthcare provider about the best exercise plan for you  © Copyright [employer] 2018 Information is for End User's use only and may not be sold, redistributed or otherwise used for commercial purposes   All illustrations and images included in CareNotes® are the copyrighted property of [employer]  or [address] [place] [address]

## 2022-03-30 NOTE — PROGRESS NOTES
Assessment and Plan:   Continue current medications  Have labs done and I will discuss the results with you when available  After reviewing those labs I will put in new labs for follow-up visit in 6 months  Please contact Nephrology to have an appointment scheduled in the end of May as we discussed  Problem List Items Addressed This Visit        Cardiovascular and Mediastinum    Primary hypertension    Relevant Orders    CBC and differential    Comprehensive metabolic panel       Genitourinary    CKD (chronic kidney disease) stage 3, GFR 30-59 ml/min ([facility])      Other Visit Diagnoses     Encounter for Medicare annual wellness exam    -  Primary    Mixed hyperlipidemia        Relevant Orders    Lipid panel    Trigger middle finger of right hand        Relevant Orders    Ambulatory referral to Orthopedic Surgery        BMI Counseling: Body mass index is 25 61 kg/m²  The BMI is above normal  Nutrition recommendations include decreasing portion sizes, encouraging healthy choices of fruits and vegetables, consuming healthier snacks, moderation in carbohydrate intake and reducing intake of cholesterol  Exercise recommendations include exercising 3-5 times per week  Rationale for BMI follow-up plan is due to patient being overweight or obese  Depression Screening and Follow-up Plan: Patient was screened for depression during today's encounter  They screened negative with a PHQ-2 score of 0  Preventive health issues were discussed with patient, and age appropriate screening tests were ordered as noted in patient's After Visit Summary  Personalized health advice and appropriate referrals for health education or preventive services given if needed, as noted in patient's After Visit Summary  History of Present Illness:     Patient presents for Medicare Annual Wellness visit  Questionnaire has been reviewed, clarified, and updated with the patient today      Patient Care Team:  Shanthi Montoya MD as PCP - MD Kristen Valdez MD     Problem List:     Patient Active Problem List   Diagnosis    Benign prostatic hyperplasia, presence of lower urinary tract symptoms unspecified    Erectile dysfunction    Lumbar radiculopathy    CKD (chronic kidney disease) stage 3, GFR 30-59 ml/min ([facility])    Vertigo    Chronic kidney disease-mineral and bone disorder    Colon polyps    Pain in left knee    Stroke-like symptoms    Primary hypertension      Past Medical and Surgical History:     Past Medical History:   Diagnosis Date    Benign prostatic hyperplasia     Chronic kidney disease     COVID-19 03/2020    Esophageal reflux     Hypertension     Hypertensive urgency 3/26/2019    Vertigo      Past Surgical History:   Procedure Laterality Date    APPENDECTOMY  05/21/2015    CYST REMOVAL      Fatty cyst removed from back      Family History:     Family History   Problem Relation Age of Onset    Prostate cancer Father     Prostate cancer Maternal Grandfather     Stomach cancer Maternal Aunt         malignant tumor of pharynx    Stomach cancer Maternal Uncle         malignant tumor of pharynx    Mental illness Family     Depression Family     Schizophrenia Family     Hypertension Mother     No Known Problems Sister     Dementia Sister       Social History:     Social History     Socioeconomic History    Marital status: /Civil Union     Spouse name: None    Number of children: None    Years of education: None    Highest education level: None   Occupational History    Occupation: Retired   Tobacco Use    Smoking status: Former Smoker    Smokeless tobacco: Never Used    Tobacco comment: 1ppw   Vaping Use    Vaping Use: Never used   Substance and Sexual Activity    Alcohol use: Yes     Comment: socially    Drug use: No    Sexual activity: Yes     Partners: Female     Comment: denied history of high risk sexual behavior   Other Topics Concern    None   Social History Narrative    No active advance directive    Always uses seat belt    Exercises occasionally, moderate    Five children    Occasional caffeine consumption      Regular dental care  Brushes teeth twice daily     Social Determinants of Health     Financial Resource Strain: Not on file   Food Insecurity: Not on file   Transportation Needs: Not on file   Physical Activity: Not on file   Stress: Not on file   Social Connections: Not on file   Intimate Partner Violence: Not on file   Housing Stability: Not on file      Medications and Allergies:     Current Outpatient Medications   Medication Sig Dispense Refill    amLODIPine (NORVASC) 10 mg tablet Take 1 tablet (10 mg total) by mouth daily 90 tablet 1    aspirin 81 mg chewable tablet Chew 1 tablet (81 mg total) daily 30 tablet 0    atorvastatin (LIPITOR) 40 mg tablet Take 1 tablet (40 mg total) by mouth every evening 90 tablet 1    fluticasone (FLONASE) 50 mcg/act nasal spray 1 spray into each nostril daily 16 g 0    lidocaine (LIDODERM) 5 % Apply 1 patch topically daily Remove & Discard patch within 12 hours or as directed by MD (Patient taking differently: Apply 1 patch topically as needed Remove & Discard patch within 12 hours or as directed by MD ) 50 patch 5    multivitamin (THERAGRAN) TABS Take 1 tablet by mouth daily      omeprazole (PriLOSEC) 20 mg delayed release capsule       tadalafil (CIALIS) 20 MG tablet tadalafil 20 mg tablet      tamsulosin (FLOMAX) 0 4 mg Take 1 capsule (0 4 mg total) by mouth daily with dinner 90 capsule 3     No current facility-administered medications for this visit       Allergies   Allergen Reactions    Penicillin V Anaphylaxis      Immunizations:     Immunization History   Administered Date(s) Administered    COVID-19 PFIZER VACCINE 0 3 ML IM 04/14/2021, 05/06/2021    Influenza, high dose seasonal 0 7 mL 11/06/2019      Health Maintenance:         Topic Date Due    Colorectal Cancer Screening  11/20/2022    Hepatitis C Screening  Completed         Topic Date Due    DTaP,Tdap,and Td Vaccines (1 - Tdap) Never done    Pneumococcal Vaccine: 65+ Years (1 of 1 - PPSV23) Never done      Medicare Health Risk Assessment:     /68 (BP Location: Left arm, Patient Position: Sitting, Cuff Size: Adult)   Pulse 60   Ht 6' (1 829 m)   Wt 85 6 kg (188 lb 12 8 oz)   SpO2 99%   BMI 25 61 kg/m²      Tomas is here for his Subsequent Wellness visit  Last Medicare Wellness visit information reviewed, patient interviewed and updates made to the record today  Health Risk Assessment:   Patient rates overall health as very good  Patient feels that their physical health rating is slightly better  Patient is satisfied with their life  Eyesight was rated as slightly worse  Hearing was rated as same  Patient feels that their emotional and mental health rating is same  Patients states they are never, rarely angry  Patient states they are never, rarely unusually tired/fatigued  Pain experienced in the last 7 days has been none  Patient states that he has experienced no weight loss or gain in last 6 months  Depression Screening:   PHQ-2 Score: 0      Fall Risk Screening: In the past year, patient has experienced: no history of falling in past year      Home Safety:  Patient does not have trouble with stairs inside or outside of their home  Patient has working smoke alarms and has working carbon monoxide detector  Home safety hazards include: none  Nutrition:   Current diet is Regular  Medications:   Patient is currently taking over-the-counter supplements  OTC medications include: see medication list  Patient is able to manage medications  Activities of Daily Living (ADLs)/Instrumental Activities of Daily Living (IADLs):   Walk and transfer into and out of bed and chair?: Yes  Dress and groom yourself?: Yes    Bathe or shower yourself?: Yes    Feed yourself?  Yes  Do your laundry/housekeeping?: Yes  Manage your money, pay your bills and track your expenses?: Yes  Make your own meals?: Yes    Do your own shopping?: Yes    Previous Hospitalizations:   Any hospitalizations or ED visits within the last 12 months?: Yes    How many hospitalizations have you had in the last year?: 1-2    Advance Care Planning:   Living will: Yes    Durable POA for healthcare: Yes    Advanced directive: Yes    Five wishes given: Yes      Comments: Will bring copy to office    Cognitive Screening:   Provider or family/friend/caregiver concerned regarding cognition?: No    PREVENTIVE SCREENINGS      Cardiovascular Screening:    General: Screening Not Indicated and History Lipid Disorder    Due for: Lipid Panel      Diabetes Screening:     General: Screening Current    Due for: Blood Glucose      Colorectal Cancer Screening:     General: Screening Current      Prostate Cancer Screening:      Due for: PSA      Osteoporosis Screening:    General: Screening Not Indicated      Abdominal Aortic Aneurysm (AAA) Screening:    Risk factors include: age between 73-67 yo and tobacco use        General: Screening Not Indicated      Lung Cancer Screening:     General: Screening Not Indicated      Hepatitis C Screening:    General: Screening Current    Screening, Brief Intervention, and Referral to Treatment (SBIRT)    Screening  Typical number of drinks in a day: 0  Typical number of drinks in a week: 0  Interpretation: Low risk drinking behavior  Single Item Drug Screening:  How often have you used an illegal drug (including marijuana) or a prescription medication for non-medical reasons in the past year? never    Single Item Drug Screen Score: 0  Interpretation: Negative screen for possible drug use disorder    Brief Intervention  Alcohol & drug use screenings were reviewed  No concerns regarding substance use disorder identified         Angelia Lopez PA-C

## 2022-04-11 ENCOUNTER — TELEPHONE (OUTPATIENT)
Dept: INTERNAL MEDICINE CLINIC | Facility: CLINIC | Age: 72
End: 2022-04-11

## 2022-04-11 ENCOUNTER — APPOINTMENT (OUTPATIENT)
Dept: LAB | Facility: HOSPITAL | Age: 72
End: 2022-04-11
Payer: MEDICARE

## 2022-04-11 DIAGNOSIS — I10 PRIMARY HYPERTENSION: ICD-10-CM

## 2022-04-11 DIAGNOSIS — E78.2 MIXED HYPERLIPIDEMIA: ICD-10-CM

## 2022-04-11 LAB
ALBUMIN SERPL BCP-MCNC: 3.9 G/DL (ref 3.5–5)
ALP SERPL-CCNC: 62 U/L (ref 46–116)
ALT SERPL W P-5'-P-CCNC: 39 U/L (ref 12–78)
ANION GAP SERPL CALCULATED.3IONS-SCNC: 9 MMOL/L (ref 4–13)
AST SERPL W P-5'-P-CCNC: 27 U/L (ref 5–45)
BASOPHILS # BLD AUTO: 0.02 THOUSANDS/ΜL (ref 0–0.1)
BASOPHILS NFR BLD AUTO: 1 % (ref 0–1)
BILIRUB SERPL-MCNC: 0.57 MG/DL (ref 0.2–1)
BUN SERPL-MCNC: 20 MG/DL (ref 5–25)
CALCIUM SERPL-MCNC: 8.7 MG/DL (ref 8.3–10.1)
CHLORIDE SERPL-SCNC: 106 MMOL/L (ref 100–108)
CHOLEST SERPL-MCNC: 140 MG/DL
CO2 SERPL-SCNC: 26 MMOL/L (ref 21–32)
CREAT SERPL-MCNC: 1.39 MG/DL (ref 0.6–1.3)
EOSINOPHIL # BLD AUTO: 0.05 THOUSAND/ΜL (ref 0–0.61)
EOSINOPHIL NFR BLD AUTO: 1 % (ref 0–6)
ERYTHROCYTE [DISTWIDTH] IN BLOOD BY AUTOMATED COUNT: 13.2 % (ref 11.6–15.1)
GFR SERPL CREATININE-BSD FRML MDRD: 50 ML/MIN/1.73SQ M
GLUCOSE P FAST SERPL-MCNC: 86 MG/DL (ref 65–99)
HCT VFR BLD AUTO: 42.3 % (ref 36.5–49.3)
HDLC SERPL-MCNC: 76 MG/DL
HGB BLD-MCNC: 14.2 G/DL (ref 12–17)
IMM GRANULOCYTES # BLD AUTO: 0 THOUSAND/UL (ref 0–0.2)
IMM GRANULOCYTES NFR BLD AUTO: 0 % (ref 0–2)
LDLC SERPL CALC-MCNC: 55 MG/DL (ref 0–100)
LYMPHOCYTES # BLD AUTO: 1.58 THOUSANDS/ΜL (ref 0.6–4.47)
LYMPHOCYTES NFR BLD AUTO: 41 % (ref 14–44)
MCH RBC QN AUTO: 30.6 PG (ref 26.8–34.3)
MCHC RBC AUTO-ENTMCNC: 33.6 G/DL (ref 31.4–37.4)
MCV RBC AUTO: 91 FL (ref 82–98)
MONOCYTES # BLD AUTO: 0.29 THOUSAND/ΜL (ref 0.17–1.22)
MONOCYTES NFR BLD AUTO: 8 % (ref 4–12)
NEUTROPHILS # BLD AUTO: 1.89 THOUSANDS/ΜL (ref 1.85–7.62)
NEUTS SEG NFR BLD AUTO: 49 % (ref 43–75)
NONHDLC SERPL-MCNC: 64 MG/DL
NRBC BLD AUTO-RTO: 0 /100 WBCS
PLATELET # BLD AUTO: 191 THOUSANDS/UL (ref 149–390)
PMV BLD AUTO: 11.3 FL (ref 8.9–12.7)
POTASSIUM SERPL-SCNC: 4.1 MMOL/L (ref 3.5–5.3)
PROT SERPL-MCNC: 7.2 G/DL (ref 6.4–8.2)
RBC # BLD AUTO: 4.64 MILLION/UL (ref 3.88–5.62)
SODIUM SERPL-SCNC: 141 MMOL/L (ref 136–145)
TRIGL SERPL-MCNC: 45 MG/DL
WBC # BLD AUTO: 3.83 THOUSAND/UL (ref 4.31–10.16)

## 2022-04-11 PROCEDURE — 36415 COLL VENOUS BLD VENIPUNCTURE: CPT

## 2022-04-11 PROCEDURE — 85025 COMPLETE CBC W/AUTO DIFF WBC: CPT

## 2022-04-11 PROCEDURE — 80061 LIPID PANEL: CPT

## 2022-04-11 PROCEDURE — 80053 COMPREHEN METABOLIC PANEL: CPT

## 2022-04-11 NOTE — TELEPHONE ENCOUNTER
Patient would like his testosterone level checked  He is asking if you would add this to his exisitng lab orders? He will be going today      Call back #[phone]

## 2022-04-20 ENCOUNTER — OFFICE VISIT (OUTPATIENT)
Dept: OBGYN CLINIC | Facility: CLINIC | Age: 72
End: 2022-04-20
Payer: MEDICARE

## 2022-04-20 VITALS
DIASTOLIC BLOOD PRESSURE: 86 MMHG | SYSTOLIC BLOOD PRESSURE: 137 MMHG | WEIGHT: 188 LBS | HEART RATE: 57 BPM | HEIGHT: 72 IN | BODY MASS INDEX: 25.47 KG/M2

## 2022-04-20 DIAGNOSIS — M65.331 TRIGGER MIDDLE FINGER OF RIGHT HAND: ICD-10-CM

## 2022-04-20 PROCEDURE — 99203 OFFICE O/P NEW LOW 30 MIN: CPT | Performed by: FAMILY MEDICINE

## 2022-04-20 NOTE — LETTER
April 21, 2022     RAK[facility] [address]  [facility]    Patient: Stephanie Valdez   YOB: 1950   Date of Visit: 4/20/2022       Dear Dr Pete Weir:    Thank you for referring Damian Tolbert to me for evaluation  Below are my notes for this consultation  If you have questions, please do not hesitate to call me  I look forward to following your patient along with you  Sincerely,        [employer], DO        CC: No Recipients  [employer], DO  4/21/2022  7:44 AM  Sign when Signing Visit  Assessment/Plan:  Assessment/Plan   Diagnoses and all orders for this visit:    Trigger middle finger of right hand  -     Ambulatory referral to Orthopedic Surgery  -     Diclofenac Sodium (VOLTAREN) 1 %; Apply 2 g topically 4 (four) times a day      75-year-old left-hand dominant male with right middle finger triggering more than few months duration  Discussed with patient physical exam, impression and plan  Physical exam right middle finger noted for flexion attitude at the PIP joint  There is mild tenderness at the A1 pulley  There is palpable flexor tendon nodule  He has extension at the PIP joint limited to -10° and full flexion of the digit  There is reproducible locking and snapping with flexion and extension  There is no appreciable collateral ligament laxity  He is intact neurovascularly  Clinical impression is that he is symptomatic from trigger finger  I discussed treatment of supplements and topical anti-inflammatory  He defers corticosteroid injection at this time  He is to take tumeric at least 1000 mg daily and tart cherry at least 1000 mg daily  He is to apply topical diclofenac gel 3 to 4 times a day for the next 10 days  He will follow up as needed  Subjective:   Patient ID: Stephanie Valdez is a 70 y o  male    Chief Complaint   Patient presents with    Right Hand - Locking       75-year-old left hand dominant male presents for evaluation of right middle finger triggering more than few months duration  He denies any particular trauma or inciting event  He works out on a regular basis  He started having locking and snapping of the digit, but denies any pain  He states that sometimes after the finger locks he has to passively extend the digit with use of the of the other hand  He was seen by primary care provider and advised that he has trigger finger, and he was referred to orthopedic care  The following portions of the patient's history were reviewed and updated as appropriate: He  has a past medical history of Benign prostatic hyperplasia, Chronic kidney disease, COVID-19 (03/2020), Esophageal reflux, Hypertension, Hypertensive urgency (3/26/2019), and Vertigo  He  has a past surgical history that includes Appendectomy (05/21/2015) and Cyst Removal   His family history includes Dementia in his sister; Depression in his family; Hypertension in his mother; Mental illness in his family; No Known Problems in his sister; Prostate cancer in his father and maternal grandfather; Schizophrenia in his family; Stomach cancer in his maternal aunt and maternal uncle  He  reports that he has quit smoking  He has never used smokeless tobacco  He reports current alcohol use  He reports that he does not use drugs  He is allergic to penicillin v     Review of Systems   Constitutional: Negative for chills and fever  HENT: Negative for sore throat  Eyes: Negative for visual disturbance  Respiratory: Negative for shortness of breath  Cardiovascular: Negative for chest pain  Gastrointestinal: Negative for abdominal pain  Genitourinary: Negative for flank pain  Musculoskeletal: Negative for arthralgias and joint swelling  Skin: Negative for rash and wound  Neurological: Negative for weakness and numbness  Hematological: Does not bruise/bleed easily  Psychiatric/Behavioral: Negative for self-injury         Objective:  Vitals:    04/20/22 1330   BP: 137/86   Pulse: 57   Weight: 85 3 kg (188 lb)   Height: 6' (1 829 m)     Right Hand Exam     Muscle Strength   The patient has normal right wrist strength  Other   Sensation: normal  Pulse: present    Comments:  Middle finger   - flexion attitude at the PIP joint  There is mild tenderness at the A1 pulley  There is palpable flexor tendon nodule  He has extension at the PIP joint limited to -10° and full flexion of the digit  There is reproducible locking and snapping with flexion and extension  There is no appreciable collateral ligament laxity  Strength/Myotome Testing     Right Wrist/Hand   Normal wrist strength      Physical Exam  Vitals and nursing note reviewed  Constitutional:       General: He is not in acute distress  Appearance: He is well-developed  He is not ill-appearing or diaphoretic  HENT:      Head: Normocephalic and atraumatic  Right Ear: External ear normal       Left Ear: External ear normal    Eyes:      Conjunctiva/sclera: Conjunctivae normal    Neck:      Trachea: No tracheal deviation  Cardiovascular:      Rate and Rhythm: Normal rate  Pulmonary:      Effort: Pulmonary effort is normal  No respiratory distress  Abdominal:      General: There is no distension  Musculoskeletal:         General: Tenderness present  No swelling, deformity or signs of injury  Skin:     General: Skin is warm and dry  Coloration: Skin is not jaundiced or pale  Neurological:      Mental Status: He is alert and oriented to person, place, and time  Psychiatric:         Mood and Affect: Mood normal          Behavior: Behavior normal          Thought Content:  Thought content normal          Judgment: Judgment normal

## 2022-05-18 ENCOUNTER — APPOINTMENT (OUTPATIENT)
Dept: LAB | Facility: HOSPITAL | Age: 72
End: 2022-05-18
Payer: MEDICARE

## 2022-05-18 DIAGNOSIS — E78.2 MIXED HYPERLIPIDEMIA: ICD-10-CM

## 2022-05-18 DIAGNOSIS — E83.9 CHRONIC KIDNEY DISEASE-MINERAL AND BONE DISORDER: ICD-10-CM

## 2022-05-18 DIAGNOSIS — N18.9 CHRONIC KIDNEY DISEASE-MINERAL AND BONE DISORDER: ICD-10-CM

## 2022-05-18 DIAGNOSIS — N18.31 STAGE 3A CHRONIC KIDNEY DISEASE (HCC): ICD-10-CM

## 2022-05-18 DIAGNOSIS — M89.9 CHRONIC KIDNEY DISEASE-MINERAL AND BONE DISORDER: ICD-10-CM

## 2022-05-18 DIAGNOSIS — I10 PRIMARY HYPERTENSION: ICD-10-CM

## 2022-05-18 LAB
25(OH)D3 SERPL-MCNC: 37.2 NG/ML (ref 30–100)
ANION GAP SERPL CALCULATED.3IONS-SCNC: 7 MMOL/L (ref 4–13)
BASOPHILS # BLD AUTO: 0.03 THOUSANDS/ΜL (ref 0–0.1)
BASOPHILS NFR BLD AUTO: 1 % (ref 0–1)
BILIRUB UR QL STRIP: NEGATIVE
BUN SERPL-MCNC: 19 MG/DL (ref 5–25)
CALCIUM SERPL-MCNC: 9 MG/DL (ref 8.3–10.1)
CHLORIDE SERPL-SCNC: 105 MMOL/L (ref 100–108)
CLARITY UR: CLEAR
CO2 SERPL-SCNC: 29 MMOL/L (ref 21–32)
COLOR UR: YELLOW
CREAT SERPL-MCNC: 1.4 MG/DL (ref 0.6–1.3)
CREAT UR-MCNC: 233 MG/DL
EOSINOPHIL # BLD AUTO: 0.06 THOUSAND/ΜL (ref 0–0.61)
EOSINOPHIL NFR BLD AUTO: 2 % (ref 0–6)
ERYTHROCYTE [DISTWIDTH] IN BLOOD BY AUTOMATED COUNT: 13.7 % (ref 11.6–15.1)
GFR SERPL CREATININE-BSD FRML MDRD: 50 ML/MIN/1.73SQ M
GLUCOSE P FAST SERPL-MCNC: 95 MG/DL (ref 65–99)
GLUCOSE UR STRIP-MCNC: NEGATIVE MG/DL
HCT VFR BLD AUTO: 44.8 % (ref 36.5–49.3)
HGB BLD-MCNC: 14.8 G/DL (ref 12–17)
HGB UR QL STRIP.AUTO: NEGATIVE
IMM GRANULOCYTES # BLD AUTO: 0 THOUSAND/UL (ref 0–0.2)
IMM GRANULOCYTES NFR BLD AUTO: 0 % (ref 0–2)
KETONES UR STRIP-MCNC: NEGATIVE MG/DL
LEUKOCYTE ESTERASE UR QL STRIP: NEGATIVE
LYMPHOCYTES # BLD AUTO: 1.48 THOUSANDS/ΜL (ref 0.6–4.47)
LYMPHOCYTES NFR BLD AUTO: 40 % (ref 14–44)
MCH RBC QN AUTO: 30.8 PG (ref 26.8–34.3)
MCHC RBC AUTO-ENTMCNC: 33 G/DL (ref 31.4–37.4)
MCV RBC AUTO: 93 FL (ref 82–98)
MONOCYTES # BLD AUTO: 0.23 THOUSAND/ΜL (ref 0.17–1.22)
MONOCYTES NFR BLD AUTO: 6 % (ref 4–12)
NEUTROPHILS # BLD AUTO: 1.94 THOUSANDS/ΜL (ref 1.85–7.62)
NEUTS SEG NFR BLD AUTO: 51 % (ref 43–75)
NITRITE UR QL STRIP: NEGATIVE
NRBC BLD AUTO-RTO: 0 /100 WBCS
PH UR STRIP.AUTO: 6 [PH]
PHOSPHATE SERPL-MCNC: 3 MG/DL (ref 2.3–4.1)
PLATELET # BLD AUTO: 228 THOUSANDS/UL (ref 149–390)
PMV BLD AUTO: 11.2 FL (ref 8.9–12.7)
POTASSIUM SERPL-SCNC: 4.6 MMOL/L (ref 3.5–5.3)
PROT UR STRIP-MCNC: NEGATIVE MG/DL
PROT UR-MCNC: 12 MG/DL
PROT/CREAT UR: 0.05 MG/G{CREAT} (ref 0–0.1)
PTH-INTACT SERPL-MCNC: 60.6 PG/ML (ref 18.4–80.1)
RBC # BLD AUTO: 4.8 MILLION/UL (ref 3.88–5.62)
SODIUM SERPL-SCNC: 141 MMOL/L (ref 136–145)
SP GR UR STRIP.AUTO: 1.02 (ref 1–1.03)
UROBILINOGEN UR QL STRIP.AUTO: 0.2 E.U./DL
WBC # BLD AUTO: 3.74 THOUSAND/UL (ref 4.31–10.16)

## 2022-05-18 PROCEDURE — 36415 COLL VENOUS BLD VENIPUNCTURE: CPT

## 2022-05-18 PROCEDURE — 84156 ASSAY OF PROTEIN URINE: CPT

## 2022-05-18 PROCEDURE — 82570 ASSAY OF URINE CREATININE: CPT

## 2022-05-18 PROCEDURE — 83970 ASSAY OF PARATHORMONE: CPT

## 2022-05-18 PROCEDURE — 85025 COMPLETE CBC W/AUTO DIFF WBC: CPT

## 2022-05-18 PROCEDURE — 81003 URINALYSIS AUTO W/O SCOPE: CPT

## 2022-05-18 PROCEDURE — 80048 BASIC METABOLIC PNL TOTAL CA: CPT

## 2022-05-18 PROCEDURE — 84100 ASSAY OF PHOSPHORUS: CPT

## 2022-05-18 PROCEDURE — 82306 VITAMIN D 25 HYDROXY: CPT

## 2022-06-20 ENCOUNTER — APPOINTMENT (OUTPATIENT)
Dept: LAB | Facility: HOSPITAL | Age: 72
End: 2022-06-20
Payer: MEDICARE

## 2022-06-20 DIAGNOSIS — Z12.5 ENCOUNTER FOR SCREENING FOR MALIGNANT NEOPLASM OF PROSTATE: ICD-10-CM

## 2022-06-20 PROCEDURE — G0103 PSA SCREENING: HCPCS

## 2022-06-20 PROCEDURE — 36415 COLL VENOUS BLD VENIPUNCTURE: CPT

## 2022-06-21 LAB — PSA SERPL-MCNC: 3.2 NG/ML (ref 0–4)

## 2022-06-22 ENCOUNTER — OFFICE VISIT (OUTPATIENT)
Dept: NEPHROLOGY | Facility: CLINIC | Age: 72
End: 2022-06-22
Payer: MEDICARE

## 2022-06-22 VITALS
HEART RATE: 62 BPM | HEIGHT: 72 IN | OXYGEN SATURATION: 98 % | SYSTOLIC BLOOD PRESSURE: 120 MMHG | BODY MASS INDEX: 25.19 KG/M2 | DIASTOLIC BLOOD PRESSURE: 80 MMHG | TEMPERATURE: 97.2 F | WEIGHT: 186 LBS | RESPIRATION RATE: 16 BRPM

## 2022-06-22 DIAGNOSIS — E83.9 CHRONIC KIDNEY DISEASE-MINERAL AND BONE DISORDER: ICD-10-CM

## 2022-06-22 DIAGNOSIS — N18.9 CHRONIC KIDNEY DISEASE-MINERAL AND BONE DISORDER: ICD-10-CM

## 2022-06-22 DIAGNOSIS — N40.0 BENIGN PROSTATIC HYPERPLASIA, PRESENCE OF LOWER URINARY TRACT SYMPTOMS UNSPECIFIED: ICD-10-CM

## 2022-06-22 DIAGNOSIS — N18.31 STAGE 3A CHRONIC KIDNEY DISEASE (HCC): Primary | ICD-10-CM

## 2022-06-22 DIAGNOSIS — M89.9 CHRONIC KIDNEY DISEASE-MINERAL AND BONE DISORDER: ICD-10-CM

## 2022-06-22 DIAGNOSIS — I12.0 HYPERTENSIVE CHRONIC KIDNEY DISEASE WITH STAGE 5 CHRONIC KIDNEY DISEASE OR END STAGE RENAL DISEASE (HCC): ICD-10-CM

## 2022-06-22 DIAGNOSIS — I10 PRIMARY HYPERTENSION: ICD-10-CM

## 2022-06-22 PROCEDURE — 99214 OFFICE O/P EST MOD 30 MIN: CPT | Performed by: INTERNAL MEDICINE

## 2022-06-22 NOTE — ASSESSMENT & PLAN NOTE
Lab Results   Component Value Date    EGFR 50 05/18/2022    EGFR 50 04/11/2022    EGFR 60 02/25/2022    CREATININE 1 40 (H) 05/18/2022    CREATININE 1 39 (H) 04/11/2022    CREATININE 1 20 02/25/2022   Renal function is quite stable with GFR about 50  Likely etiology is hypertensive nephrosclerosis  Asymptomatic in progress and nature of kidney disease discussed with the patient    Advised hydration and avoid any nephrotoxic medication

## 2022-06-22 NOTE — PROGRESS NOTES
NEPHROLOGY OFFICE FOLLOW UP  Lindsey Mason 70 y o  male MRN: [id number]    Encounter: [id number] 6/22/2022    REASON FOR VISIT: Lindsey Mason is a 70 y o  male who is here on 6/22/2022 for Chronic Kidney Disease and Follow-up    HPI:    [place] mature for follow-up of CKD  Since I saw him last he was hospitalized with uncontrolled blood pressure with possibility of CVA though it was negative    He is feeling quite well denies any complaint     No chest pain no palpitation or shortness of breath     No nausea no vomiting     Denies any urinary complaints      REVIEW OF SYSTEMS:    Review of Systems   Constitutional: Negative for activity change and fatigue  HENT: Negative for congestion and ear discharge  Eyes: Negative for photophobia and pain  Respiratory: Negative for apnea and choking  Cardiovascular: Negative for chest pain and palpitations  Gastrointestinal: Negative for abdominal distention and blood in stool  Endocrine: Negative for heat intolerance and polyphagia  Genitourinary: Negative for flank pain and urgency  Musculoskeletal: Negative for neck pain and neck stiffness  Skin: Negative for color change and wound  Allergic/Immunologic: Negative for food allergies and immunocompromised state  Neurological: Negative for seizures and facial asymmetry  Hematological: Negative for adenopathy  Does not bruise/bleed easily  Psychiatric/Behavioral: Negative for self-injury and suicidal ideas           PAST MEDICAL HISTORY:  Past Medical History:   Diagnosis Date    Benign prostatic hyperplasia     Chronic kidney disease     COVID-19 03/2020    Esophageal reflux     Hypertension     Hypertensive urgency 3/26/2019    Vertigo        PAST SURGICAL HISTORY:  Past Surgical History:   Procedure Laterality Date    APPENDECTOMY  05/21/2015    CYST REMOVAL      Fatty cyst removed from back       SOCIAL HISTORY:  Social History     Substance and Sexual Activity   Alcohol Use Yes Comment: socially     Social History     Substance and Sexual Activity   Drug Use No     Social History     Tobacco Use   Smoking Status Former Smoker   Smokeless Tobacco Never Used   Tobacco Comment    1ppw       FAMILY HISTORY:  Family History   Problem Relation Age of Onset    Prostate cancer Father     Prostate cancer Maternal Grandfather     Stomach cancer Maternal Aunt         malignant tumor of pharynx    Stomach cancer Maternal Uncle         malignant tumor of pharynx    Mental illness Family     Depression Family     Schizophrenia Family     Hypertension Mother     No Known Problems Sister     Dementia Sister        MEDICATIONS:    Current Outpatient Medications:     amLODIPine (NORVASC) 10 mg tablet, Take 1 tablet (10 mg total) by mouth daily, Disp: 90 tablet, Rfl: 1    aspirin 81 mg chewable tablet, Chew 1 tablet (81 mg total) daily, Disp: 30 tablet, Rfl: 0    atorvastatin (LIPITOR) 40 mg tablet, Take 1 tablet (40 mg total) by mouth every evening, Disp: 90 tablet, Rfl: 1    Diclofenac Sodium (VOLTAREN) 1 %, Apply 2 g topically 4 (four) times a day, Disp: 100 g, Rfl: 1    lidocaine (LIDODERM) 5 %, Apply 1 patch topically daily Remove & Discard patch within 12 hours or as directed by MD (Patient taking differently: Apply 1 patch topically as needed Remove & Discard patch within 12 hours or as directed by MD), Disp: 50 patch, Rfl: 5    multivitamin (THERAGRAN) TABS, Take 1 tablet by mouth daily, Disp: , Rfl:     omeprazole (PriLOSEC) 20 mg delayed release capsule, as needed, Disp: , Rfl:     tadalafil (CIALIS) 20 MG tablet, tadalafil 20 mg tablet, Disp: , Rfl:     tamsulosin (FLOMAX) 0 4 mg, Take 1 capsule (0 4 mg total) by mouth daily with dinner, Disp: 90 capsule, Rfl: 3    fluticasone (FLONASE) 50 mcg/act nasal spray, 1 spray into each nostril daily (Patient not taking: Reported on 6/22/2022), Disp: 16 g, Rfl: 0    PHYSICAL EXAM:  Vitals:    06/22/22 1033   BP: 120/80   BP Location: Right arm   Patient Position: Sitting   Pulse: 62   Resp: 16   Temp: (!) 97 2 °F (36 2 °C)   TempSrc: Temporal   SpO2: 98%   Weight: 84 4 kg (186 lb)   Height: 5' 11 5" (1 816 m)     Body mass index is 25 58 kg/m²  Physical Exam  Constitutional:       General: He is not in acute distress  Appearance: He is well-developed  He is obese  HENT:      Head: Normocephalic  Eyes:      General: No scleral icterus  Conjunctiva/sclera: Conjunctivae normal       Pupils: Pupils are equal, round, and reactive to light  Neck:      Vascular: No JVD  Cardiovascular:      Rate and Rhythm: Normal rate  Heart sounds: Normal heart sounds  Pulmonary:      Effort: Pulmonary effort is normal       Breath sounds: No wheezing  Abdominal:      General: Bowel sounds are normal       Palpations: Abdomen is soft  Tenderness: There is no abdominal tenderness  Musculoskeletal:         General: No swelling  Normal range of motion  Cervical back: Normal range of motion and neck supple  Skin:     General: Skin is warm  Findings: No rash  Neurological:      General: No focal deficit present  Mental Status: He is alert and oriented to person, place, and time  Psychiatric:         Behavior: Behavior normal          LAB RESULTS:  Results for orders placed or performed in visit on 06/20/22   PSA, Total Screen   Result Value Ref Range    PSA 3 2 0 0 - 4 0 ng/mL       ASSESSMENT and PLAN:      CKD (chronic kidney disease) stage 3, GFR 30-59 ml/min ([facility])  Lab Results   Component Value Date    EGFR 50 05/18/2022    EGFR 50 04/11/2022    EGFR 60 02/25/2022    CREATININE 1 40 (H) 05/18/2022    CREATININE 1 39 (H) 04/11/2022    CREATININE 1 20 02/25/2022   Renal function is quite stable with GFR about 50  Likely etiology is hypertensive nephrosclerosis  Asymptomatic in progress and nature of kidney disease discussed with the patient    Advised hydration and avoid any nephrotoxic medication    Chronic kidney disease-mineral and bone disorder  Lab Results   Component Value Date    EGFR 50 05/18/2022    EGFR 50 04/11/2022    EGFR 60 02/25/2022    CREATININE 1 40 (H) 05/18/2022    CREATININE 1 39 (H) 04/11/2022    CREATININE 1 20 02/25/2022   PTH and phosphorus along with the vitamin-D are within acceptable range and we will continue to monitor    Benign prostatic hyperplasia, presence of lower urinary tract symptoms unspecified  At present asymptomatic and being monitored by urologist    Primary hypertension  Very well control with present medication      Discussed everything with the patient  I will see him back in 6 months  We will get blood and urine test before that visit        Portions of the record may have been created with voice recognition software  Occasional wrong word or "sound a like" substitutions may have occurred due to the inherent limitations of voice recognition software  Read the chart carefully and recognize, using context, where substitutions have occurred  If you have any questions, please contact the dictating provider

## 2022-06-22 NOTE — ASSESSMENT & PLAN NOTE
Lab Results   Component Value Date    EGFR 50 05/18/2022    EGFR 50 04/11/2022    EGFR 60 02/25/2022    CREATININE 1 40 (H) 05/18/2022    CREATININE 1 39 (H) 04/11/2022    CREATININE 1 20 02/25/2022   PTH and phosphorus along with the vitamin-D are within acceptable range and we will continue to monitor

## 2022-07-07 ENCOUNTER — APPOINTMENT (EMERGENCY)
Dept: RADIOLOGY | Facility: HOSPITAL | Age: 72
End: 2022-07-07
Payer: MEDICARE

## 2022-07-07 ENCOUNTER — HOSPITAL ENCOUNTER (EMERGENCY)
Facility: HOSPITAL | Age: 72
Discharge: HOME | End: 2022-07-07
Attending: EMERGENCY MEDICINE | Admitting: EMERGENCY MEDICINE
Payer: MEDICARE

## 2022-07-07 ENCOUNTER — APPOINTMENT (EMERGENCY)
Dept: CT IMAGING | Facility: HOSPITAL | Age: 72
End: 2022-07-07
Payer: MEDICARE

## 2022-07-07 VITALS
DIASTOLIC BLOOD PRESSURE: 72 MMHG | RESPIRATION RATE: 16 BRPM | SYSTOLIC BLOOD PRESSURE: 152 MMHG | OXYGEN SATURATION: 100 % | TEMPERATURE: 97.8 F | HEART RATE: 55 BPM

## 2022-07-07 DIAGNOSIS — M54.50 RIGHT LOW BACK PAIN: Primary | ICD-10-CM

## 2022-07-07 DIAGNOSIS — M51.36 DEGENERATIVE DISC DISEASE, LUMBAR: ICD-10-CM

## 2022-07-07 LAB
ALBUMIN SERPL BCP-MCNC: 3.8 G/DL (ref 3.5–5)
ALP SERPL-CCNC: 55 U/L (ref 46–116)
ALT SERPL W P-5'-P-CCNC: 33 U/L (ref 12–78)
ANION GAP SERPL CALCULATED.3IONS-SCNC: 9 MMOL/L (ref 4–13)
AST SERPL W P-5'-P-CCNC: 19 U/L (ref 5–45)
BASOPHILS # BLD AUTO: 0.01 THOUSANDS/ΜL (ref 0–0.1)
BASOPHILS NFR BLD AUTO: 0 % (ref 0–1)
BILIRUB SERPL-MCNC: 0.39 MG/DL (ref 0.2–1)
BILIRUB UR QL STRIP: NEGATIVE
BUN SERPL-MCNC: 20 MG/DL (ref 5–25)
CALCIUM SERPL-MCNC: 9 MG/DL (ref 8.3–10.1)
CARDIAC TROPONIN I PNL SERPL HS: 4 NG/L
CHLORIDE SERPL-SCNC: 105 MMOL/L (ref 100–108)
CLARITY UR: CLEAR
CO2 SERPL-SCNC: 28 MMOL/L (ref 21–32)
COLOR UR: YELLOW
CREAT SERPL-MCNC: 1.36 MG/DL (ref 0.6–1.3)
EOSINOPHIL # BLD AUTO: 0.04 THOUSAND/ΜL (ref 0–0.61)
EOSINOPHIL NFR BLD AUTO: 1 % (ref 0–6)
ERYTHROCYTE [DISTWIDTH] IN BLOOD BY AUTOMATED COUNT: 13.7 % (ref 11.6–15.1)
GFR SERPL CREATININE-BSD FRML MDRD: 51 ML/MIN/1.73SQ M
GLUCOSE SERPL-MCNC: 84 MG/DL (ref 65–140)
GLUCOSE UR STRIP-MCNC: NEGATIVE MG/DL
HCT VFR BLD AUTO: 42.5 % (ref 36.5–49.3)
HGB BLD-MCNC: 14.2 G/DL (ref 12–17)
HGB UR QL STRIP.AUTO: NEGATIVE
IMM GRANULOCYTES # BLD AUTO: 0 THOUSAND/UL (ref 0–0.2)
IMM GRANULOCYTES NFR BLD AUTO: 0 % (ref 0–2)
KETONES UR STRIP-MCNC: NEGATIVE MG/DL
LEUKOCYTE ESTERASE UR QL STRIP: NEGATIVE
LIPASE SERPL-CCNC: 81 U/L (ref 73–393)
LYMPHOCYTES # BLD AUTO: 1.44 THOUSANDS/ΜL (ref 0.6–4.47)
LYMPHOCYTES NFR BLD AUTO: 31 % (ref 14–44)
MCH RBC QN AUTO: 31.1 PG (ref 26.8–34.3)
MCHC RBC AUTO-ENTMCNC: 33.4 G/DL (ref 31.4–37.4)
MCV RBC AUTO: 93 FL (ref 82–98)
MONOCYTES # BLD AUTO: 0.32 THOUSAND/ΜL (ref 0.17–1.22)
MONOCYTES NFR BLD AUTO: 7 % (ref 4–12)
NEUTROPHILS # BLD AUTO: 2.83 THOUSANDS/ΜL (ref 1.85–7.62)
NEUTS SEG NFR BLD AUTO: 61 % (ref 43–75)
NITRITE UR QL STRIP: NEGATIVE
NRBC BLD AUTO-RTO: 0 /100 WBCS
PH UR STRIP.AUTO: 6 [PH]
PLATELET # BLD AUTO: 199 THOUSANDS/UL (ref 149–390)
PMV BLD AUTO: 10.8 FL (ref 8.9–12.7)
POTASSIUM SERPL-SCNC: 4.3 MMOL/L (ref 3.5–5.3)
PROT SERPL-MCNC: 7.2 G/DL (ref 6.4–8.2)
PROT UR STRIP-MCNC: NEGATIVE MG/DL
RBC # BLD AUTO: 4.56 MILLION/UL (ref 3.88–5.62)
SODIUM SERPL-SCNC: 142 MMOL/L (ref 136–145)
SP GR UR STRIP.AUTO: >=1.03 (ref 1–1.03)
UROBILINOGEN UR QL STRIP.AUTO: 0.2 E.U./DL
WBC # BLD AUTO: 4.64 THOUSAND/UL (ref 4.31–10.16)

## 2022-07-07 PROCEDURE — 93005 ELECTROCARDIOGRAM TRACING: CPT

## 2022-07-07 PROCEDURE — 36415 COLL VENOUS BLD VENIPUNCTURE: CPT | Performed by: EMERGENCY MEDICINE

## 2022-07-07 PROCEDURE — 81003 URINALYSIS AUTO W/O SCOPE: CPT | Performed by: EMERGENCY MEDICINE

## 2022-07-07 PROCEDURE — 99284 EMERGENCY DEPT VISIT MOD MDM: CPT

## 2022-07-07 PROCEDURE — G1004 CDSM NDSC: HCPCS

## 2022-07-07 PROCEDURE — 84484 ASSAY OF TROPONIN QUANT: CPT | Performed by: EMERGENCY MEDICINE

## 2022-07-07 PROCEDURE — 80053 COMPREHEN METABOLIC PANEL: CPT | Performed by: EMERGENCY MEDICINE

## 2022-07-07 PROCEDURE — 83690 ASSAY OF LIPASE: CPT | Performed by: EMERGENCY MEDICINE

## 2022-07-07 PROCEDURE — 85025 COMPLETE CBC W/AUTO DIFF WBC: CPT | Performed by: EMERGENCY MEDICINE

## 2022-07-07 PROCEDURE — 99285 EMERGENCY DEPT VISIT HI MDM: CPT | Performed by: EMERGENCY MEDICINE

## 2022-07-07 PROCEDURE — 74176 CT ABD & PELVIS W/O CONTRAST: CPT

## 2022-07-07 PROCEDURE — 71045 X-RAY EXAM CHEST 1 VIEW: CPT

## 2022-07-07 RX ORDER — ACETAMINOPHEN 325 MG/1
650 TABLET ORAL ONCE
Status: COMPLETED | OUTPATIENT
Start: 2022-07-07 | End: 2022-07-07

## 2022-07-07 RX ORDER — CYCLOBENZAPRINE HCL 10 MG
10 TABLET ORAL ONCE
Status: COMPLETED | OUTPATIENT
Start: 2022-07-07 | End: 2022-07-07

## 2022-07-07 RX ORDER — CYCLOBENZAPRINE HCL 10 MG
10 TABLET ORAL 2 TIMES DAILY PRN
Qty: 12 TABLET | Refills: 0 | Status: SHIPPED | OUTPATIENT
Start: 2022-07-07

## 2022-07-07 RX ORDER — MAGNESIUM HYDROXIDE/ALUMINUM HYDROXICE/SIMETHICONE 120; 1200; 1200 MG/30ML; MG/30ML; MG/30ML
30 SUSPENSION ORAL ONCE
Status: COMPLETED | OUTPATIENT
Start: 2022-07-07 | End: 2022-07-07

## 2022-07-07 RX ADMIN — ALUMINUM HYDROXIDE, MAGNESIUM HYDROXIDE, AND SIMETHICONE 30 ML: 200; 200; 20 SUSPENSION ORAL at 15:42

## 2022-07-07 RX ADMIN — ACETAMINOPHEN 650 MG: 325 TABLET, FILM COATED ORAL at 15:43

## 2022-07-07 RX ADMIN — CYCLOBENZAPRINE HYDROCHLORIDE 10 MG: 10 TABLET, FILM COATED ORAL at 15:43

## 2022-07-07 NOTE — ED PROVIDER NOTES
History  Chief Complaint   Patient presents with    Back Pain     Lower L back pain, hx "disc problem"  Reports injuring back last Friday and back "locked"      Patient is a 55-year-old male with past medical history of BPH, hypertension, hyperlipidemia, GERD, chronic kidney disease, vertigo, appendectomy, prior lumbar disc herniation, presents to the emergency department complaining of low back pain  Patient states that since Friday he has had right low back pain that he localizes to the right lower lumbar/sacral region  The pain radiates into the right side of his buttocks and he reports a burning sensation in the right buttocks  He states the pain started after bending down  He reports on Saturday he went to the gym and was feeling okay and then he went to the chiropractor who manipulated him  When he was getting out of his car after the chiropractor, he reports the pain worsened and he feels as though the entire right side of his back locked up  Pain is worsened when he bends and then straightened back up  Patient also states that he gets occasional radiation into the right lower abdomen and he has had some constipation for which she took a laxative  He also reports some burning in his chest that just started today but attributes that to not eating much and history of GERD  He also reports feeling slightly off balance but denies any dizziness or vertigo or feeling lightheaded  He denies headache, cough, URI symptoms, fevers, chills, palpitations, dyspnea, abdominal distension, nausea, vomiting, dysuria, difficulty urinating, change in frequency or hematuria, flank pain, skin rash or color change, extremity weakness or paresthesia or other focal neurologic deficits  Denies any recent fall  Patient has been using OTC lidocaine patches and has a patch on currently        History provided by:  Patient and spouse   used: No    Back Pain  Associated symptoms: abdominal pain and chest pain    Associated symptoms: no dysuria, no fever, no headaches, no numbness and no weakness        Prior to Admission Medications   Prescriptions Last Dose Informant Patient Reported? Taking?    Diclofenac Sodium (VOLTAREN) 1 %  Self No No   Sig: Apply 2 g topically 4 (four) times a day   amLODIPine (NORVASC) 10 mg tablet  Self No No   Sig: Take 1 tablet (10 mg total) by mouth daily   aspirin 81 mg chewable tablet  Self No No   Sig: Chew 1 tablet (81 mg total) daily   atorvastatin (LIPITOR) 40 mg tablet  Self No No   Sig: Take 1 tablet (40 mg total) by mouth every evening   fluticasone (FLONASE) 50 mcg/act nasal spray  Self No No   Si spray into each nostril daily   Patient not taking: Reported on 2022   lidocaine (LIDODERM) 5 %  Self No No   Sig: Apply 1 patch topically daily Remove & Discard patch within 12 hours or as directed by MD   Patient taking differently: Apply 1 patch topically as needed Remove & Discard patch within 12 hours or as directed by MD   multivitamin (THERAGRAN) TABS  Self Yes No   Sig: Take 1 tablet by mouth daily   omeprazole (PriLOSEC) 20 mg delayed release capsule  Self Yes No   Sig: as needed   tadalafil (CIALIS) 20 MG tablet  Self Yes No   Sig: tadalafil 20 mg tablet   tamsulosin (FLOMAX) 0 4 mg  Self No No   Sig: Take 1 capsule (0 4 mg total) by mouth daily with dinner      Facility-Administered Medications: None       Past Medical History:   Diagnosis Date    Benign prostatic hyperplasia     Chronic kidney disease     COVID-19 2020    Esophageal reflux     Hypertension     Hypertensive urgency 3/26/2019    Vertigo        Past Surgical History:   Procedure Laterality Date    APPENDECTOMY  2015    CYST REMOVAL      Fatty cyst removed from back       Family History   Problem Relation Age of Onset    Prostate cancer Father     Prostate cancer Maternal Grandfather     Stomach cancer Maternal Aunt         malignant tumor of pharynx    Stomach cancer Maternal Uncle         malignant tumor of pharynx    Mental illness Family     Depression Family     Schizophrenia Family     Hypertension Mother     No Known Problems Sister     Dementia Sister      I have reviewed and agree with the history as documented  E-Cigarette/Vaping    E-Cigarette Use Never User      E-Cigarette/Vaping Substances     Social History     Tobacco Use    Smoking status: Former Smoker    Smokeless tobacco: Never Used    Tobacco comment: 1ppw   Vaping Use    Vaping Use: Never used   Substance Use Topics    Alcohol use: Yes     Comment: socially    Drug use: No       Review of Systems   Constitutional: Negative for chills, diaphoresis and fever  HENT: Negative for congestion, ear pain, hearing loss, rhinorrhea, sore throat and tinnitus  Eyes: Negative for photophobia, pain and visual disturbance  Respiratory: Negative for cough, chest tightness, shortness of breath and wheezing  Cardiovascular: Positive for chest pain  Negative for palpitations  Gastrointestinal: Positive for abdominal pain and constipation  Negative for diarrhea, nausea and vomiting  Genitourinary: Negative for difficulty urinating, dysuria, flank pain, frequency and hematuria  Musculoskeletal: Positive for back pain and gait problem  Negative for neck pain and neck stiffness  Skin: Negative for color change, pallor, rash and wound  Allergic/Immunologic: Negative for immunocompromised state  Neurological: Negative for dizziness, syncope, weakness, light-headedness, numbness and headaches  Hematological: Negative for adenopathy  Does not bruise/bleed easily  Psychiatric/Behavioral: Negative for confusion and decreased concentration  All other systems reviewed and are negative  Physical Exam  Physical Exam  Vitals and nursing note reviewed  Constitutional:       General: He is not in acute distress  Appearance: Normal appearance  He is well-developed   He is not ill-appearing, toxic-appearing or diaphoretic  HENT:      Head: Normocephalic and atraumatic  Right Ear: External ear normal       Left Ear: External ear normal       Mouth/Throat:      Comments: Orpharyngeal exam deferred at this time due to risk of exposure to COVID-19 during current pandemic  Patient has no oropharyngeal complaints  Eyes:      Extraocular Movements: Extraocular movements intact  Conjunctiva/sclera: Conjunctivae normal    Neck:      Vascular: No JVD  Cardiovascular:      Rate and Rhythm: Normal rate and regular rhythm  Heart sounds: Normal heart sounds  No murmur heard  No friction rub  No gallop  Pulmonary:      Effort: Pulmonary effort is normal  No respiratory distress  Breath sounds: Normal breath sounds  No wheezing or rales  Chest:      Chest wall: No tenderness  Abdominal:      General: Bowel sounds are normal  There is no distension  Palpations: Abdomen is soft  Tenderness: There is abdominal tenderness  There is no guarding or rebound  Comments: +Mild tenderness in the right lower quadrant  Musculoskeletal:         General: Tenderness present  No deformity  Normal range of motion  Cervical back: Normal range of motion and neck supple  No rigidity or tenderness  Comments: No midline cervical, thoracic or lumbar spine tenderness  No step-offs  +Right lumbosacral paravertebral tenderness with associated muscle hypertonicity and palpable muscle knots  Negative straight leg raise test on the right  Skin:     General: Skin is warm and dry  Coloration: Skin is not pale  Findings: No erythema or rash  Neurological:      General: No focal deficit present  Mental Status: He is alert and oriented to person, place, and time  Cranial Nerves: No cranial nerve deficit  Sensory: No sensory deficit  Motor: No weakness  Comments: 5/5 strength in all 4 extremities  Normal gait     Psychiatric:         Behavior: Behavior normal          Thought Content:  Thought content normal          Vital Signs  ED Triage Vitals   Temperature Pulse Respirations Blood Pressure SpO2   07/07/22 1310 07/07/22 1310 07/07/22 1310 07/07/22 1310 07/07/22 1310   97 8 °F (36 6 °C) 58 18 134/64 100 %      Temp Source Heart Rate Source Patient Position - Orthostatic VS BP Location FiO2 (%)   07/07/22 1310 07/07/22 1310 07/07/22 1310 07/07/22 1310 --   Tympanic Monitor Sitting Left arm       Pain Score       07/07/22 1543       No Pain         Vitals:    07/07/22 1310 07/07/22 1545   BP: 134/64 152/72   BP Location: Left arm Right arm   Pulse: 58 55   Resp: 18 16   Temp: 97 8 °F (36 6 °C)    TempSrc: Tympanic    SpO2: 100% 100%       Visual Acuity      ED Medications  Medications   acetaminophen (TYLENOL) tablet 650 mg (650 mg Oral Given 7/7/22 1543)   cyclobenzaprine (FLEXERIL) tablet 10 mg (10 mg Oral Given 7/7/22 1543)   aluminum-magnesium hydroxide-simethicone (MYLANTA) oral suspension 30 mL (30 mL Oral Given 7/7/22 1542)       Diagnostic Studies  Results Reviewed     Procedure Component Value Units Date/Time    Lipase [[id number]]  (Normal) Collected: 07/07/22 1545    Lab Status: Final result Specimen: Blood from Arm, Right Updated: 07/07/22 1648     Lipase 81 u/L     Comprehensive metabolic panel [[id number]]  (Abnormal) Collected: 07/07/22 1545    Lab Status: Final result Specimen: Blood from Arm, Right Updated: 07/07/22 1648     Sodium 142 mmol/L      Potassium 4 3 mmol/L      Chloride 105 mmol/L      CO2 28 mmol/L      ANION GAP 9 mmol/L      BUN 20 mg/dL      Creatinine 1 36 mg/dL      Glucose 84 mg/dL      Calcium 9 0 mg/dL      AST 19 U/L      ALT 33 U/L      Alkaline Phosphatase 55 U/L      Total Protein 7 2 g/dL      Albumin 3 8 g/dL      Total Bilirubin 0 39 mg/dL      eGFR 51 ml/min/1 73sq m     Narrative:      [place] guidelines for Chronic Kidney Disease (CKD):     Stage 1 with normal or high GFR (GFR > 90 mL/min/1 73 square meters)    Stage 2 Mild CKD (GFR = 60-89 mL/min/1 73 square meters)    Stage 3A Moderate CKD (GFR = 45-59 mL/min/1 73 square meters)    Stage 3B Moderate CKD (GFR = 30-44 mL/min/1 73 square meters)    Stage 4 Severe CKD (GFR = 15-29 mL/min/1 73 square meters)    Stage 5 End Stage CKD (GFR <15 mL/min/1 73 square meters)  Note: GFR calculation is accurate only with a steady state creatinine    HS Troponin 0hr (reflex protocol) [[id number]]  (Normal) Collected: 07/07/22 1545    Lab Status: Final result Specimen: Blood from Arm, Right Updated: 07/07/22 1619     hs TnI 0hr 4 ng/L     UA (URINE) with reflex to Scope [[id number]] Collected: 07/07/22 1545    Lab Status: Final result Specimen: Urine, Clean Catch Updated: 07/07/22 1600     Color, UA Yellow     Clarity, UA Clear     Specific Gravity, UA >=1 030     pH, UA 6 0     Leukocytes, UA Negative     Nitrite, UA Negative     Protein, UA Negative mg/dl      Glucose, UA Negative mg/dl      Ketones, UA Negative mg/dl      Urobilinogen, UA 0 2 E U /dl      Bilirubin, UA Negative     Occult Blood, UA Negative    CBC and differential [[id number]] Collected: 07/07/22 1545    Lab Status: Final result Specimen: Blood from Arm, Right Updated: 07/07/22 1552     WBC 4 64 Thousand/uL      RBC 4 56 Million/uL      Hemoglobin 14 2 g/dL      Hematocrit 42 5 %      MCV 93 fL      MCH 31 1 pg      MCHC 33 4 g/dL      RDW 13 7 %      MPV 10 8 fL      Platelets 547 Thousands/uL      nRBC 0 /100 WBCs      Neutrophils Relative 61 %      Immat GRANS % 0 %      Lymphocytes Relative 31 %      Monocytes Relative 7 %      Eosinophils Relative 1 %      Basophils Relative 0 %      Neutrophils Absolute 2 83 Thousands/µL      Immature Grans Absolute 0 00 Thousand/uL      Lymphocytes Absolute 1 44 Thousands/µL      Monocytes Absolute 0 32 Thousand/µL      Eosinophils Absolute 0 04 Thousand/µL      Basophils Absolute 0 01 Thousands/µL                  XR chest 1 view portable   ED Interpretation by Mario Hartman DO (07/07 1519)   No acute abnormality in the chest       CT abdomen pelvis wo contrast   Final Result by Arnold Sanford MD (07/07 1545)      1  No acute abnormality   2  Findings of chronic bladder obstruction            Workstation performed: [id number]         CT recon only lumbar spine   Final Result by Arnold Sanford MD (07/07 1550)      Degenerative changes as described, most prominent at L4-5      Workstation performed: [id number]                    Procedures  ECG 12 Lead Documentation Only    Date/Time: 7/7/2022 4:04 PM  Performed by: Mario Hartman DO  Authorized by: Mario Hartman DO     ECG reviewed by me, the ED Provider: yes    Patient location:  ED  Previous ECG:     Previous ECG:  Compared to current    Comparison ECG info:  1-  Rate:     ECG rate:  55    ECG rate assessment: bradycardic    Rhythm:     Rhythm: sinus bradycardia    Ectopy:     Ectopy: none    QRS:     QRS axis:  Normal    QRS intervals:  Normal  Conduction:     Conduction: normal    ST segments:     ST segments:  Normal  T waves:     T waves: normal               ED Course  ED Course as of 07/07/22 1712   u Jul 07, 2022   1635 Updated patient about CT scan result including degenerative disc disease in the lumbar spine  CT abdomen and pelvis unremarkable other than chronic bladder outlet obstruction secondary to known BPH  Urinalysis unremarkable  Waiting on rest of blood work and will likely discharge  Patient reports his back pain is not present but he is currently resting and has not tried to move  He denies any chest discomfort at this time  1703 Updated patient about blood work being unremarkable  Will discharge home with referral to ambulatory spine program and also recommended close follow-up with PCP  Discussed symptom management at home and when to return to the ER                           MDM  Number of Diagnoses or Management Options  Diagnosis management comments: 79-year-old male with history of prior lumbar disc herniation presents to the ED with acute right low back pain that radiates into his right abdomen and right buttocks with associated right buttocks burning sensation  Patient also reports some constipation and abdominal issues as well as burning chest discomfort that started today  Most likely his back pain is musculoskeletal in origin, likely related to prior disc herniation with exacerbation from bending  Other considerations include muscle strain or spasm, sciatica  Other etiologies include renal colic secondary to stone, nonspecific enteritis or colitis  Given his right lower quadrant tenderness as well as complaint of burning in the chest, will workup with EKG, abdominal and cardiac labs, chest x-ray and CT abdomen and pelvis with lumbar spine recon imaging  Patient already has a lidocaine patch on and will give Tylenol and Flexeril for muscle relaxer effects  If workup unremarkable, will refer to ambulatory Comprehensive Spine Program     This patient was evaluated during a time of global shortage of iodinated contrast media  Based on guidance from the [employer] of Radiology, best practices, and local institutional approaches, an alternative path for evaluating and managing the patient may have been employed in order to provide optimal care during the shortage           Amount and/or Complexity of Data Reviewed  Clinical lab tests: ordered and reviewed  Tests in the radiology section of CPT®: ordered and reviewed  Tests in the medicine section of CPT®: ordered and reviewed  Independent visualization of images, tracings, or specimens: yes        Disposition  Final diagnoses:   Right low back pain   Degenerative disc disease, lumbar     Time reflects when diagnosis was documented in both MDM as applicable and the Disposition within this note     Time User Action Codes Description Comment    7/7/2022  5:02 PM Monalisa FRANKLIN Add [M54 50] Right low back pain     7/7/2022  5:02 PM Jahaira Judith Add [M51 36] Degenerative disc disease, lumbar       ED Disposition     ED Disposition   Discharge    Condition   Stable    Date/Time   Thu Jul 7, 2022  5:02 PM    Comment   Laith Damian discharge to home/self care                 Follow-up Information     Follow up With Specialties Details Why Contact Info Additional Information    Brenda Lopez MD Internal Medicine Schedule an appointment as soon as possible for a visit   [facility] [phone]       [facility] Comprehensive Spine Program Physical Therapy Schedule an appointment as soon as possible for a visit       [address] [facility] Emergency Department Emergency Medicine Go to  If symptoms worsen [address] Emergency Department, [address] [place] [address], [id number]          Discharge Medication List as of 7/7/2022  5:03 PM      START taking these medications    Details   cyclobenzaprine (FLEXERIL) 10 mg tablet Take 1 tablet (10 mg total) by mouth 2 (two) times a day as needed for muscle spasms, Starting Thu 7/7/2022, Normal         CONTINUE these medications which have NOT CHANGED    Details   amLODIPine (NORVASC) 10 mg tablet Take 1 tablet (10 mg total) by mouth daily, Starting Mon 2/14/2022, Normal      aspirin 81 mg chewable tablet Chew 1 tablet (81 mg total) daily, Starting Wed 1/26/2022, Until Wed 6/22/2022, Normal      atorvastatin (LIPITOR) 40 mg tablet Take 1 tablet (40 mg total) by mouth every evening, Starting Fri 2/4/2022, Until Wed 6/22/2022, Normal      Diclofenac Sodium (VOLTAREN) 1 % Apply 2 g topically 4 (four) times a day, Starting Wed 4/20/2022, Normal      fluticasone (FLONASE) 50 mcg/act nasal spray 1 spray into each nostril daily, Starting Fri 2/25/2022, Print      lidocaine (LIDODERM) 5 % Apply 1 patch topically daily Remove & Discard patch within 12 hours or as directed by MD, Starting Wed 4/14/2021, Normal      multivitamin (THERAGRAN) TABS Take 1 tablet by mouth daily, Historical Med      omeprazole (PriLOSEC) 20 mg delayed release capsule as needed, Starting Thu 12/16/2021, Historical Med      tadalafil (CIALIS) 20 MG tablet tadalafil 20 mg tablet, Historical Med      tamsulosin (FLOMAX) 0 4 mg Take 1 capsule (0 4 mg total) by mouth daily with dinner, Starting Tue 1/5/2021, Normal                 PDMP Review     None          ED Provider  Electronically Signed by           Mi Tyson DO  07/07/22 3595

## 2022-07-08 ENCOUNTER — TELEPHONE (OUTPATIENT)
Dept: PHYSICAL THERAPY | Facility: OTHER | Age: 72
End: 2022-07-08

## 2022-07-08 ENCOUNTER — TELEPHONE (OUTPATIENT)
Dept: INTERNAL MEDICINE CLINIC | Facility: CLINIC | Age: 72
End: 2022-07-08

## 2022-07-08 NOTE — TELEPHONE ENCOUNTER
This is not a good medication to take with chronic kidney disease    He would have to have this cleared by his nephrologist

## 2022-07-08 NOTE — TELEPHONE ENCOUNTER
Spoke with the patient and notified him of this  He understood  He wants to know if there's something else you could prescribe to help with the inflammation?

## 2022-07-08 NOTE — TELEPHONE ENCOUNTER
Over-the-counter turmeric, and tart cherry juice tablets have been shown to be good for pain and inflammation

## 2022-07-08 NOTE — TELEPHONE ENCOUNTER
Pt called asking for a meloxicam med,    inflamation in his lower back, from a slipped disc,     Made it seem like he got this from us in the past for this  [facility]ses [facility]   Armando

## 2022-07-12 LAB
ATRIAL RATE: 55 BPM
P AXIS: 57 DEGREES
PR INTERVAL: 166 MS
QRS AXIS: 42 DEGREES
QRSD INTERVAL: 96 MS
QT INTERVAL: 412 MS
QTC INTERVAL: 394 MS
T WAVE AXIS: 76 DEGREES
VENTRICULAR RATE: 55 BPM

## 2022-07-12 PROCEDURE — 93010 ELECTROCARDIOGRAM REPORT: CPT | Performed by: INTERNAL MEDICINE

## 2022-07-15 ENCOUNTER — TELEPHONE (OUTPATIENT)
Dept: PHYSICAL THERAPY | Facility: OTHER | Age: 72
End: 2022-07-15

## 2022-07-15 NOTE — TELEPHONE ENCOUNTER
Patient called csp  Patient said he is feeling much better, and will call us back should his pain return       Referral closed

## 2022-07-17 ENCOUNTER — NURSE TRIAGE (OUTPATIENT)
Dept: OTHER | Facility: OTHER | Age: 72
End: 2022-07-17

## 2022-07-17 NOTE — TELEPHONE ENCOUNTER
Regarding: Covid exposure/ guidance (brown 1/2)  ----- Message from [employer] [facility] sent at 7/17/2022  7:57 PM EDT -----  " we went away and a person from our group tested positive for covid   We have take home tests at home, but we would like some guidance "

## 2022-07-18 NOTE — TELEPHONE ENCOUNTER
Reason for Disposition   [1] CLOSE CONTACT COVID-19 EXPOSURE within last 14 days AND [2] NO symptoms    Answer Assessment - Initial Assessment Questions  1   COVID-19 EXPOSURE: "Please describe how you were exposed to someone with a COVID-19 infection "      Patient was exposed on a trip no symptoms    Protocols used: CORONAVIRUS (COVID-19) EXPOSURE-ADULT-AH

## 2022-08-01 ENCOUNTER — TELEPHONE (OUTPATIENT)
Dept: NEPHROLOGY | Facility: CLINIC | Age: 72
End: 2022-08-01

## 2022-08-01 NOTE — TELEPHONE ENCOUNTER
Patient of Dr Júnior Baker called stating that since he has been home from vacation he is having pain on both of his sides of his Kidney's  Patient did have a smell to his urine but no discoloring  Patient drinks 3 bottles of water a day  Patient does have a burning feeling something around the Kidney area  Patient did call is PCP, but his PCP is out on vacation, but has an appointment with Itzel Adams PA-C on 8/8/2022  Please call patient at [phone] to advise him because he is very concern   Rodrigo Blanco,

## 2022-08-02 ENCOUNTER — RA CDI HCC (OUTPATIENT)
Dept: OTHER | Facility: HOSPITAL | Age: 72
End: 2022-08-02

## 2022-08-02 NOTE — PROGRESS NOTES
[address]  coding opportunities       Chart reviewed, no opportunity found: CHART REVIEWED, NO OPPORTUNITY FOUND        Patients Insurance     Medicare Insurance: Medicare

## 2022-08-08 ENCOUNTER — OFFICE VISIT (OUTPATIENT)
Dept: INTERNAL MEDICINE CLINIC | Facility: CLINIC | Age: 72
End: 2022-08-08
Payer: MEDICARE

## 2022-08-08 ENCOUNTER — APPOINTMENT (OUTPATIENT)
Dept: LAB | Facility: HOSPITAL | Age: 72
End: 2022-08-08
Payer: MEDICARE

## 2022-08-08 VITALS
DIASTOLIC BLOOD PRESSURE: 78 MMHG | WEIGHT: 181 LBS | RESPIRATION RATE: 12 BRPM | HEIGHT: 72 IN | SYSTOLIC BLOOD PRESSURE: 140 MMHG | OXYGEN SATURATION: 99 % | HEART RATE: 66 BPM | BODY MASS INDEX: 24.52 KG/M2

## 2022-08-08 DIAGNOSIS — R10.9 BILATERAL FLANK PAIN: ICD-10-CM

## 2022-08-08 DIAGNOSIS — R10.9 BILATERAL FLANK PAIN: Primary | ICD-10-CM

## 2022-08-08 LAB
ALBUMIN SERPL BCP-MCNC: 3.9 G/DL (ref 3.5–5)
ALP SERPL-CCNC: 62 U/L (ref 46–116)
ALT SERPL W P-5'-P-CCNC: 36 U/L (ref 12–78)
ANION GAP SERPL CALCULATED.3IONS-SCNC: 9 MMOL/L (ref 4–13)
AST SERPL W P-5'-P-CCNC: 32 U/L (ref 5–45)
BACTERIA UR QL AUTO: NORMAL /HPF
BASOPHILS # BLD AUTO: 0.02 THOUSANDS/ΜL (ref 0–0.1)
BASOPHILS NFR BLD AUTO: 1 % (ref 0–1)
BILIRUB SERPL-MCNC: 0.51 MG/DL (ref 0.2–1)
BILIRUB UR QL STRIP: NEGATIVE
BUN SERPL-MCNC: 20 MG/DL (ref 5–25)
CALCIUM SERPL-MCNC: 9 MG/DL (ref 8.3–10.1)
CHLORIDE SERPL-SCNC: 105 MMOL/L (ref 96–108)
CLARITY UR: CLEAR
CO2 SERPL-SCNC: 28 MMOL/L (ref 21–32)
COLOR UR: YELLOW
CREAT SERPL-MCNC: 1.43 MG/DL (ref 0.6–1.3)
EOSINOPHIL # BLD AUTO: 0.05 THOUSAND/ΜL (ref 0–0.61)
EOSINOPHIL NFR BLD AUTO: 2 % (ref 0–6)
ERYTHROCYTE [DISTWIDTH] IN BLOOD BY AUTOMATED COUNT: 13.3 % (ref 11.6–15.1)
GFR SERPL CREATININE-BSD FRML MDRD: 48 ML/MIN/1.73SQ M
GLUCOSE P FAST SERPL-MCNC: 93 MG/DL (ref 65–99)
GLUCOSE UR STRIP-MCNC: NEGATIVE MG/DL
HCT VFR BLD AUTO: 41.6 % (ref 36.5–49.3)
HGB BLD-MCNC: 13.8 G/DL (ref 12–17)
HGB UR QL STRIP.AUTO: NEGATIVE
IMM GRANULOCYTES # BLD AUTO: 0.01 THOUSAND/UL (ref 0–0.2)
IMM GRANULOCYTES NFR BLD AUTO: 0 % (ref 0–2)
KETONES UR STRIP-MCNC: NEGATIVE MG/DL
LEUKOCYTE ESTERASE UR QL STRIP: NEGATIVE
LYMPHOCYTES # BLD AUTO: 1.45 THOUSANDS/ΜL (ref 0.6–4.47)
LYMPHOCYTES NFR BLD AUTO: 43 % (ref 14–44)
MCH RBC QN AUTO: 30.5 PG (ref 26.8–34.3)
MCHC RBC AUTO-ENTMCNC: 33.2 G/DL (ref 31.4–37.4)
MCV RBC AUTO: 92 FL (ref 82–98)
MONOCYTES # BLD AUTO: 0.24 THOUSAND/ΜL (ref 0.17–1.22)
MONOCYTES NFR BLD AUTO: 7 % (ref 4–12)
NEUTROPHILS # BLD AUTO: 1.59 THOUSANDS/ΜL (ref 1.85–7.62)
NEUTS SEG NFR BLD AUTO: 47 % (ref 43–75)
NITRITE UR QL STRIP: NEGATIVE
NON-SQ EPI CELLS URNS QL MICRO: NORMAL /HPF
NRBC BLD AUTO-RTO: 0 /100 WBCS
PH UR STRIP.AUTO: 6 [PH]
PLATELET # BLD AUTO: 209 THOUSANDS/UL (ref 149–390)
PMV BLD AUTO: 10.9 FL (ref 8.9–12.7)
POTASSIUM SERPL-SCNC: 4.1 MMOL/L (ref 3.5–5.3)
PROT SERPL-MCNC: 7.4 G/DL (ref 6.4–8.4)
PROT UR STRIP-MCNC: NEGATIVE MG/DL
RBC # BLD AUTO: 4.52 MILLION/UL (ref 3.88–5.62)
RBC #/AREA URNS AUTO: NORMAL /HPF
SODIUM SERPL-SCNC: 142 MMOL/L (ref 135–147)
SP GR UR STRIP.AUTO: 1.02 (ref 1–1.03)
UROBILINOGEN UR QL STRIP.AUTO: 0.2 E.U./DL
WBC # BLD AUTO: 3.36 THOUSAND/UL (ref 4.31–10.16)
WBC #/AREA URNS AUTO: NORMAL /HPF

## 2022-08-08 PROCEDURE — 87086 URINE CULTURE/COLONY COUNT: CPT

## 2022-08-08 PROCEDURE — 81001 URINALYSIS AUTO W/SCOPE: CPT | Performed by: PHYSICIAN ASSISTANT

## 2022-08-08 PROCEDURE — 99213 OFFICE O/P EST LOW 20 MIN: CPT | Performed by: PHYSICIAN ASSISTANT

## 2022-08-08 PROCEDURE — 85025 COMPLETE CBC W/AUTO DIFF WBC: CPT

## 2022-08-08 PROCEDURE — 80053 COMPREHEN METABOLIC PANEL: CPT

## 2022-08-08 PROCEDURE — 36415 COLL VENOUS BLD VENIPUNCTURE: CPT

## 2022-08-08 NOTE — PROGRESS NOTES
Assessment/Plan:   Patient Instructions   Will have patient check labs due to the bilateral flank pain  I will discuss results with him when available  Continue to stay hydrated as you are doing  Quality Measures:   Depression Screening and Follow-up Plan: Patient was screened for depression during today's encounter  They screened negative with a PHQ-2 score of 0  Return for Next scheduled follow up  Diagnoses and all orders for this visit:    Bilateral flank pain  -     Urinalysis with microscopic  -     Urine culture; Future  -     CBC and differential; Future  -     Comprehensive metabolic panel; Future          Subjective:      Patient ID: Eitan Carr is a 67 y o  male  Acute visit     Patient states that 2 weeks ago he was vacationing in [facility]   After returning he started developing bilateral lower flank pain  He states he was not constipated, was urinating without difficulty however did note that his urine started to smell  He did not notice any blood  No fever or chills  No blood in stools, no diarrhea  Energy level is normal   Has not noted pain when he is exercising        ALLERGIES:  Allergies   Allergen Reactions    Penicillin V Anaphylaxis       CURRENT MEDICATIONS:    Current Outpatient Medications:     amLODIPine (NORVASC) 10 mg tablet, Take 1 tablet (10 mg total) by mouth daily, Disp: 90 tablet, Rfl: 1    aspirin 81 mg chewable tablet, Chew 1 tablet (81 mg total) daily, Disp: 30 tablet, Rfl: 0    cyclobenzaprine (FLEXERIL) 10 mg tablet, Take 1 tablet (10 mg total) by mouth 2 (two) times a day as needed for muscle spasms, Disp: 12 tablet, Rfl: 0    lidocaine (LIDODERM) 5 %, Apply 1 patch topically daily Remove & Discard patch within 12 hours or as directed by MD (Patient taking differently: Apply 1 patch topically as needed Remove & Discard patch within 12 hours or as directed by MD), Disp: 50 patch, Rfl: 5    multivitamin (THERAGRAN) TABS, Take 1 tablet by mouth daily, Disp: , Rfl:     omeprazole (PriLOSEC) 20 mg delayed release capsule, as needed, Disp: , Rfl:     tadalafil (CIALIS) 20 MG tablet, tadalafil 20 mg tablet, Disp: , Rfl:     tamsulosin (FLOMAX) 0 4 mg, Take 1 capsule (0 4 mg total) by mouth daily with dinner, Disp: 90 capsule, Rfl: 3    atorvastatin (LIPITOR) 40 mg tablet, Take 1 tablet (40 mg total) by mouth every evening (Patient not taking: Reported on 8/8/2022), Disp: 90 tablet, Rfl: 1    Diclofenac Sodium (VOLTAREN) 1 %, Apply 2 g topically 4 (four) times a day (Patient not taking: Reported on 8/8/2022), Disp: 100 g, Rfl: 1    fluticasone (FLONASE) 50 mcg/act nasal spray, 1 spray into each nostril daily (Patient not taking: No sig reported), Disp: 16 g, Rfl: 0    ACTIVE PROBLEM LIST:  Patient Active Problem List   Diagnosis    Benign prostatic hyperplasia, presence of lower urinary tract symptoms unspecified    Erectile dysfunction    Lumbar radiculopathy    CKD (chronic kidney disease) stage 3, GFR 30-59 ml/min ([facility])    Vertigo    Chronic kidney disease-mineral and bone disorder    Colon polyps    Pain in left knee    Stroke-like symptoms    Primary hypertension       PAST MEDICAL HISTORY:  Past Medical History:   Diagnosis Date    Benign prostatic hyperplasia     Chronic kidney disease     COVID-19 03/2020    Esophageal reflux     Hypertension     Hypertensive urgency 3/26/2019    Vertigo        PAST SURGICAL HISTORY:  Past Surgical History:   Procedure Laterality Date    APPENDECTOMY  05/21/2015    CYST REMOVAL      Fatty cyst removed from back       FAMILY HISTORY:  Family History   Problem Relation Age of Onset    Prostate cancer Father     Prostate cancer Maternal Grandfather     Stomach cancer Maternal Aunt         malignant tumor of pharynx    Stomach cancer Maternal Uncle         malignant tumor of pharynx    Mental illness Family     Depression Family     Schizophrenia Family     Hypertension Mother     No Known Problems Sister     Dementia Sister        SOCIAL HISTORY:  Social History     Socioeconomic History    Marital status: /Civil Union     Spouse name: Not on file    Number of children: Not on file    Years of education: Not on file    Highest education level: Not on file   Occupational History    Occupation: Retired   Tobacco Use    Smoking status: Former Smoker    Smokeless tobacco: Never Used    Tobacco comment: 1ppw   Vaping Use    Vaping Use: Never used   Substance and Sexual Activity    Alcohol use: Yes     Comment: socially    Drug use: No    Sexual activity: Yes     Partners: Female     Comment: denied history of high risk sexual behavior   Other Topics Concern    Not on file   Social History Narrative    No active advance directive    Always uses seat belt    Exercises occasionally, moderate    Five children    Occasional caffeine consumption      Regular dental care  Brushes teeth twice daily       Retired     Social Determinants of Health     Financial Resource Strain: Not on file   Food Insecurity: Not on file   Transportation Needs: Not on file   Physical Activity: Not on file   Stress: Not on file   Social Connections: Not on file   Intimate Partner Violence: Not on file   Housing Stability: Not on file       Review of Systems   Constitutional: Negative for activity change, chills, fatigue and fever  HENT: Negative for congestion  Eyes: Negative for discharge  Respiratory: Negative for cough, chest tightness and shortness of breath  Cardiovascular: Negative for chest pain, palpitations and leg swelling  Gastrointestinal: Negative for abdominal pain  Genitourinary: Positive for flank pain  Negative for decreased urine volume, difficulty urinating, dysuria, frequency, penile discharge, penile pain, testicular pain and urgency  Musculoskeletal: Negative for arthralgias and myalgias  Skin: Negative for rash     Allergic/Immunologic: Negative for immunocompromised state    Neurological: Negative for dizziness, syncope, weakness, light-headedness and headaches  Hematological: Negative for adenopathy  Does not bruise/bleed easily  Psychiatric/Behavioral: Negative for dysphoric mood, sleep disturbance and suicidal ideas  The patient is not nervous/anxious  Objective:  Vitals:    08/08/22 0814   BP: 140/78   BP Location: Left arm   Patient Position: Sitting   Pulse: 66   Resp: 12   SpO2: 99%   Weight: 82 1 kg (181 lb)   Height: 5' 11 5" (1 816 m)     Body mass index is 24 89 kg/m²  Physical Exam  Vitals and nursing note reviewed  Constitutional:       General: He is not in acute distress  Appearance: He is well-developed  HENT:      Head: Normocephalic and atraumatic  Eyes:      Extraocular Movements: Extraocular movements intact  Pupils: Pupils are equal, round, and reactive to light  Neck:      Thyroid: No thyromegaly  Vascular: No carotid bruit or JVD  Cardiovascular:      Rate and Rhythm: Normal rate and regular rhythm  Heart sounds: Normal heart sounds  Pulmonary:      Effort: Pulmonary effort is normal  No respiratory distress  Breath sounds: Normal breath sounds  Abdominal:      General: Abdomen is flat  Bowel sounds are normal       Palpations: Abdomen is soft  Tenderness: There is no abdominal tenderness  There is no right CVA tenderness, left CVA tenderness, guarding or rebound  Comments: Mild tenderness of bilateral lower flank muscles   Musculoskeletal:      Cervical back: Neck supple  Right lower leg: No edema  Left lower leg: No edema  Lymphadenopathy:      Cervical: No cervical adenopathy  Skin:     General: Skin is warm and dry  Findings: No rash  Neurological:      General: No focal deficit present  Mental Status: He is alert and oriented to person, place, and time  Mental status is at baseline     Psychiatric:         Mood and Affect: Mood normal          Behavior: Behavior normal            RESULTS:    Recent Results (from the past 1008 hour(s))   ECG 12 lead    Collection Time: 07/07/22  3:37 PM   Result Value Ref Range    Ventricular Rate 55 BPM    Atrial Rate 55 BPM    SD Interval 166 ms    QRSD Interval 96 ms    QT Interval 412 ms    QTC Interval 394 ms    P [place] 57 degrees    QRS Axis 42 degrees    T Wave Axis 76 degrees   CBC and differential    Collection Time: 07/07/22  3:45 PM   Result Value Ref Range    WBC 4 64 4 31 - 10 16 Thousand/uL    RBC 4 56 3 88 - 5 62 Million/uL    Hemoglobin 14 2 12 0 - 17 0 g/dL    Hematocrit 42 5 36 5 - 49 3 %    MCV 93 82 - 98 fL    MCH 31 1 26 8 - 34 3 pg    MCHC 33 4 31 4 - 37 4 g/dL    RDW 13 7 11 6 - 15 1 %    MPV 10 8 8 9 - 12 7 fL    Platelets 353 865 - 712 Thousands/uL    nRBC 0 /100 WBCs    Neutrophils Relative 61 43 - 75 %    Immat GRANS % 0 0 - 2 %    Lymphocytes Relative 31 14 - 44 %    Monocytes Relative 7 4 - 12 %    Eosinophils Relative 1 0 - 6 %    Basophils Relative 0 0 - 1 %    Neutrophils Absolute 2 83 1 85 - 7 62 Thousands/µL    Immature Grans Absolute 0 00 0 00 - 0 20 Thousand/uL    Lymphocytes Absolute 1 44 0 60 - 4 47 Thousands/µL    Monocytes Absolute 0 32 0 17 - 1 22 Thousand/µL    Eosinophils Absolute 0 04 0 00 - 0 61 Thousand/µL    Basophils Absolute 0 01 0 00 - 0 10 Thousands/µL   Comprehensive metabolic panel    Collection Time: 07/07/22  3:45 PM   Result Value Ref Range    Sodium 142 136 - 145 mmol/L    Potassium 4 3 3 5 - 5 3 mmol/L    Chloride 105 100 - 108 mmol/L    CO2 28 21 - 32 mmol/L    ANION GAP 9 4 - 13 mmol/L    BUN 20 5 - 25 mg/dL    Creatinine 1 36 (H) 0 60 - 1 30 mg/dL    Glucose 84 65 - 140 mg/dL    Calcium 9 0 8 3 - 10 1 mg/dL    AST 19 5 - 45 U/L    ALT 33 12 - 78 U/L    Alkaline Phosphatase 55 46 - 116 U/L    Total Protein 7 2 6 4 - 8 2 g/dL    Albumin 3 8 3 5 - 5 0 g/dL    Total Bilirubin 0 39 0 20 - 1 00 mg/dL    eGFR 51 ml/min/1 73sq m   Lipase    Collection Time: 07/07/22  3:45 PM   Result Value Ref Range    Lipase 81 73 - 393 u/L   HS Troponin 0hr (reflex protocol)    Collection Time: 07/07/22  3:45 PM   Result Value Ref Range    hs TnI 0hr 4 "Refer to ACS Flowchart"- see link ng/L   UA (URINE) with reflex to Scope    Collection Time: 07/07/22  3:45 PM   Result Value Ref Range    Color, UA Yellow     Clarity, UA Clear     Specific Gravity, UA >=1 030 1 003 - 1 030    pH, UA 6 0 4 5, 5 0, 5 5, 6 0, 6 5, 7 0, 7 5, 8 0    Leukocytes, UA Negative Negative    Nitrite, UA Negative Negative    Protein, UA Negative Negative mg/dl    Glucose, UA Negative Negative mg/dl    Ketones, UA Negative Negative mg/dl    Urobilinogen, UA 0 2 0 2, 1 0 E U /dl E U /dl    Bilirubin, UA Negative Negative    Occult Blood, UA Negative Negative       This note was created with voice recognition software  Phonic, grammatical and spelling errors may be present within the note as a result

## 2022-08-08 NOTE — PATIENT INSTRUCTIONS
Will have patient check labs due to the bilateral flank pain  I will discuss results with him when available  Continue to stay hydrated as you are doing

## 2022-08-09 LAB — BACTERIA UR CULT: NORMAL

## 2022-09-23 DIAGNOSIS — I10 ESSENTIAL HYPERTENSION: ICD-10-CM

## 2022-09-23 RX ORDER — AMLODIPINE BESYLATE 10 MG/1
TABLET ORAL
Qty: 90 TABLET | Refills: 1 | Status: SHIPPED | OUTPATIENT
Start: 2022-09-23

## 2022-09-26 ENCOUNTER — APPOINTMENT (OUTPATIENT)
Dept: LAB | Facility: HOSPITAL | Age: 72
End: 2022-09-26
Payer: MEDICARE

## 2022-09-26 DIAGNOSIS — I10 ESSENTIAL (PRIMARY) HYPERTENSION: ICD-10-CM

## 2022-09-26 DIAGNOSIS — E78.5 HYPERLIPIDEMIA, UNSPECIFIED HYPERLIPIDEMIA TYPE: ICD-10-CM

## 2022-09-26 LAB
ALBUMIN SERPL BCP-MCNC: 3.4 G/DL (ref 3.5–5)
ALP SERPL-CCNC: 56 U/L (ref 46–116)
ALT SERPL W P-5'-P-CCNC: 33 U/L (ref 12–78)
ANION GAP SERPL CALCULATED.3IONS-SCNC: 4 MMOL/L (ref 4–13)
AST SERPL W P-5'-P-CCNC: 23 U/L (ref 5–45)
BASOPHILS # BLD AUTO: 0.02 THOUSANDS/ΜL (ref 0–0.1)
BASOPHILS NFR BLD AUTO: 1 % (ref 0–1)
BILIRUB SERPL-MCNC: 0.41 MG/DL (ref 0.2–1)
BUN SERPL-MCNC: 26 MG/DL (ref 5–25)
CALCIUM ALBUM COR SERPL-MCNC: 9.3 MG/DL (ref 8.3–10.1)
CALCIUM SERPL-MCNC: 8.8 MG/DL (ref 8.3–10.1)
CHLORIDE SERPL-SCNC: 105 MMOL/L (ref 96–108)
CHOLEST SERPL-MCNC: 162 MG/DL
CO2 SERPL-SCNC: 27 MMOL/L (ref 21–32)
CREAT SERPL-MCNC: 1.53 MG/DL (ref 0.6–1.3)
EOSINOPHIL # BLD AUTO: 0.04 THOUSAND/ΜL (ref 0–0.61)
EOSINOPHIL NFR BLD AUTO: 1 % (ref 0–6)
ERYTHROCYTE [DISTWIDTH] IN BLOOD BY AUTOMATED COUNT: 13.3 % (ref 11.6–15.1)
GFR SERPL CREATININE-BSD FRML MDRD: 44 ML/MIN/1.73SQ M
GLUCOSE P FAST SERPL-MCNC: 86 MG/DL (ref 65–99)
HCT VFR BLD AUTO: 40.8 % (ref 36.5–49.3)
HDLC SERPL-MCNC: 77 MG/DL
HGB BLD-MCNC: 13.5 G/DL (ref 12–17)
IMM GRANULOCYTES # BLD AUTO: 0.01 THOUSAND/UL (ref 0–0.2)
IMM GRANULOCYTES NFR BLD AUTO: 0 % (ref 0–2)
LDLC SERPL CALC-MCNC: 72 MG/DL (ref 0–100)
LYMPHOCYTES # BLD AUTO: 1.72 THOUSANDS/ΜL (ref 0.6–4.47)
LYMPHOCYTES NFR BLD AUTO: 42 % (ref 14–44)
MCH RBC QN AUTO: 30.2 PG (ref 26.8–34.3)
MCHC RBC AUTO-ENTMCNC: 33.1 G/DL (ref 31.4–37.4)
MCV RBC AUTO: 91 FL (ref 82–98)
MONOCYTES # BLD AUTO: 0.35 THOUSAND/ΜL (ref 0.17–1.22)
MONOCYTES NFR BLD AUTO: 9 % (ref 4–12)
NEUTROPHILS # BLD AUTO: 1.98 THOUSANDS/ΜL (ref 1.85–7.62)
NEUTS SEG NFR BLD AUTO: 47 % (ref 43–75)
NONHDLC SERPL-MCNC: 85 MG/DL
NRBC BLD AUTO-RTO: 0 /100 WBCS
PLATELET # BLD AUTO: 194 THOUSANDS/UL (ref 149–390)
PMV BLD AUTO: 11.2 FL (ref 8.9–12.7)
POTASSIUM SERPL-SCNC: 4.2 MMOL/L (ref 3.5–5.3)
PROT SERPL-MCNC: 7.3 G/DL (ref 6.4–8.4)
RBC # BLD AUTO: 4.47 MILLION/UL (ref 3.88–5.62)
SODIUM SERPL-SCNC: 136 MMOL/L (ref 135–147)
TRIGL SERPL-MCNC: 65 MG/DL
WBC # BLD AUTO: 4.12 THOUSAND/UL (ref 4.31–10.16)

## 2022-09-30 ENCOUNTER — OFFICE VISIT (OUTPATIENT)
Dept: INTERNAL MEDICINE CLINIC | Facility: CLINIC | Age: 72
End: 2022-09-30
Payer: MEDICARE

## 2022-09-30 VITALS
WEIGHT: 188.4 LBS | BODY MASS INDEX: 25.52 KG/M2 | HEIGHT: 72 IN | SYSTOLIC BLOOD PRESSURE: 110 MMHG | HEART RATE: 72 BPM | OXYGEN SATURATION: 98 % | RESPIRATION RATE: 16 BRPM | DIASTOLIC BLOOD PRESSURE: 64 MMHG

## 2022-09-30 DIAGNOSIS — Z13.6 SCREENING FOR HEART DISEASE: ICD-10-CM

## 2022-09-30 DIAGNOSIS — N18.31 STAGE 3A CHRONIC KIDNEY DISEASE (HCC): ICD-10-CM

## 2022-09-30 DIAGNOSIS — N40.0 BENIGN PROSTATIC HYPERPLASIA, PRESENCE OF LOWER URINARY TRACT SYMPTOMS UNSPECIFIED: ICD-10-CM

## 2022-09-30 DIAGNOSIS — I10 PRIMARY HYPERTENSION: Primary | ICD-10-CM

## 2022-09-30 PROBLEM — R29.90 STROKE-LIKE SYMPTOMS: Status: RESOLVED | Noted: 2022-01-25 | Resolved: 2022-09-30

## 2022-09-30 PROCEDURE — 99214 OFFICE O/P EST MOD 30 MIN: CPT | Performed by: PHYSICIAN ASSISTANT

## 2022-09-30 NOTE — PROGRESS NOTES
Assessment/Plan:   Patient Instructions   Continue current medications  Schedule follow-up with repeat labs in 6 months to be Medicare annual wellness visit with physical   Follow-up sooner as needed  Quality Measures:   Depression Screening and Follow-up Plan: Patient was screened for depression during today's encounter  They screened negative with a PHQ-2 score of 0  Return in about 26 weeks (around 3/31/2023) for Medicare Annual Wellness with Physical          Diagnoses and all orders for this visit:    Primary hypertension  -     CBC and differential; Future  -     Comprehensive metabolic panel; Future    Benign prostatic hyperplasia, presence of lower urinary tract symptoms unspecified    Stage 3a chronic kidney disease ([facility] [address] )    Screening for heart disease  -     Lipid panel; Future        Subjective:      Patient ID: Linda Evangelista is a 67 y o  male  Follow-up, labs reviewed with patient    Hypertension:  Good control on his current medication  Eduarda cp, palp, sob, edema, HA, dizziness, diaphoresis, syncope, visual disturbance  Lipids show excellent control on no medication  BPH with nocturia:  On tamsulosin  No change in symptoms  CKD 3:  Renal function stable  Follows with Nephrology on regular basis  States he stays hydrated  Avoids NSAIDs  No other focal concerns today        ALLERGIES:  Allergies   Allergen Reactions    Penicillin V Anaphylaxis       CURRENT MEDICATIONS:    Current Outpatient Medications:     amLODIPine (NORVASC) 10 mg tablet, TAKE 1 TABLET DAILY, Disp: 90 tablet, Rfl: 1    aspirin 81 mg chewable tablet, Chew 1 tablet (81 mg total) daily, Disp: 30 tablet, Rfl: 0    cyclobenzaprine (FLEXERIL) 10 mg tablet, Take 1 tablet (10 mg total) by mouth 2 (two) times a day as needed for muscle spasms, Disp: 12 tablet, Rfl: 0    lidocaine (LIDODERM) 5 %, Apply 1 patch topically daily Remove & Discard patch within 12 hours or as directed by MD, Disp: 50 patch, Rfl: 5    multivitamin (THERAGRAN) TABS, Take 1 tablet by mouth daily, Disp: , Rfl:     omeprazole (PriLOSEC) 20 mg delayed release capsule, as needed, Disp: , Rfl:     tadalafil (CIALIS) 20 MG tablet, tadalafil 20 mg tablet, Disp: , Rfl:     tamsulosin (FLOMAX) 0 4 mg, Take 1 capsule (0 4 mg total) by mouth daily with dinner, Disp: 90 capsule, Rfl: 3    ACTIVE PROBLEM LIST:  Patient Active Problem List   Diagnosis    Benign prostatic hyperplasia, presence of lower urinary tract symptoms unspecified    Erectile dysfunction    Lumbar radiculopathy    CKD (chronic kidney disease) stage 3, GFR 30-59 ml/min ([facility]    Vertigo    Chronic kidney disease-mineral and bone disorder    Colon polyps    Pain in left knee    Primary hypertension       PAST MEDICAL HISTORY:  Past Medical History:   Diagnosis Date    Benign prostatic hyperplasia     Chronic kidney disease     COVID-19 03/2020    Esophageal reflux     Hypertension     Hypertensive urgency 3/26/2019    Vertigo        PAST SURGICAL HISTORY:  Past Surgical History:   Procedure Laterality Date    APPENDECTOMY  05/21/2015    CYST REMOVAL      Fatty cyst removed from back       FAMILY HISTORY:  Family History   Problem Relation Age of Onset    Prostate cancer Father     Prostate cancer Maternal Grandfather     Stomach cancer Maternal Aunt         malignant tumor of pharynx    Stomach cancer Maternal Uncle         malignant tumor of pharynx    Mental illness Family     Depression Family     Schizophrenia Family     Hypertension Mother     No Known Problems Sister     Dementia Sister        SOCIAL HISTORY:  Social History     Socioeconomic History    Marital status: /Civil Union     Spouse name: Not on file    Number of children: Not on file    Years of education: Not on file    Highest education level: Not on file   Occupational History    Occupation: Retired   Tobacco Use    Smoking status: Former Smoker    Smokeless tobacco: Never Used    Tobacco comment: 1ppw   Vaping Use    Vaping Use: Never used   Substance and Sexual Activity    Alcohol use: Yes     Comment: socially    Drug use: No    Sexual activity: Yes     Partners: Female     Comment: denied history of high risk sexual behavior   Other Topics Concern    Not on file   Social History Narrative    No active advance directive    Always uses seat belt    Exercises occasionally, moderate    Five children    Occasional caffeine consumption      Regular dental care  Brushes teeth twice daily       Retired     Social Determinants of Health     Financial Resource Strain: Not on file   Food Insecurity: Not on file   Transportation Needs: Not on file   Physical Activity: Not on file   Stress: Not on file   Social Connections: Not on file   Intimate Partner Violence: Not on file   Housing Stability: Not on file       Review of Systems   Constitutional: Negative for activity change, chills, fatigue and fever  HENT: Negative for congestion  Eyes: Negative for discharge  Respiratory: Negative for cough, chest tightness and shortness of breath  Cardiovascular: Negative for chest pain, palpitations and leg swelling  Gastrointestinal: Negative for abdominal pain, blood in stool, diarrhea, nausea and vomiting  Endocrine: Negative for polydipsia, polyphagia and polyuria  Genitourinary: Negative for difficulty urinating  Musculoskeletal: Negative for arthralgias and myalgias  Skin: Negative for rash  Allergic/Immunologic: Negative for immunocompromised state  Neurological: Negative for dizziness, syncope, weakness, light-headedness and headaches  Hematological: Negative for adenopathy  Does not bruise/bleed easily  Psychiatric/Behavioral: Negative for dysphoric mood, sleep disturbance and suicidal ideas  The patient is not nervous/anxious            Objective:  Vitals:    09/30/22 1401 09/30/22 1412   BP: 142/78 110/64   BP Location: Left arm Left arm   Patient Position: Sitting Sitting   Cuff Size: Adult    Pulse: 72    Resp: 16    SpO2: 98%    Weight: 85 5 kg (188 lb 6 4 oz)    Height: 5' 11 5" (1 816 m)      Body mass index is 25 91 kg/m²  Physical Exam  Vitals and nursing note reviewed  Constitutional:       General: He is not in acute distress  Appearance: He is well-developed  He is not ill-appearing  HENT:      Head: Normocephalic and atraumatic  Eyes:      Extraocular Movements: Extraocular movements intact  Pupils: Pupils are equal, round, and reactive to light  Neck:      Thyroid: No thyromegaly  Vascular: No carotid bruit or JVD  Cardiovascular:      Rate and Rhythm: Normal rate and regular rhythm  Heart sounds: Normal heart sounds  Pulmonary:      Effort: Pulmonary effort is normal  No respiratory distress  Breath sounds: Normal breath sounds  Abdominal:      Tenderness: There is no abdominal tenderness  Musculoskeletal:      Cervical back: Neck supple  Right lower leg: No edema  Left lower leg: No edema  Lymphadenopathy:      Cervical: No cervical adenopathy  Skin:     General: Skin is warm and dry  Findings: No rash  Neurological:      General: No focal deficit present  Mental Status: He is alert and oriented to person, place, and time  Mental status is at baseline     Psychiatric:         Mood and Affect: Mood normal          Behavior: Behavior normal            RESULTS:    Recent Results (from the past 1008 hour(s))   CBC and differential    Collection Time: 09/26/22  8:13 AM   Result Value Ref Range    WBC 4 12 (L) 4 31 - 10 16 Thousand/uL    RBC 4 47 3 88 - 5 62 Million/uL    Hemoglobin 13 5 12 0 - 17 0 g/dL    Hematocrit 40 8 36 5 - 49 3 %    MCV 91 82 - 98 fL    MCH 30 2 26 8 - 34 3 pg    MCHC 33 1 31 4 - 37 4 g/dL    RDW 13 3 11 6 - 15 1 %    MPV 11 2 8 9 - 12 7 fL    Platelets 194 237 - 121 Thousands/uL    nRBC 0 /100 WBCs    Neutrophils Relative 47 43 - 75 %    Immat GRANS % 0 0 - 2 %    Lymphocytes Relative 42 14 - 44 %    Monocytes Relative 9 4 - 12 %    Eosinophils Relative 1 0 - 6 %    Basophils Relative 1 0 - 1 %    Neutrophils Absolute 1 98 1 85 - 7 62 Thousands/µL    Immature Grans Absolute 0 01 0 00 - 0 20 Thousand/uL    Lymphocytes Absolute 1 72 0 60 - 4 47 Thousands/µL    Monocytes Absolute 0 35 0 17 - 1 22 Thousand/µL    Eosinophils Absolute 0 04 0 00 - 0 61 Thousand/µL    Basophils Absolute 0 02 0 00 - 0 10 Thousands/µL   Comprehensive metabolic panel    Collection Time: 09/26/22  8:13 AM   Result Value Ref Range    Sodium 136 135 - 147 mmol/L    Potassium 4 2 3 5 - 5 3 mmol/L    Chloride 105 96 - 108 mmol/L    CO2 27 21 - 32 mmol/L    ANION GAP 4 4 - 13 mmol/L    BUN 26 (H) 5 - 25 mg/dL    Creatinine 1 53 (H) 0 60 - 1 30 mg/dL    Glucose, Fasting 86 65 - 99 mg/dL    Calcium 8 8 8 3 - 10 1 mg/dL    Corrected Calcium 9 3 8 3 - 10 1 mg/dL    AST 23 5 - 45 U/L    ALT 33 12 - 78 U/L    Alkaline Phosphatase 56 46 - 116 U/L    Total Protein 7 3 6 4 - 8 4 g/dL    Albumin 3 4 (L) 3 5 - 5 0 g/dL    Total Bilirubin 0 41 0 20 - 1 00 mg/dL    eGFR 44 ml/min/1 73sq m   Lipid panel    Collection Time: 09/26/22  8:13 AM   Result Value Ref Range    Cholesterol 162 See Comment mg/dL    Triglycerides 65 See Comment mg/dL    HDL, Direct 77 >=40 mg/dL    LDL Calculated 72 0 - 100 mg/dL    Non-HDL-Chol (CHOL-HDL) 85 mg/dl       This note was created with voice recognition software  Phonic, grammatical and spelling errors may be present within the note as a result

## 2022-09-30 NOTE — PATIENT INSTRUCTIONS
Continue current medications  Schedule follow-up with repeat labs in 6 months to be Medicare annual wellness visit with physical   Follow-up sooner as needed

## 2022-11-03 ENCOUNTER — OFFICE VISIT (OUTPATIENT)
Dept: INTERNAL MEDICINE CLINIC | Facility: CLINIC | Age: 72
End: 2022-11-03

## 2022-11-03 VITALS
BODY MASS INDEX: 25.79 KG/M2 | WEIGHT: 190.4 LBS | RESPIRATION RATE: 16 BRPM | SYSTOLIC BLOOD PRESSURE: 128 MMHG | HEART RATE: 71 BPM | TEMPERATURE: 98.1 F | DIASTOLIC BLOOD PRESSURE: 60 MMHG | OXYGEN SATURATION: 98 % | HEIGHT: 72 IN

## 2022-11-03 DIAGNOSIS — J20.9 ACUTE BRONCHITIS WITH BRONCHOSPASM: Primary | ICD-10-CM

## 2022-11-03 RX ORDER — AZITHROMYCIN 250 MG/1
TABLET, FILM COATED ORAL
Qty: 6 TABLET | Refills: 0 | Status: SHIPPED | OUTPATIENT
Start: 2022-11-03 | End: 2022-11-07

## 2022-11-03 RX ORDER — METHYLPREDNISOLONE 4 MG/1
TABLET ORAL
Qty: 21 TABLET | Refills: 0 | Status: SHIPPED | OUTPATIENT
Start: 2022-11-03

## 2022-11-03 NOTE — PATIENT INSTRUCTIONS
Rest, increase fluids  Tylenol for fever or aches as per package instructions  Start antibiotic and finish  Always wise to take a probiotic and or eat yogurt daily when on an antibiotic  Start and finish Medrol pack  Can continue cough medicine as you are using  Follow-up if not steadily improving

## 2022-11-03 NOTE — PROGRESS NOTES
Assessment/Plan:   Patient Instructions   Rest, increase fluids  Tylenol for fever or aches as per package instructions  Start antibiotic and finish  Always wise to take a probiotic and or eat yogurt daily when on an antibiotic  Start and finish Medrol pack  Can continue cough medicine as you are using  Follow-up if not steadily improving  Quality Measures:   Depression Screening and Follow-up Plan: Patient was screened for depression during today's encounter  They screened negative with a PHQ-2 score of 0  Return if symptoms worsen or fail to improve, for Next scheduled follow up  Diagnoses and all orders for this visit:    Acute bronchitis with bronchospasm  Comments:  post covid   Orders:  -     azithromycin (ZITHROMAX) 250 mg tablet; Take 2 tablets today then 1 tablet daily x 4 days  -     methylPREDNISolone 4 MG tablet therapy pack; Use as directed on package    Other orders  -     Diclofenac Sodium (VOLTAREN) 1 %; APPLY 2 GRAMS TO THE AFFECTED AREA(S) BY TOPICAL ROUTE 4 TIMES PER DAY        Subjective:      Patient ID: Chase Herrera is a 67 y o  male  Acute visit     Patient states that in mid October after returning from a vacation to [facility] he had contracted COVID-19 disease  He states he did well for the 5 days he isolated himself  After that however he started developing a sore throat recurrent achiness, a tight cough with intermittent wheezing  Over-the-counter remedies are not helping but Robitussin is helping the cough to some degree  Does not feel as if he is getting any better and may be slightly worse  Throat continues to bother him  He can smell, can taste, no nausea, vomiting, diarrhea        ALLERGIES:  Allergies   Allergen Reactions   • Penicillin V Anaphylaxis       CURRENT MEDICATIONS:    Current Outpatient Medications:   •  amLODIPine (NORVASC) 10 mg tablet, TAKE 1 TABLET DAILY, Disp: 90 tablet, Rfl: 1  •  aspirin 81 mg chewable tablet, Chew 1 tablet (81 mg total) daily, Disp: 30 tablet, Rfl: 0  •  azithromycin (ZITHROMAX) 250 mg tablet, Take 2 tablets today then 1 tablet daily x 4 days, Disp: 6 tablet, Rfl: 0  •  cyclobenzaprine (FLEXERIL) 10 mg tablet, Take 1 tablet (10 mg total) by mouth 2 (two) times a day as needed for muscle spasms, Disp: 12 tablet, Rfl: 0  •  Diclofenac Sodium (VOLTAREN) 1 %, APPLY 2 GRAMS TO THE AFFECTED AREA(S) BY TOPICAL ROUTE 4 TIMES PER DAY, Disp: , Rfl:   •  lidocaine (LIDODERM) 5 %, Apply 1 patch topically daily Remove & Discard patch within 12 hours or as directed by MD, Disp: 50 patch, Rfl: 5  •  methylPREDNISolone 4 MG tablet therapy pack, Use as directed on package, Disp: 21 tablet, Rfl: 0  •  multivitamin (THERAGRAN) TABS, Take 1 tablet by mouth daily, Disp: , Rfl:   •  omeprazole (PriLOSEC) 20 mg delayed release capsule, as needed, Disp: , Rfl:   •  tadalafil (CIALIS) 20 MG tablet, tadalafil 20 mg tablet, Disp: , Rfl:   •  tamsulosin (FLOMAX) 0 4 mg, Take 1 capsule (0 4 mg total) by mouth daily with dinner, Disp: 90 capsule, Rfl: 3    ACTIVE PROBLEM LIST:  Patient Active Problem List   Diagnosis   • Benign prostatic hyperplasia, presence of lower urinary tract symptoms unspecified   • Erectile dysfunction   • Lumbar radiculopathy   • CKD (chronic kidney disease) stage 3, GFR 30-59 ml/min ([facility])   • Vertigo   • Chronic kidney disease-mineral and bone disorder   • Colon polyps   • Pain in left knee   • Primary hypertension       PAST MEDICAL HISTORY:  Past Medical History:   Diagnosis Date   • Benign prostatic hyperplasia    • Chronic kidney disease    • COVID-19 03/2020   • Esophageal reflux    • Hypertension    • Hypertensive urgency 3/26/2019   • Vertigo        PAST SURGICAL HISTORY:  Past Surgical History:   Procedure Laterality Date   • APPENDECTOMY  05/21/2015   • CYST REMOVAL      Fatty cyst removed from back       FAMILY HISTORY:  Family History   Problem Relation Age of Onset   • Prostate cancer Father    • Prostate cancer Maternal Grandfather    • Stomach cancer Maternal Aunt         malignant tumor of pharynx   • Stomach cancer Maternal Uncle         malignant tumor of pharynx   • Mental illness Family    • Depression Family    • Schizophrenia Family    • Hypertension Mother    • No Known Problems Sister    • Dementia Sister        SOCIAL HISTORY:  Social History     Socioeconomic History   • Marital status: /Civil Union     Spouse name: Not on file   • Number of children: Not on file   • Years of education: Not on file   • Highest education level: Not on file   Occupational History   • Occupation: Retired   Tobacco Use   • Smoking status: Former Smoker   • Smokeless tobacco: Never Used   • Tobacco comment: 1ppw   Vaping Use   • Vaping Use: Never used   Substance and Sexual Activity   • Alcohol use: Yes     Comment: socially   • Drug use: No   • Sexual activity: Yes     Partners: Female     Comment: denied history of high risk sexual behavior   Other Topics Concern   • Not on file   Social History Narrative    No active advance directive    Always uses seat belt    Exercises occasionally, moderate    Five children    Occasional caffeine consumption      Regular dental care  Brushes teeth twice daily       Retired     Social Determinants of Health     Financial Resource Strain: Not on file   Food Insecurity: Not on file   Transportation Needs: Not on file   Physical Activity: Not on file   Stress: Not on file   Social Connections: Not on file   Intimate Partner Violence: Not on file   Housing Stability: Not on file       Review of Systems   Constitutional: Positive for chills and fatigue  Negative for activity change and fever  HENT: Positive for postnasal drip, rhinorrhea and sore throat  Negative for congestion, ear pain and sinus pressure  Eyes: Negative for discharge  Respiratory: Negative for cough, chest tightness and shortness of breath      Cardiovascular: Negative for chest pain, palpitations and leg swelling  Gastrointestinal: Negative for abdominal pain, blood in stool, constipation, diarrhea, nausea and vomiting  Endocrine: Negative for polydipsia, polyphagia and polyuria  Genitourinary: Negative for difficulty urinating  Musculoskeletal: Positive for myalgias  Negative for arthralgias  Skin: Negative for rash  Allergic/Immunologic: Negative for immunocompromised state  Neurological: Negative for dizziness, syncope, weakness, light-headedness and headaches  Hematological: Negative for adenopathy  Does not bruise/bleed easily  Psychiatric/Behavioral: Negative for dysphoric mood, sleep disturbance and suicidal ideas  The patient is not nervous/anxious  Objective:  Vitals:    11/03/22 1413   BP: 128/60   BP Location: Left arm   Patient Position: Sitting   Cuff Size: Adult   Pulse: 71   Resp: 16   Temp: 98 1 °F (36 7 °C)   TempSrc: Tympanic   SpO2: 98%   Weight: 86 4 kg (190 lb 6 4 oz)   Height: 5' 11 5" (1 816 m)     Body mass index is 26 19 kg/m²  Physical Exam  Vitals and nursing note reviewed  Constitutional:       General: He is not in acute distress  Appearance: He is well-developed  HENT:      Head: Normocephalic and atraumatic  Right Ear: Tympanic membrane and ear canal normal       Left Ear: Tympanic membrane and ear canal normal       Nose: Congestion present  Mouth/Throat:      Mouth: Mucous membranes are moist       Pharynx: Posterior oropharyngeal erythema present  No oropharyngeal exudate or uvula swelling  Tonsils: No tonsillar exudate  Eyes:      Extraocular Movements: Extraocular movements intact  Right eye: Normal extraocular motion  Left eye: Normal extraocular motion  Conjunctiva/sclera: Conjunctivae normal       Pupils: Pupils are equal, round, and reactive to light  Neck:      Thyroid: No thyromegaly  Vascular: No carotid bruit or JVD  Cardiovascular:      Rate and Rhythm: Normal rate and regular rhythm  Heart sounds: Normal heart sounds  Pulmonary:      Effort: Pulmonary effort is normal  No respiratory distress  Comments: Forced expiratory wheezing present in the anterior fields  Musculoskeletal:      Cervical back: Neck supple  Right lower leg: No edema  Left lower leg: No edema  Lymphadenopathy:      Cervical: Cervical adenopathy (  Shoddy enlarged anterior cervical nodes) present  Skin:     General: Skin is warm and dry  Findings: No rash  Neurological:      General: No focal deficit present  Mental Status: He is alert and oriented to person, place, and time  Mental status is at baseline     Psychiatric:         Mood and Affect: Mood normal          Behavior: Behavior normal            RESULTS:    Recent Results (from the past 1008 hour(s))   CBC and differential    Collection Time: 09/26/22  8:13 AM   Result Value Ref Range    WBC 4 12 (L) 4 31 - 10 16 Thousand/uL    RBC 4 47 3 88 - 5 62 Million/uL    Hemoglobin 13 5 12 0 - 17 0 g/dL    Hematocrit 40 8 36 5 - 49 3 %    MCV 91 82 - 98 fL    MCH 30 2 26 8 - 34 3 pg    MCHC 33 1 31 4 - 37 4 g/dL    RDW 13 3 11 6 - 15 1 %    MPV 11 2 8 9 - 12 7 fL    Platelets 147 565 - 883 Thousands/uL    nRBC 0 /100 WBCs    Neutrophils Relative 47 43 - 75 %    Immat GRANS % 0 0 - 2 %    Lymphocytes Relative 42 14 - 44 %    Monocytes Relative 9 4 - 12 %    Eosinophils Relative 1 0 - 6 %    Basophils Relative 1 0 - 1 %    Neutrophils Absolute 1 98 1 85 - 7 62 Thousands/µL    Immature Grans Absolute 0 01 0 00 - 0 20 Thousand/uL    Lymphocytes Absolute 1 72 0 60 - 4 47 Thousands/µL    Monocytes Absolute 0 35 0 17 - 1 22 Thousand/µL    Eosinophils Absolute 0 04 0 00 - 0 61 Thousand/µL    Basophils Absolute 0 02 0 00 - 0 10 Thousands/µL   Comprehensive metabolic panel    Collection Time: 09/26/22  8:13 AM   Result Value Ref Range    Sodium 136 135 - 147 mmol/L    Potassium 4 2 3 5 - 5 3 mmol/L    Chloride 105 96 - 108 mmol/L    CO2 27 21 - 32 mmol/L ANION GAP 4 4 - 13 mmol/L    BUN 26 (H) 5 - 25 mg/dL    Creatinine 1 53 (H) 0 60 - 1 30 mg/dL    Glucose, Fasting 86 65 - 99 mg/dL    Calcium 8 8 8 3 - 10 1 mg/dL    Corrected Calcium 9 3 8 3 - 10 1 mg/dL    AST 23 5 - 45 U/L    ALT 33 12 - 78 U/L    Alkaline Phosphatase 56 46 - 116 U/L    Total Protein 7 3 6 4 - 8 4 g/dL    Albumin 3 4 (L) 3 5 - 5 0 g/dL    Total Bilirubin 0 41 0 20 - 1 00 mg/dL    eGFR 44 ml/min/1 73sq m   Lipid panel    Collection Time: 09/26/22  8:13 AM   Result Value Ref Range    Cholesterol 162 See Comment mg/dL    Triglycerides 65 See Comment mg/dL    HDL, Direct 77 >=40 mg/dL    LDL Calculated 72 0 - 100 mg/dL    Non-HDL-Chol (CHOL-HDL) 85 mg/dl       This note was created with voice recognition software  Phonic, grammatical and spelling errors may be present within the note as a result

## 2022-11-16 ENCOUNTER — TELEPHONE (OUTPATIENT)
Dept: NEPHROLOGY | Facility: CLINIC | Age: 72
End: 2022-11-16

## 2022-11-16 ENCOUNTER — APPOINTMENT (OUTPATIENT)
Dept: LAB | Facility: HOSPITAL | Age: 72
End: 2022-11-16

## 2022-11-16 DIAGNOSIS — R82.2 BILIURIA: ICD-10-CM

## 2022-11-16 DIAGNOSIS — N18.31 STAGE 3A CHRONIC KIDNEY DISEASE (HCC): Primary | ICD-10-CM

## 2022-11-16 LAB
ALBUMIN SERPL BCP-MCNC: 3.2 G/DL (ref 3.5–5)
ALP SERPL-CCNC: 63 U/L (ref 46–116)
ALT SERPL W P-5'-P-CCNC: 28 U/L (ref 12–78)
AST SERPL W P-5'-P-CCNC: 16 U/L (ref 5–45)
BILIRUB DIRECT SERPL-MCNC: 0.12 MG/DL (ref 0–0.2)
BILIRUB SERPL-MCNC: 0.25 MG/DL (ref 0.2–1)
PROT SERPL-MCNC: 6.8 G/DL (ref 6.4–8.4)

## 2022-12-29 ENCOUNTER — TELEPHONE (OUTPATIENT)
Dept: NEPHROLOGY | Facility: CLINIC | Age: 72
End: 2022-12-29

## 2022-12-30 ENCOUNTER — APPOINTMENT (OUTPATIENT)
Dept: LAB | Facility: HOSPITAL | Age: 72
End: 2022-12-30

## 2022-12-30 ENCOUNTER — TELEPHONE (OUTPATIENT)
Dept: NEPHROLOGY | Facility: CLINIC | Age: 72
End: 2022-12-30

## 2022-12-30 DIAGNOSIS — M89.9 CHRONIC KIDNEY DISEASE-MINERAL AND BONE DISORDER: ICD-10-CM

## 2022-12-30 DIAGNOSIS — E83.9 CHRONIC KIDNEY DISEASE-MINERAL AND BONE DISORDER: ICD-10-CM

## 2022-12-30 DIAGNOSIS — N18.9 CHRONIC KIDNEY DISEASE-MINERAL AND BONE DISORDER: ICD-10-CM

## 2022-12-30 DIAGNOSIS — N18.31 STAGE 3A CHRONIC KIDNEY DISEASE (HCC): ICD-10-CM

## 2022-12-30 LAB
25(OH)D3 SERPL-MCNC: 28.6 NG/ML (ref 30–100)
ANION GAP SERPL CALCULATED.3IONS-SCNC: 7 MMOL/L (ref 4–13)
BACTERIA UR QL AUTO: NORMAL /HPF
BASOPHILS # BLD AUTO: 0.02 THOUSANDS/ÂΜL (ref 0–0.1)
BASOPHILS NFR BLD AUTO: 1 % (ref 0–1)
BILIRUB UR QL STRIP: NEGATIVE
BUN SERPL-MCNC: 18 MG/DL (ref 5–25)
CALCIUM SERPL-MCNC: 9 MG/DL (ref 8.3–10.1)
CHLORIDE SERPL-SCNC: 104 MMOL/L (ref 96–108)
CLARITY UR: CLEAR
CO2 SERPL-SCNC: 30 MMOL/L (ref 21–32)
COLOR UR: NORMAL
CREAT SERPL-MCNC: 1.28 MG/DL (ref 0.6–1.3)
CREAT UR-MCNC: 188 MG/DL
EOSINOPHIL # BLD AUTO: 0.05 THOUSAND/ÂΜL (ref 0–0.61)
EOSINOPHIL NFR BLD AUTO: 2 % (ref 0–6)
ERYTHROCYTE [DISTWIDTH] IN BLOOD BY AUTOMATED COUNT: 13.4 % (ref 11.6–15.1)
GFR SERPL CREATININE-BSD FRML MDRD: 55 ML/MIN/1.73SQ M
GLUCOSE P FAST SERPL-MCNC: 92 MG/DL (ref 65–99)
GLUCOSE UR STRIP-MCNC: NEGATIVE MG/DL
HCT VFR BLD AUTO: 43 % (ref 36.5–49.3)
HGB BLD-MCNC: 14.6 G/DL (ref 12–17)
HGB UR QL STRIP.AUTO: NEGATIVE
IMM GRANULOCYTES # BLD AUTO: 0 THOUSAND/UL (ref 0–0.2)
IMM GRANULOCYTES NFR BLD AUTO: 0 % (ref 0–2)
KETONES UR STRIP-MCNC: NEGATIVE MG/DL
LEUKOCYTE ESTERASE UR QL STRIP: NEGATIVE
LYMPHOCYTES # BLD AUTO: 1.56 THOUSANDS/ÂΜL (ref 0.6–4.47)
LYMPHOCYTES NFR BLD AUTO: 45 % (ref 14–44)
MCH RBC QN AUTO: 30.8 PG (ref 26.8–34.3)
MCHC RBC AUTO-ENTMCNC: 34 G/DL (ref 31.4–37.4)
MCV RBC AUTO: 91 FL (ref 82–98)
MONOCYTES # BLD AUTO: 0.23 THOUSAND/ÂΜL (ref 0.17–1.22)
MONOCYTES NFR BLD AUTO: 7 % (ref 4–12)
NEUTROPHILS # BLD AUTO: 1.5 THOUSANDS/ÂΜL (ref 1.85–7.62)
NEUTS SEG NFR BLD AUTO: 45 % (ref 43–75)
NITRITE UR QL STRIP: NEGATIVE
NON-SQ EPI CELLS URNS QL MICRO: NORMAL /HPF
NRBC BLD AUTO-RTO: 0 /100 WBCS
PH UR STRIP.AUTO: 6 [PH]
PHOSPHATE SERPL-MCNC: 3.6 MG/DL (ref 2.3–4.1)
PLATELET # BLD AUTO: 193 THOUSANDS/UL (ref 149–390)
PMV BLD AUTO: 10.8 FL (ref 8.9–12.7)
POTASSIUM SERPL-SCNC: 4.2 MMOL/L (ref 3.5–5.3)
PROT UR STRIP-MCNC: NEGATIVE MG/DL
PROT UR-MCNC: 9 MG/DL
PROT/CREAT UR: 0.05 MG/G{CREAT} (ref 0–0.1)
PTH-INTACT SERPL-MCNC: 60 PG/ML (ref 18.4–80.1)
RBC # BLD AUTO: 4.74 MILLION/UL (ref 3.88–5.62)
RBC #/AREA URNS AUTO: NORMAL /HPF
SODIUM SERPL-SCNC: 141 MMOL/L (ref 135–147)
SP GR UR STRIP.AUTO: 1.02 (ref 1–1.03)
UROBILINOGEN UR STRIP-ACNC: <2 MG/DL
WBC # BLD AUTO: 3.36 THOUSAND/UL (ref 4.31–10.16)
WBC #/AREA URNS AUTO: NORMAL /HPF

## 2022-12-30 PROCEDURE — 81001 URINALYSIS AUTO W/SCOPE: CPT

## 2023-01-03 ENCOUNTER — OFFICE VISIT (OUTPATIENT)
Dept: NEPHROLOGY | Facility: CLINIC | Age: 73
End: 2023-01-03

## 2023-01-03 VITALS
BODY MASS INDEX: 26.3 KG/M2 | DIASTOLIC BLOOD PRESSURE: 70 MMHG | TEMPERATURE: 97.5 F | SYSTOLIC BLOOD PRESSURE: 120 MMHG | RESPIRATION RATE: 16 BRPM | HEIGHT: 72 IN | HEART RATE: 82 BPM | OXYGEN SATURATION: 97 % | WEIGHT: 194.2 LBS

## 2023-01-03 DIAGNOSIS — M54.16 LUMBAR RADICULOPATHY: ICD-10-CM

## 2023-01-03 DIAGNOSIS — I10 PRIMARY HYPERTENSION: ICD-10-CM

## 2023-01-03 DIAGNOSIS — N18.31 STAGE 3A CHRONIC KIDNEY DISEASE (HCC): Primary | ICD-10-CM

## 2023-01-03 DIAGNOSIS — M89.9 CHRONIC KIDNEY DISEASE-MINERAL AND BONE DISORDER: ICD-10-CM

## 2023-01-03 DIAGNOSIS — E83.9 CHRONIC KIDNEY DISEASE-MINERAL AND BONE DISORDER: ICD-10-CM

## 2023-01-03 DIAGNOSIS — N18.9 CHRONIC KIDNEY DISEASE-MINERAL AND BONE DISORDER: ICD-10-CM

## 2023-01-03 DIAGNOSIS — N40.0 BENIGN PROSTATIC HYPERPLASIA, PRESENCE OF LOWER URINARY TRACT SYMPTOMS UNSPECIFIED: ICD-10-CM

## 2023-01-03 NOTE — ASSESSMENT & PLAN NOTE
Lab Results   Component Value Date    EGFR 55 12/30/2022    EGFR 44 09/26/2022    EGFR 48 08/08/2022    CREATININE 1 28 12/30/2022    CREATININE 1 53 (H) 09/26/2022    CREATININE 1 43 (H) 08/08/2022   Renal function is quite stable with some fluctuation based on volume status    Advise hydration and avoiding nephrotoxic medication

## 2023-01-03 NOTE — ASSESSMENT & PLAN NOTE
Patient does have nocturia    Advised to follow-up with urologist as her urine test and blood tests are all within acceptable range

## 2023-01-03 NOTE — PROGRESS NOTES
NEPHROLOGY OFFICE FOLLOW UP  Delroy Summers 67 y o  male MRN: [id number]    Encounter: [id number] 1/3/2023    REASON FOR VISIT: Delroy Summers is a 67 y o  male who is here on 1/3/2023 for Chronic Kidney Disease and Follow-up    HPI:    Mariangel Natasha comes in today for follow-up of CKD  70-year-old gentleman with his hypertension and stage III CKD    Patient overall doing quite well  Complaining of some foul-smelling urine and nocturia    No dysuria    No abdominal discomfort no flank pain    No chest pain no palpitation no shortness of breath      REVIEW OF SYSTEMS:    Review of Systems   Constitutional: Negative for activity change and fatigue  HENT: Negative for congestion and ear discharge  Eyes: Negative for photophobia and pain  Respiratory: Negative for apnea and choking  Cardiovascular: Negative for chest pain and palpitations  Gastrointestinal: Negative for abdominal distention and blood in stool  Endocrine: Negative for heat intolerance and polyphagia  Genitourinary: Negative for flank pain and urgency  Musculoskeletal: Negative for neck pain and neck stiffness  Skin: Negative for color change and wound  Allergic/Immunologic: Negative for food allergies and immunocompromised state  Neurological: Negative for seizures and facial asymmetry  Hematological: Negative for adenopathy  Does not bruise/bleed easily  Psychiatric/Behavioral: Negative for self-injury and suicidal ideas           PAST MEDICAL HISTORY:  Past Medical History:   Diagnosis Date   • Benign prostatic hyperplasia    • Chronic kidney disease    • COVID-19 03/2020   • Esophageal reflux    • Hypertension    • Hypertensive urgency 3/26/2019   • Vertigo        PAST SURGICAL HISTORY:  Past Surgical History:   Procedure Laterality Date   • APPENDECTOMY  05/21/2015   • CYST REMOVAL      Fatty cyst removed from back       SOCIAL HISTORY:  Social History     Substance and Sexual Activity   Alcohol Use Yes    Comment: socially Social History     Substance and Sexual Activity   Drug Use No     Social History     Tobacco Use   Smoking Status Former   Smokeless Tobacco Never   Tobacco Comments    1ppw       FAMILY HISTORY:  Family History   Problem Relation Age of Onset   • Prostate cancer Father    • Prostate cancer Maternal Grandfather    • Stomach cancer Maternal Aunt         malignant tumor of pharynx   • Stomach cancer Maternal Uncle         malignant tumor of pharynx   • Mental illness Family    • Depression Family    • Schizophrenia Family    • Hypertension Mother    • No Known Problems Sister    • Dementia Sister        MEDICATIONS:    Current Outpatient Medications:   •  amLODIPine (NORVASC) 10 mg tablet, TAKE 1 TABLET DAILY, Disp: 90 tablet, Rfl: 1  •  aspirin 81 mg chewable tablet, Chew 1 tablet (81 mg total) daily, Disp: 30 tablet, Rfl: 0  •  cyclobenzaprine (FLEXERIL) 10 mg tablet, Take 1 tablet (10 mg total) by mouth 2 (two) times a day as needed for muscle spasms (Patient taking differently: Take 10 mg by mouth if needed for muscle spasms), Disp: 12 tablet, Rfl: 0  •  lidocaine (LIDODERM) 5 %, Apply 1 patch topically daily Remove & Discard patch within 12 hours or as directed by MD (Patient taking differently: Apply 1 patch topically if needed Remove & Discard patch within 12 hours or as directed by MD), Disp: 50 patch, Rfl: 5  •  multivitamin (THERAGRAN) TABS, Take 1 tablet by mouth daily, Disp: , Rfl:   •  omeprazole (PriLOSEC) 20 mg delayed release capsule, as needed, Disp: , Rfl:   •  tadalafil (CIALIS) 20 MG tablet, if needed, Disp: , Rfl:   •  tamsulosin (FLOMAX) 0 4 mg, Take 1 capsule (0 4 mg total) by mouth daily with dinner, Disp: 90 capsule, Rfl: 3    PHYSICAL EXAM:  Vitals:    01/03/23 1307   BP: 120/70   BP Location: Right arm   Patient Position: Sitting   Pulse: 82   Resp: 16   Temp: 97 5 °F (36 4 °C)   TempSrc: Temporal   SpO2: 97%   Weight: 88 1 kg (194 lb 3 2 oz)   Height: 5' 11 5" (1 816 m)     Body mass index is 26 71 kg/m²  Physical Exam  Constitutional:       General: He is not in acute distress  Appearance: He is well-developed  HENT:      Head: Normocephalic  Eyes:      General: No scleral icterus  Conjunctiva/sclera: Conjunctivae normal    Neck:      Vascular: No JVD  Cardiovascular:      Rate and Rhythm: Normal rate  Heart sounds: Normal heart sounds  Pulmonary:      Effort: Pulmonary effort is normal       Breath sounds: No wheezing  Abdominal:      Palpations: Abdomen is soft  Tenderness: There is no abdominal tenderness  Musculoskeletal:         General: Normal range of motion  Cervical back: Neck supple  Skin:     General: Skin is warm  Findings: No rash  Neurological:      Mental Status: He is alert and oriented to person, place, and time     Psychiatric:         Behavior: Behavior normal          LAB RESULTS:  Results for orders placed or performed in visit on 99/43/69   Basic metabolic panel   Result Value Ref Range    Sodium 141 135 - 147 mmol/L    Potassium 4 2 3 5 - 5 3 mmol/L    Chloride 104 96 - 108 mmol/L    CO2 30 21 - 32 mmol/L    ANION GAP 7 4 - 13 mmol/L    BUN 18 5 - 25 mg/dL    Creatinine 1 28 0 60 - 1 30 mg/dL    Glucose, Fasting 92 65 - 99 mg/dL    Calcium 9 0 8 3 - 10 1 mg/dL    eGFR 55 ml/min/1 73sq m   CBC and differential   Result Value Ref Range    WBC 3 36 (L) 4 31 - 10 16 Thousand/uL    RBC 4 74 3 88 - 5 62 Million/uL    Hemoglobin 14 6 12 0 - 17 0 g/dL    Hematocrit 43 0 36 5 - 49 3 %    MCV 91 82 - 98 fL    MCH 30 8 26 8 - 34 3 pg    MCHC 34 0 31 4 - 37 4 g/dL    RDW 13 4 11 6 - 15 1 %    MPV 10 8 8 9 - 12 7 fL    Platelets 783 403 - 197 Thousands/uL    nRBC 0 /100 WBCs    Neutrophils Relative 45 43 - 75 %    Immat GRANS % 0 0 - 2 %    Lymphocytes Relative 45 (H) 14 - 44 %    Monocytes Relative 7 4 - 12 %    Eosinophils Relative 2 0 - 6 %    Basophils Relative 1 0 - 1 %    Neutrophils Absolute 1 50 (L) 1 85 - 7 62 Thousands/µL Immature Grans Absolute 0 00 0 00 - 0 20 Thousand/uL    Lymphocytes Absolute 1 56 0 60 - 4 47 Thousands/µL    Monocytes Absolute 0 23 0 17 - 1 22 Thousand/µL    Eosinophils Absolute 0 05 0 00 - 0 61 Thousand/µL    Basophils Absolute 0 02 0 00 - 0 10 Thousands/µL   Phosphorus   Result Value Ref Range    Phosphorus 3 6 2 3 - 4 1 mg/dL   Protein / creatinine ratio, urine   Result Value Ref Range    Creatinine, Ur 188 0 mg/dL    Protein Urine Random 9 mg/dL    Prot/Creat Ratio, Ur 0 05 0 00 - 0 10   PTH, intact   Result Value Ref Range    PTH 60 0 18 4 - 80 1 pg/mL   Vitamin D 25 hydroxy   Result Value Ref Range    Vit D, 25-Hydroxy 28 6 (L) 30 0 - 100 0 ng/mL       ASSESSMENT and PLAN:      CKD (chronic kidney disease) stage 3, GFR 30-59 ml/min ([facility])  Lab Results   Component Value Date    EGFR 55 12/30/2022    EGFR 44 09/26/2022    EGFR 48 08/08/2022    CREATININE 1 28 12/30/2022    CREATININE 1 53 (H) 09/26/2022    CREATININE 1 43 (H) 08/08/2022   Renal function is quite stable with some fluctuation based on volume status  Advise hydration and avoiding nephrotoxic medication    Chronic kidney disease-mineral and bone disorder  Lab Results   Component Value Date    EGFR 55 12/30/2022    EGFR 44 09/26/2022    EGFR 48 08/08/2022    CREATININE 1 28 12/30/2022    CREATININE 1 53 (H) 09/26/2022    CREATININE 1 43 (H) 08/08/2022   Patient PTH and phosphorus along with vitamin D are within acceptable range    Benign prostatic hyperplasia, presence of lower urinary tract symptoms unspecified  Patient does have nocturia  Advised to follow-up with urologist as her urine test and blood tests are all within acceptable range    Primary hypertension  Blood pressure is very well controlled  Advised to continue present medication        Everything discussed with patient at length  I will see him back in 6 months    We will get blood and urine test before that visit      Portions of the record may have been created with voice recognition software  Occasional wrong word or "sound a like" substitutions may have occurred due to the inherent limitations of voice recognition software  Read the chart carefully and recognize, using context, where substitutions have occurred  If you have any questions, please contact the dictating provider

## 2023-01-03 NOTE — ASSESSMENT & PLAN NOTE
Lab Results   Component Value Date    EGFR 55 12/30/2022    EGFR 44 09/26/2022    EGFR 48 08/08/2022    CREATININE 1 28 12/30/2022    CREATININE 1 53 (H) 09/26/2022    CREATININE 1 43 (H) 08/08/2022   Patient PTH and phosphorus along with vitamin D are within acceptable range

## 2023-03-08 DIAGNOSIS — I10 ESSENTIAL HYPERTENSION: ICD-10-CM

## 2023-03-08 RX ORDER — AMLODIPINE BESYLATE 10 MG/1
TABLET ORAL
Qty: 90 TABLET | Refills: 1 | Status: SHIPPED | OUTPATIENT
Start: 2023-03-08

## 2023-03-13 ENCOUNTER — HOSPITAL ENCOUNTER (EMERGENCY)
Facility: HOSPITAL | Age: 73
Discharge: HOME | End: 2023-03-13
Attending: EMERGENCY MEDICINE | Admitting: FAMILY MEDICINE

## 2023-03-13 ENCOUNTER — APPOINTMENT (EMERGENCY)
Dept: CT IMAGING | Facility: HOSPITAL | Age: 73
End: 2023-03-13

## 2023-03-13 ENCOUNTER — APPOINTMENT (OUTPATIENT)
Dept: CT IMAGING | Facility: HOSPITAL | Age: 73
End: 2023-03-13

## 2023-03-13 VITALS
DIASTOLIC BLOOD PRESSURE: 74 MMHG | OXYGEN SATURATION: 100 % | TEMPERATURE: 97.7 F | RESPIRATION RATE: 20 BRPM | SYSTOLIC BLOOD PRESSURE: 157 MMHG | HEART RATE: 61 BPM

## 2023-03-13 DIAGNOSIS — R51.9 HEADACHE: ICD-10-CM

## 2023-03-13 DIAGNOSIS — R20.2 PARESTHESIAS: Primary | ICD-10-CM

## 2023-03-13 DIAGNOSIS — I10 PRIMARY HYPERTENSION: ICD-10-CM

## 2023-03-13 DIAGNOSIS — R20.0 LEFT SIDED NUMBNESS: ICD-10-CM

## 2023-03-13 PROBLEM — R29.90 STROKE-LIKE SYMPTOMS: Status: ACTIVE | Noted: 2023-03-13

## 2023-03-13 PROBLEM — R20.8 DYSESTHESIA OF MULTIPLE SITES: Status: ACTIVE | Noted: 2023-03-13

## 2023-03-13 LAB
ANION GAP SERPL CALCULATED.3IONS-SCNC: 7 MMOL/L (ref 4–13)
BASOPHILS # BLD AUTO: 0.02 THOUSANDS/ÂΜL (ref 0–0.1)
BASOPHILS NFR BLD AUTO: 1 % (ref 0–1)
BUN SERPL-MCNC: 19 MG/DL (ref 5–25)
CALCIUM SERPL-MCNC: 9.8 MG/DL (ref 8.4–10.2)
CHLORIDE SERPL-SCNC: 105 MMOL/L (ref 96–108)
CO2 SERPL-SCNC: 27 MMOL/L (ref 21–32)
CREAT SERPL-MCNC: 1.42 MG/DL (ref 0.6–1.3)
EOSINOPHIL # BLD AUTO: 0.13 THOUSAND/ÂΜL (ref 0–0.61)
EOSINOPHIL NFR BLD AUTO: 3 % (ref 0–6)
ERYTHROCYTE [DISTWIDTH] IN BLOOD BY AUTOMATED COUNT: 13.4 % (ref 11.6–15.1)
GFR SERPL CREATININE-BSD FRML MDRD: 48 ML/MIN/1.73SQ M
GLUCOSE SERPL-MCNC: 99 MG/DL (ref 65–140)
HCT VFR BLD AUTO: 41.7 % (ref 36.5–49.3)
HGB BLD-MCNC: 13.8 G/DL (ref 12–17)
IMM GRANULOCYTES # BLD AUTO: 0 THOUSAND/UL (ref 0–0.2)
IMM GRANULOCYTES NFR BLD AUTO: 0 % (ref 0–2)
LYMPHOCYTES # BLD AUTO: 1.78 THOUSANDS/ÂΜL (ref 0.6–4.47)
LYMPHOCYTES NFR BLD AUTO: 46 % (ref 14–44)
MCH RBC QN AUTO: 29.7 PG (ref 26.8–34.3)
MCHC RBC AUTO-ENTMCNC: 33.1 G/DL (ref 31.4–37.4)
MCV RBC AUTO: 90 FL (ref 82–98)
MONOCYTES # BLD AUTO: 0.29 THOUSAND/ÂΜL (ref 0.17–1.22)
MONOCYTES NFR BLD AUTO: 8 % (ref 4–12)
NEUTROPHILS # BLD AUTO: 1.62 THOUSANDS/ÂΜL (ref 1.85–7.62)
NEUTS SEG NFR BLD AUTO: 42 % (ref 43–75)
NRBC BLD AUTO-RTO: 0 /100 WBCS
PLATELET # BLD AUTO: 229 THOUSANDS/UL (ref 149–390)
PMV BLD AUTO: 11.1 FL (ref 8.9–12.7)
POTASSIUM SERPL-SCNC: 3.7 MMOL/L (ref 3.5–5.3)
RBC # BLD AUTO: 4.64 MILLION/UL (ref 3.88–5.62)
SODIUM SERPL-SCNC: 139 MMOL/L (ref 135–147)
WBC # BLD AUTO: 3.84 THOUSAND/UL (ref 4.31–10.16)

## 2023-03-13 RX ORDER — AMLODIPINE BESYLATE 5 MG/1
10 TABLET ORAL ONCE
Status: COMPLETED | OUTPATIENT
Start: 2023-03-13 | End: 2023-03-13

## 2023-03-13 RX ORDER — HEPARIN SODIUM 5000 [USP'U]/ML
5000 INJECTION, SOLUTION INTRAVENOUS; SUBCUTANEOUS EVERY 8 HOURS SCHEDULED
Status: DISCONTINUED | OUTPATIENT
Start: 2023-03-13 | End: 2023-03-13

## 2023-03-13 RX ORDER — CLOPIDOGREL BISULFATE 75 MG/1
75 TABLET ORAL DAILY
Status: DISCONTINUED | OUTPATIENT
Start: 2023-03-14 | End: 2023-03-13

## 2023-03-13 RX ORDER — ACETAMINOPHEN 325 MG/1
650 TABLET ORAL ONCE
Status: COMPLETED | OUTPATIENT
Start: 2023-03-13 | End: 2023-03-13

## 2023-03-13 RX ORDER — SODIUM CHLORIDE 9 MG/ML
100 INJECTION, SOLUTION INTRAVENOUS CONTINUOUS
Status: DISCONTINUED | OUTPATIENT
Start: 2023-03-13 | End: 2023-03-13

## 2023-03-13 RX ORDER — PANTOPRAZOLE SODIUM 20 MG/1
20 TABLET, DELAYED RELEASE ORAL
Status: DISCONTINUED | OUTPATIENT
Start: 2023-03-13 | End: 2023-03-13

## 2023-03-13 RX ORDER — CLOPIDOGREL BISULFATE 75 MG/1
300 TABLET ORAL ONCE
Status: DISCONTINUED | OUTPATIENT
Start: 2023-03-13 | End: 2023-03-13

## 2023-03-13 RX ORDER — ATORVASTATIN CALCIUM 40 MG/1
40 TABLET, FILM COATED ORAL EVERY EVENING
Status: DISCONTINUED | OUTPATIENT
Start: 2023-03-13 | End: 2023-03-13 | Stop reason: HOSPADM

## 2023-03-13 RX ORDER — ASPIRIN 81 MG/1
81 TABLET, CHEWABLE ORAL DAILY
Status: DISCONTINUED | OUTPATIENT
Start: 2023-03-13 | End: 2023-03-13 | Stop reason: HOSPADM

## 2023-03-13 RX ADMIN — SODIUM CHLORIDE 100 ML/HR: 0.9 INJECTION, SOLUTION INTRAVENOUS at 10:16

## 2023-03-13 RX ADMIN — IOHEXOL 100 ML: 350 INJECTION, SOLUTION INTRAVENOUS at 10:44

## 2023-03-13 RX ADMIN — ASPIRIN 81 MG: 81 TABLET, CHEWABLE ORAL at 10:14

## 2023-03-13 RX ADMIN — AMLODIPINE BESYLATE 10 MG: 5 TABLET ORAL at 11:30

## 2023-03-13 RX ADMIN — ACETAMINOPHEN 650 MG: 325 TABLET, FILM COATED ORAL at 11:30

## 2023-03-13 NOTE — SPEECH THERAPY NOTE
Speech Language/Pathology      Patient passed nursing dysphagia screen; presently tolerating oral diet  Need for formal evaluation of swallow function is not indicated at this time  Please re-order should status change or concerns arise       Mila Wolfe [address], [address] [facility], [place], operated by [facility]  Speech-Language Pathologist  [place] #[id number]  [place] #[id number]

## 2023-03-13 NOTE — ASSESSMENT & PLAN NOTE
68 yo LHD male with HTN and stage 3 CKD who presented to [facility] ED on 3/13/23 for subjective sensory changes in L forehead, L proximal shoulder and L proximal LE  Last known well 0400 AM 3/13/23  BP on arrival 152/74  CTH without any acute intracranial abnormality  NIHSS per attending neurologist 0  Pt was not a stroke alert  Imaging   CTH wo contrast 3/13/23:   "No acute intracranial abnormality "  CTA H/N wwo contrast 3/13/23:   "1   No acute intracranial CT abnormality  2   Patent major vasculature of the [place] without high-grade stenosis  No aneurysm  3   No hemodynamically significant stenosis or dissection of cervical carotid and vertebral arteries  Stable mild atherosclerotic change in the left carotid bifurcation "    Suspect complicated headache  Plan  - No further neuroimaging necessary per attending neurologist    -Recommend restarting home aspirin 81 mg daily  - Recommend treating headache with IV Toradol or acetaminophen to see if symptoms improve  Symptoms are subjective and there are no focal findings on neurologic exam   - goal normotension   - goal euglycemia and normothermia

## 2023-03-13 NOTE — CONSULTS
Consultation - Neurology   Roberto Welch 67 y o  male MRN: [id number]  Unit/Bed#: ED 22 Encounter: [id number]      Assessment/Plan     * Dysesthesia of multiple sites  Assessment & Plan  68 yo LHD male with HTN and stage 3 CKD who presented to [facility] ED on 3/13/23 for subjective sensory changes in L forehead, L proximal shoulder and L proximal LE  Last known well 0400 AM 3/13/23  BP on arrival 152/74  CTH without any acute intracranial abnormality  NIHSS per attending neurologist 0  Pt was not a stroke alert  Imaging   CTH wo contrast 3/13/23:   "No acute intracranial abnormality "  CTA H/N wwo contrast 3/13/23:   "1   No acute intracranial CT abnormality  2   Patent major vasculature of the [place] of [place] without high-grade stenosis  No aneurysm  3   No hemodynamically significant stenosis or dissection of cervical carotid and vertebral arteries  Stable mild atherosclerotic change in the left carotid bifurcation "    Suspect complicated headache  Plan  - No further neuroimaging necessary per attending neurologist    -Recommend restarting home aspirin 81 mg daily  - Recommend treating headache with IV Toradol or acetaminophen to see if symptoms improve  Symptoms are subjective and there are no focal findings on neurologic exam   - goal normotension   - goal euglycemia and normothermia  **History and physical examination obtained by attending neurologist  AP in room as witness to history and physical examination obtained and acted solely as a scribe for attending neurologist  This AP did not provide any decision making for this encounter  **      Roberto Welch will need follow up in in 3 months with general attending or advance practitioner  He will not require outpatient neurological testing      History of Present Illness     Reason for Consult / Principal Problem: Dysesthesia   Hx and PE limited by: N/A   HPI: Roberto Welch is a 67 y o  left handed male  With HTN, CKD,who presents to [facility] on 3/13/23 with L sided numbness  LKW 4 AM  BP on arrival 152/74  CTH wo any acute abnormality  NIHSS per ED 1; however, NIHSS obtained by neurology attending was 0  Patient was not a formal stroke alert  Patient would not have been a thrombolytic candidate due to low NIHSS/nondisabling symptoms  Patient reports that he no longer has any numbness on the left side of his forehead, left shoulder, and/or proximal left lower extremity  Patient reports that a similar episode has happened in the past when he had a "numb sensation" and states that he seeks evaluation at the ED "most of the time" when this occurs  Patient denies history of stroke  Patient reports history of TIA  Patient currently endorses a slight headache on the left side of his head  Patient denies taking any new medications  Patient reports that he has not yet taken his amlodipine today due to seeking ED eval before morning medications  Patient denies fever  Patient denies recent sick contacts  Patient reports that he had migraines over 30 years ago  He describes his previous migraines as "hitting my head with a sledgehammer "    Per chart review, patient was evaluated by inpatient neurology team in January 2022 for similar symptoms  Per note on 1/25/2022: Patient presented to the hospital on 1/25/2022 with left middle finger and left foot numbness  A stroke alert was initiated  CT head at that time without any acute intracranial abnormality  CTA head neck with and without contrast at the time with trace atherosclerotic disease of the left cavernous segment of the carotid arteries as well as minimal atherosclerotic disease at the left carotid bifurcation but no IR target  Patient was not a thrombolytic candidate at that time due to being outside of thrombolytic time window  Patient reported later in the morning on 1/25/2022, his symptoms had resolved  MRI brain without contrast was completed at that time and did not reveal infarct  Patient was recommended to continue on aspirin 81 mg daily and atorvastatin 40 mg QPM      Inpatient consult to Neurology  Consult performed by: John Whitley PA-C  Consult ordered by: Madisyn Oneal MD          Review of Systems   Constitutional: Negative for chills and fever  HENT: Negative for congestion and trouble swallowing  Eyes: Negative for photophobia and visual disturbance  Respiratory: Negative for cough and shortness of breath  Cardiovascular: Negative for chest pain and palpitations  Musculoskeletal: Negative for gait problem  Neurological: Positive for numbness (pt also dexcribed numbness as pain ) and headaches  Negative for dizziness and weakness         Historical Information   Past Medical History:   Diagnosis Date   • Benign prostatic hyperplasia    • Chronic kidney disease    • COVID-19 03/2020   • Esophageal reflux    • Hypertension    • Hypertensive urgency 3/26/2019   • Vertigo      Past Surgical History:   Procedure Laterality Date   • APPENDECTOMY  05/21/2015   • CYST REMOVAL      Fatty cyst removed from back     Social History   Social History     Substance and Sexual Activity   Alcohol Use Yes    Comment: socially     Social History     Substance and Sexual Activity   Drug Use No     E-Cigarette/Vaping   • E-Cigarette Use Never User      E-Cigarette/Vaping Substances   • Nicotine No    • THC No    • CBD No    • Flavoring No    • Other No    • Unknown No      Social History     Tobacco Use   Smoking Status Former   Smokeless Tobacco Never   Tobacco Comments    1ppw     Family History:   Family History   Problem Relation Age of Onset   • Prostate cancer Father    • Prostate cancer Maternal Grandfather    • Stomach cancer Maternal Aunt         malignant tumor of pharynx   • Stomach cancer Maternal Uncle         malignant tumor of pharynx   • Mental illness Family    • Depression Family    • Schizophrenia Family    • Hypertension Mother    • No Known Problems Sister    • Dementia Sister        Review of previous medical records was  completed  Meds/Allergies   current meds:   Current Facility-Administered Medications   Medication Dose Route Frequency   • aspirin chewable tablet 81 mg  81 mg Oral Daily   • atorvastatin (LIPITOR) tablet 40 mg  40 mg Oral QPM    and PTA meds:   Prior to Admission Medications   Prescriptions Last Dose Informant Patient Reported? Taking? amLODIPine (NORVASC) 10 mg tablet   No No   Sig: TAKE 1 TABLET DAILY   aspirin 81 mg chewable tablet   No No   Sig: Chew 1 tablet (81 mg total) daily   cyclobenzaprine (FLEXERIL) 10 mg tablet   No No   Sig: Take 1 tablet (10 mg total) by mouth 2 (two) times a day as needed for muscle spasms   Patient taking differently: Take 10 mg by mouth if needed for muscle spasms   lidocaine (LIDODERM) 5 %   No No   Sig: Apply 1 patch topically daily Remove & Discard patch within 12 hours or as directed by MD   Patient taking differently: Apply 1 patch topically if needed Remove & Discard patch within 12 hours or as directed by MD   multivitamin (THERAGRAN) TABS   Yes No   Sig: Take 1 tablet by mouth daily   omeprazole (PriLOSEC) 20 mg delayed release capsule   Yes No   Sig: as needed   tadalafil (CIALIS) 20 MG tablet   Yes No   Sig: if needed   tamsulosin (FLOMAX) 0 4 mg   No No   Sig: Take 1 capsule (0 4 mg total) by mouth daily with dinner      Facility-Administered Medications: None       Allergies   Allergen Reactions   • Penicillin V Anaphylaxis       Objective   Vitals:Blood pressure 157/74, pulse 61, temperature 97 7 °F (36 5 °C), resp  rate 20, SpO2 100 %  ,There is no height or weight on file to calculate BMI  Intake/Output Summary (Last 24 hours) at 3/13/2023 1327  Last data filed at 3/13/2023 1126  Gross per 24 hour   Intake 116 67 ml   Output --   Net 116 67 ml       Invasive Devices: Invasive Devices     None                 Physical Exam  Vitals and nursing note reviewed   Exam conducted with a chaperone present  Constitutional:       General: He is not in acute distress  Appearance: He is normal weight  He is not diaphoretic  HENT:      Head: Normocephalic and atraumatic  Nose: Nose normal       Mouth/Throat:      Mouth: Mucous membranes are moist    Eyes:      General: No scleral icterus  Right eye: No discharge  Left eye: No discharge  Extraocular Movements: Extraocular movements intact and EOM normal       Conjunctiva/sclera: Conjunctivae normal    Cardiovascular:      Rate and Rhythm: Normal rate  Pulmonary:      Effort: Pulmonary effort is normal    Neurological:      Mental Status: He is alert  Coordination: Finger-Nose-Finger Test and Romberg Test normal       Deep Tendon Reflexes:      Reflex Scores:       Bicep reflexes are 2+ on the right side and 2+ on the left side  Psychiatric:      Comments: Pleasant and cooperative during exam       Neurologic Exam     Mental Status   Follows 2 step commands  Attention: appropriately attends to provider  Concentration: appropriately attends to provider    Speech: (Speech fluent  No dysarthria appreciated on exam  )  Level of consciousness: alert  Unable to name object: glasses, mask  Able to repeat [facility] phrases and "[place]" )  - Able to correctly state age 67  - Able to correctly state month, March  - Able to correctly state year, 2023  - Able to state current president, Joe Screws to self      Cranial Nerves     CN II   Visual acuity: (grossly intact )  Right visual field deficit: none  Left visual field deficit: none     CN III, IV, VI   Extraocular motions are normal    Conjugate gaze: present (Midline)    CN V   Facial sensation intact  Right facial sensation deficit: none  Left facial sensation deficit: none    CN VII   Facial expression full, symmetric     Right facial weakness: none  Left facial weakness: none    CN VIII   Hearing impaired: grossly intact     CN XII   CN XII normal  Tongue: not atrophic  Fasciculations: absent  Tongue deviation: none    Motor Exam   Right arm pronator drift: absent  Left arm pronator drift: absent- Able to lift bilateral upper extremities antigravity x10 seconds without drift  - Able to lift bilateral lower extremities antigravity x5 seconds without drift  - Bilateral hip flexion 5/5         Sensory Exam   Light touch normal    Right arm light touch: normal  Left arm light touch: normal  Right leg light touch: normal  Left leg light touch: normal  Pinprick normal    Right arm pinprick: normal  Left arm pinprick: normal  Right leg pinprick: normal  Left leg pinprick: normal  - extinction absent      Gait, Coordination, and Reflexes     Coordination   Romberg: negative  Finger to nose coordination: normal    Reflexes   Right biceps: 2+  Left biceps: 2+  Right patellar reflex grade: trace  Left patellar reflex grade: trace  NIHSS:  1a Level of Consciousness: 0 = Alert   1b  LOC Questions: 0 = Answers both correctly   1c  LOC Commands: 0 = Obeys both correctly   2  Best Gaze: 0 = Normal   3  Visual: 0 = No visual field loss   4  Facial Palsy: 0=Normal symmetric movement   5a  Motor Right Arm: 0=No drift, limb holds 90 (or 45) degrees for full 10 seconds   5b  Motor Left Arm: 0=No drift, limb holds 90 (or 45) degrees for full 10 seconds   6a  Motor Right Le=No drift, limb holds 90 (or 45) degrees for full 10 seconds   6b  Motor Left Le=No drift, limb holds 90 (or 45) degrees for full 10 seconds   7  Limb Ataxia:  0=Absent   8  Sensory: 0=Normal; no sensory loss   9  Best Language:  0=No aphasia, normal   10  Dysarthria: 0=Normal articulation   11   Extinction and Inattention (formerly Neglect): 0=No abnormality   Total Score: 0       Lab Results:   CBC:   Results from last 7 days   Lab Units 23  0854   WBC Thousand/uL 3 84*   RBC Million/uL 4 64   HEMOGLOBIN g/dL 13 8   HEMATOCRIT % 41 7   MCV fL 90   PLATELETS Thousands/uL 229   , BMP/CMP: Results from last 7 days   Lab Units 03/13/23  0854   SODIUM mmol/L 139   POTASSIUM mmol/L 3 7   CHLORIDE mmol/L 105   CO2 mmol/L 27   BUN mg/dL 19   CREATININE mg/dL 1 42*   CALCIUM mg/dL 9 8   EGFR ml/min/1 73sq m 48     Imaging Studies: I have personally reviewed pertinent reports  and I have personally reviewed pertinent films in PACS CTA head neck with and without contrast 3/13/2023, CT head without contrast 3/13/2023  EKG, Pathology, and Other Studies: I have personally reviewed pertinent reports  EKG 3/13/2023  VTE Prophylaxis: Sequential compression device (Venodyne)     Code Status: Level 1 - Full Code  Advance Directive and Living Will:      Power of :    POLST:      Counseling / Coordination of Care  Please see attending attestation for amount of time with patient  This note was completed in part utilizing [employer] Direct Software  Grammatical errors, random word insertions, spelling mistakes, and incomplete sentences may be an occasional consequence of this system secondary to software limitations, ambient noise, and hardware issues  If you have any questions or concerns about the content, text, or information contained within the body of this dictation, please contact the provider for clarification

## 2023-03-13 NOTE — H&P
[facility] 1950, 67 y o  male MRN: [id number]  Unit/Bed#: ED 22 Encounter: [id number]  Primary Care Provider: Natalia Urbina MD   Date and time admitted to hospital: 3/13/2023  8:13 AM    * Stroke-like symptoms  Assessment & Plan  68 yo with past medical history hypertension/BPH/prior TIA presenting to ED with symptoms of left-sided numbness which began abruptly at 6 AM today  Patient reported symptoms of headache in his left parietal region for 2 days "stabbing sensation"  Associated with left facial/left upper extremity/left lower extremity numbness which began at 6 AM almost resolved at this point  Denies any one-sided weakness  No slurring in speech  No facial droop  No visual deficits    · CBC/CMP within normal limits  · CT head negative  · MRI brain ordered  · Continue aspirin/statin  · Lipid panel/A1c ordered  · On telemetry  · Neurochecks  · PT OT speech  · Neurology consulted  Primary hypertension  Assessment & Plan  · Given strokelike symptoms, allow for permissive hypertension now  · Hold amlodipine  · Monitor BP closely  CKD (chronic kidney disease) stage 3, GFR 30-59 ml/min [facility])  Assessment & Plan  Lab Results   Component Value Date    EGFR 48 03/13/2023    EGFR 55 12/30/2022    EGFR 44 09/26/2022    CREATININE 1 42 (H) 03/13/2023    CREATININE 1 28 12/30/2022    CREATININE 1 53 (H) 09/26/2022   · Does have baseline CKD with creatinine 1 28  · Started gentle IV hydration given creatinine 1 42  · Monitor BMP closely  VTE Prophylaxis: Heparin  / sequential compression device   Code Status: level 1  POLST: POLST form is not discussed and not completed at this time  Anticipated Length of Stay:  Patient will be admitted on an Observation basis with an anticipated length of stay of  Less than 2 midnights  Justification for Hospital Stay: stroke like sx    Total Time for Visit, including Counseling / Coordination of Care: 30 minutes  Greater than 50% of this total time spent on direct patient counseling and coordination of care  Chief Complaint:   Left sided numbness    History of Present Illness:    Cherelle Méndez is a 67 y o  male with underlying history of hypertension/BPH/CKD presenting to ED with abrupt onset left-sided numbness/tingling  Located in his face/left upper extremity/left lower extremity  No associated weakness  No slurring in speech  No facial droop  No visual deficits  He does report having a headache over the last 2 days associated with the same  Review of Systems:    Review of Systems   Constitutional: Negative for activity change, appetite change, chills, diaphoresis, fatigue and fever  HENT: Negative for congestion, postnasal drip and rhinorrhea  Respiratory: Negative for cough, shortness of breath and wheezing  Cardiovascular: Negative for chest pain, palpitations and leg swelling  Gastrointestinal: Negative for abdominal pain, blood in stool, constipation, diarrhea, nausea and vomiting  Genitourinary: Negative for dysuria, flank pain, frequency and hematuria  Musculoskeletal: Negative for gait problem and myalgias  Skin: Negative for rash  Neurological: Positive for numbness  Negative for dizziness, seizures, speech difficulty, weakness, light-headedness and headaches  Past Medical and Surgical History:     Past Medical History:   Diagnosis Date   • Benign prostatic hyperplasia    • Chronic kidney disease    • COVID-19 03/2020   • Esophageal reflux    • Hypertension    • Hypertensive urgency 3/26/2019   • Vertigo        Past Surgical History:   Procedure Laterality Date   • APPENDECTOMY  05/21/2015   • CYST REMOVAL      Fatty cyst removed from back       Meds/Allergies:    Prior to Admission medications    Medication Sig Start Date End Date Taking?  Authorizing Provider   amLODIPine (NORVASC) 10 mg tablet TAKE 1 TABLET DAILY 3/8/23   Kavita Strickland PA-C   aspirin 81 mg chewable tablet Chew 1 tablet (81 mg total) daily 1/26/22 1/3/23  Byron Marsh MD   cyclobenzaprine (FLEXERIL) 10 mg tablet Take 1 tablet (10 mg total) by mouth 2 (two) times a day as needed for muscle spasms  Patient taking differently: Take 10 mg by mouth if needed for muscle spasms 7/7/22   Shraddha Abarca,    lidocaine (LIDODERM) 5 % Apply 1 patch topically daily Remove & Discard patch within 12 hours or as directed by MD  Patient taking differently: Apply 1 patch topically if needed Remove & Discard patch within 12 hours or as directed by MD 4/14/21   Myron Cowart PA-C   multivitamin [facility]) TABS Take 1 tablet by mouth daily    Historical Provider, MD   omeprazole (PriLOSEC) 20 mg delayed release capsule as needed 12/16/21   Historical Provider, MD   tadalafil (CIALIS) 20 MG tablet if needed    Historical Provider, MD   tamsulosin (FLOMAX) 0 4 mg Take 1 capsule (0 4 mg total) by mouth daily with dinner 1/5/21   Myron Cowart PA-C     I have reviewed home medications with patient personally  Allergies: Allergies   Allergen Reactions   • Penicillin V Anaphylaxis       Social History:     Marital Status: /Civil Union     Substance Use History:   Social History     Substance and Sexual Activity   Alcohol Use Yes    Comment: socially     Social History     Tobacco Use   Smoking Status Former   Smokeless Tobacco Never   Tobacco Comments    1ppw     Social History     Substance and Sexual Activity   Drug Use No        Family History:    non-contributory    Physical Exam:     Vitals:   Blood Pressure: 152/74 (03/13/23 0819)  Pulse: 67 (03/13/23 0819)  Temperature: 97 7 °F (36 5 °C) (03/13/23 0819)  Respirations: 18 (03/13/23 0819)  SpO2: 100 % (03/13/23 0819)    Physical Exam  Vitals reviewed  Constitutional:       General: He is not in acute distress  Appearance: He is not ill-appearing  HENT:      Head: Normocephalic and atraumatic  Nose: Nose normal  No congestion        Mouth/Throat: Mouth: Mucous membranes are moist       Pharynx: Oropharynx is clear  Eyes:      Conjunctiva/sclera: Conjunctivae normal       Pupils: Pupils are equal, round, and reactive to light  Cardiovascular:      Rate and Rhythm: Normal rate and regular rhythm  Pulses: Normal pulses  Pulmonary:      Effort: Pulmonary effort is normal  No respiratory distress  Breath sounds: Normal breath sounds  No wheezing or rales  Abdominal:      General: Abdomen is flat  Bowel sounds are normal  There is no distension  Palpations: Abdomen is soft  Tenderness: There is no abdominal tenderness  There is no guarding  Musculoskeletal:         General: No swelling  Normal range of motion  Cervical back: Normal range of motion and neck supple  Skin:     General: Skin is warm and dry  Findings: No rash  Neurological:      General: No focal deficit present  Mental Status: He is alert and oriented to person, place, and time  Psychiatric:         Mood and Affect: Mood normal          Behavior: Behavior normal        Additional Data:     Lab Results: I have personally reviewed pertinent reports  Results from last 7 days   Lab Units 03/13/23  0854   WBC Thousand/uL 3 84*   HEMOGLOBIN g/dL 13 8   HEMATOCRIT % 41 7   PLATELETS Thousands/uL 229   NEUTROS PCT % 42*   LYMPHS PCT % 46*   MONOS PCT % 8   EOS PCT % 3     Results from last 7 days   Lab Units 03/13/23  0854   SODIUM mmol/L 139   POTASSIUM mmol/L 3 7   CHLORIDE mmol/L 105   CO2 mmol/L 27   BUN mg/dL 19   CREATININE mg/dL 1 42*   ANION GAP mmol/L 7   CALCIUM mg/dL 9 8   GLUCOSE RANDOM mg/dL 99                       Imaging: I have personally reviewed pertinent reports  CT head without contrast   Final Result by Zuhair Condon MD (03/13 5331)      No acute intracranial abnormality                    Workstation performed: [id number]         MRI Inpatient Order    (Results Pending)     [facility] Records Reviewed: Yes     ** Please Note: This note has been constructed using a voice recognition system   **

## 2023-03-13 NOTE — ASSESSMENT & PLAN NOTE
Lab Results   Component Value Date    EGFR 48 03/13/2023    EGFR 55 12/30/2022    EGFR 44 09/26/2022    CREATININE 1 42 (H) 03/13/2023    CREATININE 1 28 12/30/2022    CREATININE 1 53 (H) 09/26/2022   · Does have baseline CKD with creatinine 1 28  · Started gentle IV hydration given creatinine 1 42  · Monitor BMP closely

## 2023-03-13 NOTE — Clinical Note
[facility] Michelle Rivera accompanied Dorothy Becerra to the emergency department on 3/13/2023  Return date if applicable: 86/43/7027    ? If you have any questions or concerns, please don't hesitate to call        Grisel Garvin RN

## 2023-03-13 NOTE — Clinical Note
[facility] Jackie Woodard accompanied Jaydon Chaidez to the emergency department on 3/13/2023  Return date if applicable: 63/37/8517        If you have any questions or concerns, please don't hesitate to call        Bertram Melissa RN

## 2023-03-13 NOTE — DISCHARGE INSTRUCTIONS
Return to ED if having increased numbness, visual changes, persistent dizziness, slurring of speech, motor weakness, trouble walking, chest pain, SOB  Continue all home medications tomorrow     Take tylenol as prescribed on the bottle for headaches     Continue healthy diet, exercise, avoiding tobacco and alcohol to control blood pressure

## 2023-03-13 NOTE — Clinical Note
[facility] Laurie Cox accompanied Miya Rosa to the emergency department on 3/13/2023  Return date if applicable: 35/79/8517        If you have any questions or concerns, please don't hesitate to call        Florencio Dorman RN

## 2023-03-13 NOTE — Clinical Note
[facility] Goodmaraffy Adams accompanied Eryn Angel to the emergency department on 3/13/2023  Return date if applicable: 11/81/2925        If you have any questions or concerns, please don't hesitate to call        Page Dozier PA-C

## 2023-03-13 NOTE — ASSESSMENT & PLAN NOTE
· Given strokelike symptoms, allow for permissive hypertension now  · Hold amlodipine  · Monitor BP closely

## 2023-03-13 NOTE — Clinical Note
Johnny Michellemble was seen and treated in our emergency department on 3/13/2023  Diagnosis:     Tomas    He may return on this date: If you have any questions or concerns, please don't hesitate to call        Nico Nam MD    ______________________________           _______________          _______________  Hospital Representative                              Date                                Time

## 2023-03-13 NOTE — ED PROVIDER NOTES
History  Chief Complaint   Patient presents with   • Numbness     Left sided temple pain/numbness and numbness to L arm and leg, started at [place] today      Demarco Cruz is a 66 yo male with PMH significant for TIA, HTN, BPPV, and BPH who presents for L sided numbness x 2 5 hrs  Patient states that this morning he woke up at 4AM and was feeling healthy in his normal state  At approximately 6AM he was getting ready for the gym and experience an acute onset of numbness on his L side starting in his forehead continued to his medial ankles  He states he drove himself to the hospital without difficulty and plans to go to the gym after his imaging  Notes he had a benign headache yesterday that resolved after 2 hrs without OTC or medical treatment  In normal state of health currently and denies recent sick contacts  Hx of TIA over 10 years ago with no repeat occurrence  Hx of acute headaches with normal neuro exams and nonrevealing imaging studies, most recently in February 2022 with CT head negative for CVA and in January 2022 CTA head & neck/MRI negative for CVA or ischemic abnormalities  He is a former smoker, drinks 1 beer per week, and denies illicit drug use  Exercises daily  Takes aspirin 81 mg, no other AC/AP  No recent trauma or falls  Spoke to wife on the phone who confirms patient's hx and normal mental status and functioning this morning  Lives at home with wife  Retired train   Prior to Admission Medications   Prescriptions Last Dose Informant Patient Reported? Taking?    amLODIPine (NORVASC) 10 mg tablet   No No   Sig: TAKE 1 TABLET DAILY   aspirin 81 mg chewable tablet   No No   Sig: Chew 1 tablet (81 mg total) daily   cyclobenzaprine (FLEXERIL) 10 mg tablet   No No   Sig: Take 1 tablet (10 mg total) by mouth 2 (two) times a day as needed for muscle spasms   Patient taking differently: Take 10 mg by mouth if needed for muscle spasms   lidocaine (LIDODERM) 5 %   No No   Sig: Apply 1 patch topically daily Remove & Discard patch within 12 hours or as directed by MD   Patient taking differently: Apply 1 patch topically if needed Remove & Discard patch within 12 hours or as directed by MD   multivitamin (THERAGRAN) TABS   Yes No   Sig: Take 1 tablet by mouth daily   omeprazole (PriLOSEC) 20 mg delayed release capsule   Yes No   Sig: as needed   tadalafil (CIALIS) 20 MG tablet   Yes No   Sig: if needed   tamsulosin (FLOMAX) 0 4 mg   No No   Sig: Take 1 capsule (0 4 mg total) by mouth daily with dinner      Facility-Administered Medications: None       Past Medical History:   Diagnosis Date   • Benign prostatic hyperplasia    • Chronic kidney disease    • COVID-19 03/2020   • Esophageal reflux    • Hypertension    • Hypertensive urgency 3/26/2019   • Vertigo        Past Surgical History:   Procedure Laterality Date   • APPENDECTOMY  05/21/2015   • CYST REMOVAL      Fatty cyst removed from back       Family History   Problem Relation Age of Onset   • Prostate cancer Father    • Prostate cancer Maternal Grandfather    • Stomach cancer Maternal Aunt         malignant tumor of pharynx   • Stomach cancer Maternal Uncle         malignant tumor of pharynx   • Mental illness Family    • Depression Family    • Schizophrenia Family    • Hypertension Mother    • No Known Problems Sister    • Dementia Sister      I have reviewed and agree with the history as documented  E-Cigarette/Vaping   • E-Cigarette Use Never User      E-Cigarette/Vaping Substances   • Nicotine No    • THC No    • CBD No    • Flavoring No    • Other No    • Unknown No      Social History     Tobacco Use   • Smoking status: Former   • Smokeless tobacco: Never   • Tobacco comments:     1ppw   Vaping Use   • Vaping Use: Never used   Substance Use Topics   • Alcohol use: Yes     Comment: socially   • Drug use: No       Review of Systems   Constitutional: Negative for activity change, appetite change, chills, diaphoresis, fatigue and fever  HENT: Negative for hearing loss  Eyes: Negative for photophobia, pain, redness and visual disturbance  Respiratory: Negative for cough, chest tightness and shortness of breath  Cardiovascular: Negative for chest pain, palpitations and leg swelling  Gastrointestinal: Negative for abdominal pain and vomiting  Genitourinary: Negative for dysuria and hematuria  Musculoskeletal: Negative for arthralgias, back pain, neck pain and neck stiffness  Skin: Negative for color change and rash  Neurological: Positive for numbness and headaches  Negative for dizziness, tremors, seizures, syncope, facial asymmetry, speech difficulty, weakness and light-headedness  Psychiatric/Behavioral: Negative for confusion  All other systems reviewed and are negative  Physical Exam  Physical Exam  Vitals and nursing note reviewed  Constitutional:       General: He is not in acute distress  Appearance: He is well-developed  He is not ill-appearing or toxic-appearing  Comments: Talking to wife on the phone    HENT:      Head: Normocephalic and atraumatic  Comments: Nontender to palpation of head, cervical spine, and paraspinal muscle tenderness     Right Ear: External ear normal       Left Ear: External ear normal       Nose: Nose normal  No congestion or rhinorrhea  Mouth/Throat:      Mouth: Mucous membranes are moist       Pharynx: No oropharyngeal exudate or posterior oropharyngeal erythema  Eyes:      General: No scleral icterus  Right eye: No discharge  Left eye: No discharge  Extraocular Movements: Extraocular movements intact  Conjunctiva/sclera: Conjunctivae normal       Pupils: Pupils are equal, round, and reactive to light  Comments: Arcus sennelis     2mm pupils,quick to react    Cardiovascular:      Rate and Rhythm: Normal rate and regular rhythm  Pulses: Normal pulses  Heart sounds: Normal heart sounds  No murmur heard  No friction rub   No gallop  Pulmonary:      Effort: Pulmonary effort is normal  No respiratory distress  Breath sounds: Normal breath sounds  No stridor  No wheezing, rhonchi or rales  Chest:      Chest wall: No tenderness  Abdominal:      General: Bowel sounds are normal  There is no distension  Palpations: Abdomen is soft  There is no mass  Tenderness: There is no abdominal tenderness  There is no guarding  Musculoskeletal:         General: No swelling, tenderness, deformity or signs of injury  Cervical back: Normal range of motion and neck supple  No rigidity or tenderness  Right lower leg: No edema  Left lower leg: No edema  Comments: 5/5 strength, advanced muscle tone and flexibility for age in b/l UE and LE     Full ROM of b/l UE and LE including wrists, fingers, and ankles   Skin:     General: Skin is warm and dry  Capillary Refill: Capillary refill takes less than 2 seconds  Coloration: Skin is not jaundiced or pale  Findings: No bruising, lesion or rash  Neurological:      Mental Status: He is alert and oriented to person, place, and time  Mental status is at baseline  GCS: GCS eye subscore is 4  GCS verbal subscore is 5  GCS motor subscore is 6  Cranial Nerves: No cranial nerve deficit, dysarthria or facial asymmetry  Sensory: Sensory deficit present  Motor: No weakness, tremor, atrophy, abnormal muscle tone or seizure activity  Coordination: Coordination is intact  Coordination normal  Finger-Nose-Finger Test and Heel to [facility] Test normal       Gait: Gait normal       Comments: Decreased sensation to light tough L forehead compared to R forehead   No decreased sensation to temperature or remaining face, b/l UE, supraclavicular, or LE       Psychiatric:         Mood and Affect: Mood normal          Vital Signs  ED Triage Vitals   Temperature Pulse Respirations Blood Pressure SpO2   03/13/23 0819 03/13/23 0819 03/13/23 0819 03/13/23 0581 03/13/23 0819   97 7 °F (36 5 °C) 67 18 152/74 100 %      Temp src Heart Rate Source Patient Position - Orthostatic VS BP Location FiO2 (%)   -- 03/13/23 1000 -- 03/13/23 1000 --    Monitor  Right arm       Pain Score       03/13/23 1000       5           Vitals:    03/13/23 0900 03/13/23 1000 03/13/23 1100 03/13/23 1130   BP: (!) 146/119 151/81 153/82 157/74   Pulse: 58 57 61          Visual Acuity  Visual Acuity    Flowsheet Row Most Recent Value   L Pupil Size (mm) 2   R Pupil Size (mm) 2          ED Medications  Medications   iohexol (OMNIPAQUE) 350 MG/ML injection (SINGLE-DOSE) 100 mL (100 mL Intravenous Given 3/13/23 1044)   acetaminophen (TYLENOL) tablet 650 mg (650 mg Oral Given 3/13/23 1130)   amLODIPine (NORVASC) tablet 10 mg (10 mg Oral Given 3/13/23 1130)       Diagnostic Studies  Results Reviewed     Procedure Component Value Units Date/Time    Basic metabolic panel [[id number]]  (Abnormal) Collected: 03/13/23 0854    Lab Status: Final result Specimen: Blood from Arm, Right Updated: 03/13/23 0923     Sodium 139 mmol/L      Potassium 3 7 mmol/L      Chloride 105 mmol/L      CO2 27 mmol/L      ANION GAP 7 mmol/L      BUN 19 mg/dL      Creatinine 1 42 mg/dL      Glucose 99 mg/dL      Calcium 9 8 mg/dL      eGFR 48 ml/min/1 73sq m     Narrative:      [place] guidelines for Chronic Kidney Disease (CKD):   •  Stage 1 with normal or high GFR (GFR > 90 mL/min/1 73 square meters)  •  Stage 2 Mild CKD (GFR = 60-89 mL/min/1 73 square meters)  •  Stage 3A Moderate CKD (GFR = 45-59 mL/min/1 73 square meters)  •  Stage 3B Moderate CKD (GFR = 30-44 mL/min/1 73 square meters)  •  Stage 4 Severe CKD (GFR = 15-29 mL/min/1 73 square meters)  •  Stage 5 End Stage CKD (GFR <15 mL/min/1 73 square meters)  Note: GFR calculation is accurate only with a steady state creatinine    CBC and differential [[id number]]  (Abnormal) Collected: 03/13/23 0854    Lab Status: Final result Specimen: Blood from Arm, Right Updated: 03/13/23 0900     WBC 3 84 Thousand/uL      RBC 4 64 Million/uL      Hemoglobin 13 8 g/dL      Hematocrit 41 7 %      MCV 90 fL      MCH 29 7 pg      MCHC 33 1 g/dL      RDW 13 4 %      MPV 11 1 fL      Platelets 586 Thousands/uL      nRBC 0 /100 WBCs      Neutrophils Relative 42 %      Immat GRANS % 0 %      Lymphocytes Relative 46 %      Monocytes Relative 8 %      Eosinophils Relative 3 %      Basophils Relative 1 %      Neutrophils Absolute 1 62 Thousands/µL      Immature Grans Absolute 0 00 Thousand/uL      Lymphocytes Absolute 1 78 Thousands/µL      Monocytes Absolute 0 29 Thousand/µL      Eosinophils Absolute 0 13 Thousand/µL      Basophils Absolute 0 02 Thousands/µL                  CTA head and neck w wo contrast   Final Result by Russell Boyer MD (03/13 1112)      1  No acute intracranial CT abnormality  2   Patent major vasculature of the [place] without high-grade stenosis  No aneurysm  3   No hemodynamically significant stenosis or dissection of cervical carotid and vertebral arteries  Stable mild atherosclerotic change in the left carotid bifurcation  Workstation performed: [id number]         CT head without contrast   Final Result by Lindsey Covington MD (03/13 0682)      No acute intracranial abnormality                    Workstation performed: [id number]                    Procedures  ECG 12 Lead Documentation Only    Date/Time: 3/13/2023 8:59 AM  Performed by: Francisca Cuba PA-C  Authorized by: Francisca Cuba PA-C     Indications / Diagnosis:  L sided facial numbness  ECG reviewed by me, the ED Provider: yes    Patient location:  ED  Previous ECG:     Previous ECG:  Compared to current    Comparison ECG info:  July 7 2022- sinus bradycardia at time     Similarity:  No change    Comparison to cardiac monitor: Yes    Interpretation:     Interpretation: normal    Rate:     ECG rate:  68    ECG rate assessment: normal    Rhythm: Rhythm: sinus rhythm    Ectopy:     Ectopy: none    QRS:     QRS axis:  Normal    QRS intervals:  Normal  Conduction:     Conduction: normal    ST segments:     ST segments:  Normal  T waves:     T waves: normal               ED Course  ED Course as of 03/13/23 1640   Mon Mar 13, 2023   7957 Patient examined bedside- IV in place and EKG pending  [facility] 1    0851 EKG without acute abn  No STEMI   0914 Mild leukopenia with otherwise unrevealing CBC  Normal Hgb/Hct/thrombocytes    0918 CT head:  Negative for acute intracranial abn  No acute infarction  "Large polyp or retention cyst seen in the left maxillary sinus  There is also a polyp or retention cyst in the left sphenoid sinus  The mastoid air cells are clear "   0929 Mildly hypokalemia, Elevated Cr, appears to be patient's baseline since 2019, according to  chart review  [address] Neurology bedside requesting CTA    1108 CTA head and neck w wo contrast  Negative   1139 Patient will receive acetaminophen for pain relief for headache, home antihypertensive med that was missed and have repeat vital signs                   Stroke Assessment     Row Name 03/13/23 0832             NIH Stroke Scale    Interval --      Level of Consciousness (1a ) 0      LOC Questions (1b ) 0      LOC Commands (1c ) 0      Best Gaze (2 ) 0      Visual (3 ) 0      Facial Palsy (4 ) 0      Motor Arm, Left (5a ) 0      Motor Arm, Right (5b ) 0      Motor Leg, Left (6a ) 0      Motor Leg, Right (6b ) 0      Limb Ataxia (7 ) 0      Sensory (8 ) 1      Best Language (9 ) 0      Dysarthria (10 ) 0      Extinction and Inattention (11 ) (Formerly Neglect) 0      Total 1              Flowsheet Row Most Recent Value   Thrombolytic Decision Options    Thrombolytic Decision Patient not a candidate  Patient is not a candidate options Symptoms resolved/clearly non disabling                      SBIRT 20yo+    Flowsheet Row Most Recent Value   SBIRT (23 yo +)    In order to provide better care to our patients, we are screening all of our patients for alcohol and drug use  Would it be okay to ask you these screening questions? Unable to answer at this time Filed at: 03/13/2023 [address] [place]  Patient presented for acute L sided numbness x 2 5 hrs  Differential diagnosis includes but its not limited to hemorrhagic CVA, ischemic CVA, TIA, complex migraine, subdural hematoma, subarachnoid hemorrhage, carotid stenosis, [place] Palsy, Multiple Sclerosis, and age associated degeneration changes  Given patient hx also confirmed by wife of acute onset with waking up normal, non disabling change, no subjective motor deficits, normal vision, without dizziness or confusion or AMS, in the setting of minimal medical hx CVA is low on my differential  Neuro exam is only revealing for L sided forehead numbness, NIH of 1  CVA remains low on differential and CTH, EKG, BMP, and CBC, obtained  Obtained CT which is negative for acute intracranial abn including acute infarction  Patient continued to have normal neuro exam and subjectively symptoms unchanged during ED visit  CBC and BMP without abn requiring intervention and did not affect management  Will plan to admit patient to observation with f/u MRI in the morning  Reached out to [facility] for observation who requested neurology consult prior to admission  Neurology assessment reveal resolved facial numbness, NIH stroke score 0 and requested CTA head neck  CTA head necknegative for acute intracranial abn  Given negative CTH and CTA head neck in the setting of minimal deficit neuro exam in a healthy 68 yo, with prior workup including CTH, CTA, and MRI within last 2 years decision was made with patient wife and neurology bedside to d/c patient home  [facility] agreed  Wife continues to state patient is acting and looks normal and she notes no deficits  Patient is eager to go home make lunch and go to the gym he states  Wife requesting work note       Gave patient acetaminophen dose and home BP medicine since he did not take it this morning  Provided wife work note, reassesed patient with improved neuro exam without forehead facial deficit and continued stable vital signs  Discussed strict return precautions with patient and wife bedside including return to ER for increased numbness, motor deficit, confusion, difficulty seeing, visual changes, severe headache, chest pain, dizziness  All questions were addressed and they expressed appreciation and understanding  1) L sided facial numbness    - CTH: No acute intracranial abn, negative for acute infarction  "Large polyp or retention cyst seen in the left maxillary sinus  There is also a polyp or retention cyst in the left sphenoid sinus  The mastoid air cells are clear "   - CTA: "1   No acute intracranial CT abnormality  2   Patent major vasculature of the [place] of [place] without high-grade stenosis  No aneurysm  3   No hemodynamically significant stenosis or dissection of cervical carotid and vertebral arteries  Stable mild atherosclerotic change in the left carotid bifurcation  "   - Neurology consulted and assessed  - EKG without acute abn requiring intervention, CBC and BMP reviewed   - Consider  Outpatient neurology assessment in the future for recurrent nonemergency room symptoms   - Continue home aspirin 81mg and antihypertensive meds as outpatient   - Continue healthy lifestyle including exercise, heart healthy diet, avoiding tobacco products/alcohol   2) Benign tension headache  - Continue acetaminophen as directed on the bottle, not to exceed 3g/day for headaches   - Avoid dehydration, proper sleep wake cycle is encouraged   - Continue supportive measures   - See plan for facial numbness   3) HTN   - Normal EKG and cardiac exam   - Continue home antihypertensive- Amlodipine   Prescribed dose here   - Continue healthy lifestyle including exercise, heart healthy diet, avoiding tobacco products/alcohol 4) Hypokalemia to 3 7  - Gave handout on potassium rich foods   - EKG without acute abn    Headache: acute illness or injury  Paresthesias: acute illness or injury  Primary hypertension: chronic illness or injury  Amount and/or Complexity of Data Reviewed  External Data Reviewed: labs, radiology and ECG  Labs: ordered  Radiology: ordered  Decision-making details documented in ED Course  ECG/medicine tests: independent interpretation performed  Details: NSR       Risk  OTC drugs  Prescription drug management  Disposition  Final diagnoses:   Paresthesias   Headache   Primary hypertension     Time reflects when diagnosis was documented in both MDM as applicable and the Disposition within this note     Time User Action Codes Description Comment    3/13/2023  9:38 AM Cedric Yates Add [R20 0] Left sided numbness     3/13/2023 12:17 PM Ronnald Mare Add [R20 2] Paresthesias     3/13/2023 12:17 PM Ronnald Mare Modify [R20 0] Left sided numbness     3/13/2023 12:17 PM Ronnald Mare Modify [R20 2] Paresthesias     3/13/2023 12:17 PM Ronnald Mare Add [R51 9] Headache     3/13/2023 12:18 PM Ronnald Mare Add [I10] Primary hypertension       ED Disposition     ED Disposition   Discharge    Condition   Stable    Date/Time   Mon Mar 13, 2023 12:16 PM    Comment   [place] discharge to home/self care                 Follow-up Information     Follow up With Specialties Details Why Contact Info Additional Information    Emerson Tijerina MD Internal Medicine Schedule an appointment as soon as possible for a visit in 3 days  [facility] [phone]       [facility] Emergency Medicine  If symptoms worsen [address] Emergency Department, [address] [place] [address], [id number]          Discharge Medication List as of 3/13/2023 12:19 PM      CONTINUE these medications which have NOT CHANGED    Details   amLODIPine (NORVASC) 10 mg tablet TAKE 1 TABLET DAILY, Normal      aspirin 81 mg chewable tablet Chew 1 tablet (81 mg total) daily, Starting Wed 1/26/2022, Until Tue 1/3/2023, Normal      cyclobenzaprine (FLEXERIL) 10 mg tablet Take 1 tablet (10 mg total) by mouth 2 (two) times a day as needed for muscle spasms, Starting Thu 7/7/2022, Normal      lidocaine (LIDODERM) 5 % Apply 1 patch topically daily Remove & Discard patch within 12 hours or as directed by MD, Starting Wed 4/14/2021, Normal      multivitamin (THERAGRAN) TABS Take 1 tablet by mouth daily, Historical Med      omeprazole (PriLOSEC) 20 mg delayed release capsule as needed, Starting Thu 12/16/2021, Historical Med      tadalafil (CIALIS) 20 MG tablet if needed, Historical Med      tamsulosin (FLOMAX) 0 4 mg Take 1 capsule (0 4 mg total) by mouth daily with dinner, Starting Tue 1/5/2021, Normal             No discharge procedures on file      PDMP Review     None          ED Provider  Electronically Signed by           Jacqueline Bustamante PA-C  03/13/23 0003

## 2023-03-13 NOTE — ASSESSMENT & PLAN NOTE
68 yo with past medical history hypertension/BPH/prior TIA presenting to ED with symptoms of left-sided numbness which began abruptly at 6 AM today  Patient reported symptoms of headache in his left parietal region for 2 days "stabbing sensation"  Associated with left facial/left upper extremity/left lower extremity numbness which began at 6 AM almost resolved at this point  Denies any one-sided weakness  No slurring in speech  No facial droop  No visual deficits    · CBC/CMP within normal limits  · CT head negative  · MRI brain ordered  · Continue aspirin/statin  · Lipid panel/A1c ordered  · On telemetry  · Neurochecks  · PT OT speech  · Neurology consulted

## 2023-03-15 LAB
ATRIAL RATE: 68 BPM
P AXIS: 73 DEGREES
PR INTERVAL: 160 MS
QRS AXIS: 46 DEGREES
QRSD INTERVAL: 88 MS
QT INTERVAL: 400 MS
QTC INTERVAL: 425 MS
T WAVE AXIS: 55 DEGREES
VENTRICULAR RATE: 68 BPM

## 2023-03-17 ENCOUNTER — TELEPHONE (OUTPATIENT)
Dept: INTERNAL MEDICINE CLINIC | Facility: CLINIC | Age: 73
End: 2023-03-17

## 2023-03-17 NOTE — TELEPHONE ENCOUNTER
Patient went to the ER on Monday due too numbness left side of brain, neck, shoulder and leg  Two days prior to this patient said he had a sh rp shooting pain on the right side on top of his head  Patient would like to know if you can order a Calcium Score Test?    Please advise    Sawyer Jorgensen

## 2023-03-20 ENCOUNTER — TELEPHONE (OUTPATIENT)
Dept: NEUROLOGY | Facility: CLINIC | Age: 73
End: 2023-03-20

## 2023-03-20 NOTE — TELEPHONE ENCOUNTER
Called and spoke with patient in regards to scheduling HFU with Neurology  Patient would like to discuss w/ PCP first in regards to appt, and stated he would call back if needed to schedule  His appt w/ PCP is 4/5/23  I gave him our number and patient stated he would call back  NOTES: Jesus Bailey will need follow up in in 3 months with general attending or advance practitioner  He will not require outpatient neurological testing

## 2023-03-30 ENCOUNTER — RA CDI HCC (OUTPATIENT)
Dept: OTHER | Facility: HOSPITAL | Age: 73
End: 2023-03-30

## 2023-04-04 ENCOUNTER — APPOINTMENT (OUTPATIENT)
Dept: LAB | Facility: HOSPITAL | Age: 73
End: 2023-04-04

## 2023-04-04 DIAGNOSIS — Z13.6 SCREENING FOR HEART DISEASE: ICD-10-CM

## 2023-04-04 DIAGNOSIS — I10 PRIMARY HYPERTENSION: ICD-10-CM

## 2023-04-04 LAB
ALBUMIN SERPL BCP-MCNC: 4.3 G/DL (ref 3.5–5)
ALP SERPL-CCNC: 55 U/L (ref 34–104)
ALT SERPL W P-5'-P-CCNC: 18 U/L (ref 7–52)
ANION GAP SERPL CALCULATED.3IONS-SCNC: 6 MMOL/L (ref 4–13)
AST SERPL W P-5'-P-CCNC: 18 U/L (ref 13–39)
BASOPHILS # BLD AUTO: 0.02 THOUSANDS/ÂΜL (ref 0–0.1)
BASOPHILS NFR BLD AUTO: 1 % (ref 0–1)
BILIRUB SERPL-MCNC: 0.61 MG/DL (ref 0.2–1)
BUN SERPL-MCNC: 21 MG/DL (ref 5–25)
CALCIUM SERPL-MCNC: 9.6 MG/DL (ref 8.4–10.2)
CHLORIDE SERPL-SCNC: 106 MMOL/L (ref 96–108)
CHOLEST SERPL-MCNC: 169 MG/DL
CO2 SERPL-SCNC: 27 MMOL/L (ref 21–32)
CREAT SERPL-MCNC: 1.39 MG/DL (ref 0.6–1.3)
EOSINOPHIL # BLD AUTO: 0.07 THOUSAND/ÂΜL (ref 0–0.61)
EOSINOPHIL NFR BLD AUTO: 2 % (ref 0–6)
ERYTHROCYTE [DISTWIDTH] IN BLOOD BY AUTOMATED COUNT: 13.2 % (ref 11.6–15.1)
GFR SERPL CREATININE-BSD FRML MDRD: 50 ML/MIN/1.73SQ M
GLUCOSE P FAST SERPL-MCNC: 91 MG/DL (ref 65–99)
HCT VFR BLD AUTO: 43.3 % (ref 36.5–49.3)
HDLC SERPL-MCNC: 70 MG/DL
HGB BLD-MCNC: 14.2 G/DL (ref 12–17)
IMM GRANULOCYTES # BLD AUTO: 0.01 THOUSAND/UL (ref 0–0.2)
IMM GRANULOCYTES NFR BLD AUTO: 0 % (ref 0–2)
LDLC SERPL CALC-MCNC: 86 MG/DL (ref 0–100)
LYMPHOCYTES # BLD AUTO: 1.48 THOUSANDS/ÂΜL (ref 0.6–4.47)
LYMPHOCYTES NFR BLD AUTO: 45 % (ref 14–44)
MCH RBC QN AUTO: 29.6 PG (ref 26.8–34.3)
MCHC RBC AUTO-ENTMCNC: 32.8 G/DL (ref 31.4–37.4)
MCV RBC AUTO: 90 FL (ref 82–98)
MONOCYTES # BLD AUTO: 0.23 THOUSAND/ÂΜL (ref 0.17–1.22)
MONOCYTES NFR BLD AUTO: 7 % (ref 4–12)
NEUTROPHILS # BLD AUTO: 1.46 THOUSANDS/ÂΜL (ref 1.85–7.62)
NEUTS SEG NFR BLD AUTO: 45 % (ref 43–75)
NONHDLC SERPL-MCNC: 99 MG/DL
NRBC BLD AUTO-RTO: 0 /100 WBCS
PLATELET # BLD AUTO: 212 THOUSANDS/UL (ref 149–390)
PMV BLD AUTO: 10.9 FL (ref 8.9–12.7)
POTASSIUM SERPL-SCNC: 4.2 MMOL/L (ref 3.5–5.3)
PROT SERPL-MCNC: 7.2 G/DL (ref 6.4–8.4)
RBC # BLD AUTO: 4.79 MILLION/UL (ref 3.88–5.62)
SODIUM SERPL-SCNC: 139 MMOL/L (ref 135–147)
TRIGL SERPL-MCNC: 63 MG/DL
WBC # BLD AUTO: 3.27 THOUSAND/UL (ref 4.31–10.16)

## 2023-04-05 ENCOUNTER — OFFICE VISIT (OUTPATIENT)
Dept: INTERNAL MEDICINE CLINIC | Facility: CLINIC | Age: 73
End: 2023-04-05

## 2023-04-05 VITALS
OXYGEN SATURATION: 98 % | DIASTOLIC BLOOD PRESSURE: 68 MMHG | BODY MASS INDEX: 25.22 KG/M2 | RESPIRATION RATE: 16 BRPM | SYSTOLIC BLOOD PRESSURE: 106 MMHG | HEART RATE: 65 BPM | WEIGHT: 186.2 LBS | HEIGHT: 72 IN

## 2023-04-05 DIAGNOSIS — M54.16 LUMBAR RADICULOPATHY: ICD-10-CM

## 2023-04-05 DIAGNOSIS — N18.31 STAGE 3A CHRONIC KIDNEY DISEASE (HCC): ICD-10-CM

## 2023-04-05 DIAGNOSIS — I10 PRIMARY HYPERTENSION: ICD-10-CM

## 2023-04-05 DIAGNOSIS — Z00.00 MEDICARE ANNUAL WELLNESS VISIT, SUBSEQUENT: Primary | ICD-10-CM

## 2023-04-05 DIAGNOSIS — Z12.5 SCREENING FOR PROSTATE CANCER: ICD-10-CM

## 2023-04-05 RX ORDER — METHYLPREDNISOLONE 4 MG/1
TABLET ORAL
Qty: 21 TABLET | Refills: 0 | Status: SHIPPED | OUTPATIENT
Start: 2023-04-05

## 2023-04-05 NOTE — PATIENT INSTRUCTIONS
Continue current medication  Start Medrol pack and finish for ongoing low back pain with radiculopathy  We will try to get requested coronary calcium score done  Follow-up in 6 months with repeat labs, sooner as needed  Continue follow-up with specialist               Medicare Preventive Visit Patient Instructions  Thank you for completing your Welcome to Medicare Visit or Medicare Annual Wellness Visit today  Your next wellness visit will be due in one year (4/5/2024)  The screening/preventive services that you may require over the next 5-10 years are detailed below  Some tests may not apply to you based off risk factors and/or age  Screening tests ordered at today's visit but not completed yet may show as past due  Also, please note that scanned in results may not display below  Preventive Screenings:  Service Recommendations Previous Testing/Comments   Colorectal Cancer Screening  Colonoscopy    Fecal Occult Blood Test (FOBT)/Fecal Immunochemical Test (FIT)  Fecal DNA/Cologuard Test  Flexible Sigmoidoscopy Age: 39-70 years old   Colonoscopy: every 10 years (May be performed more frequently if at higher risk)  OR  FOBT/FIT: every 1 year  OR  Cologuard: every 3 years  OR  Sigmoidoscopy: every 5 years  Screening may be recommended earlier than age 39 if at higher risk for colorectal cancer  Also, an individualized decision between you and your healthcare provider will decide whether screening between the ages of 74-80 would be appropriate   Colonoscopy: 11/20/2019  FOBT/FIT: Not on file  Cologuard: Not on file  Sigmoidoscopy: Not on file    Screening Current     Prostate Cancer Screening Individualized decision between patient and health care provider in men between ages of 53-78   Medicare will cover every 12 months beginning on the day after your 50th birthday PSA: 3 2 ng/mL     Screening Current     Hepatitis C Screening Once for adults born between [facility]  More frequently in patients at high risk for Hepatitis C Hep C Antibody: 06/06/2019    Screening Current   Diabetes Screening 1-2 times per year if you're at risk for diabetes or have pre-diabetes Fasting glucose: 91 mg/dL (4/4/2023)  A1C: No results in last 5 years (No results in last 5 years)  Screening Current   Cholesterol Screening Once every 5 years if you don't have a lipid disorder  May order more often based on risk factors  Lipid panel: 04/04/2023  Screening Not Indicated  History Lipid Disorder      Other Preventive Screenings Covered by Medicare:  Abdominal Aortic Aneurysm (AAA) Screening: covered once if your at risk  You're considered to be at risk if you have a family history of AAA or a male between the age of 73-68 who smoking at least 100 cigarettes in your lifetime  Lung Cancer Screening: covers low dose CT scan once per year if you meet all of the following conditions: (1) Age 50-69; (2) No signs or symptoms of lung cancer; (3) Current smoker or have quit smoking within the last 15 years; (4) You have a tobacco smoking history of at least 20 pack years (packs per day x number of years you smoked); (5) You get a written order from a healthcare provider  Glaucoma Screening: covered annually if you're considered high risk: (1) You have diabetes OR (2) Family history of glaucoma OR (3)  aged 48 and older OR (3)  American aged 72 and older  Osteoporosis Screening: covered every 2 years if you meet one of the following conditions: (1) Have a vertebral abnormality; (2) On glucocorticoid therapy for more than 3 months; (3) Have primary hyperparathyroidism; (4) On osteoporosis medications and need to assess response to drug therapy  HIV Screening: covered annually if you're between the age of 12-76  Also covered annually if you are younger than 13 and older than 72 with risk factors for HIV infection  For pregnant patients, it is covered up to 3 times per pregnancy      Immunizations:  Immunization Recommendations Influenza Vaccine Annual influenza vaccination during flu season is recommended for all persons aged >= 6 months who do not have contraindications   Pneumococcal Vaccine   * Pneumococcal conjugate vaccine = PCV13 (Prevnar 13), PCV15 (Vaxneuvance), PCV20 (Prevnar 20)  * Pneumococcal polysaccharide vaccine = PPSV23 (Pneumovax) Adults 25-60 years old: 1-3 doses may be recommended based on certain risk factors  Adults 72 years old: 1-2 doses may be recommended based off what pneumonia vaccine you previously received   Hepatitis B Vaccine 3 dose series if at intermediate or high risk (ex: diabetes, end stage renal disease, liver disease)   Tetanus (Td) Vaccine - COST NOT COVERED BY MEDICARE PART B Following completion of primary series, a booster dose should be given every 10 years to maintain immunity against tetanus  Td may also be given as tetanus wound prophylaxis  Tdap Vaccine - COST NOT COVERED BY MEDICARE PART B Recommended at least once for all adults  For pregnant patients, recommended with each pregnancy  Shingles Vaccine (Shingrix) - COST NOT COVERED BY MEDICARE PART B  2 shot series recommended in those aged 48 and above     Health Maintenance Due:      Topic Date Due    Colorectal Cancer Screening  11/20/2022    Hepatitis C Screening  Completed     Immunizations Due:      Topic Date Due    Pneumococcal Vaccine: 65+ Years (1 - PCV) Never done    COVID-19 Vaccine (3 - Booster for Pfizer series) 07/01/2021    Influenza Vaccine (1) 09/01/2022     Advance Directives   What are advance directives? Advance directives are legal documents that state your wishes and plans for medical care  These plans are made ahead of time in case you lose your ability to make decisions for yourself  Advance directives can apply to any medical decision, such as the treatments you want, and if you want to donate organs  What are the types of advance directives?   There are many types of advance directives, and each state has rules about how to use them  You may choose a combination of any of the following:  Living will: This is a written record of the treatment you want  You can also choose which treatments you do not want, which to limit, and which to stop at a certain time  This includes surgery, medicine, IV fluid, and tube feedings  Durable power of  for healthcare [facility]): This is a written record that states who you want to make healthcare choices for you when you are unable to make them for yourself  This person, called a proxy, is usually a family member or a friend  You may choose more than 1 proxy  Do not resuscitate (DNR) order:  A DNR order is used in case your heart stops beating or you stop breathing  It is a request not to have certain forms of treatment, such as CPR  A DNR order may be included in other types of advance directives  Medical directive: This covers the care that you want if you are in a coma, near death, or unable to make decisions for yourself  You can list the treatments you want for each condition  Treatment may include pain medicine, surgery, blood transfusions, dialysis, IV or tube feedings, and a ventilator (breathing machine)  Values history: This document has questions about your views, beliefs, and how you feel and think about life  This information can help others choose the care that you would choose  Why are advance directives important? An advance directive helps you control your care  Although spoken wishes may be used, it is better to have your wishes written down  Spoken wishes can be misunderstood, or not followed  Treatments may be given even if you do not want them  An advance directive may make it easier for your family to make difficult choices about your care     Weight Management   Why it is important to manage your weight:  Being overweight increases your risk of health conditions such as heart disease, high blood pressure, type 2 diabetes, and certain types of cancer  It can also increase your risk for osteoarthritis, sleep apnea, and other respiratory problems  Aim for a slow, steady weight loss  Even a small amount of weight loss can lower your risk of health problems  How to lose weight safely:  A safe and healthy way to lose weight is to eat fewer calories and get regular exercise  You can lose up about 1 pound a week by decreasing the number of calories you eat by 500 calories each day  Healthy meal plan for weight management:  A healthy meal plan includes a variety of foods, contains fewer calories, and helps you stay healthy  A healthy meal plan includes the following:  Eat whole-grain foods more often  A healthy meal plan should contain fiber  Fiber is the part of grains, fruits, and vegetables that is not broken down by your body  Whole-grain foods are healthy and provide extra fiber in your diet  Some examples of whole-grain foods are whole-wheat breads and pastas, oatmeal, brown rice, and bulgur  Eat a variety of vegetables every day  Include dark, leafy greens such as spinach, kale, dena greens, and mustard greens  Eat yellow and orange vegetables such as carrots, sweet potatoes, and winter squash  Eat a variety of fruits every day  Choose fresh or canned fruit (canned in its own juice or light syrup) instead of juice  Fruit juice has very little or no fiber  Eat low-fat dairy foods  Drink fat-free (skim) milk or 1% milk  Eat fat-free yogurt and low-fat cottage cheese  Try low-fat cheeses such as mozzarella and other reduced-fat cheeses  Choose meat and other protein foods that are low in fat  Choose beans or other legumes such as split peas or lentils  Choose fish, skinless poultry (chicken or turkey), or lean cuts of red meat (beef or pork)  Before you cook meat or poultry, cut off any visible fat  Use less fat and oil  Try baking foods instead of frying them   Add less fat, such as margarine, sour cream, regular salad dressing and mayonnaise to foods  Eat fewer high-fat foods  Some examples of high-fat foods include french fries, doughnuts, ice cream, and cakes  Eat fewer sweets  Limit foods and drinks that are high in sugar  This includes candy, cookies, regular soda, and sweetened drinks  Exercise:  Exercise at least 30 minutes per day on most days of the week  Some examples of exercise include walking, biking, dancing, and swimming  You can also fit in more physical activity by taking the stairs instead of the elevator or parking farther away from stores  Ask your healthcare provider about the best exercise plan for you  © Copyright [employer] 2018 Information is for End User's use only and may not be sold, redistributed or otherwise used for commercial purposes   All illustrations and images included in CareNotes® are the copyrighted property of A D A M , Inc  or [address] [place] [address]

## 2023-04-05 NOTE — PROGRESS NOTES
Assessment and Plan:   Continue current medication  Start Medrol pack and finish for ongoing low back pain with radiculopathy  We will try to get requested coronary calcium score done  Follow-up in 6 months with repeat labs, sooner as needed  Continue follow-up with specialist   Problem List Items Addressed This Visit        Cardiovascular and Mediastinum    Primary hypertension    Relevant Orders    CT coronary calcium score    CBC and differential    Comprehensive metabolic panel    Lipid panel       Nervous and Auditory    Lumbar radiculopathy    Relevant Medications    methylPREDNISolone 4 MG tablet therapy pack       Genitourinary    CKD (chronic kidney disease) stage 3, GFR 30-59 ml/min ([facility])   Other Visit Diagnoses     Medicare annual wellness visit, subsequent    -  Primary    Screening for prostate cancer        Relevant Orders    PSA, Total Screen        Patient is not overweight, he was weighed with his clothing on, and is muscular throwing off the BMI  Depression Screening and Follow-up Plan: Patient was screened for depression during today's encounter  They screened negative with a PHQ-2 score of 0  Preventive health issues were discussed with patient, and age appropriate screening tests were ordered as noted in patient's After Visit Summary  Personalized health advice and appropriate referrals for health education or preventive services given if needed, as noted in patient's After Visit Summary  History of Present Illness:     Patient presents for a Medicare Wellness Visit  Questionnaire has been reviewed, clarified, and updated with the patient today  Follow-up, labs reviewed with patient    Hypertension: Excellent control on current medication  Eduarda cp, palp, sob, edema, HA, dizziness, diaphoresis, syncope, visual disturbance  CKD 3A: Following with nephrology  Labs today show stability  Patient staying hydrated  Avoiding NSAIDs      Low back pain: Started about a week and a half ago  He had been at the gym lifting weights, then was in the car for about 2 hours picking up his son, after he got out of the car he had pain pointing to his right sacroiliac region  He has since seen his chiropractor which has been helpful but still having pain  Hospitalized last month for paresthesias of the left side of his head and intermittent headache  Work-up unremarkable  Had been referred to neurology  Lipid labs look good  Patient informs me that last week he had his colonoscopy  2 polyps were found and removed  For follow-up in 5 years  Patient Care Team:  Judy Murphy MD as PCP - General  MD Thea Blanco MD     Review of Systems:     Review of Systems   Constitutional: Negative for activity change, chills, fatigue and fever  HENT: Negative for congestion  Eyes: Negative for discharge  Respiratory: Negative for cough, chest tightness and shortness of breath  Cardiovascular: Negative for chest pain, palpitations and leg swelling  Gastrointestinal: Negative for abdominal pain, blood in stool, constipation, diarrhea, nausea and vomiting  Endocrine: Negative for polydipsia, polyphagia and polyuria  Genitourinary: Negative for difficulty urinating  Musculoskeletal: Negative for arthralgias and myalgias  Skin: Negative for rash  Allergic/Immunologic: Negative for immunocompromised state  Neurological: Positive for headaches  Negative for dizziness, syncope, weakness and light-headedness  Hematological: Negative for adenopathy  Does not bruise/bleed easily  Psychiatric/Behavioral: Negative for dysphoric mood, sleep disturbance and suicidal ideas  The patient is not nervous/anxious           Problem List:     Patient Active Problem List   Diagnosis   • Benign prostatic hyperplasia, presence of lower urinary tract symptoms unspecified   • Erectile dysfunction   • Lumbar radiculopathy   • CKD (chronic kidney disease) stage 3, GFR 30-59 ml/min ([facility])   • Vertigo   • Chronic kidney disease-mineral and bone disorder   • Colon polyps   • Pain in left knee   • Primary hypertension   • Dysesthesia of multiple sites      Past Medical and Surgical History:     Past Medical History:   Diagnosis Date   • Benign prostatic hyperplasia    • Chronic kidney disease    • COVID-19 03/2020   • Esophageal reflux    • Hypertension    • Hypertensive urgency 3/26/2019   • Vertigo      Past Surgical History:   Procedure Laterality Date   • APPENDECTOMY  05/21/2015   • CYST REMOVAL      Fatty cyst removed from back      Family History:     Family History   Problem Relation Age of Onset   • Prostate cancer Father    • Prostate cancer Maternal Grandfather    • Stomach cancer Maternal Aunt         malignant tumor of pharynx   • Stomach cancer Maternal Uncle         malignant tumor of pharynx   • Mental illness Family    • Depression Family    • Schizophrenia Family    • Hypertension Mother    • No Known Problems Sister    • Dementia Sister       Social History:     Social History     Socioeconomic History   • Marital status: /Civil Union     Spouse name: None   • Number of children: None   • Years of education: None   • Highest education level: None   Occupational History   • Occupation: Retired   Tobacco Use   • Smoking status: Former   • Smokeless tobacco: Never   • Tobacco comments:     1ppw   Vaping Use   • Vaping Use: Never used   Substance and Sexual Activity   • Alcohol use: Yes     Comment: socially   • Drug use: No   • Sexual activity: Yes     Partners: Female     Comment: denied history of high risk sexual behavior   Other Topics Concern   • None   Social History Narrative    No active advance directive    Always uses seat belt    Exercises occasionally, moderate    Five children    Occasional caffeine consumption      Regular dental care   Brushes teeth twice daily       Retired     Social Determinants of Health     Financial Resource Strain: Low Risk    • Difficulty of Paying Living Expenses: Not hard at all   Food Insecurity: Not on file   Transportation Needs: No Transportation Needs   • Lack of Transportation (Medical): No   • Lack of Transportation (Non-Medical): No   Physical Activity: Not on file   Stress: Not on file   Social Connections: Not on file   Intimate Partner Violence: Not on file   Housing Stability: Not on file      Medications and Allergies:     Current Outpatient Medications   Medication Sig Dispense Refill   • amLODIPine (NORVASC) 10 mg tablet TAKE 1 TABLET DAILY 90 tablet 1   • cyclobenzaprine (FLEXERIL) 10 mg tablet Take 1 tablet (10 mg total) by mouth 2 (two) times a day as needed for muscle spasms (Patient taking differently: Take 10 mg by mouth if needed for muscle spasms) 12 tablet 0   • lidocaine (LIDODERM) 5 % Apply 1 patch topically daily Remove & Discard patch within 12 hours or as directed by MD (Patient taking differently: Apply 1 patch topically if needed Remove & Discard patch within 12 hours or as directed by MD) 50 patch 5   • methylPREDNISolone 4 MG tablet therapy pack Use as directed on package 21 tablet 0   • multivitamin (THERAGRAN) TABS Take 1 tablet by mouth daily     • omeprazole (PriLOSEC) 20 mg delayed release capsule as needed     • tadalafil (CIALIS) 20 MG tablet if needed     • tamsulosin (FLOMAX) 0 4 mg Take 1 capsule (0 4 mg total) by mouth daily with dinner 90 capsule 3   • aspirin 81 mg chewable tablet Chew 1 tablet (81 mg total) daily (Patient not taking: Reported on 4/5/2023) 30 tablet 0     No current facility-administered medications for this visit       Allergies   Allergen Reactions   • Penicillin V Anaphylaxis      Immunizations:     Immunization History   Administered Date(s) Administered   • COVID-19 PFIZER VACCINE 0 3 ML IM 04/14/2021, 05/06/2021   • Influenza, high dose seasonal 0 7 mL 11/06/2019      Health Maintenance:         Topic Date Due   • Colorectal Cancer Screening  11/20/2022   • Hepatitis C Screening  Completed         Topic Date Due   • Pneumococcal Vaccine: 65+ Years (1 - PCV) Never done   • COVID-19 Vaccine (3 - Booster for Pfizer series) 07/01/2021   • Influenza Vaccine (1) 09/01/2022      Medicare Screening Tests and Risk Assessments:     Yoseph Peterson is here for his Subsequent Wellness visit  Last Medicare Wellness visit information reviewed, patient interviewed and updates made to the record today  Health Risk Assessment:   Patient rates overall health as good  Patient feels that their physical health rating is same  Patient is very satisfied with their life  Eyesight was rated as same  Hearing was rated as same  Patient feels that their emotional and mental health rating is same  Patients states they are never, rarely angry  Patient states they are sometimes unusually tired/fatigued  Pain experienced in the last 7 days has been some  Patient's pain rating has been 9/10  Patient states that he has experienced no weight loss or gain in last 6 months  Depression Screening:   PHQ-2 Score: 0      Fall Risk Screening: In the past year, patient has experienced: no history of falling in past year      Home Safety:  Patient does not have trouble with stairs inside or outside of their home  Patient has working smoke alarms and has working carbon monoxide detector  Home safety hazards include: none  Nutrition:   Current diet is Regular  Medications:   Patient is currently taking over-the-counter supplements  OTC medications include: see medication list  Patient is able to manage medications  Activities of Daily Living (ADLs)/Instrumental Activities of Daily Living (IADLs):   Walk and transfer into and out of bed and chair?: Yes  Dress and groom yourself?: Yes    Bathe or shower yourself?: Yes    Feed yourself?  Yes  Do your laundry/housekeeping?: Yes  Manage your money, pay your bills and track your expenses?: Yes  Make your own meals?: Yes    Do your own shopping?: Yes    Previous "Hospitalizations:   Any hospitalizations or ED visits within the last 12 months?: No      Advance Care Planning:   Living will: No    Durable POA for healthcare: No    Advanced directive: No      Cognitive Screening:   Provider or family/friend/caregiver concerned regarding cognition?: No    PREVENTIVE SCREENINGS      Cardiovascular Screening:    General: Screening Not Indicated, History Lipid Disorder and Screening Current    Due for: Lipid Panel      Diabetes Screening:     General: Screening Current    Due for: Blood Glucose      Colorectal Cancer Screening:     General: Screening Current      Prostate Cancer Screening:    General: Screening Current    Due for: PSA      Osteoporosis Screening:    General: Screening Not Indicated      Abdominal Aortic Aneurysm (AAA) Screening:    Risk factors include: age between 73-69 yo and tobacco use        General: Screening Current      Lung Cancer Screening:     General: Screening Not Indicated      Hepatitis C Screening:    General: Screening Current    Screening, Brief Intervention, and Referral to Treatment (SBIRT)    Screening  Typical number of drinks in a day: 0  Typical number of drinks in a week: 0  Interpretation: Low risk drinking behavior  AUDIT-C Screenin) How often did you have a drink containing alcohol in the past year? monthly or less  2) How many drinks did you have on a typical day when you were drinking in the past year? 1 to 2  3) How often did you have 6 or more drinks on one occasion in the past year? never    AUDIT-C Score: 1  Interpretation: Score 0-3 (male): Negative screen for alcohol misuse    Brief Intervention  Alcohol & drug use screenings were reviewed  No concerns regarding substance use disorder identified  No results found       Physical Exam:     /68 (BP Location: Left arm, Patient Position: Sitting, Cuff Size: Adult)   Pulse 65   Resp 16   Ht 5' 11 5\" (1 816 m)   Wt 84 5 kg (186 lb 3 2 oz)   SpO2 98%   BMI 25 61 " kg/m²     Physical Exam  Vitals and nursing note reviewed  Constitutional:       General: He is not in acute distress  Appearance: Normal appearance  He is well-developed  He is not ill-appearing  HENT:      Head: Normocephalic and atraumatic  Nose: Nose normal       Mouth/Throat:      Pharynx: No oropharyngeal exudate or posterior oropharyngeal erythema  Eyes:      Extraocular Movements: Extraocular movements intact  Conjunctiva/sclera: Conjunctivae normal       Pupils: Pupils are equal, round, and reactive to light  Neck:      Thyroid: No thyromegaly  Vascular: No carotid bruit or JVD  Cardiovascular:      Rate and Rhythm: Normal rate and regular rhythm  Heart sounds: No murmur heard  Pulmonary:      Effort: Pulmonary effort is normal  No respiratory distress  Breath sounds: Normal breath sounds  Chest:      Chest wall: No tenderness  Abdominal:      General: Abdomen is flat  Bowel sounds are normal       Palpations: Abdomen is soft  There is no hepatomegaly or splenomegaly  Tenderness: There is no abdominal tenderness  Musculoskeletal:         General: No swelling  Normal range of motion  Cervical back: Normal range of motion and neck supple  Right lower leg: No edema  Left lower leg: No edema  Lymphadenopathy:      Cervical: No cervical adenopathy  Skin:     General: Skin is warm and dry  Findings: No rash  Neurological:      General: No focal deficit present  Mental Status: He is alert and oriented to person, place, and time  Mental status is at baseline     Psychiatric:         Mood and Affect: Mood normal          Behavior: Behavior normal           Naveen Mac PA-C

## 2023-05-01 ENCOUNTER — APPOINTMENT (EMERGENCY)
Dept: CT IMAGING | Facility: HOSPITAL | Age: 73
End: 2023-05-01

## 2023-05-01 ENCOUNTER — HOSPITAL ENCOUNTER (EMERGENCY)
Facility: HOSPITAL | Age: 73
Discharge: HOME | End: 2023-05-01
Attending: EMERGENCY MEDICINE

## 2023-05-01 VITALS
OXYGEN SATURATION: 99 % | DIASTOLIC BLOOD PRESSURE: 68 MMHG | HEART RATE: 65 BPM | SYSTOLIC BLOOD PRESSURE: 146 MMHG | BODY MASS INDEX: 24.65 KG/M2 | WEIGHT: 182 LBS | HEIGHT: 72 IN | RESPIRATION RATE: 18 BRPM | TEMPERATURE: 98 F

## 2023-05-01 DIAGNOSIS — R10.9 BILATERAL FLANK PAIN: Primary | ICD-10-CM

## 2023-05-01 DIAGNOSIS — N18.30 CKD (CHRONIC KIDNEY DISEASE) STAGE 3, GFR 30-59 ML/MIN (HCC): ICD-10-CM

## 2023-05-01 LAB
ALBUMIN SERPL BCP-MCNC: 4.2 G/DL (ref 3.5–5)
ALP SERPL-CCNC: 44 U/L (ref 34–104)
ALT SERPL W P-5'-P-CCNC: 19 U/L (ref 7–52)
ANION GAP SERPL CALCULATED.3IONS-SCNC: 5 MMOL/L (ref 4–13)
AST SERPL W P-5'-P-CCNC: 20 U/L (ref 13–39)
BASOPHILS # BLD AUTO: 0.02 THOUSANDS/ΜL (ref 0–0.1)
BASOPHILS NFR BLD AUTO: 1 % (ref 0–1)
BILIRUB SERPL-MCNC: 0.37 MG/DL (ref 0.2–1)
BILIRUB UR QL STRIP: NEGATIVE
BUN SERPL-MCNC: 19 MG/DL (ref 5–25)
CALCIUM SERPL-MCNC: 9.4 MG/DL (ref 8.4–10.2)
CHLORIDE SERPL-SCNC: 105 MMOL/L (ref 96–108)
CLARITY UR: CLEAR
CO2 SERPL-SCNC: 29 MMOL/L (ref 21–32)
COLOR UR: COLORLESS
CREAT SERPL-MCNC: 1.73 MG/DL (ref 0.6–1.3)
EOSINOPHIL # BLD AUTO: 0.06 THOUSAND/ΜL (ref 0–0.61)
EOSINOPHIL NFR BLD AUTO: 1 % (ref 0–6)
ERYTHROCYTE [DISTWIDTH] IN BLOOD BY AUTOMATED COUNT: 13.4 % (ref 11.6–15.1)
GFR SERPL CREATININE-BSD FRML MDRD: 38 ML/MIN/1.73SQ M
GLUCOSE SERPL-MCNC: 99 MG/DL (ref 65–140)
GLUCOSE UR STRIP-MCNC: NEGATIVE MG/DL
HCT VFR BLD AUTO: 38 % (ref 36.5–49.3)
HGB BLD-MCNC: 12.7 G/DL (ref 12–17)
HGB UR QL STRIP.AUTO: NEGATIVE
IMM GRANULOCYTES # BLD AUTO: 0.01 THOUSAND/UL (ref 0–0.2)
IMM GRANULOCYTES NFR BLD AUTO: 0 % (ref 0–2)
KETONES UR STRIP-MCNC: NEGATIVE MG/DL
LEUKOCYTE ESTERASE UR QL STRIP: NEGATIVE
LIPASE SERPL-CCNC: 25 U/L (ref 11–82)
LYMPHOCYTES # BLD AUTO: 1.6 THOUSANDS/ΜL (ref 0.6–4.47)
LYMPHOCYTES NFR BLD AUTO: 38 % (ref 14–44)
MCH RBC QN AUTO: 30.2 PG (ref 26.8–34.3)
MCHC RBC AUTO-ENTMCNC: 33.4 G/DL (ref 31.4–37.4)
MCV RBC AUTO: 91 FL (ref 82–98)
MONOCYTES # BLD AUTO: 0.31 THOUSAND/ΜL (ref 0.17–1.22)
MONOCYTES NFR BLD AUTO: 7 % (ref 4–12)
NEUTROPHILS # BLD AUTO: 2.27 THOUSANDS/ΜL (ref 1.85–7.62)
NEUTS SEG NFR BLD AUTO: 53 % (ref 43–75)
NITRITE UR QL STRIP: NEGATIVE
NRBC BLD AUTO-RTO: 0 /100 WBCS
PH UR STRIP.AUTO: 7.5 [PH]
PLATELET # BLD AUTO: 187 THOUSANDS/UL (ref 149–390)
PMV BLD AUTO: 10.8 FL (ref 8.9–12.7)
POTASSIUM SERPL-SCNC: 4 MMOL/L (ref 3.5–5.3)
PROT SERPL-MCNC: 6.7 G/DL (ref 6.4–8.4)
PROT UR STRIP-MCNC: NEGATIVE MG/DL
RBC # BLD AUTO: 4.2 MILLION/UL (ref 3.88–5.62)
SODIUM SERPL-SCNC: 139 MMOL/L (ref 135–147)
SP GR UR STRIP.AUTO: 1.01 (ref 1–1.03)
UROBILINOGEN UR STRIP-ACNC: <2 MG/DL
WBC # BLD AUTO: 4.27 THOUSAND/UL (ref 4.31–10.16)

## 2023-05-01 RX ADMIN — IOHEXOL 100 ML: 350 INJECTION, SOLUTION INTRAVENOUS at 17:57

## 2023-05-01 NOTE — ED PROVIDER NOTES
History  Chief Complaint   Patient presents with    Flank Pain     Pt reports b/l flank pain that started yesterday  Denies any other symptoms  66 yo male patient here for bilateral flank pain  Onset Sunday  Constant  Worsened by moving, bending, twisting  Thought it was related to his constipation and after having a BM today he has continued pain which concerned him  Denies fever, chills, n/v  Normal urination  No trauma or injury however he does note that recently he started exercising more including sit ups/crunches  History provided by:  Patient   used: No    Flank Pain  Pain location:  L flank and R flank  Pain quality: aching    Pain radiates to:  Does not radiate  Associated symptoms: no chest pain, no chills, no cough, no dysuria, no fever, no hematuria, no shortness of breath, no sore throat and no vomiting        Prior to Admission Medications   Prescriptions Last Dose Informant Patient Reported? Taking?    amLODIPine (NORVASC) 10 mg tablet   No No   Sig: TAKE 1 TABLET DAILY   aspirin 81 mg chewable tablet   No No   Sig: Chew 1 tablet (81 mg total) daily   Patient not taking: Reported on 4/5/2023   cyclobenzaprine (FLEXERIL) 10 mg tablet   No No   Sig: Take 1 tablet (10 mg total) by mouth 2 (two) times a day as needed for muscle spasms   Patient taking differently: Take 10 mg by mouth if needed for muscle spasms   lidocaine (LIDODERM) 5 %   No No   Sig: Apply 1 patch topically daily Remove & Discard patch within 12 hours or as directed by MD   Patient taking differently: Apply 1 patch topically if needed Remove & Discard patch within 12 hours or as directed by MD   methylPREDNISolone 4 MG tablet therapy pack   No No   Sig: Use as directed on package   multivitamin (THERAGRAN) TABS   Yes No   Sig: Take 1 tablet by mouth daily   omeprazole (PriLOSEC) 20 mg delayed release capsule   Yes No   Sig: as needed   tadalafil (CIALIS) 20 MG tablet   Yes No   Sig: if needed   tamsulosin (FLOMAX) 0 4 mg   No No   Sig: Take 1 capsule (0 4 mg total) by mouth daily with dinner      Facility-Administered Medications: None       Past Medical History:   Diagnosis Date    Benign prostatic hyperplasia     Chronic kidney disease     COVID-19 03/2020    Esophageal reflux     Hypertension     Hypertensive urgency 3/26/2019    Vertigo        Past Surgical History:   Procedure Laterality Date    APPENDECTOMY  05/21/2015    CYST REMOVAL      Fatty cyst removed from back       Family History   Problem Relation Age of Onset    Prostate cancer Father     Prostate cancer Maternal Grandfather     Stomach cancer Maternal Aunt         malignant tumor of pharynx    Stomach cancer Maternal Uncle         malignant tumor of pharynx    Mental illness Family     Depression Family     Schizophrenia Family     Hypertension Mother     No Known Problems Sister     Dementia Sister      I have reviewed and agree with the history as documented  E-Cigarette/Vaping    E-Cigarette Use Never User      E-Cigarette/Vaping Substances    Nicotine No     THC No     CBD No     Flavoring No     Other No     Unknown No      Social History     Tobacco Use    Smoking status: Former    Smokeless tobacco: Never    Tobacco comments:     1ppw   Vaping Use    Vaping Use: Never used   Substance Use Topics    Alcohol use: Yes     Comment: socially    Drug use: No       Review of Systems   Constitutional: Negative for chills and fever  HENT: Negative for ear pain and sore throat  Eyes: Negative for pain and visual disturbance  Respiratory: Negative for cough and shortness of breath  Cardiovascular: Negative for chest pain and palpitations  Gastrointestinal: Negative for abdominal pain and vomiting  Genitourinary: Positive for flank pain  Negative for dysuria and hematuria  Musculoskeletal: Negative for arthralgias and back pain  Skin: Negative for color change and rash     Neurological: Negative for seizures and syncope  All other systems reviewed and are negative  Physical Exam  Physical Exam  Vitals and nursing note reviewed  Constitutional:       General: He is not in acute distress  Appearance: He is well-developed  HENT:      Head: Normocephalic and atraumatic  Eyes:      Conjunctiva/sclera: Conjunctivae normal    Cardiovascular:      Rate and Rhythm: Normal rate and regular rhythm  Heart sounds: No murmur heard  Pulmonary:      Effort: Pulmonary effort is normal  No respiratory distress  Breath sounds: Normal breath sounds  Abdominal:      Palpations: Abdomen is soft  Tenderness: There is abdominal tenderness (b/l)  Musculoskeletal:         General: No swelling  Cervical back: Neck supple  Skin:     General: Skin is warm and dry  Capillary Refill: Capillary refill takes less than 2 seconds  Neurological:      Mental Status: He is alert and oriented to person, place, and time  GCS: GCS eye subscore is 4  GCS verbal subscore is 5  GCS motor subscore is 6  Comments: GCS 15  AAOx3  Ambulating in department without difficulty  CN II-XII grossly intact  No focal neuro deficits       Psychiatric:         Mood and Affect: Mood normal          Vital Signs  ED Triage Vitals [05/01/23 1649]   Temperature Pulse Respirations Blood Pressure SpO2   98 °F (36 7 °C) 67 18 146/64 97 %      Temp Source Heart Rate Source Patient Position - Orthostatic VS BP Location FiO2 (%)   Tympanic Monitor Sitting Left arm --      Pain Score       --           Vitals:    05/01/23 1649 05/01/23 1815   BP: 146/64 146/68   Pulse: 67 65   Patient Position - Orthostatic VS: Sitting Sitting         Visual Acuity      ED Medications  Medications   iohexol (OMNIPAQUE) 350 MG/ML injection (SINGLE-DOSE) 100 mL (100 mL Intravenous Given 5/1/23 8500)       Diagnostic Studies  Results Reviewed     Procedure Component Value Units Date/Time    UA w Reflex to Microscopic w Reflex to Culture [[id number]] Collected: 05/01/23 1735    Lab Status: Final result Specimen: Urine, Clean Catch Updated: 05/01/23 1745     Color, UA Colorless     Clarity, UA Clear     Specific Gravity, UA 1 012     pH, UA 7 5     Leukocytes, UA Negative     Nitrite, UA Negative     Protein, UA Negative mg/dl      Glucose, UA Negative mg/dl      Ketones, UA Negative mg/dl      Urobilinogen, UA <2 0 mg/dl      Bilirubin, UA Negative     Occult Blood, UA Negative    Comprehensive metabolic panel [[id number]]  (Abnormal) Collected: 05/01/23 1712    Lab Status: Final result Specimen: Blood from Arm, Right Updated: 05/01/23 1740     Sodium 139 mmol/L      Potassium 4 0 mmol/L      Chloride 105 mmol/L      CO2 29 mmol/L      ANION GAP 5 mmol/L      BUN 19 mg/dL      Creatinine 1 73 mg/dL      Glucose 99 mg/dL      Calcium 9 4 mg/dL      AST 20 U/L      ALT 19 U/L      Alkaline Phosphatase 44 U/L      Total Protein 6 7 g/dL      Albumin 4 2 g/dL      Total Bilirubin 0 37 mg/dL      eGFR 38 ml/min/1 73sq m     Narrative:      [facility] guidelines for Chronic Kidney Disease (CKD):     Stage 1 with normal or high GFR (GFR > 90 mL/min/1 73 square meters)    Stage 2 Mild CKD (GFR = 60-89 mL/min/1 73 square meters)    Stage 3A Moderate CKD (GFR = 45-59 mL/min/1 73 square meters)    Stage 3B Moderate CKD (GFR = 30-44 mL/min/1 73 square meters)    Stage 4 Severe CKD (GFR = 15-29 mL/min/1 73 square meters)    Stage 5 End Stage CKD (GFR <15 mL/min/1 73 square meters)  Note: GFR calculation is accurate only with a steady state creatinine    Lipase [[id number]]  (Normal) Collected: 05/01/23 1712    Lab Status: Final result Specimen: Blood from Arm, Right Updated: 05/01/23 1740     Lipase 25 u/L     CBC and differential [[id number]]  (Abnormal) Collected: 05/01/23 1712    Lab Status: Final result Specimen: Blood from Arm, Right Updated: 05/01/23 1721     WBC 4 27 Thousand/uL      RBC 4 20 Million/uL      Hemoglobin 12 7 g/dL Hematocrit 38 0 %      MCV 91 fL      MCH 30 2 pg      MCHC 33 4 g/dL      RDW 13 4 %      MPV 10 8 fL      Platelets 189 Thousands/uL      nRBC 0 /100 WBCs      Neutrophils Relative 53 %      Immat GRANS % 0 %      Lymphocytes Relative 38 %      Monocytes Relative 7 %      Eosinophils Relative 1 %      Basophils Relative 1 %      Neutrophils Absolute 2 27 Thousands/µL      Immature Grans Absolute 0 01 Thousand/uL      Lymphocytes Absolute 1 60 Thousands/µL      Monocytes Absolute 0 31 Thousand/µL      Eosinophils Absolute 0 06 Thousand/µL      Basophils Absolute 0 02 Thousands/µL                  CT abdomen pelvis with contrast   Final Result by Bret Montgomery MD (05/01 1944)      No acute abdominopelvic pathology  Workstation performed: [id number]                    Procedures  Procedures         ED Course                               SBIRT 20yo+    Flowsheet Row Most Recent Value   Initial Alcohol Screen: US AUDIT-C     1  How often do you have a drink containing alcohol? 0 Filed at: 05/01/2023 1701   2  How many drinks containing alcohol do you have on a typical day you are drinking? 0 Filed at: 05/01/2023 1701   3a  Male UNDER 65: How often do you have five or more drinks on one occasion? 0 Filed at: 05/01/2023 1701   3b  FEMALE Any Age, or MALE 65+: How often do you have 4 or more drinks on one occassion? 0 Filed at: 05/01/2023 1701   Audit-C Score 0 Filed at: 05/01/2023 1701   CRISTIAN: How many times in the past year have you    Used an illegal drug or used a prescription medication for non-medical reasons? Never Filed at: 05/01/2023 1701                    Medical Decision Making  DDx including but not limited to: renal colic, pyelonephritis, UTI, GI etiology, appendicitis, diverticulitis, pancreatitis, cholecystitis, biliary colic, AAA, rhabdomyolysis, tumor, zoster  Plan: Labs  CT      MDM: 68-year-old male with bilateral flank pain  CT scan is overall unremarkable    Labs do reveal slightly worsening kidney function compared to prior  Offered to admit patient declined  He would like to follow-up  Contact his nephrologist for follow-up  Recommend he increase fluid hydration  He also later noted that he has been taking protein supplements for his exercising/workouts  I recommend he avoid these protein supplements  We discussed return parameters  Patient understands and agrees with this plan  Amount and/or Complexity of Data Reviewed  Labs: ordered  Radiology: ordered  Risk  Prescription drug management  Disposition  Final diagnoses:   Bilateral flank pain   CKD (chronic kidney disease) stage 3, GFR 30-59 ml/min (formerly [facility])     Time reflects when diagnosis was documented in both MDM as applicable and the Disposition within this note     Time User Action Codes Description Comment    5/1/2023  8:08 PM Clevester Arie L Add [R10 9] Bilateral flank pain     5/1/2023  8:08 PM Clevester Arie L Add [N18 30] CKD (chronic kidney disease) stage 3, GFR 30-59 ml/min [facility])       ED Disposition     ED Disposition   Discharge    Condition   Stable    Date/Time   Mon May 1, 2023  8:07 PM    Comment   Marielena All discharge to home/self care                 Follow-up Information     Follow up With Specialties Details Why Miquel Keller MD Internal Medicine   [address]      Nova Marsh MD Nephrology   [address] [facility]  [address]  [facility]  [phone]            Discharge Medication List as of 5/1/2023  8:09 PM      CONTINUE these medications which have NOT CHANGED    Details   amLODIPine (NORVASC) 10 mg tablet TAKE 1 TABLET DAILY, Normal      aspirin 81 mg chewable tablet Chew 1 tablet (81 mg total) daily, Starting Wed 1/26/2022, Until Wed 4/5/2023, Normal      cyclobenzaprine (FLEXERIL) 10 mg tablet Take 1 tablet (10 mg total) by mouth 2 (two) times a day as needed for muscle spasms, Starting u 7/7/2022, Normal lidocaine (LIDODERM) 5 % Apply 1 patch topically daily Remove & Discard patch within 12 hours or as directed by MD, Starting Wed 4/14/2021, Normal      methylPREDNISolone 4 MG tablet therapy pack Use as directed on package, Normal      multivitamin (THERAGRAN) TABS Take 1 tablet by mouth daily, Historical Med      omeprazole (PriLOSEC) 20 mg delayed release capsule as needed, Starting Thu 12/16/2021, Historical Med      tadalafil (CIALIS) 20 MG tablet if needed, Historical Med      tamsulosin (FLOMAX) 0 4 mg Take 1 capsule (0 4 mg total) by mouth daily with dinner, Starting Tue 1/5/2021, Normal             Outpatient Discharge Orders   Basic metabolic panel   Standing Status: Future Standing Exp   Date: 05/01/24       PDMP Review     None          ED Provider  Electronically Signed by           Remi Prabhakar PA-C  05/01/23 8056

## 2023-05-02 ENCOUNTER — TELEPHONE (OUTPATIENT)
Dept: NEPHROLOGY | Facility: CLINIC | Age: 73
End: 2023-05-02

## 2023-05-02 NOTE — TELEPHONE ENCOUNTER
Called spoke with patient advised patient as per Dr Pyle, it is unlikely his kidneys  advised patient  Derick Bashir did review CT Scan results and he does not have kidney stones  patient understood and is okay with it

## 2023-05-02 NOTE — TELEPHONE ENCOUNTER
Patient called will like to speak with Dr Pyle regarding his kidney's, patient states he is having pains and does not know if he has kidney stones  will like to discuss further with Dr Pyle   Patient phone # [phone]

## 2023-05-03 ENCOUNTER — OFFICE VISIT (OUTPATIENT)
Dept: INTERNAL MEDICINE CLINIC | Facility: CLINIC | Age: 73
End: 2023-05-03

## 2023-05-03 VITALS
DIASTOLIC BLOOD PRESSURE: 84 MMHG | RESPIRATION RATE: 16 BRPM | HEART RATE: 68 BPM | BODY MASS INDEX: 24.52 KG/M2 | OXYGEN SATURATION: 96 % | HEIGHT: 72 IN | WEIGHT: 181 LBS | SYSTOLIC BLOOD PRESSURE: 138 MMHG

## 2023-05-03 DIAGNOSIS — M51.36 DEGENERATIVE DISC DISEASE, LUMBAR: ICD-10-CM

## 2023-05-03 DIAGNOSIS — N18.31 STAGE 3A CHRONIC KIDNEY DISEASE (HCC): ICD-10-CM

## 2023-05-03 DIAGNOSIS — M54.9 BACK PAIN, UNSPECIFIED BACK LOCATION, UNSPECIFIED BACK PAIN LATERALITY, UNSPECIFIED CHRONICITY: ICD-10-CM

## 2023-05-03 DIAGNOSIS — M54.50 RIGHT LOW BACK PAIN: Primary | ICD-10-CM

## 2023-05-03 RX ORDER — CYCLOBENZAPRINE HCL 10 MG
10 TABLET ORAL 2 TIMES DAILY PRN
Qty: 30 TABLET | Refills: 1 | Status: SHIPPED | OUTPATIENT
Start: 2023-05-03

## 2023-05-03 RX ORDER — LIDOCAINE 50 MG/G
1 PATCH TOPICAL AS NEEDED
Qty: 30 PATCH | Refills: 3 | Status: SHIPPED | OUTPATIENT
Start: 2023-05-03

## 2023-05-03 NOTE — PROGRESS NOTES
Assessment/Plan:   Patient Instructions   Important for you to stay hydrated  Stop any of the supplements you are taking  Stop the Claritin-D  Can use plain Claritin  Use topical ice packs to right flank area when muscle spasm is bad  Use topical heating pad to area when pain is low level  Can use your TENS unit at any time  Continue use of Lidoderm patches  Have lab work done next week, call me for the results  Follow-up as scheduled, sooner as needed  Quality Measures:   Depression Screening and Follow-up Plan: Patient was screened for depression during today's encounter  They screened negative with a PHQ-2 score of 0  Return if symptoms worsen or fail to improve, for Next scheduled follow up  Diagnoses and all orders for this visit:    Right low back pain  -     cyclobenzaprine (FLEXERIL) 10 mg tablet; Take 1 tablet (10 mg total) by mouth 2 (two) times a day as needed for muscle spasms    Degenerative disc disease, lumbar  -     cyclobenzaprine (FLEXERIL) 10 mg tablet; Take 1 tablet (10 mg total) by mouth 2 (two) times a day as needed for muscle spasms    Stage 3a chronic kidney disease (HCC)        Subjective:      Patient ID: Cathy Ruiz is a 67 y o  male  Acute visit, ER follow-up    Right flank pain: ER note reviewed  Patient goes to the gym and lifts weights all week long  Not aware of any particular injury  Started with right flank pain then left flank pain on Saturday evening  Seem to worsen on Sunday, therefore he went to the ER on Monday  ER work-up essentially unremarkable other than his creatinine was elevated from his baseline and his GFR was decreased  He did admit to me that he has been taking creatine and supplements for weight lifting  He may not have been hydrating as well as he should  He states he is not taking any NSAIDs  He is generally followed by nephrology  Since that news he is hydrating better  Can touch the pain    Pain present on range of motion of his lower back  No  symptoms  No fever or chills, nausea or vomiting        ALLERGIES:  Allergies   Allergen Reactions    Penicillin V Anaphylaxis       CURRENT MEDICATIONS:    Current Outpatient Medications:     amLODIPine (NORVASC) 10 mg tablet, TAKE 1 TABLET DAILY, Disp: 90 tablet, Rfl: 1    cyclobenzaprine (FLEXERIL) 10 mg tablet, Take 1 tablet (10 mg total) by mouth 2 (two) times a day as needed for muscle spasms, Disp: 30 tablet, Rfl: 1    lidocaine (LIDODERM) 5 %, Apply 1 patch topically daily Remove & Discard patch within 12 hours or as directed by MD (Patient taking differently: Apply 1 patch topically if needed Remove & Discard patch within 12 hours or as directed by MD), Disp: 50 patch, Rfl: 5    multivitamin (THERAGRAN) TABS, Take 1 tablet by mouth daily, Disp: , Rfl:     omeprazole (PriLOSEC) 20 mg delayed release capsule, as needed, Disp: , Rfl:     tadalafil (CIALIS) 20 MG tablet, if needed, Disp: , Rfl:     tamsulosin (FLOMAX) 0 4 mg, Take 1 capsule (0 4 mg total) by mouth daily with dinner, Disp: 90 capsule, Rfl: 3    aspirin 81 mg chewable tablet, Chew 1 tablet (81 mg total) daily (Patient not taking: Reported on 4/5/2023), Disp: 30 tablet, Rfl: 0    ACTIVE PROBLEM LIST:  Patient Active Problem List   Diagnosis    Benign prostatic hyperplasia, presence of lower urinary tract symptoms unspecified    Erectile dysfunction    Lumbar radiculopathy    CKD (chronic kidney disease) stage 3, GFR 30-59 ml/min ([facility])    Vertigo    Chronic kidney disease-mineral and bone disorder    Colon polyps    Pain in left knee    Primary hypertension    Dysesthesia of multiple sites       PAST MEDICAL HISTORY:  Past Medical History:   Diagnosis Date    Benign prostatic hyperplasia     Chronic kidney disease     COVID-19 03/2020    Esophageal reflux     Hypertension     Hypertensive urgency 3/26/2019    Vertigo        PAST SURGICAL HISTORY:  Past Surgical History:   Procedure Laterality Date    APPENDECTOMY  05/21/2015    CYST REMOVAL      Fatty cyst removed from back       FAMILY HISTORY:  Family History   Problem Relation Age of Onset    Prostate cancer Father     Prostate cancer Maternal Grandfather     Stomach cancer Maternal Aunt         malignant tumor of pharynx    Stomach cancer Maternal Uncle         malignant tumor of pharynx    Mental illness Family     Depression Family     Schizophrenia Family     Hypertension Mother     No Known Problems Sister     Dementia Sister        SOCIAL HISTORY:  Social History     Socioeconomic History    Marital status: /Civil Union     Spouse name: Not on file    Number of children: Not on file    Years of education: Not on file    Highest education level: Not on file   Occupational History    Occupation: Retired   Tobacco Use    Smoking status: Former    Smokeless tobacco: Never    Tobacco comments:     1ppw   Vaping Use    Vaping Use: Never used   Substance and Sexual Activity    Alcohol use: Yes     Comment: socially    Drug use: No    Sexual activity: Yes     Partners: Female     Comment: denied history of high risk sexual behavior   Other Topics Concern    Not on file   Social History Narrative    No active advance directive    Always uses seat belt    Exercises occasionally, moderate    Five children    Occasional caffeine consumption      Regular dental care  Brushes teeth twice daily       Retired     Social Determinants of Health     Financial Resource Strain: Low Risk     Difficulty of Paying Living Expenses: Not hard at all   Food Insecurity: Not on file   Transportation Needs: No Transportation Needs    Lack of Transportation (Medical): No    Lack of Transportation (Non-Medical):  No   Physical Activity: Not on file   Stress: Not on file   Social Connections: Not on file   Intimate Partner Violence: Not on file   Housing Stability: Not on file       Review of Systems   Constitutional: Negative for activity change, chills, fatigue and fever  HENT: Negative for congestion  Eyes: Negative for discharge  Respiratory: Negative for cough, chest tightness and shortness of breath  Cardiovascular: Negative for chest pain, palpitations and leg swelling  Gastrointestinal: Negative for abdominal pain, blood in stool, constipation, diarrhea, nausea and vomiting  Endocrine: Negative for polydipsia, polyphagia and polyuria  Genitourinary: Negative for difficulty urinating  Musculoskeletal: Positive for back pain  Negative for arthralgias and myalgias  Skin: Negative for rash  Allergic/Immunologic: Negative for immunocompromised state  Neurological: Negative for dizziness, syncope, weakness, light-headedness and headaches  Hematological: Negative for adenopathy  Does not bruise/bleed easily  Psychiatric/Behavioral: Negative for dysphoric mood, sleep disturbance and suicidal ideas  The patient is not nervous/anxious  Objective:  Vitals:    05/03/23 0923   BP: 138/84   BP Location: Left arm   Patient Position: Sitting   Cuff Size: Adult   Pulse: 68   Resp: 16   SpO2: 96%   Weight: 82 1 kg (181 lb)   Height: 6' (1 829 m)     Body mass index is 24 55 kg/m²  Physical Exam  Vitals and nursing note reviewed  Constitutional:       General: He is not in acute distress  Appearance: He is well-developed  He is not ill-appearing  HENT:      Head: Normocephalic and atraumatic  Eyes:      Extraocular Movements: Extraocular movements intact  Pupils: Pupils are equal, round, and reactive to light  Neck:      Thyroid: No thyromegaly  Vascular: No carotid bruit or JVD  Cardiovascular:      Rate and Rhythm: Normal rate and regular rhythm  Heart sounds: Normal heart sounds  Pulmonary:      Effort: Pulmonary effort is normal  No respiratory distress  Breath sounds: Normal breath sounds  Musculoskeletal:      Cervical back: Neck supple  Right lower leg: No edema  Left lower leg: No edema  Comments: Palpable muscle spasm right para lumbar area that is tender  Complains that area aches on straight leg raising and active range of motion of his hip  Complains of pain on forward flexion of his lumbar spine and extension  No neurovascular compromise  Lymphadenopathy:      Cervical: No cervical adenopathy  Skin:     General: Skin is warm and dry  Findings: No rash  Neurological:      General: No focal deficit present  Mental Status: He is alert and oriented to person, place, and time  Mental status is at baseline  Psychiatric:         Mood and Affect: Mood normal          Behavior: Behavior normal            RESULTS:    In chart    This note was created with voice recognition software  Phonic, grammatical and spelling errors may be present within the note as a result

## 2023-05-03 NOTE — PATIENT INSTRUCTIONS
Important for you to stay hydrated  Stop any of the supplements you are taking  Stop the Claritin-D  Can use plain Claritin  Use topical ice packs to right flank area when muscle spasm is bad  Use topical heating pad to area when pain is low level  Can use your TENS unit at any time  Continue use of Lidoderm patches  Have lab work done next week, call me for the results  Follow-up as scheduled, sooner as needed

## 2023-05-08 ENCOUNTER — APPOINTMENT (OUTPATIENT)
Dept: LAB | Facility: HOSPITAL | Age: 73
End: 2023-05-08

## 2023-05-08 DIAGNOSIS — N18.30 CKD (CHRONIC KIDNEY DISEASE) STAGE 3, GFR 30-59 ML/MIN (HCC): ICD-10-CM

## 2023-05-08 DIAGNOSIS — R10.9 BILATERAL FLANK PAIN: ICD-10-CM

## 2023-05-08 LAB
ANION GAP SERPL CALCULATED.3IONS-SCNC: 4 MMOL/L (ref 4–13)
BUN SERPL-MCNC: 20 MG/DL (ref 5–25)
CALCIUM SERPL-MCNC: 9.5 MG/DL (ref 8.4–10.2)
CHLORIDE SERPL-SCNC: 106 MMOL/L (ref 96–108)
CO2 SERPL-SCNC: 29 MMOL/L (ref 21–32)
CREAT SERPL-MCNC: 1.25 MG/DL (ref 0.6–1.3)
GFR SERPL CREATININE-BSD FRML MDRD: 57 ML/MIN/1.73SQ M
GLUCOSE P FAST SERPL-MCNC: 93 MG/DL (ref 65–99)
POTASSIUM SERPL-SCNC: 4.1 MMOL/L (ref 3.5–5.3)
SODIUM SERPL-SCNC: 139 MMOL/L (ref 135–147)

## 2023-06-22 ENCOUNTER — TELEPHONE (OUTPATIENT)
Dept: NEPHROLOGY | Facility: CLINIC | Age: 73
End: 2023-06-22

## 2023-06-24 ENCOUNTER — APPOINTMENT (OUTPATIENT)
Dept: LAB | Facility: HOSPITAL | Age: 73
End: 2023-06-24
Payer: MEDICARE

## 2023-06-24 DIAGNOSIS — Z12.5 SCREENING FOR PROSTATE CANCER: ICD-10-CM

## 2023-06-24 PROCEDURE — G0103 PSA SCREENING: HCPCS

## 2023-06-24 PROCEDURE — 36415 COLL VENOUS BLD VENIPUNCTURE: CPT

## 2023-06-25 LAB — PSA SERPL-MCNC: 3.83 NG/ML (ref 0–4)

## 2023-07-05 ENCOUNTER — APPOINTMENT (OUTPATIENT)
Dept: LAB | Facility: HOSPITAL | Age: 73
End: 2023-07-05
Payer: MEDICARE

## 2023-07-05 DIAGNOSIS — I10 PRIMARY HYPERTENSION: ICD-10-CM

## 2023-07-05 DIAGNOSIS — Z12.5 SCREENING FOR PROSTATE CANCER: ICD-10-CM

## 2023-07-05 LAB
ALBUMIN SERPL BCP-MCNC: 4.3 G/DL (ref 3.5–5)
ALP SERPL-CCNC: 52 U/L (ref 34–104)
ALT SERPL W P-5'-P-CCNC: 23 U/L (ref 7–52)
ANION GAP SERPL CALCULATED.3IONS-SCNC: 6 MMOL/L
AST SERPL W P-5'-P-CCNC: 28 U/L (ref 13–39)
BASOPHILS # BLD AUTO: 0.02 THOUSANDS/ÂΜL (ref 0–0.1)
BASOPHILS NFR BLD AUTO: 1 % (ref 0–1)
BILIRUB SERPL-MCNC: 0.42 MG/DL (ref 0.2–1)
BUN SERPL-MCNC: 23 MG/DL (ref 5–25)
CALCIUM SERPL-MCNC: 9.6 MG/DL (ref 8.4–10.2)
CHLORIDE SERPL-SCNC: 104 MMOL/L (ref 96–108)
CHOLEST SERPL-MCNC: 179 MG/DL
CO2 SERPL-SCNC: 29 MMOL/L (ref 21–32)
CREAT SERPL-MCNC: 1.37 MG/DL (ref 0.6–1.3)
EOSINOPHIL # BLD AUTO: 0.06 THOUSAND/ÂΜL (ref 0–0.61)
EOSINOPHIL NFR BLD AUTO: 2 % (ref 0–6)
ERYTHROCYTE [DISTWIDTH] IN BLOOD BY AUTOMATED COUNT: 13.9 % (ref 11.6–15.1)
GFR SERPL CREATININE-BSD FRML MDRD: 51 ML/MIN/1.73SQ M
GLUCOSE P FAST SERPL-MCNC: 85 MG/DL (ref 65–99)
HCT VFR BLD AUTO: 40.4 % (ref 36.5–49.3)
HDLC SERPL-MCNC: 67 MG/DL
HGB BLD-MCNC: 13.4 G/DL (ref 12–17)
IMM GRANULOCYTES # BLD AUTO: 0 THOUSAND/UL (ref 0–0.2)
IMM GRANULOCYTES NFR BLD AUTO: 0 % (ref 0–2)
LDLC SERPL CALC-MCNC: 91 MG/DL (ref 0–100)
LYMPHOCYTES # BLD AUTO: 1.43 THOUSANDS/ÂΜL (ref 0.6–4.47)
LYMPHOCYTES NFR BLD AUTO: 46 % (ref 14–44)
MCH RBC QN AUTO: 29.4 PG (ref 26.8–34.3)
MCHC RBC AUTO-ENTMCNC: 33.2 G/DL (ref 31.4–37.4)
MCV RBC AUTO: 89 FL (ref 82–98)
MONOCYTES # BLD AUTO: 0.26 THOUSAND/ÂΜL (ref 0.17–1.22)
MONOCYTES NFR BLD AUTO: 8 % (ref 4–12)
NEUTROPHILS # BLD AUTO: 1.33 THOUSANDS/ÂΜL (ref 1.85–7.62)
NEUTS SEG NFR BLD AUTO: 43 % (ref 43–75)
NONHDLC SERPL-MCNC: 112 MG/DL
NRBC BLD AUTO-RTO: 0 /100 WBCS
PLATELET # BLD AUTO: 204 THOUSANDS/UL (ref 149–390)
PMV BLD AUTO: 11.2 FL (ref 8.9–12.7)
POTASSIUM SERPL-SCNC: 4.2 MMOL/L (ref 3.5–5.3)
PROT SERPL-MCNC: 7.1 G/DL (ref 6.4–8.4)
PSA SERPL-MCNC: 3.51 NG/ML (ref 0–4)
RBC # BLD AUTO: 4.56 MILLION/UL (ref 3.88–5.62)
SODIUM SERPL-SCNC: 139 MMOL/L (ref 135–147)
TRIGL SERPL-MCNC: 104 MG/DL
WBC # BLD AUTO: 3.1 THOUSAND/UL (ref 4.31–10.16)

## 2023-07-05 PROCEDURE — G0103 PSA SCREENING: HCPCS

## 2023-07-05 PROCEDURE — 80053 COMPREHEN METABOLIC PANEL: CPT

## 2023-07-05 PROCEDURE — 80061 LIPID PANEL: CPT

## 2023-07-05 PROCEDURE — 85025 COMPLETE CBC W/AUTO DIFF WBC: CPT

## 2023-07-05 PROCEDURE — 36415 COLL VENOUS BLD VENIPUNCTURE: CPT

## 2023-07-06 ENCOUNTER — OFFICE VISIT (OUTPATIENT)
Dept: NEPHROLOGY | Facility: CLINIC | Age: 73
End: 2023-07-06
Payer: MEDICARE

## 2023-07-06 VITALS
DIASTOLIC BLOOD PRESSURE: 80 MMHG | RESPIRATION RATE: 18 BRPM | BODY MASS INDEX: 25.68 KG/M2 | SYSTOLIC BLOOD PRESSURE: 120 MMHG | HEIGHT: 72 IN | WEIGHT: 189.6 LBS | TEMPERATURE: 97.5 F | HEART RATE: 64 BPM | OXYGEN SATURATION: 96 %

## 2023-07-06 DIAGNOSIS — M54.16 LUMBAR RADICULOPATHY: ICD-10-CM

## 2023-07-06 DIAGNOSIS — N40.0 BENIGN PROSTATIC HYPERPLASIA, PRESENCE OF LOWER URINARY TRACT SYMPTOMS UNSPECIFIED: ICD-10-CM

## 2023-07-06 DIAGNOSIS — I10 PRIMARY HYPERTENSION: ICD-10-CM

## 2023-07-06 DIAGNOSIS — N18.9 CHRONIC KIDNEY DISEASE-MINERAL AND BONE DISORDER: ICD-10-CM

## 2023-07-06 DIAGNOSIS — N18.31 STAGE 3A CHRONIC KIDNEY DISEASE (HCC): Primary | ICD-10-CM

## 2023-07-06 DIAGNOSIS — E83.9 CHRONIC KIDNEY DISEASE-MINERAL AND BONE DISORDER: ICD-10-CM

## 2023-07-06 DIAGNOSIS — M89.9 CHRONIC KIDNEY DISEASE-MINERAL AND BONE DISORDER: ICD-10-CM

## 2023-07-06 PROCEDURE — 99214 OFFICE O/P EST MOD 30 MIN: CPT | Performed by: INTERNAL MEDICINE

## 2023-07-06 RX ORDER — PANTOPRAZOLE SODIUM 40 MG/1
40 TABLET, DELAYED RELEASE ORAL DAILY
COMMUNITY
Start: 2023-05-19

## 2023-07-06 NOTE — ASSESSMENT & PLAN NOTE
Lab Results   Component Value Date    EGFR 51 07/05/2023    EGFR 57 05/08/2023    EGFR 38 05/01/2023    CREATININE 1.37 (H) 07/05/2023    CREATININE 1.25 05/08/2023    CREATININE 1.73 (H) 05/01/2023   Renal function is stable overall.   Advise hydration and avoiding nephrotoxic medication

## 2023-07-06 NOTE — ASSESSMENT & PLAN NOTE
Lab Results   Component Value Date    EGFR 51 07/05/2023    EGFR 57 05/08/2023    EGFR 38 05/01/2023    CREATININE 1.37 (H) 07/05/2023    CREATININE 1.25 05/08/2023    CREATININE 1.73 (H) 05/01/2023   Due to PTH and phosphorus and vitamin D as they were not done by the labs.   We will do a bit next visit

## 2023-07-06 NOTE — PROGRESS NOTES
NEPHROLOGY OFFICE FOLLOW UP  Jorge L Marti 67 y.o. male MRN: [id number]    Encounter: [id number] 7/6/2023    REASON FOR VISIT: Jorge L Marti is a 67 y.o. male who is here on 7/6/2023 for Chronic Kidney Disease and Follow-up  . HPI:    Dhiraj Peoples came in today for follow-up of stage III CKD. 17-year-old gentleman who is doing well overall and denies any acute complaint    Patient does get recurrent back pain and had been to emergency room couple of times    Denies taking any nonsteroidal painkiller    No chest pain no palpitation    Patient does do regular exercise and recently he had some shortness of breath and diaphoresis during the regular exercise. He thought it was due to excessive hot in the gym. Denies any new complaint today      REVIEW OF SYSTEMS:    Review of Systems   Constitutional: Negative for activity change and fatigue. HENT: Negative for congestion and ear discharge. Eyes: Negative for photophobia and pain. Respiratory: Negative for apnea and choking. Cardiovascular: Negative for chest pain and palpitations. Gastrointestinal: Negative for abdominal distention and blood in stool. Endocrine: Negative for heat intolerance and polyphagia. Genitourinary: Negative for flank pain and urgency. Musculoskeletal: Negative for neck pain and neck stiffness. Skin: Negative for color change and wound. Allergic/Immunologic: Negative for food allergies and immunocompromised state. Neurological: Negative for seizures and facial asymmetry. Hematological: Negative for adenopathy. Does not bruise/bleed easily. Psychiatric/Behavioral: Negative for self-injury and suicidal ideas.          PAST MEDICAL HISTORY:  Past Medical History:   Diagnosis Date   • Benign prostatic hyperplasia    • Chronic kidney disease    • COVID-19 03/2020   • Esophageal reflux    • Hypertension    • Hypertensive urgency 3/26/2019   • Vertigo        PAST SURGICAL HISTORY:  Past Surgical History:   Procedure Laterality Date   • APPENDECTOMY  05/21/2015   • CYST REMOVAL      Fatty cyst removed from back       SOCIAL HISTORY:  Social History     Substance and Sexual Activity   Alcohol Use Yes    Comment: socially     Social History     Substance and Sexual Activity   Drug Use No     Social History     Tobacco Use   Smoking Status Former   Smokeless Tobacco Never   Tobacco Comments    1ppw       FAMILY HISTORY:  Family History   Problem Relation Age of Onset   • Prostate cancer Father    • Prostate cancer Maternal Grandfather    • Stomach cancer Maternal Aunt         malignant tumor of pharynx   • Stomach cancer Maternal Uncle         malignant tumor of pharynx   • Mental illness Family    • Depression Family    • Schizophrenia Family    • Hypertension Mother    • No Known Problems Sister    • Dementia Sister        MEDICATIONS:    Current Outpatient Medications:   •  amLODIPine (NORVASC) 10 mg tablet, TAKE 1 TABLET DAILY, Disp: 90 tablet, Rfl: 1  •  cyclobenzaprine (FLEXERIL) 10 mg tablet, Take 1 tablet (10 mg total) by mouth 2 (two) times a day as needed for muscle spasms (Patient taking differently: Take 10 mg by mouth if needed for muscle spasms), Disp: 30 tablet, Rfl: 1  •  lidocaine (LIDODERM) 5 %, Apply 1 patch topically over 12 hours if needed (Intermittent right low back pain with radiculopathy) Remove & Discard patch within 12 hours or as directed by MD, Disp: 30 patch, Rfl: 3  •  multivitamin (THERAGRAN) TABS, Take 1 tablet by mouth daily, Disp: , Rfl:   •  pantoprazole (PROTONIX) 40 mg tablet, Take 40 mg by mouth daily, Disp: , Rfl:   •  tadalafil (CIALIS) 20 MG tablet, if needed, Disp: , Rfl:   •  tamsulosin (FLOMAX) 0.4 mg, Take 1 capsule (0.4 mg total) by mouth daily with dinner, Disp: 90 capsule, Rfl: 3    PHYSICAL EXAM:  Vitals:    07/06/23 0950   BP: 120/80   BP Location: Right arm   Patient Position: Sitting   Pulse: 64   Resp: 18   Temp: 97.5 °F (36.4 °C)   TempSrc: Temporal   SpO2: 96%   Weight: 86 kg (189 lb 9.6 oz)   Height: 6' (1.829 m)     Body mass index is 25.71 kg/m². Physical Exam  Constitutional:       General: He is not in acute distress. Appearance: He is well-developed. HENT:      Head: Normocephalic. Eyes:      General: No scleral icterus. Conjunctiva/sclera: Conjunctivae normal.   Neck:      Vascular: No JVD. Cardiovascular:      Rate and Rhythm: Normal rate. Heart sounds: Normal heart sounds. Pulmonary:      Effort: Pulmonary effort is normal.      Breath sounds: No wheezing. Abdominal:      Palpations: Abdomen is soft. Tenderness: There is no abdominal tenderness. Musculoskeletal:         General: Normal range of motion. Cervical back: Neck supple. Skin:     General: Skin is warm. Findings: No rash. Neurological:      Mental Status: He is alert and oriented to person, place, and time.    Psychiatric:         Behavior: Behavior normal.         LAB RESULTS:  Results for orders placed or performed in visit on 07/05/23   CBC and differential   Result Value Ref Range    WBC 3.10 (L) 4.31 - 10.16 Thousand/uL    RBC 4.56 3.88 - 5.62 Million/uL    Hemoglobin 13.4 12.0 - 17.0 g/dL    Hematocrit 40.4 36.5 - 49.3 %    MCV 89 82 - 98 fL    MCH 29.4 26.8 - 34.3 pg    MCHC 33.2 31.4 - 37.4 g/dL    RDW 13.9 11.6 - 15.1 %    MPV 11.2 8.9 - 12.7 fL    Platelets 444 578 - 573 Thousands/uL    nRBC 0 /100 WBCs    Neutrophils Relative 43 43 - 75 %    Immat GRANS % 0 0 - 2 %    Lymphocytes Relative 46 (H) 14 - 44 %    Monocytes Relative 8 4 - 12 %    Eosinophils Relative 2 0 - 6 %    Basophils Relative 1 0 - 1 %    Neutrophils Absolute 1.33 (L) 1.85 - 7.62 Thousands/µL    Immature Grans Absolute 0.00 0.00 - 0.20 Thousand/uL    Lymphocytes Absolute 1.43 0.60 - 4.47 Thousands/µL    Monocytes Absolute 0.26 0.17 - 1.22 Thousand/µL    Eosinophils Absolute 0.06 0.00 - 0.61 Thousand/µL    Basophils Absolute 0.02 0.00 - 0.10 Thousands/µL   Comprehensive metabolic panel   Result Value Ref Range    Sodium 139 135 - 147 mmol/L    Potassium 4.2 3.5 - 5.3 mmol/L    Chloride 104 96 - 108 mmol/L    CO2 29 21 - 32 mmol/L    ANION GAP 6 mmol/L    BUN 23 5 - 25 mg/dL    Creatinine 1.37 (H) 0.60 - 1.30 mg/dL    Glucose, Fasting 85 65 - 99 mg/dL    Calcium 9.6 8.4 - 10.2 mg/dL    AST 28 13 - 39 U/L    ALT 23 7 - 52 U/L    Alkaline Phosphatase 52 34 - 104 U/L    Total Protein 7.1 6.4 - 8.4 g/dL    Albumin 4.3 3.5 - 5.0 g/dL    Total Bilirubin 0.42 0.20 - 1.00 mg/dL    eGFR 51 ml/min/1.73sq m   Lipid panel   Result Value Ref Range    Cholesterol 179 See Comment mg/dL    Triglycerides 104 See Comment mg/dL    HDL, Direct 67 >=40 mg/dL    LDL Calculated 91 0 - 100 mg/dL    Non-HDL-Chol (CHOL-HDL) 112 mg/dl   PSA, Total Screen   Result Value Ref Range    PSA 3.51 0.00 - 4.00 ng/mL       ASSESSMENT and PLAN:      CKD (chronic kidney disease) stage 3, GFR 30-59 ml/min ([facility])  Lab Results   Component Value Date    EGFR 51 07/05/2023    EGFR 57 05/08/2023    EGFR 38 05/01/2023    CREATININE 1.37 (H) 07/05/2023    CREATININE 1.25 05/08/2023    CREATININE 1.73 (H) 05/01/2023   Renal function is stable overall. Advise hydration and avoiding nephrotoxic medication    Chronic kidney disease-mineral and bone disorder  Lab Results   Component Value Date    EGFR 51 07/05/2023    EGFR 57 05/08/2023    EGFR 38 05/01/2023    CREATININE 1.37 (H) 07/05/2023    CREATININE 1.25 05/08/2023    CREATININE 1.73 (H) 05/01/2023   Due to PTH and phosphorus and vitamin D as they were not done by the labs. We will do a bit next visit    Primary hypertension  Stable at this point    Lumbar radiculopathy  Asymptomatic        Everything discussed with the patient. I will see him back in 6 months. We will get blood and urine test before that visit.   Advised to discuss with primary doctor about shortness of breath on exertion as he may need stress testing      Portions of the record may have been created with voice recognition software. Occasional wrong word or "sound a like" substitutions may have occurred due to the inherent limitations of voice recognition software. Read the chart carefully and recognize, using context, where substitutions have occurred. If you have any questions, please contact the dictating provider.

## 2023-07-14 ENCOUNTER — TELEPHONE (OUTPATIENT)
Dept: NEPHROLOGY | Facility: CLINIC | Age: 73
End: 2023-07-14

## 2023-07-14 NOTE — TELEPHONE ENCOUNTER
Patient of Dr. Gennaro De La Cruz called stating that his ankles have been swollen since yesterday 7/13/2023. Patient was on vacation and did do a lot of walking. Patient does wear low socks. Patient has a slight burning on his left side towards his back. Please call patient at [phone] to advise.

## 2023-07-18 NOTE — TELEPHONE ENCOUNTER
Called and spoke to pt. Advised to wear compression stockings and check his weight daily. Asked him to call our office if he notices a weight gain of greater than 3 pounds.  As far as the discomfort in his lower back advised him to follow-up with his PCP for further evaluation, per [place]. Pt understood and was ok with that. He will check his weight daily.

## 2023-08-11 ENCOUNTER — HOSPITAL ENCOUNTER (OUTPATIENT)
Dept: CT IMAGING | Facility: CLINIC | Age: 73
Discharge: HOME | End: 2023-08-11
Payer: COMMERCIAL

## 2023-08-11 DIAGNOSIS — I10 PRIMARY HYPERTENSION: ICD-10-CM

## 2023-08-11 PROCEDURE — 75571 CT HRT W/O DYE W/CA TEST: CPT

## 2023-08-11 PROCEDURE — G1004 CDSM NDSC: HCPCS

## 2023-09-18 DIAGNOSIS — I10 ESSENTIAL HYPERTENSION: ICD-10-CM

## 2023-09-18 RX ORDER — AMLODIPINE BESYLATE 10 MG/1
TABLET ORAL
Qty: 90 TABLET | Refills: 1 | Status: SHIPPED | OUTPATIENT
Start: 2023-09-18

## 2023-10-10 ENCOUNTER — OFFICE VISIT (OUTPATIENT)
Dept: INTERNAL MEDICINE CLINIC | Facility: CLINIC | Age: 73
End: 2023-10-10
Payer: MEDICARE

## 2023-10-10 VITALS
WEIGHT: 191.6 LBS | DIASTOLIC BLOOD PRESSURE: 82 MMHG | HEIGHT: 72 IN | BODY MASS INDEX: 25.95 KG/M2 | HEART RATE: 67 BPM | OXYGEN SATURATION: 97 % | SYSTOLIC BLOOD PRESSURE: 136 MMHG

## 2023-10-10 DIAGNOSIS — I10 PRIMARY HYPERTENSION: Primary | ICD-10-CM

## 2023-10-10 DIAGNOSIS — N18.31 STAGE 3A CHRONIC KIDNEY DISEASE (HCC): ICD-10-CM

## 2023-10-10 DIAGNOSIS — N40.0 BENIGN PROSTATIC HYPERPLASIA, PRESENCE OF LOWER URINARY TRACT SYMPTOMS UNSPECIFIED: ICD-10-CM

## 2023-10-10 PROCEDURE — 99214 OFFICE O/P EST MOD 30 MIN: CPT | Performed by: PHYSICIAN ASSISTANT

## 2023-10-10 NOTE — PATIENT INSTRUCTIONS
Have labs done and I will discuss results with you when available. Continue current medications. Follow-up with specialist as scheduled. Follow-up in 6 months for Medicare annual wellness with physical.  Follow-up sooner as needed. Obtain flaxseed oil caplets take 1 daily for ongoing tendinitis. Keep me informed as to effectiveness of this. Remember it will take up to a month to become effective.

## 2023-10-10 NOTE — PROGRESS NOTES
Assessment/Plan:   Patient Instructions   Have labs done and I will discuss results with you when available. Continue current medications. Follow-up with specialist as scheduled. Follow-up in 6 months for Medicare annual wellness with physical.  Follow-up sooner as needed. Obtain flaxseed oil caplets take 1 daily for ongoing tendinitis. Keep me informed as to effectiveness of this. Remember it will take up to a month to become effective. Quality Measures:       Return in about 6 months (around 4/10/2024) for Medicare Annual Wellness with Physical.         Diagnoses and all orders for this visit:    Primary hypertension  -     CBC and differential; Future  -     Lipid panel; Future    Stage 3a chronic kidney disease (HCC)  -     Comprehensive metabolic panel; Future    Benign prostatic hyperplasia, presence of lower urinary tract symptoms unspecified          Subjective:      Patient ID: Александр Martin is a 68 y.o. male. Follow-up, labs reviewed with patient    Hypertension: Good control on amlodipine 10 mg daily. Eduarda cp, palp, sob, edema, HA, dizziness, diaphoresis, syncope, visual disturbance. Patient will continue this medication. Denies side effects. BPH with lower urinary tract symptoms: On tamsulosin. This is helped significantly. We will continue the medication. Again no side effects. GERD: On pantoprazole. States he recently had a EGD this summer, we do not have the report but will obtain. Also states he had colonoscopy done this summer. We will obtain report. CKD 3A: Lives with nephrology. UN/creatinine/GFR has been stable. Patient avoids nephrotoxic substances. Tries to stay hydrated. Planing of various tendinitis type symptoms. Right knee, ankle, and hands.       ALLERGIES:  Allergies   Allergen Reactions   • Penicillin V Anaphylaxis       CURRENT MEDICATIONS:    Current Outpatient Medications:   •  amLODIPine (NORVASC) 10 mg tablet, TAKE 1 TABLET DAILY, Disp: 90 tablet, Rfl: 1  •  cyclobenzaprine (FLEXERIL) 10 mg tablet, Take 1 tablet (10 mg total) by mouth 2 (two) times a day as needed for muscle spasms (Patient taking differently: Take 10 mg by mouth if needed for muscle spasms), Disp: 30 tablet, Rfl: 1  •  lidocaine (LIDODERM) 5 %, Apply 1 patch topically over 12 hours if needed (Intermittent right low back pain with radiculopathy) Remove & Discard patch within 12 hours or as directed by MD, Disp: 30 patch, Rfl: 3  •  multivitamin (THERAGRAN) TABS, Take 1 tablet by mouth daily, Disp: , Rfl:   •  pantoprazole (PROTONIX) 40 mg tablet, Take 40 mg by mouth daily, Disp: , Rfl:   •  tadalafil (CIALIS) 20 MG tablet, if needed, Disp: , Rfl:   •  tamsulosin (FLOMAX) 0.4 mg, Take 1 capsule (0.4 mg total) by mouth daily with dinner, Disp: 90 capsule, Rfl: 3    ACTIVE PROBLEM LIST:  Patient Active Problem List   Diagnosis   • Benign prostatic hyperplasia, presence of lower urinary tract symptoms unspecified   • Erectile dysfunction   • Lumbar radiculopathy   • CKD (chronic kidney disease) stage 3, GFR 30-59 ml/min ([facility])   • Vertigo   • Chronic kidney disease-mineral and bone disorder   • Colon polyps   • Pain in left knee   • Primary hypertension   • Dysesthesia of multiple sites       PAST MEDICAL HISTORY:  Past Medical History:   Diagnosis Date   • Benign prostatic hyperplasia    • Chronic kidney disease    • COVID-19 03/2020   • Esophageal reflux    • Hypertension    • Hypertensive urgency 3/26/2019   • Vertigo        PAST SURGICAL HISTORY:  Past Surgical History:   Procedure Laterality Date   • APPENDECTOMY  05/21/2015   • CYST REMOVAL      Fatty cyst removed from back       FAMILY HISTORY:  Family History   Problem Relation Age of Onset   • Prostate cancer Father    • Prostate cancer Maternal Grandfather    • Stomach cancer Maternal Aunt         malignant tumor of pharynx   • Stomach cancer Maternal Uncle         malignant tumor of pharynx   • Mental illness Family    • Depression Family    • Schizophrenia Family    • Hypertension Mother    • No Known Problems Sister    • Dementia Sister        SOCIAL HISTORY:  Social History     Socioeconomic History   • Marital status: /Civil Union     Spouse name: Not on file   • Number of children: Not on file   • Years of education: Not on file   • Highest education level: Not on file   Occupational History   • Occupation: Retired   Tobacco Use   • Smoking status: Former   • Smokeless tobacco: Never   • Tobacco comments:     1ppw   Vaping Use   • Vaping Use: Never used   Substance and Sexual Activity   • Alcohol use: Yes     Comment: socially   • Drug use: No   • Sexual activity: Yes     Partners: Female     Comment: denied history of high risk sexual behavior   Other Topics Concern   • Not on file   Social History Narrative    No active advance directive    Always uses seat belt    Exercises occasionally, moderate    Five children    Occasional caffeine consumption      Regular dental care. Brushes teeth twice daily       Retired     Social Determinants of Health     Financial Resource Strain: Low Risk  (4/5/2023)    Overall Financial Resource Strain (CARDIA)    • Difficulty of Paying Living Expenses: Not hard at all   Food Insecurity: Not on file   Transportation Needs: No Transportation Needs (4/5/2023)    PRAPARE - Transportation    • Lack of Transportation (Medical): No    • Lack of Transportation (Non-Medical): No   Physical Activity: Not on file   Stress: Not on file   Social Connections: Not on file   Intimate Partner Violence: Not on file   Housing Stability: Not on file       Review of Systems   Constitutional: Negative for activity change, chills, fatigue and fever. HENT: Negative for congestion. Eyes: Negative for discharge. Respiratory: Negative for cough, chest tightness and shortness of breath. Cardiovascular: Negative for chest pain, palpitations and leg swelling.    Gastrointestinal: Negative for abdominal pain, blood in stool, constipation, diarrhea, nausea and vomiting. Endocrine: Negative for polydipsia, polyphagia and polyuria. Genitourinary: Negative for difficulty urinating. Musculoskeletal: Negative for arthralgias and myalgias. Skin: Negative for rash. Allergic/Immunologic: Negative for immunocompromised state. Neurological: Negative for dizziness, syncope, weakness, light-headedness and headaches. Hematological: Negative for adenopathy. Does not bruise/bleed easily. Psychiatric/Behavioral: Negative for dysphoric mood, sleep disturbance and suicidal ideas. The patient is not nervous/anxious. Objective:  Vitals:    10/10/23 1308   BP: 136/82   BP Location: Left arm   Patient Position: Sitting   Cuff Size: Adult   Pulse: 67   SpO2: 97%   Weight: 86.9 kg (191 lb 9.6 oz)   Height: 6' (1.829 m)     Body mass index is 25.99 kg/m². Physical Exam  Vitals and nursing note reviewed. Constitutional:       General: He is not in acute distress. Appearance: He is well-developed. He is not ill-appearing. HENT:      Head: Normocephalic and atraumatic. Eyes:      Extraocular Movements: Extraocular movements intact. Pupils: Pupils are equal, round, and reactive to light. Neck:      Thyroid: No thyromegaly. Vascular: No carotid bruit or JVD. Cardiovascular:      Rate and Rhythm: Normal rate and regular rhythm. Heart sounds: Normal heart sounds. Pulmonary:      Effort: Pulmonary effort is normal. No respiratory distress. Breath sounds: Normal breath sounds. Musculoskeletal:      Cervical back: Neck supple. Right lower leg: No edema. Left lower leg: No edema. Comments: Tender right medial knee region. No significant swelling or instability. Lymphadenopathy:      Cervical: No cervical adenopathy. Skin:     General: Skin is warm and dry. Findings: No rash. Neurological:      General: No focal deficit present.       Mental Status: He is alert and oriented to person, place, and time. Mental status is at baseline. Psychiatric:         Mood and Affect: Mood normal.         Behavior: Behavior normal.           RESULTS:  Cholesterol   Date/Time Value Ref Range Status   07/05/2023 07:31  See Comment mg/dL Final     Comment:     Cholesterol:         Pediatric <18 Years        Desirable          <170 mg/dL      Borderline High    170-199 mg/dL      High               >=200 mg/dL        Adult >=18 Years            Desirable        <200 mg/dL      Borderline High  200-239 mg/dL      High             >239 mg/dL       Triglycerides   Date/Time Value Ref Range Status   07/05/2023 07:31  See Comment mg/dL Final     Comment:     Triglyceride:     0-9Y            <75mg/dL     10Y-17Y         <90 mg/dL       >=18Y     Normal          <150 mg/dL     Borderline High 150-199 mg/dL     High            200-499 mg/dL        Very High       >499 mg/dL    Specimen collection should occur prior to Metamizole administration due to the potential for falsely depressed results. HDL, Direct   Date/Time Value Ref Range Status   07/05/2023 07:31 AM 67 >=40 mg/dL Final     LDL Calculated   Date/Time Value Ref Range Status   07/05/2023 07:31 AM 91 0 - 100 mg/dL Final     Comment:     LDL Cholesterol:     Optimal           <100 mg/dl     Near Optimal      100-129 mg/dl     Above Optimal       Borderline High 130-159 mg/dl       High            160-189 mg/dl       Very High       >189 mg/dl         This screening LDL is a calculated result. It does not have the accuracy of the Direct Measured LDL in the monitoring of patients with hyperlipidemia and/or statin therapy. Direct Measure LDL ([id number]) must be ordered separately in these patients.      Hemoglobin   Date/Time Value Ref Range Status   07/05/2023 07:31 AM 13.4 12.0 - 17.0 g/dL Final     Hematocrit   Date/Time Value Ref Range Status   07/05/2023 07:31 AM 40.4 36.5 - 49.3 % Final     Platelets   Date/Time Value Ref Range Status   07/05/2023 07:31  149 - 390 Thousands/uL Final     PSA   Date/Time Value Ref Range Status   07/05/2023 07:31 AM 3.51 0.00 - 4.00 ng/mL Final     Comment:     [employer] Access chemiluminescent immunoassay. Confirm baseline values for patients being serially monitored. 06/20/2022 03:23 PM 3.2 0.0 - 4.0 ng/mL Final     Comment:     American Urological Association Guidelines define biochemical recurrence of prostate cancer as a detectable or rising PSA value post-radical prostatectomy that is greater than or equal to 0.2 ng/mL with a second confirmatory level of greater than or equal to 0.2 ng/mL. TSH 3RD GENERATON   Date/Time Value Ref Range Status   05/03/2018 08:42 AM 1.012 0.358 - 3.740 uIU/mL Final     Sodium   Date/Time Value Ref Range Status   07/05/2023 07:31  135 - 147 mmol/L Final     BUN   Date/Time Value Ref Range Status   07/05/2023 07:31 AM 23 5 - 25 mg/dL Final     Creatinine   Date/Time Value Ref Range Status   07/05/2023 07:31 AM 1.37 (H) 0.60 - 1.30 mg/dL Final     Comment:     Standardized to IDMS reference method      In chart    This note was created with voice recognition software. Phonic, grammatical and spelling errors may be present within the note as a result.

## 2023-10-11 ENCOUNTER — APPOINTMENT (OUTPATIENT)
Dept: LAB | Facility: HOSPITAL | Age: 73
End: 2023-10-11
Payer: MEDICARE

## 2023-10-11 DIAGNOSIS — I10 PRIMARY HYPERTENSION: Primary | ICD-10-CM

## 2023-10-11 DIAGNOSIS — I10 PRIMARY HYPERTENSION: ICD-10-CM

## 2023-10-11 DIAGNOSIS — N18.31 STAGE 3A CHRONIC KIDNEY DISEASE (HCC): ICD-10-CM

## 2023-10-11 LAB
ALBUMIN SERPL BCP-MCNC: 4.1 G/DL (ref 3.5–5)
ALP SERPL-CCNC: 58 U/L (ref 34–104)
ALT SERPL W P-5'-P-CCNC: 19 U/L (ref 7–52)
ANION GAP SERPL CALCULATED.3IONS-SCNC: 6 MMOL/L
AST SERPL W P-5'-P-CCNC: 18 U/L (ref 13–39)
BASOPHILS # BLD AUTO: 0.02 THOUSANDS/ÂΜL (ref 0–0.1)
BASOPHILS NFR BLD AUTO: 1 % (ref 0–1)
BILIRUB SERPL-MCNC: 0.39 MG/DL (ref 0.2–1)
BUN SERPL-MCNC: 24 MG/DL (ref 5–25)
CALCIUM SERPL-MCNC: 9.4 MG/DL (ref 8.4–10.2)
CHLORIDE SERPL-SCNC: 105 MMOL/L (ref 96–108)
CHOLEST SERPL-MCNC: 169 MG/DL
CO2 SERPL-SCNC: 28 MMOL/L (ref 21–32)
CREAT SERPL-MCNC: 1.44 MG/DL (ref 0.6–1.3)
EOSINOPHIL # BLD AUTO: 0.31 THOUSAND/ÂΜL (ref 0–0.61)
EOSINOPHIL NFR BLD AUTO: 8 % (ref 0–6)
ERYTHROCYTE [DISTWIDTH] IN BLOOD BY AUTOMATED COUNT: 13.5 % (ref 11.6–15.1)
GFR SERPL CREATININE-BSD FRML MDRD: 47 ML/MIN/1.73SQ M
GLUCOSE P FAST SERPL-MCNC: 81 MG/DL (ref 65–99)
HCT VFR BLD AUTO: 41.8 % (ref 36.5–49.3)
HDLC SERPL-MCNC: 70 MG/DL
HGB BLD-MCNC: 13.9 G/DL (ref 12–17)
IMM GRANULOCYTES # BLD AUTO: 0 THOUSAND/UL (ref 0–0.2)
IMM GRANULOCYTES NFR BLD AUTO: 0 % (ref 0–2)
LDLC SERPL CALC-MCNC: 86 MG/DL (ref 0–100)
LYMPHOCYTES # BLD AUTO: 1.59 THOUSANDS/ÂΜL (ref 0.6–4.47)
LYMPHOCYTES NFR BLD AUTO: 40 % (ref 14–44)
MCH RBC QN AUTO: 29.8 PG (ref 26.8–34.3)
MCHC RBC AUTO-ENTMCNC: 33.3 G/DL (ref 31.4–37.4)
MCV RBC AUTO: 90 FL (ref 82–98)
MONOCYTES # BLD AUTO: 0.28 THOUSAND/ÂΜL (ref 0.17–1.22)
MONOCYTES NFR BLD AUTO: 7 % (ref 4–12)
NEUTROPHILS # BLD AUTO: 1.74 THOUSANDS/ÂΜL (ref 1.85–7.62)
NEUTS SEG NFR BLD AUTO: 44 % (ref 43–75)
NONHDLC SERPL-MCNC: 99 MG/DL
NRBC BLD AUTO-RTO: 0 /100 WBCS
PLATELET # BLD AUTO: 199 THOUSANDS/UL (ref 149–390)
PMV BLD AUTO: 11.6 FL (ref 8.9–12.7)
POTASSIUM SERPL-SCNC: 4 MMOL/L (ref 3.5–5.3)
PROT SERPL-MCNC: 7 G/DL (ref 6.4–8.4)
RBC # BLD AUTO: 4.66 MILLION/UL (ref 3.88–5.62)
SODIUM SERPL-SCNC: 139 MMOL/L (ref 135–147)
TRIGL SERPL-MCNC: 63 MG/DL
WBC # BLD AUTO: 3.94 THOUSAND/UL (ref 4.31–10.16)

## 2023-10-11 PROCEDURE — 36415 COLL VENOUS BLD VENIPUNCTURE: CPT

## 2023-10-11 PROCEDURE — 85025 COMPLETE CBC W/AUTO DIFF WBC: CPT

## 2023-10-11 PROCEDURE — 80053 COMPREHEN METABOLIC PANEL: CPT

## 2023-10-11 PROCEDURE — 80061 LIPID PANEL: CPT

## 2023-10-18 ENCOUNTER — APPOINTMENT (EMERGENCY)
Dept: CT IMAGING | Facility: HOSPITAL | Age: 73
End: 2023-10-18
Payer: MEDICARE

## 2023-10-18 ENCOUNTER — APPOINTMENT (EMERGENCY)
Dept: RADIOLOGY | Facility: HOSPITAL | Age: 73
End: 2023-10-18
Payer: MEDICARE

## 2023-10-18 ENCOUNTER — HOSPITAL ENCOUNTER (EMERGENCY)
Facility: HOSPITAL | Age: 73
Discharge: HOME | End: 2023-10-18
Attending: EMERGENCY MEDICINE
Payer: MEDICARE

## 2023-10-18 VITALS
TEMPERATURE: 97.9 F | HEART RATE: 60 BPM | RESPIRATION RATE: 19 BRPM | BODY MASS INDEX: 25.98 KG/M2 | WEIGHT: 191.58 LBS | SYSTOLIC BLOOD PRESSURE: 137 MMHG | OXYGEN SATURATION: 98 % | DIASTOLIC BLOOD PRESSURE: 78 MMHG

## 2023-10-18 DIAGNOSIS — R42 DIZZINESS: Primary | ICD-10-CM

## 2023-10-18 DIAGNOSIS — R42 VERTIGO: ICD-10-CM

## 2023-10-18 LAB
2HR DELTA HS TROPONIN: 1 NG/L
ALBUMIN SERPL BCP-MCNC: 4.2 G/DL (ref 3.5–5)
ALP SERPL-CCNC: 54 U/L (ref 34–104)
ALT SERPL W P-5'-P-CCNC: 19 U/L (ref 7–52)
ANION GAP SERPL CALCULATED.3IONS-SCNC: 7 MMOL/L
AST SERPL W P-5'-P-CCNC: 20 U/L (ref 13–39)
BASOPHILS # BLD AUTO: 0.02 THOUSANDS/ÂΜL (ref 0–0.1)
BASOPHILS NFR BLD AUTO: 1 % (ref 0–1)
BILIRUB SERPL-MCNC: 0.35 MG/DL (ref 0.2–1)
BUN SERPL-MCNC: 22 MG/DL (ref 5–25)
CALCIUM SERPL-MCNC: 9.8 MG/DL (ref 8.4–10.2)
CARDIAC TROPONIN I PNL SERPL HS: 4 NG/L
CARDIAC TROPONIN I PNL SERPL HS: 5 NG/L
CHLORIDE SERPL-SCNC: 104 MMOL/L (ref 96–108)
CO2 SERPL-SCNC: 27 MMOL/L (ref 21–32)
CREAT SERPL-MCNC: 1.4 MG/DL (ref 0.6–1.3)
EOSINOPHIL # BLD AUTO: 0.27 THOUSAND/ÂΜL (ref 0–0.61)
EOSINOPHIL NFR BLD AUTO: 6 % (ref 0–6)
ERYTHROCYTE [DISTWIDTH] IN BLOOD BY AUTOMATED COUNT: 13.5 % (ref 11.6–15.1)
FLUAV RNA RESP QL NAA+PROBE: NEGATIVE
FLUBV RNA RESP QL NAA+PROBE: NEGATIVE
GFR SERPL CREATININE-BSD FRML MDRD: 49 ML/MIN/1.73SQ M
GLUCOSE SERPL-MCNC: 104 MG/DL (ref 65–140)
HCT VFR BLD AUTO: 39.2 % (ref 36.5–49.3)
HGB BLD-MCNC: 13.3 G/DL (ref 12–17)
IMM GRANULOCYTES # BLD AUTO: 0 THOUSAND/UL (ref 0–0.2)
IMM GRANULOCYTES NFR BLD AUTO: 0 % (ref 0–2)
LYMPHOCYTES # BLD AUTO: 1.67 THOUSANDS/ÂΜL (ref 0.6–4.47)
LYMPHOCYTES NFR BLD AUTO: 38 % (ref 14–44)
MCH RBC QN AUTO: 30 PG (ref 26.8–34.3)
MCHC RBC AUTO-ENTMCNC: 33.9 G/DL (ref 31.4–37.4)
MCV RBC AUTO: 89 FL (ref 82–98)
MONOCYTES # BLD AUTO: 0.29 THOUSAND/ÂΜL (ref 0.17–1.22)
MONOCYTES NFR BLD AUTO: 7 % (ref 4–12)
NEUTROPHILS # BLD AUTO: 2.18 THOUSANDS/ÂΜL (ref 1.85–7.62)
NEUTS SEG NFR BLD AUTO: 48 % (ref 43–75)
NRBC BLD AUTO-RTO: 0 /100 WBCS
PLATELET # BLD AUTO: 190 THOUSANDS/UL (ref 149–390)
PMV BLD AUTO: 11.6 FL (ref 8.9–12.7)
POTASSIUM SERPL-SCNC: 3.7 MMOL/L (ref 3.5–5.3)
PROT SERPL-MCNC: 7.1 G/DL (ref 6.4–8.4)
RBC # BLD AUTO: 4.43 MILLION/UL (ref 3.88–5.62)
RSV RNA RESP QL NAA+PROBE: NEGATIVE
SARS-COV-2 RNA RESP QL NAA+PROBE: NEGATIVE
SODIUM SERPL-SCNC: 138 MMOL/L (ref 135–147)
WBC # BLD AUTO: 4.43 THOUSAND/UL (ref 4.31–10.16)

## 2023-10-18 PROCEDURE — 0241U HB NFCT DS VIR RESP RNA 4 TRGT: CPT | Performed by: EMERGENCY MEDICINE

## 2023-10-18 PROCEDURE — 99285 EMERGENCY DEPT VISIT HI MDM: CPT

## 2023-10-18 PROCEDURE — 96360 HYDRATION IV INFUSION INIT: CPT

## 2023-10-18 PROCEDURE — 85025 COMPLETE CBC W/AUTO DIFF WBC: CPT | Performed by: EMERGENCY MEDICINE

## 2023-10-18 PROCEDURE — 71045 X-RAY EXAM CHEST 1 VIEW: CPT

## 2023-10-18 PROCEDURE — 70496 CT ANGIOGRAPHY HEAD: CPT

## 2023-10-18 PROCEDURE — 99285 EMERGENCY DEPT VISIT HI MDM: CPT | Performed by: EMERGENCY MEDICINE

## 2023-10-18 PROCEDURE — 70498 CT ANGIOGRAPHY NECK: CPT

## 2023-10-18 PROCEDURE — 36415 COLL VENOUS BLD VENIPUNCTURE: CPT | Performed by: EMERGENCY MEDICINE

## 2023-10-18 PROCEDURE — 80053 COMPREHEN METABOLIC PANEL: CPT | Performed by: EMERGENCY MEDICINE

## 2023-10-18 PROCEDURE — 84484 ASSAY OF TROPONIN QUANT: CPT | Performed by: EMERGENCY MEDICINE

## 2023-10-18 PROCEDURE — 93005 ELECTROCARDIOGRAM TRACING: CPT

## 2023-10-18 RX ORDER — MECLIZINE HYDROCHLORIDE 25 MG/1
25 TABLET ORAL 3 TIMES DAILY PRN
Qty: 30 TABLET | Refills: 0 | Status: SHIPPED | OUTPATIENT
Start: 2023-10-18

## 2023-10-18 RX ORDER — MECLIZINE HYDROCHLORIDE 25 MG/1
25 TABLET ORAL ONCE
Status: COMPLETED | OUTPATIENT
Start: 2023-10-18 | End: 2023-10-18

## 2023-10-18 RX ADMIN — SODIUM CHLORIDE 1000 ML: 0.9 INJECTION, SOLUTION INTRAVENOUS at 19:00

## 2023-10-18 RX ADMIN — IOHEXOL 100 ML: 350 INJECTION, SOLUTION INTRAVENOUS at 19:32

## 2023-10-18 RX ADMIN — MECLIZINE HYDROCHLORIDE 25 MG: 25 TABLET ORAL at 19:00

## 2023-10-18 NOTE — Clinical Note
Nathanial Nissen accompanied Jose Miguel Law to the emergency department on 10/18/2023. Return date if applicable: 39/26/2897        If you have any questions or concerns, please don't hesitate to call.       Micheal Santos RN

## 2023-10-18 NOTE — ED NOTES
Patient transported to [address] [place], [place] [address]ankit Chaudhary,[facility] Jesse Knox, [address]  10/18/23 1936

## 2023-10-18 NOTE — Clinical Note
Eric Mares accompanied Tyler Baez to the emergency department on 10/18/2023. Return date if applicable: 94/11/4664        If you have any questions or concerns, please don't hesitate to call.       Linda Guzman RN

## 2023-10-19 LAB
ATRIAL RATE: 63 BPM
P AXIS: 64 DEGREES
PR INTERVAL: 164 MS
QRS AXIS: 38 DEGREES
QRSD INTERVAL: 92 MS
QT INTERVAL: 398 MS
QTC INTERVAL: 407 MS
T WAVE AXIS: 9 DEGREES
VENTRICULAR RATE: 63 BPM

## 2023-10-19 PROCEDURE — 93010 ELECTROCARDIOGRAM REPORT: CPT | Performed by: INTERNAL MEDICINE

## 2023-10-26 NOTE — ED PROVIDER NOTES
History  Chief Complaint   Patient presents with    Dizziness     Pt reports room spinning dizziness since yesterday, was getting better went to the gym and stood up and felt dizzy again. Pt reports his BP was "low at 137/77 then 134/69"      43-year-old male had acute onset of room spinning dizziness that occurred yesterday. He had another episode of the same today while exercising at the gym. He had no focal neurologic deficits other than dizziness which feels similar to a prior episode of vertigo for the patient. He had no associated cranial nerve dysfunction, no associated upper or lower extremity weakness. No visual deficits or changes. No history of strokes in the past.  No blood thinning medicine. Prior to Admission Medications   Prescriptions Last Dose Informant Patient Reported? Taking?    amLODIPine (NORVASC) 10 mg tablet   No No   Sig: TAKE 1 TABLET DAILY   cyclobenzaprine (FLEXERIL) 10 mg tablet  Self No No   Sig: Take 1 tablet (10 mg total) by mouth 2 (two) times a day as needed for muscle spasms   Patient taking differently: Take 10 mg by mouth if needed for muscle spasms   lidocaine (LIDODERM) 5 %  Self No No   Sig: Apply 1 patch topically over 12 hours if needed (Intermittent right low back pain with radiculopathy) Remove & Discard patch within 12 hours or as directed by MD   multivitamin (THERAGRAN) TABS  Self Yes No   Sig: Take 1 tablet by mouth daily   pantoprazole (PROTONIX) 40 mg tablet  Self Yes No   Sig: Take 40 mg by mouth daily   tadalafil (CIALIS) 20 MG tablet  Self Yes No   Sig: if needed   tamsulosin (FLOMAX) 0.4 mg  Self No No   Sig: Take 1 capsule (0.4 mg total) by mouth daily with dinner      Facility-Administered Medications: None       Past Medical History:   Diagnosis Date    Benign prostatic hyperplasia     Chronic kidney disease     COVID-19 03/2020    Esophageal reflux     Hypertension     Hypertensive urgency 3/26/2019    Vertigo        Past Surgical History: Procedure Laterality Date    APPENDECTOMY  05/21/2015    CYST REMOVAL      Fatty cyst removed from back       Family History   Problem Relation Age of Onset    Prostate cancer Father     Prostate cancer Maternal Grandfather     Stomach cancer Maternal Aunt         malignant tumor of pharynx    Stomach cancer Maternal Uncle         malignant tumor of pharynx    Mental illness Family     Depression Family     Schizophrenia Family     Hypertension Mother     No Known Problems Sister     Dementia Sister      I have reviewed and agree with the history as documented. E-Cigarette/Vaping    E-Cigarette Use Never User      E-Cigarette/Vaping Substances    Nicotine No     THC No     CBD No     Flavoring No     Other No     Unknown No      Social History     Tobacco Use    Smoking status: Former    Smokeless tobacco: Never    Tobacco comments:     1ppw   Vaping Use    Vaping Use: Never used   Substance Use Topics    Alcohol use: Yes     Comment: socially    Drug use: No       Review of Systems   Constitutional:  Positive for fatigue. Negative for appetite change, chills and fever. HENT:  Negative for congestion and rhinorrhea. Eyes:  Negative for photophobia and visual disturbance. Respiratory:  Negative for chest tightness and shortness of breath. Cardiovascular:  Negative for chest pain and leg swelling. Gastrointestinal:  Negative for abdominal pain, nausea and vomiting. Musculoskeletal:  Negative for back pain and neck pain. Skin:  Negative for wound. Neurological:  Positive for dizziness and light-headedness. Negative for syncope, weakness, numbness and headaches. Physical Exam  Physical Exam  Vitals reviewed. Constitutional:       General: He is not in acute distress. Appearance: He is well-developed. He is not diaphoretic. HENT:      Head: Normocephalic and atraumatic.       Right Ear: Tympanic membrane normal.      Left Ear: Tympanic membrane normal.   Eyes:      General: No scleral icterus. Right eye: No discharge. Left eye: No discharge. Conjunctiva/sclera: Conjunctivae normal.      Pupils: Pupils are equal, round, and reactive to light. Neck:      Vascular: No JVD. Cardiovascular:      Rate and Rhythm: Normal rate and regular rhythm. Heart sounds: Normal heart sounds. No murmur heard. No friction rub. No gallop. Pulmonary:      Effort: Pulmonary effort is normal. No respiratory distress. Breath sounds: Normal breath sounds. No wheezing or rales. Chest:      Chest wall: No tenderness. Abdominal:      General: Bowel sounds are normal. There is no distension. Palpations: Abdomen is soft. Tenderness: There is no abdominal tenderness. There is no guarding. Musculoskeletal:         General: No tenderness or deformity. Normal range of motion. Cervical back: Normal range of motion and neck supple. Skin:     General: Skin is warm and dry. Coloration: Skin is not pale. Findings: No erythema or rash. Neurological:      General: No focal deficit present. Mental Status: He is alert and oriented to person, place, and time. Cranial Nerves: No cranial nerve deficit. Sensory: No sensory deficit. Motor: No weakness. Comments:  GCS 15. AAOx3. No focal neuro deficits. CN II-XII grossly intact. Speech normal, no aphasia or dysarthria. No pronator drift. Cerebellar function normal. Finger to nose normal. PERRL. EOMI. Peripheral vision intact. No nystagmus. Upper and lower extremity strength 5/5 through.  strength 5/5 b/l. Gross sensation intact b/l. Subjective dizziness completely resolved after meclizine.        Psychiatric:         Behavior: Behavior normal.         Vital Signs  ED Triage Vitals   Temperature Pulse Respirations Blood Pressure SpO2   10/18/23 1741 10/18/23 1741 10/18/23 1741 10/18/23 1741 10/18/23 1741   97.9 °F (36.6 °C) 78 18 150/71 99 %      Temp src Heart Rate Source Patient Position - Orthostatic VS BP Location FiO2 (%)   -- 10/18/23 1741 10/18/23 1830 10/18/23 1830 --    Monitor Lying Right arm       Pain Score       --                  Vitals:    10/18/23 1741 10/18/23 1830 10/18/23 2107 10/18/23 2200   BP: 150/71 150/72 164/79 137/78   Pulse: 78 61 65 60   Patient Position - Orthostatic VS:  Lying Lying Lying         Visual Acuity  Visual Acuity      Flowsheet Row Most Recent Value   L Pupil Size (mm) 4   R Pupil Size (mm) 4            ED Medications  Medications   meclizine (ANTIVERT) tablet 25 mg (25 mg Oral Given 10/18/23 1900)   sodium chloride 0.9 % bolus 1,000 mL (0 mL Intravenous Stopped 10/18/23 2000)   iohexol (OMNIPAQUE) 350 MG/ML injection (SINGLE-DOSE) 100 mL (100 mL Intravenous Given 10/18/23 1932)       Diagnostic Studies  Results Reviewed       Procedure Component Value Units Date/Time    HS Troponin I 2hr [[id number]]  (Normal) Collected: 10/18/23 2101    Lab Status: Final result Specimen: Blood from Arm, Right Updated: 10/18/23 2201     hs TnI 2hr 5 ng/L      Delta 2hr hsTnI 1 ng/L     COVID/FLU/RSV [[id number]]  (Normal) Collected: 10/18/23 1901    Lab Status: Final result Specimen: Nares from Nose Updated: 10/18/23 1947     SARS-CoV-2 Negative     INFLUENZA A PCR Negative     INFLUENZA B PCR Negative     RSV PCR Negative    Narrative:      FOR PEDIATRIC PATIENTS - copy/paste COVID Guidelines URL to browser: [email or web address]    SARS-CoV-2 assay is a Nucleic Acid Amplification assay intended for the  qualitative detection of nucleic acid from SARS-CoV-2 in nasopharyngeal  swabs. Results are for the presumptive identification of SARS-CoV-2 RNA. Positive results are indicative of infection with SARS-CoV-2, the virus  causing COVID-19, but do not rule out bacterial infection or co-infection  with other viruses.  Laboratories within the [facility] and its  territories are required to report all positive results to the appropriate  public health authorities. Negative results do not preclude SARS-CoV-2  infection and should not be used as the sole basis for treatment or other  patient management decisions. Negative results must be combined with  clinical observations, patient history, and epidemiological information. This test has not been FDA cleared or approved. This test has been authorized by FDA under an Emergency Use Authorization  (EUA). This test is only authorized for the duration of time the  declaration that circumstances exist justifying the authorization of the  emergency use of an in vitro diagnostic tests for detection of SARS-CoV-2  virus and/or diagnosis of COVID-19 infection under section 564(b)(1) of  the Act, 21 U. S.C. 999CBH-5(M)(1), unless the authorization is terminated  or revoked sooner. The test has been validated but independent review by FDA  and CLIA is pending. Test performed using [employer]: This RT-PCR assay targets N2,  a region unique to SARS-CoV-2. A conserved region in the E-gene was chosen  for pan-Sarbecovirus detection which includes SARS-CoV-2. According to CMS-2020-01-R, this platform meets the definition of high-throughput technology.     HS Troponin 0hr (reflex protocol) [[id number]]  (Normal) Collected: 10/18/23 1901    Lab Status: Final result Specimen: Blood from Arm, Right Updated: 10/18/23 1939     hs TnI 0hr 4 ng/L     Comprehensive metabolic panel [[id number]]  (Abnormal) Collected: 10/18/23 1901    Lab Status: Final result Specimen: Blood from Arm, Right Updated: 10/18/23 1929     Sodium 138 mmol/L      Potassium 3.7 mmol/L      Chloride 104 mmol/L      CO2 27 mmol/L      ANION GAP 7 mmol/L      BUN 22 mg/dL      Creatinine 1.40 mg/dL      Glucose 104 mg/dL      Calcium 9.8 mg/dL      AST 20 U/L      ALT 19 U/L      Alkaline Phosphatase 54 U/L      Total Protein 7.1 g/dL      Albumin 4.2 g/dL      Total Bilirubin 0.35 mg/dL      eGFR 49 ml/min/1.73sq m     Narrative: National Kidney Disease Foundation guidelines for Chronic Kidney Disease (CKD):     Stage 1 with normal or high GFR (GFR > 90 mL/min/1.73 square meters)    Stage 2 Mild CKD (GFR = 60-89 mL/min/1.73 square meters)    Stage 3A Moderate CKD (GFR = 45-59 mL/min/1.73 square meters)    Stage 3B Moderate CKD (GFR = 30-44 mL/min/1.73 square meters)    Stage 4 Severe CKD (GFR = 15-29 mL/min/1.73 square meters)    Stage 5 End Stage CKD (GFR <15 mL/min/1.73 square meters)  Note: GFR calculation is accurate only with a steady state creatinine    CBC and differential [[id number]] Collected: 10/18/23 1901    Lab Status: Final result Specimen: Blood from Arm, Right Updated: 10/18/23 1910     WBC 4.43 Thousand/uL      RBC 4.43 Million/uL      Hemoglobin 13.3 g/dL      Hematocrit 39.2 %      MCV 89 fL      MCH 30.0 pg      MCHC 33.9 g/dL      RDW 13.5 %      MPV 11.6 fL      Platelets 477 Thousands/uL      nRBC 0 /100 WBCs      Neutrophils Relative 48 %      Immat GRANS % 0 %      Lymphocytes Relative 38 %      Monocytes Relative 7 %      Eosinophils Relative 6 %      Basophils Relative 1 %      Neutrophils Absolute 2.18 Thousands/µL      Immature Grans Absolute 0.00 Thousand/uL      Lymphocytes Absolute 1.67 Thousands/µL      Monocytes Absolute 0.29 Thousand/µL      Eosinophils Absolute 0.27 Thousand/µL      Basophils Absolute 0.02 Thousands/µL                    CTA head and neck with and without contrast   ED Interpretation by Casey Hoyos DO (10/18 2123)   1. No evidence of acute infarct, intracranial hemorrhage or mass. 2. No stenosis, dissection or occlusion of the carotid or vertebral arteries or major vessels of the [place]. Final Result by Ady Kaur MD (10/18 2033)         1. No evidence of acute infarct, intracranial hemorrhage or mass. 2. No stenosis, dissection or occlusion of the carotid or vertebral arteries or major vessels of the [place]. Workstation performed: [id number]         XR chest 1 view portable   Final Result by Jj Otriz MD (10/19 8458)      No acute cardiopulmonary disease. Resident: Rayo Orellana, the attending radiologist, have reviewed the images and agree with the final report above. Workstation performed: [id number]                    Procedures  Procedures   EKG, no STEMI, no acute ischemia. ED Course  ED Course as of 10/25/23 2229   Wed Oct 18, 2023   1904 Out of the window for stroke alert   2231 Long discussion with patient regarding potential causes of dizziness, patient feels that this is consistent with his vertigo, he feels completely resolved after meclizine. Advised patient to stay in the hospital for further neurologic evaluation for his dizziness however patient feels resolved at this time and would like to be discharged home for follow-up outpatient. Advise good return precautions if any return of symptoms, or new neurologic symptoms. Discharged in stable condition. Identification of Seniors at [address] [place] [address] Drive Most Recent Value   (ISAR) Identification of Seniors at Risk    Before the illness or injury that brought you to the Emergency, did you need someone to help you on a regular basis? 0 Filed at: 10/18/2023 1743   In the last 24 hours, have you needed more help than usual? 0 Filed at: 10/18/2023 1743   Have you been hospitalized for one or more nights during the past 6 months? 0 Filed at: 10/18/2023 1743   In general, do you see well? 0 Filed at: 10/18/2023 1743   In general, do you have serious problems with your memory? 0 Filed at: 10/18/2023 1743   Do you take more than three different medications every day?  1 Filed at: 10/18/2023 1743   ISAR Score 1 Filed at: 10/18/2023 1743           Stroke Assessment       Row Name 10/18/23 2228             NIH Stroke Scale    Interval Baseline      Level of Consciousness (1a.) 0      LOC Questions (1b.) 0 LOC Commands (1c.) 0      Best Gaze (2.) 0      Visual (3.) 0      Facial Palsy (4.) 0      Motor Arm, Left (5a.) 0      Motor Arm, Right (5b.) 0      Motor Leg, Left (6a.) 0      Motor Leg, Right (6b.) 0      Limb Ataxia (7.) 0      Sensory (8.) 0      Best Language (9.) 0      Dysarthria (10.) 0      Extinction and Inattention (11.) (Formerly Neglect) 0      Total 0                    Flowsheet Row Most Recent Value   Thrombolytic Decision Options    Thrombolytic Decision Patient not a candidate. Patient is not a candidate options Unclear time of onset outside appropriate time window. SBIRT 20yo+      Flowsheet Row Most Recent Value   Initial Alcohol Screen: US AUDIT-C     1. How often do you have a drink containing alcohol? 0 Filed at: 10/18/2023 1743   2. How many drinks containing alcohol do you have on a typical day you are drinking? 0 Filed at: 10/18/2023 1743   3a. Male UNDER 65: How often do you have five or more drinks on one occasion? 0 Filed at: 10/18/2023 1743   3b. FEMALE Any Age, or MALE 65+: How often do you have 4 or more drinks on one occassion? 0 Filed at: 10/18/2023 1743   Audit-C Score 0 Filed at: 10/18/2023 1743   CRISTIAN: How many times in the past year have you. .. Used an illegal drug or used a prescription medication for non-medical reasons? Never Filed at: 10/18/2023 1743                      Medical Decision Making  Dizziness while exercising at the gym. Out of stroke alert window, no stroke alert called. Patient has imaging performed, stroke work-up, lab work, no acute abnormalities noted  Patient feels completely resolved after meclizine. Long discussion with patient regarding risks and benefits of further work-up in the medical and inability to rule out stroke without MRI. Patient feels much improved and wants to be discharged home, advised strict return precautions if return of strokelike symptoms.   Discharged in stable condition to follow-up with neurology and ENT for likely vertigo. Amount and/or Complexity of Data Reviewed  Labs: ordered. Radiology: ordered. Risk  Prescription drug management. Disposition  Final diagnoses:   Dizziness   Vertigo     Time reflects when diagnosis was documented in both MDM as applicable and the Disposition within this note       Time User Action Codes Description Comment    10/18/2023 10:35 PM Coppersmith, Manual Boyle Add [R42] Dizziness     10/18/2023 10:35 PM Coppersmith, Manual Boyle Add [R42] Vertigo           ED Disposition       ED Disposition   Discharge    Condition   Stable    Date/Time   Wed Oct 18, 2023 2235    [address] discharge to home/self care. Follow-up Information       Follow up With Specialties Details Why Contact Info Additional Information    [facility] Schedule an appointment as soon as possible for a visit  for vertigo treatment [address], 815 [facility].   [address]    921 [facility] Neurology Schedule an appointment as soon as possible for a visit  For follow-up regarding dizziness [address] [place] [id number] [place] [address] Neurology [address] [facility] [address], [address] [place], [place], 70 [facility] Emergency Department Emergency Medicine  If symptoms worsen [address] [facility], [place], [place], [address]            Discharge Medication List as of 10/18/2023 10:37 PM        START taking these medications    Details   meclizine (ANTIVERT) 25 mg tablet Take 1 tablet (25 mg total) by mouth 3 (three) times a day as needed for dizziness, Starting Wed 10/18/2023, Normal           CONTINUE these medications which have NOT CHANGED    Details   amLODIPine (NORVASC) 10 mg tablet TAKE 1 TABLET DAILY, Normal      cyclobenzaprine (FLEXERIL) 10 mg tablet Take 1 tablet (10 mg total) by mouth 2 (two) times a day as needed for muscle spasms, Starting Wed 5/3/2023, Normal      lidocaine (LIDODERM) 5 % Apply 1 patch topically over 12 hours if needed (Intermittent right low back pain with radiculopathy) Remove & Discard patch within 12 hours or as directed by MD, Starting Wed 5/3/2023, Normal      multivitamin (THERAGRAN) TABS Take 1 tablet by mouth daily, Historical Med      pantoprazole (PROTONIX) 40 mg tablet Take 40 mg by mouth daily, Starting Fri 5/19/2023, Historical Med      tadalafil (CIALIS) 20 MG tablet if needed, Historical Med      tamsulosin (FLOMAX) 0.4 mg Take 1 capsule (0.4 mg total) by mouth daily with dinner, Starting Tue 1/5/2021, Normal             No discharge procedures on file.     PDMP Review       None            ED Provider  Electronically Signed by             Lyn Coyle DO  10/25/23 2026

## 2023-12-18 ENCOUNTER — TELEPHONE (OUTPATIENT)
Dept: INTERNAL MEDICINE CLINIC | Facility: CLINIC | Age: 73
End: 2023-12-18

## 2023-12-18 NOTE — TELEPHONE ENCOUNTER
Pt has bps of 166/88 and 159/91,    is feeling dizziness at times.     Also had abdominal pain going to the bathroom this am .     I put him in for Thursday for now,   anything to do for bp in meantime?

## 2023-12-19 ENCOUNTER — OFFICE VISIT (OUTPATIENT)
Dept: INTERNAL MEDICINE CLINIC | Facility: CLINIC | Age: 73
End: 2023-12-19
Payer: MEDICARE

## 2023-12-19 VITALS
DIASTOLIC BLOOD PRESSURE: 84 MMHG | WEIGHT: 185 LBS | OXYGEN SATURATION: 99 % | SYSTOLIC BLOOD PRESSURE: 134 MMHG | BODY MASS INDEX: 25.06 KG/M2 | HEART RATE: 62 BPM | HEIGHT: 72 IN | RESPIRATION RATE: 16 BRPM

## 2023-12-19 DIAGNOSIS — I10 PRIMARY HYPERTENSION: Primary | ICD-10-CM

## 2023-12-19 PROCEDURE — 99213 OFFICE O/P EST LOW 20 MIN: CPT | Performed by: PHYSICIAN ASSISTANT

## 2023-12-19 NOTE — PROGRESS NOTES
"Assessment/Plan:   Patient Instructions   Blood pressure okay here today.  Look at the cold medicine you are taking at the ingredients.  If it had ephedrine in it please do not take.  It will raise your blood pressure.  Also limit your salt use.  Can use Mrs. Dash as a salt substitute.  Follow-up as scheduled in April, bring your blood pressure cuff to that visit.  Continue your current medication.  Quality Measures: Patient is not overweight, he exercises on a daily basis and is very muscular.    Depression Screening and Follow-up Plan: Patient was screened for depression during today's encounter. They screened negative with a PHQ-2 score of 0.         Return for Next scheduled follow up.         Diagnoses and all orders for this visit:    Primary hypertension          Subjective:      Patient ID: Tomas Robert is a 73 y.o. male.    Acute visit    Patient was concerned because for several days he has had a lightheaded feeling when he awakens in the morning, takes his blood pressure and it is elevated, and what he states is \"erratic\".  During this time he had been eating salty food, he had been taking cold medicine for cold symptoms.  He is not sure what was in the cold medicine but notes the symptoms were at a similar time.  This morning his blood pressure was much better controlled.  Here in the office it is under decent control.  Eduarda cp, palp, sob, edema, HA, diaphoresis, syncope, visual disturbance.        ALLERGIES:  Allergies   Allergen Reactions   • Penicillin V Anaphylaxis       CURRENT MEDICATIONS:    Current Outpatient Medications:   •  amLODIPine (NORVASC) 10 mg tablet, TAKE 1 TABLET DAILY, Disp: 90 tablet, Rfl: 1  •  cyclobenzaprine (FLEXERIL) 10 mg tablet, Take 1 tablet (10 mg total) by mouth 2 (two) times a day as needed for muscle spasms (Patient taking differently: Take 10 mg by mouth if needed for muscle spasms), Disp: 30 tablet, Rfl: 1  •  lidocaine (LIDODERM) 5 %, Apply 1 patch topically over " 12 hours if needed (Intermittent right low back pain with radiculopathy) Remove & Discard patch within 12 hours or as directed by MD, Disp: 30 patch, Rfl: 3  •  meclizine (ANTIVERT) 25 mg tablet, Take 1 tablet (25 mg total) by mouth 3 (three) times a day as needed for dizziness, Disp: 30 tablet, Rfl: 0  •  multivitamin (THERAGRAN) TABS, Take 1 tablet by mouth daily, Disp: , Rfl:   •  pantoprazole (PROTONIX) 40 mg tablet, Take 40 mg by mouth daily, Disp: , Rfl:   •  tadalafil (CIALIS) 20 MG tablet, if needed, Disp: , Rfl:   •  tamsulosin (FLOMAX) 0.4 mg, Take 1 capsule (0.4 mg total) by mouth daily with dinner, Disp: 90 capsule, Rfl: 3    ACTIVE PROBLEM LIST:  Patient Active Problem List   Diagnosis   • Benign prostatic hyperplasia, presence of lower urinary tract symptoms unspecified   • Erectile dysfunction   • Lumbar radiculopathy   • CKD (chronic kidney disease) stage 3, GFR 30-59 ml/min ([facility])   • Vertigo   • Chronic kidney disease-mineral and bone disorder   • Colon polyps   • Pain in left knee   • Primary hypertension   • Dysesthesia of multiple sites       PAST MEDICAL HISTORY:  Past Medical History:   Diagnosis Date   • Benign prostatic hyperplasia    • Chronic kidney disease    • COVID-19 03/2020   • Esophageal reflux    • Hypertension    • Hypertensive urgency 3/26/2019   • Vertigo        PAST SURGICAL HISTORY:  Past Surgical History:   Procedure Laterality Date   • APPENDECTOMY  05/21/2015   • CYST REMOVAL      Fatty cyst removed from back       FAMILY HISTORY:  Family History   Problem Relation Age of Onset   • Prostate cancer Father    • Prostate cancer Maternal Grandfather    • Stomach cancer Maternal Aunt         malignant tumor of pharynx   • Stomach cancer Maternal Uncle         malignant tumor of pharynx   • Mental illness Family    • Depression Family    • Schizophrenia Family    • Hypertension Mother    • No Known Problems Sister    • Dementia Sister        SOCIAL HISTORY:  Social History      Socioeconomic History   • Marital status: /Civil Union     Spouse name: Not on file   • Number of children: Not on file   • Years of education: Not on file   • Highest education level: Not on file   Occupational History   • Occupation: Retired   Tobacco Use   • Smoking status: Former   • Smokeless tobacco: Never   • Tobacco comments:     1ppw   Vaping Use   • Vaping status: Never Used   Substance and Sexual Activity   • Alcohol use: Yes     Comment: socially   • Drug use: No   • Sexual activity: Yes     Partners: Female     Comment: denied history of high risk sexual behavior   Other Topics Concern   • Not on file   Social History Narrative    No active advance directive    Always uses seat belt    Exercises occasionally, moderate    Five children    Occasional caffeine consumption      Regular dental care. Brushes teeth twice daily       Retired     Social Determinants of Health     Financial Resource Strain: Low Risk  (4/5/2023)    Overall Financial Resource Strain (CARDIA)    • Difficulty of Paying Living Expenses: Not hard at all   Food Insecurity: Not on file   Transportation Needs: No Transportation Needs (4/5/2023)    PRAPARE - Transportation    • Lack of Transportation (Medical): No    • Lack of Transportation (Non-Medical): No   Physical Activity: Not on file   Stress: Not on file   Social Connections: Not on file   Intimate Partner Violence: Not on file   Housing Stability: Not on file       Review of Systems   Constitutional:  Negative for activity change, chills, fatigue and fever.   HENT:  Negative for congestion.    Eyes:  Negative for discharge.   Respiratory:  Negative for cough, chest tightness and shortness of breath.    Cardiovascular:  Negative for chest pain, palpitations and leg swelling.   Gastrointestinal:  Negative for abdominal pain, blood in stool, constipation, diarrhea, nausea and vomiting.   Endocrine: Negative for polydipsia, polyphagia and polyuria.   Genitourinary:   Negative for difficulty urinating.   Musculoskeletal:  Negative for arthralgias and myalgias.   Skin:  Negative for rash.   Allergic/Immunologic: Negative for immunocompromised state.   Neurological:  Positive for dizziness. Negative for syncope, weakness, light-headedness and headaches.   Hematological:  Negative for adenopathy. Does not bruise/bleed easily.   Psychiatric/Behavioral:  Negative for dysphoric mood, sleep disturbance and suicidal ideas. The patient is not nervous/anxious.          Objective:  Vitals:    12/19/23 1316 12/19/23 1327   BP: 138/80 134/84   BP Location: Left arm Left arm   Patient Position: Sitting Sitting   Cuff Size: Adult    Pulse: 62    Resp: 16    SpO2: 99%    Weight: 83.9 kg (185 lb)    Height: 6' (1.829 m)      Body mass index is 25.09 kg/m².     Physical Exam  Vitals and nursing note reviewed.   Constitutional:       General: He is not in acute distress.     Appearance: He is well-developed.   HENT:      Head: Normocephalic and atraumatic.   Eyes:      Extraocular Movements: Extraocular movements intact.      Pupils: Pupils are equal, round, and reactive to light.   Neck:      Thyroid: No thyromegaly.      Vascular: No carotid bruit or JVD.   Cardiovascular:      Rate and Rhythm: Normal rate and regular rhythm.      Heart sounds: Normal heart sounds.   Pulmonary:      Effort: Pulmonary effort is normal. No respiratory distress.      Breath sounds: Normal breath sounds.   Musculoskeletal:      Cervical back: Neck supple.      Right lower leg: No edema.      Left lower leg: No edema.   Lymphadenopathy:      Cervical: No cervical adenopathy.   Skin:     General: Skin is warm and dry.      Findings: No rash.   Neurological:      General: No focal deficit present.      Mental Status: He is alert and oriented to person, place, and time. Mental status is at baseline.   Psychiatric:         Mood and Affect: Mood normal.         Behavior: Behavior normal.           RESULTS:  Cholesterol    Date/Time Value Ref Range Status   10/11/2023 08:54  See Comment mg/dL Final     Comment:     Cholesterol:         Pediatric <18 Years        Desirable          <170 mg/dL      Borderline High    170-199 mg/dL      High               >=200 mg/dL        Adult >=18 Years            Desirable        <200 mg/dL      Borderline High  200-239 mg/dL      High             >239 mg/dL       Triglycerides   Date/Time Value Ref Range Status   10/11/2023 08:54 AM 63 See Comment mg/dL Final     Comment:     Triglyceride:     0-9Y            <75mg/dL     10Y-17Y         <90 mg/dL       >=18Y     Normal          <150 mg/dL     Borderline High 150-199 mg/dL     High            200-499 mg/dL        Very High       >499 mg/dL    Specimen collection should occur prior to Metamizole administration due to the potential for falsely depressed results.     HDL, Direct   Date/Time Value Ref Range Status   10/11/2023 08:54 AM 70 >=40 mg/dL Final     LDL Calculated   Date/Time Value Ref Range Status   10/11/2023 08:54 AM 86 0 - 100 mg/dL Final     Comment:     LDL Cholesterol:     Optimal           <100 mg/dl     Near Optimal      100-129 mg/dl     Above Optimal       Borderline High 130-159 mg/dl       High            160-189 mg/dl       Very High       >189 mg/dl         This screening LDL is a calculated result.   It does not have the accuracy of the Direct Measured LDL in the monitoring of patients with hyperlipidemia and/or statin therapy.   Direct Measure LDL (WTW700) must be ordered separately in these patients.     Hemoglobin   Date/Time Value Ref Range Status   10/18/2023 07:01 PM 13.3 12.0 - 17.0 g/dL Final     Hematocrit   Date/Time Value Ref Range Status   10/18/2023 07:01 PM 39.2 36.5 - 49.3 % Final     Platelets   Date/Time Value Ref Range Status   10/18/2023 07:01  149 - 390 Thousands/uL Final     PSA   Date/Time Value Ref Range Status   07/05/2023 07:31 AM 3.51 0.00 - 4.00 ng/mL Final     Comment:     Kam  [employer] Access chemiluminescent immunoassay. Confirm baseline values for patients being serially monitored.    06/20/2022 03:23 PM 3.2 0.0 - 4.0 ng/mL Final     Comment:     American Urological Association Guidelines define biochemical recurrence of prostate cancer as a detectable or rising PSA value post-radical prostatectomy that is greater than or equal to 0.2 ng/mL with a second confirmatory level of greater than or equal to 0.2 ng/mL.     TSH 3RD GENERATON   Date/Time Value Ref Range Status   05/03/2018 08:42 AM 1.012 0.358 - 3.740 uIU/mL Final     Sodium   Date/Time Value Ref Range Status   10/18/2023 07:01  135 - 147 mmol/L Final     BUN   Date/Time Value Ref Range Status   10/18/2023 07:01 PM 22 5 - 25 mg/dL Final     Creatinine   Date/Time Value Ref Range Status   10/18/2023 07:01 PM 1.40 (H) 0.60 - 1.30 mg/dL Final     Comment:     Standardized to IDMS reference method      In chart    This note was created with voice recognition software.  Phonic, grammatical and spelling errors may be present within the note as a result.

## 2023-12-19 NOTE — PATIENT INSTRUCTIONS
Blood pressure okay here today.  Look at the cold medicine you are taking at the ingredients.  If it had ephedrine in it please do not take.  It will raise your blood pressure.  Also limit your salt use.  Can use Mrs. Dash as a salt substitute.  Follow-up as scheduled in April, bring your blood pressure cuff to that visit.

## 2024-01-10 ENCOUNTER — TELEPHONE (OUTPATIENT)
Dept: INTERNAL MEDICINE CLINIC | Facility: CLINIC | Age: 74
End: 2024-01-10

## 2024-01-10 NOTE — TELEPHONE ENCOUNTER
Pt is asking if he could go do a testosterone lab test?     Can add and he will do all previous labs together with it

## 2024-01-16 ENCOUNTER — TELEPHONE (OUTPATIENT)
Dept: NEPHROLOGY | Facility: CLINIC | Age: 74
End: 2024-01-16

## 2024-01-18 ENCOUNTER — APPOINTMENT (OUTPATIENT)
Dept: LAB | Facility: HOSPITAL | Age: 74
End: 2024-01-18
Payer: MEDICARE

## 2024-01-18 DIAGNOSIS — M89.9 CHRONIC KIDNEY DISEASE-MINERAL AND BONE DISORDER: ICD-10-CM

## 2024-01-18 DIAGNOSIS — N18.9 CHRONIC KIDNEY DISEASE-MINERAL AND BONE DISORDER: ICD-10-CM

## 2024-01-18 DIAGNOSIS — E83.9 CHRONIC KIDNEY DISEASE-MINERAL AND BONE DISORDER: ICD-10-CM

## 2024-01-18 DIAGNOSIS — N18.31 STAGE 3A CHRONIC KIDNEY DISEASE (HCC): ICD-10-CM

## 2024-01-23 ENCOUNTER — OFFICE VISIT (OUTPATIENT)
Dept: NEPHROLOGY | Facility: CLINIC | Age: 74
End: 2024-01-23
Payer: MEDICARE

## 2024-01-23 VITALS
DIASTOLIC BLOOD PRESSURE: 70 MMHG | RESPIRATION RATE: 16 BRPM | SYSTOLIC BLOOD PRESSURE: 120 MMHG | OXYGEN SATURATION: 98 % | TEMPERATURE: 96.9 F | BODY MASS INDEX: 26.28 KG/M2 | WEIGHT: 194 LBS | HEART RATE: 67 BPM | HEIGHT: 72 IN

## 2024-01-23 DIAGNOSIS — E83.9 CHRONIC KIDNEY DISEASE-MINERAL AND BONE DISORDER: ICD-10-CM

## 2024-01-23 DIAGNOSIS — I10 PRIMARY HYPERTENSION: ICD-10-CM

## 2024-01-23 DIAGNOSIS — N40.0 BENIGN PROSTATIC HYPERPLASIA, PRESENCE OF LOWER URINARY TRACT SYMPTOMS UNSPECIFIED: ICD-10-CM

## 2024-01-23 DIAGNOSIS — M89.9 CHRONIC KIDNEY DISEASE-MINERAL AND BONE DISORDER: ICD-10-CM

## 2024-01-23 DIAGNOSIS — N18.31 STAGE 3A CHRONIC KIDNEY DISEASE (HCC): Primary | ICD-10-CM

## 2024-01-23 DIAGNOSIS — N18.9 CHRONIC KIDNEY DISEASE-MINERAL AND BONE DISORDER: ICD-10-CM

## 2024-01-23 PROCEDURE — 99214 OFFICE O/P EST MOD 30 MIN: CPT | Performed by: INTERNAL MEDICINE

## 2024-01-23 NOTE — PROGRESS NOTES
NEPHROLOGY OFFICE FOLLOW UP  Tomas Robert 73 y.o. male MRN: [id number]    Encounter: [id number] 1/23/2024    REASON FOR VISIT: Tomas Robert is a 73 y.o. male who is here on 1/23/2024 for Chronic Kidney Disease and Follow-up  .    HPI:    Jaime came in today for follow-up of CKD.  73-year-old gentleman with stage III CKD likely because of hypertensive nephrosclerosis    Patient is doing well overall    Has some joint pain but does not take any medication    No chest pain no palpitation    Denies any urinary complaint        REVIEW OF SYSTEMS:    Review of Systems   Constitutional:  Negative for fatigue.   HENT:  Negative for congestion.    Eyes:  Negative for photophobia and pain.   Respiratory:  Negative for chest tightness and shortness of breath.    Cardiovascular:  Negative for chest pain and palpitations.   Gastrointestinal:  Negative for abdominal distention, abdominal pain and blood in stool.   Endocrine: Negative for polydipsia.   Genitourinary:  Negative for difficulty urinating, dysuria, flank pain, hematuria and urgency.   Musculoskeletal:  Negative for arthralgias and back pain.   Skin:  Negative for rash.   Neurological:  Negative for dizziness, light-headedness and headaches.   Hematological:  Does not bruise/bleed easily.   Psychiatric/Behavioral:  Negative for behavioral problems. The patient is not nervous/anxious.          PAST MEDICAL HISTORY:  Past Medical History:   Diagnosis Date    Benign prostatic hyperplasia     Chronic kidney disease     COVID-19 03/2020    Esophageal reflux     Hypertension     Hypertensive urgency 3/26/2019    Vertigo        PAST SURGICAL HISTORY:  Past Surgical History:   Procedure Laterality Date    APPENDECTOMY  05/21/2015    CYST REMOVAL      Fatty cyst removed from back       SOCIAL HISTORY:  Social History     Substance and Sexual Activity   Alcohol Use Yes    Comment: socially     Social History     Substance and Sexual Activity   Drug Use No     Social History      Tobacco Use   Smoking Status Former   Smokeless Tobacco Never   Tobacco Comments    1ppw       FAMILY HISTORY:  Family History   Problem Relation Age of Onset    Prostate cancer Father     Prostate cancer Maternal Grandfather     Stomach cancer Maternal Aunt         malignant tumor of pharynx    Stomach cancer Maternal Uncle         malignant tumor of pharynx    Mental illness Family     Depression Family     Schizophrenia Family     Hypertension Mother     No Known Problems Sister     Dementia Sister        MEDICATIONS:    Current Outpatient Medications:     amLODIPine (NORVASC) 10 mg tablet, TAKE 1 TABLET DAILY, Disp: 90 tablet, Rfl: 1    cyclobenzaprine (FLEXERIL) 10 mg tablet, Take 1 tablet (10 mg total) by mouth 2 (two) times a day as needed for muscle spasms (Patient taking differently: Take 10 mg by mouth if needed for muscle spasms), Disp: 30 tablet, Rfl: 1    lidocaine (LIDODERM) 5 %, Apply 1 patch topically over 12 hours if needed (Intermittent right low back pain with radiculopathy) Remove & Discard patch within 12 hours or as directed by MD, Disp: 30 patch, Rfl: 3    meclizine (ANTIVERT) 25 mg tablet, Take 1 tablet (25 mg total) by mouth 3 (three) times a day as needed for dizziness, Disp: 30 tablet, Rfl: 0    multivitamin (THERAGRAN) TABS, Take 1 tablet by mouth daily, Disp: , Rfl:     pantoprazole (PROTONIX) 40 mg tablet, Take 40 mg by mouth daily, Disp: , Rfl:     tadalafil (CIALIS) 20 MG tablet, if needed, Disp: , Rfl:     tamsulosin (FLOMAX) 0.4 mg, Take 1 capsule (0.4 mg total) by mouth daily with dinner, Disp: 90 capsule, Rfl: 3    PHYSICAL EXAM:  Vitals:    01/23/24 0903   BP: 120/70   BP Location: Right arm   Patient Position: Sitting   Pulse: 67   Resp: 16   Temp: (!) 96.9 °F (36.1 °C)   TempSrc: Temporal   SpO2: 98%   Weight: 88 kg (194 lb)   Height: 6' (1.829 m)     Body mass index is 26.31 kg/m².    Physical Exam  Constitutional:       General: He is not in acute distress.      Appearance: He is well-developed.   HENT:      Head: Normocephalic.      Mouth/Throat:      Mouth: Mucous membranes are moist.   Eyes:      General: No scleral icterus.     Conjunctiva/sclera: Conjunctivae normal.   Neck:      Vascular: No JVD.   Cardiovascular:      Rate and Rhythm: Normal rate.      Heart sounds: Normal heart sounds.   Pulmonary:      Effort: Pulmonary effort is normal.      Breath sounds: No wheezing.   Abdominal:      Palpations: Abdomen is soft.      Tenderness: There is no abdominal tenderness.   Musculoskeletal:         General: Normal range of motion.      Cervical back: Neck supple.   Skin:     General: Skin is warm.      Findings: No rash.   Neurological:      Mental Status: He is alert and oriented to person, place, and time.   Psychiatric:         Behavior: Behavior normal.         LAB RESULTS:  Results for orders placed or performed during the hospital encounter of 10/18/23   COVID/FLU/RSV    Specimen: Nose; Nares   Result Value Ref Range    SARS-CoV-2 Negative Negative    INFLUENZA A PCR Negative Negative    INFLUENZA B PCR Negative Negative    RSV PCR Negative Negative   CBC and differential   Result Value Ref Range    WBC 4.43 4.31 - 10.16 Thousand/uL    RBC 4.43 3.88 - 5.62 Million/uL    Hemoglobin 13.3 12.0 - 17.0 g/dL    Hematocrit 39.2 36.5 - 49.3 %    MCV 89 82 - 98 fL    MCH 30.0 26.8 - 34.3 pg    MCHC 33.9 31.4 - 37.4 g/dL    RDW 13.5 11.6 - 15.1 %    MPV 11.6 8.9 - 12.7 fL    Platelets 190 149 - 390 Thousands/uL    nRBC 0 /100 WBCs    Neutrophils Relative 48 43 - 75 %    Immat GRANS % 0 0 - 2 %    Lymphocytes Relative 38 14 - 44 %    Monocytes Relative 7 4 - 12 %    Eosinophils Relative 6 0 - 6 %    Basophils Relative 1 0 - 1 %    Neutrophils Absolute 2.18 1.85 - 7.62 Thousands/µL    Immature Grans Absolute 0.00 0.00 - 0.20 Thousand/uL    Lymphocytes Absolute 1.67 0.60 - 4.47 Thousands/µL    Monocytes Absolute 0.29 0.17 - 1.22 Thousand/µL    Eosinophils Absolute 0.27 0.00 -  "0.61 Thousand/µL    Basophils Absolute 0.02 0.00 - 0.10 Thousands/µL   Comprehensive metabolic panel   Result Value Ref Range    Sodium 138 135 - 147 mmol/L    Potassium 3.7 3.5 - 5.3 mmol/L    Chloride 104 96 - 108 mmol/L    CO2 27 21 - 32 mmol/L    ANION GAP 7 mmol/L    BUN 22 5 - 25 mg/dL    Creatinine 1.40 (H) 0.60 - 1.30 mg/dL    Glucose 104 65 - 140 mg/dL    Calcium 9.8 8.4 - 10.2 mg/dL    AST 20 13 - 39 U/L    ALT 19 7 - 52 U/L    Alkaline Phosphatase 54 34 - 104 U/L    Total Protein 7.1 6.4 - 8.4 g/dL    Albumin 4.2 3.5 - 5.0 g/dL    Total Bilirubin 0.35 0.20 - 1.00 mg/dL    eGFR 49 ml/min/1.73sq m   HS Troponin 0hr (reflex protocol)   Result Value Ref Range    hs TnI 0hr 4 \"Refer to ACS Flowchart\"- see link ng/L   HS Troponin I 2hr   Result Value Ref Range    hs TnI 2hr 5 \"Refer to ACS Flowchart\"- see link ng/L    Delta 2hr hsTnI 1 <20 ng/L   ECG 12 lead   Result Value Ref Range    Ventricular Rate 63 BPM    Atrial Rate 63 BPM    KS Interval 164 ms    QRSD Interval 92 ms    QT Interval 398 ms    QTC Interval 407 ms    P [place] 64 degrees    QRS Axis 38 degrees    T Wave Axis 9 degrees       ASSESSMENT and PLAN:      CKD (chronic kidney disease) stage 3, GFR 30-59 ml/min ([facility])  Lab Results   Component Value Date    EGFR 49 01/18/2024    EGFR 49 10/18/2023    EGFR 47 10/11/2023    CREATININE 1.39 (H) 01/18/2024    CREATININE 1.40 (H) 10/18/2023    CREATININE 1.44 (H) 10/11/2023   Renal function is quite stable for now.  Advise hydration and avoiding nephrotoxic medication    Chronic kidney disease-mineral and bone disorder  Lab Results   Component Value Date    EGFR 49 01/18/2024    EGFR 49 10/18/2023    EGFR 47 10/11/2023    CREATININE 1.39 (H) 01/18/2024    CREATININE 1.40 (H) 10/18/2023    CREATININE 1.44 (H) 10/11/2023   PTH and phosphorus along with vitamin D are within acceptable range and will continue to monitor    Primary hypertension  Blood pressure is very well-controlled for now.  Will continue " "present management    Benign prostatic hyperplasia, presence of lower urinary tract symptoms unspecified  Asymptomatic for now.  On tamsulosin which we will continue      Everything discussed at length with the patient.  I will see him back in 6 months.  We will get blood and urine test before that with        Portions of the record may have been created with voice recognition software. Occasional wrong word or \"sound a like\" substitutions may have occurred due to the inherent limitations of voice recognition software. Read the chart carefully and recognize, using context, where substitutions have occurred.If you have any questions, please contact the dictating provider.   "

## 2024-01-23 NOTE — ASSESSMENT & PLAN NOTE
Lab Results   Component Value Date    EGFR 49 01/18/2024    EGFR 49 10/18/2023    EGFR 47 10/11/2023    CREATININE 1.39 (H) 01/18/2024    CREATININE 1.40 (H) 10/18/2023    CREATININE 1.44 (H) 10/11/2023   Renal function is quite stable for now.  Advise hydration and avoiding nephrotoxic medication

## 2024-01-23 NOTE — ASSESSMENT & PLAN NOTE
Lab Results   Component Value Date    EGFR 49 01/18/2024    EGFR 49 10/18/2023    EGFR 47 10/11/2023    CREATININE 1.39 (H) 01/18/2024    CREATININE 1.40 (H) 10/18/2023    CREATININE 1.44 (H) 10/11/2023   PTH and phosphorus along with vitamin D are within acceptable range and will continue to monitor

## 2024-03-04 DIAGNOSIS — I10 ESSENTIAL HYPERTENSION: ICD-10-CM

## 2024-03-04 RX ORDER — AMLODIPINE BESYLATE 10 MG/1
10 TABLET ORAL DAILY
Qty: 90 TABLET | Refills: 1 | Status: SHIPPED | OUTPATIENT
Start: 2024-03-04

## 2024-03-11 ENCOUNTER — TELEPHONE (OUTPATIENT)
Dept: INTERNAL MEDICINE CLINIC | Facility: CLINIC | Age: 74
End: 2024-03-11

## 2024-03-11 ENCOUNTER — APPOINTMENT (OUTPATIENT)
Dept: LAB | Facility: HOSPITAL | Age: 74
End: 2024-03-11
Payer: MEDICARE

## 2024-03-11 DIAGNOSIS — R60.9 SWELLING: ICD-10-CM

## 2024-03-11 DIAGNOSIS — R41.89 BRAIN FOG: ICD-10-CM

## 2024-03-11 DIAGNOSIS — M25.50 ARTHRALGIA, UNSPECIFIED JOINT: Primary | ICD-10-CM

## 2024-03-11 DIAGNOSIS — M25.50 ARTHRALGIA, UNSPECIFIED JOINT: ICD-10-CM

## 2024-03-11 PROCEDURE — 36415 COLL VENOUS BLD VENIPUNCTURE: CPT

## 2024-03-11 PROCEDURE — 86618 LYME DISEASE ANTIBODY: CPT

## 2024-03-11 NOTE — TELEPHONE ENCOUNTER
"As per Nader, \"  Yes, it definitely okay to order     Order placed. Called pt and lmom advising.  "

## 2024-03-11 NOTE — TELEPHONE ENCOUNTER
Pt has been having swelling in knees and other joints and brain fog,   his fam member had same sxs and has lyme he states.  They were fishing together in the past year. Had ticks on them he says      Pt would like to do a lab test for lyme also?     Can we order

## 2024-03-12 LAB — B BURGDOR IGG+IGM SER QL IA: NEGATIVE

## 2024-04-01 DIAGNOSIS — I10 ESSENTIAL HYPERTENSION: ICD-10-CM

## 2024-04-01 RX ORDER — AMLODIPINE BESYLATE 10 MG/1
10 TABLET ORAL DAILY
Qty: 90 TABLET | Refills: 1 | Status: SHIPPED | OUTPATIENT
Start: 2024-04-01

## 2024-04-01 NOTE — TELEPHONE ENCOUNTER
We accidentally sent his med to local [facility]

## 2024-04-08 ENCOUNTER — OFFICE VISIT (OUTPATIENT)
Dept: INTERNAL MEDICINE CLINIC | Facility: CLINIC | Age: 74
End: 2024-04-08
Payer: COMMERCIAL

## 2024-04-08 ENCOUNTER — APPOINTMENT (OUTPATIENT)
Dept: LAB | Facility: HOSPITAL | Age: 74
End: 2024-04-08
Payer: MEDICARE

## 2024-04-08 VITALS
SYSTOLIC BLOOD PRESSURE: 124 MMHG | HEART RATE: 80 BPM | TEMPERATURE: 97 F | BODY MASS INDEX: 25.6 KG/M2 | DIASTOLIC BLOOD PRESSURE: 80 MMHG | WEIGHT: 189 LBS | OXYGEN SATURATION: 99 % | HEIGHT: 72 IN

## 2024-04-08 DIAGNOSIS — I10 PRIMARY HYPERTENSION: ICD-10-CM

## 2024-04-08 DIAGNOSIS — R10.33 UMBILICAL PAIN: Primary | ICD-10-CM

## 2024-04-08 LAB
ALBUMIN SERPL BCP-MCNC: 4.3 G/DL (ref 3.5–5)
ALP SERPL-CCNC: 58 U/L (ref 34–104)
ALT SERPL W P-5'-P-CCNC: 22 U/L (ref 7–52)
ANION GAP SERPL CALCULATED.3IONS-SCNC: 5 MMOL/L (ref 4–13)
AST SERPL W P-5'-P-CCNC: 20 U/L (ref 13–39)
BASOPHILS # BLD AUTO: 0.02 THOUSANDS/ÂΜL (ref 0–0.1)
BASOPHILS NFR BLD AUTO: 1 % (ref 0–1)
BILIRUB SERPL-MCNC: 0.47 MG/DL (ref 0.2–1)
BUN SERPL-MCNC: 19 MG/DL (ref 5–25)
CALCIUM SERPL-MCNC: 9.3 MG/DL (ref 8.4–10.2)
CHLORIDE SERPL-SCNC: 104 MMOL/L (ref 96–108)
CHOLEST SERPL-MCNC: 177 MG/DL
CO2 SERPL-SCNC: 30 MMOL/L (ref 21–32)
CREAT SERPL-MCNC: 1.4 MG/DL (ref 0.6–1.3)
EOSINOPHIL # BLD AUTO: 0.07 THOUSAND/ÂΜL (ref 0–0.61)
EOSINOPHIL NFR BLD AUTO: 2 % (ref 0–6)
ERYTHROCYTE [DISTWIDTH] IN BLOOD BY AUTOMATED COUNT: 13.4 % (ref 11.6–15.1)
GFR SERPL CREATININE-BSD FRML MDRD: 49 ML/MIN/1.73SQ M
GLUCOSE P FAST SERPL-MCNC: 93 MG/DL (ref 65–99)
HCT VFR BLD AUTO: 43.8 % (ref 36.5–49.3)
HDLC SERPL-MCNC: 69 MG/DL
HGB BLD-MCNC: 14.7 G/DL (ref 12–17)
IMM GRANULOCYTES # BLD AUTO: 0.01 THOUSAND/UL (ref 0–0.2)
IMM GRANULOCYTES NFR BLD AUTO: 0 % (ref 0–2)
LDLC SERPL CALC-MCNC: 96 MG/DL (ref 0–100)
LYMPHOCYTES # BLD AUTO: 1.59 THOUSANDS/ÂΜL (ref 0.6–4.47)
LYMPHOCYTES NFR BLD AUTO: 45 % (ref 14–44)
MCH RBC QN AUTO: 30.1 PG (ref 26.8–34.3)
MCHC RBC AUTO-ENTMCNC: 33.6 G/DL (ref 31.4–37.4)
MCV RBC AUTO: 90 FL (ref 82–98)
MONOCYTES # BLD AUTO: 0.21 THOUSAND/ÂΜL (ref 0.17–1.22)
MONOCYTES NFR BLD AUTO: 6 % (ref 4–12)
NEUTROPHILS # BLD AUTO: 1.66 THOUSANDS/ÂΜL (ref 1.85–7.62)
NEUTS SEG NFR BLD AUTO: 46 % (ref 43–75)
NONHDLC SERPL-MCNC: 108 MG/DL
NRBC BLD AUTO-RTO: 0 /100 WBCS
PLATELET # BLD AUTO: 196 THOUSANDS/UL (ref 149–390)
PMV BLD AUTO: 10.7 FL (ref 8.9–12.7)
POTASSIUM SERPL-SCNC: 4.4 MMOL/L (ref 3.5–5.3)
PROT SERPL-MCNC: 7.2 G/DL (ref 6.4–8.4)
RBC # BLD AUTO: 4.88 MILLION/UL (ref 3.88–5.62)
SODIUM SERPL-SCNC: 139 MMOL/L (ref 135–147)
TRIGL SERPL-MCNC: 60 MG/DL
WBC # BLD AUTO: 3.56 THOUSAND/UL (ref 4.31–10.16)

## 2024-04-08 PROCEDURE — 80061 LIPID PANEL: CPT

## 2024-04-08 PROCEDURE — G2211 COMPLEX E/M VISIT ADD ON: HCPCS | Performed by: PHYSICIAN ASSISTANT

## 2024-04-08 PROCEDURE — 36415 COLL VENOUS BLD VENIPUNCTURE: CPT

## 2024-04-08 PROCEDURE — 80053 COMPREHEN METABOLIC PANEL: CPT

## 2024-04-08 PROCEDURE — 85025 COMPLETE CBC W/AUTO DIFF WBC: CPT

## 2024-04-08 PROCEDURE — 99213 OFFICE O/P EST LOW 20 MIN: CPT | Performed by: PHYSICIAN ASSISTANT

## 2024-04-08 NOTE — PROGRESS NOTES
"Assessment/Plan:   Umbilical pain:  Patient reports pain localized to the umbilicus since working out on Saturday. Denies fever, chills, nausea, radiating pain, or palpable mass  Likely musculoskeletal pain  Pain is improving without treatment - patient reports \"I dont like meds\"  Discussed red flag symptoms with patient - he will contact the office if symptoms worsen or fail to improve     Quality Measures:       No follow-ups on file.    No problem-specific Assessment & Plan notes found for this encounter.       Diagnoses and all orders for this visit:    Umbilical pain          Subjective:      Patient ID: Tomas Robert is a 73 y.o. male.    Patient is a 73-year-old male who presents in the office today for evaluation of \"naval pain.\"  He reports pain has been ongoing since he worked out at the gym on Saturday.  Pain is elicited by palpation or with movement. He denies any nasuea, vomiting, diarrhea, abdominal pain, radiating pain, or palpable masses. He reports pain was 10/10 with palpation yesterday, improved to 7/10 today.         ALLERGIES:  Allergies   Allergen Reactions    Penicillin V Anaphylaxis       CURRENT MEDICATIONS:    Current Outpatient Medications:     amLODIPine (NORVASC) 10 mg tablet, Take 1 tablet (10 mg total) by mouth daily, Disp: 90 tablet, Rfl: 1    cyclobenzaprine (FLEXERIL) 10 mg tablet, Take 1 tablet (10 mg total) by mouth 2 (two) times a day as needed for muscle spasms (Patient taking differently: Take 10 mg by mouth if needed for muscle spasms), Disp: 30 tablet, Rfl: 1    lidocaine (LIDODERM) 5 %, Apply 1 patch topically over 12 hours if needed (Intermittent right low back pain with radiculopathy) Remove & Discard patch within 12 hours or as directed by MD, Disp: 30 patch, Rfl: 3    meclizine (ANTIVERT) 25 mg tablet, Take 1 tablet (25 mg total) by mouth 3 (three) times a day as needed for dizziness, Disp: 30 tablet, Rfl: 0    multivitamin (THERAGRAN) TABS, Take 1 tablet by mouth " daily, Disp: , Rfl:     pantoprazole (PROTONIX) 40 mg tablet, Take 40 mg by mouth daily, Disp: , Rfl:     tamsulosin (FLOMAX) 0.4 mg, Take 1 capsule (0.4 mg total) by mouth daily with dinner, Disp: 90 capsule, Rfl: 3    tadalafil (CIALIS) 20 MG tablet, if needed, Disp: , Rfl:     ACTIVE PROBLEM LIST:  Patient Active Problem List   Diagnosis    Benign prostatic hyperplasia, presence of lower urinary tract symptoms unspecified    Erectile dysfunction    Lumbar radiculopathy    CKD (chronic kidney disease) stage 3, GFR 30-59 ml/min ([facility])    Vertigo    Chronic kidney disease-mineral and bone disorder    Colon polyps    Pain in left knee    Primary hypertension    Dysesthesia of multiple sites    Umbilical pain       PAST MEDICAL HISTORY:  Past Medical History:   Diagnosis Date    Benign prostatic hyperplasia     Chronic kidney disease     COVID-19 03/2020    Esophageal reflux     Hypertension     Hypertensive urgency 3/26/2019    Vertigo        PAST SURGICAL HISTORY:  Past Surgical History:   Procedure Laterality Date    APPENDECTOMY  05/21/2015    CYST REMOVAL      Fatty cyst removed from back       FAMILY HISTORY:  Family History   Problem Relation Age of Onset    Prostate cancer Father     Prostate cancer Maternal Grandfather     Stomach cancer Maternal Aunt         malignant tumor of pharynx    Stomach cancer Maternal Uncle         malignant tumor of pharynx    Mental illness Family     Depression Family     Schizophrenia Family     Hypertension Mother     No Known Problems Sister     Dementia Sister        SOCIAL HISTORY:  Social History     Socioeconomic History    Marital status: /Civil Union     Spouse name: Not on file    Number of children: Not on file    Years of education: Not on file    Highest education level: Not on file   Occupational History    Occupation: Retired   Tobacco Use    Smoking status: Former    Smokeless tobacco: Never    Tobacco comments:     1ppw   Vaping Use    Vaping status: Never  Used   Substance and Sexual Activity    Alcohol use: Yes     Comment: socially    Drug use: No    Sexual activity: Yes     Partners: Female     Comment: denied history of high risk sexual behavior   Other Topics Concern    Not on file   Social History Narrative    No active advance directive    Always uses seat belt    Exercises occasionally, moderate    Five children    Occasional caffeine consumption      Regular dental care. Brushes teeth twice daily       Retired     Social Determinants of Health     Financial Resource Strain: Low Risk  (4/5/2023)    Overall Financial Resource Strain (CARDIA)     Difficulty of Paying Living Expenses: Not hard at all   Food Insecurity: Not on file   Transportation Needs: No Transportation Needs (4/5/2023)    PRAPARE - Transportation     Lack of Transportation (Medical): No     Lack of Transportation (Non-Medical): No   Physical Activity: Not on file   Stress: Not on file   Social Connections: Not on file   Intimate Partner Violence: Not on file   Housing Stability: Not on file       Review of Systems   Gastrointestinal:  Negative for abdominal distention, abdominal pain, diarrhea, nausea and vomiting.         Objective:  Vitals:    04/08/24 0931   BP: 124/80   BP Location: Left arm   Patient Position: Sitting   Cuff Size: Adult   Pulse: 80   Temp: (!) 97 °F (36.1 °C)   TempSrc: Tympanic   SpO2: 99%   Weight: 85.7 kg (189 lb)   Height: 6' (1.829 m)     Body mass index is 25.63 kg/m².     Physical Exam  Constitutional:       Appearance: He is well-developed.   HENT:      Right Ear: Tympanic membrane normal.      Left Ear: Tympanic membrane normal.      Nose: Nose normal. No congestion.   Cardiovascular:      Rate and Rhythm: Normal rate and regular rhythm.   Abdominal:      General: Bowel sounds are normal.      Palpations: Abdomen is soft. There is no mass or pulsatile mass.   Skin:     General: Skin is warm.   Neurological:      General: No focal deficit present.      Mental  Status: He is alert and oriented to person, place, and time.   Psychiatric:         Mood and Affect: Mood normal.           RESULTS:  Cholesterol   Date/Time Value Ref Range Status   10/11/2023 08:54  See Comment mg/dL Final     Comment:     Cholesterol:         Pediatric <18 Years        Desirable          <170 mg/dL      Borderline High    170-199 mg/dL      High               >=200 mg/dL        Adult >=18 Years            Desirable        <200 mg/dL      Borderline High  200-239 mg/dL      High             >239 mg/dL       Triglycerides   Date/Time Value Ref Range Status   10/11/2023 08:54 AM 63 See Comment mg/dL Final     Comment:     Triglyceride:     0-9Y            <75mg/dL     10Y-17Y         <90 mg/dL       >=18Y     Normal          <150 mg/dL     Borderline High 150-199 mg/dL     High            200-499 mg/dL        Very High       >499 mg/dL    Specimen collection should occur prior to Metamizole administration due to the potential for falsely depressed results.     HDL, Direct   Date/Time Value Ref Range Status   10/11/2023 08:54 AM 70 >=40 mg/dL Final     LDL Calculated   Date/Time Value Ref Range Status   10/11/2023 08:54 AM 86 0 - 100 mg/dL Final     Comment:     LDL Cholesterol:     Optimal           <100 mg/dl     Near Optimal      100-129 mg/dl     Above Optimal       Borderline High 130-159 mg/dl       High            160-189 mg/dl       Very High       >189 mg/dl         This screening LDL is a calculated result.   It does not have the accuracy of the Direct Measured LDL in the monitoring of patients with hyperlipidemia and/or statin therapy.   Direct Measure LDL (JTN299) must be ordered separately in these patients.     Hemoglobin   Date/Time Value Ref Range Status   01/18/2024 12:41 PM 13.7 12.0 - 17.0 g/dL Final     Hematocrit   Date/Time Value Ref Range Status   01/18/2024 12:41 PM 40.4 36.5 - 49.3 % Final     Platelets   Date/Time Value Ref Range Status   01/18/2024 12:41  149 -  390 Thousands/uL Final     PSA   Date/Time Value Ref Range Status   07/05/2023 07:31 AM 3.51 0.00 - 4.00 ng/mL Final     Comment:     [employer] Access chemiluminescent immunoassay. Confirm baseline values for patients being serially monitored.    06/20/2022 03:23 PM 3.2 0.0 - 4.0 ng/mL Final     Comment:     American U[employer] Association Guidelines define biochemical recurrence of prostate cancer as a detectable or rising PSA value post-radical prostatectomy that is greater than or equal to 0.2 ng/mL with a second confirmatory level of greater than or equal to 0.2 ng/mL.     TSH 3RD GENERATON   Date/Time Value Ref Range Status   05/03/2018 08:42 AM 1.012 0.358 - 3.740 uIU/mL Final     Sodium   Date/Time Value Ref Range Status   01/18/2024 12:41  135 - 147 mmol/L Final     BUN   Date/Time Value Ref Range Status   01/18/2024 12:41 PM 23 5 - 25 mg/dL Final     Creatinine   Date/Time Value Ref Range Status   01/18/2024 12:41 PM 1.39 (H) 0.60 - 1.30 mg/dL Final     Comment:     Standardized to IDMS reference method      In chart    This note was created with voice recognition software.  Phonic, grammatical and spelling errors may be present within the note as a result.

## 2024-04-15 NOTE — PROGRESS NOTES
Assessment/Plan:     Will double his lisinopril  He will monitor his pressure at home and increase further if needed  Recheck in 2 weeks  Return in about 2 weeks (around 8/14/2019)  No problem-specific Assessment & Plan notes found for this encounter  Diagnoses and all orders for this visit:    Essential hypertension  -     lisinopril (ZESTRIL) 10 mg tablet; Take 1 tablet (10 mg total) by mouth daily          Subjective:      Patient ID: Chelsea Fenton is a 71 y o  male  Patient comes in today because he had noticed his blood pressure was running high at home  He was getting headaches  He is taking his medicine as directed  Not having any side effects        ALLERGIES:  Allergies   Allergen Reactions    Penicillin V Anaphylaxis       CURRENT MEDICATIONS:    Current Outpatient Medications:     aspirin 81 mg chewable tablet, Chew 81 mg daily, Disp: , Rfl:     CIALIS 20 MG tablet, Take 1 tablet (20 mg total) by mouth daily as needed for erectile dysfunction, Disp: 18 tablet, Rfl: 3    lisinopril (ZESTRIL) 10 mg tablet, Take 1 tablet (10 mg total) by mouth daily, Disp: 30 tablet, Rfl: 3    multivitamin (THERAGRAN) TABS, Take 1 tablet by mouth daily, Disp: , Rfl:     tamsulosin (FLOMAX) 0 4 mg, TAKE 1 CAPSULE 30 MINUTES AFTER THE SAME MEAL EACH DAY, Disp: 90 capsule, Rfl: 3    lidocaine (LIDODERM) 5 %, Apply 1 patch topically daily Remove & Discard patch within 12 hours or as directed by MD, Disp: 30 patch, Rfl: 1    ACTIVE PROBLEM LIST:  Patient Active Problem List   Diagnosis    Benign prostatic hyperplasia, presence of lower urinary tract symptoms unspecified    Erectile dysfunction    Lumbar radiculopathy    Azotemia    Essential hypertension       PAST MEDICAL HISTORY:  Past Medical History:   Diagnosis Date    Benign prostatic hyperplasia     Esophageal reflux     GERD (gastroesophageal reflux disease)     Vertigo        PAST SURGICAL HISTORY:  Past Surgical History: LMOM TCB  STRESS ECHO THAT WAS ORDERED AT [facility]  HAS NOT BEEN DONE. NEED TO KNOW IF PATIENT IS STILL GOING TO HAVE IT DONE. IF SO APPOINTMENT WILL NEED TO BE RESCHEDULED.   PATIENT RESCHEDULED   Unable to reach patient. VM full. Need to reschedule appointment   Procedure Laterality Date    APPENDECTOMY  05/21/2015    CYST REMOVAL      Fatty cyst removed from back       FAMILY HISTORY:  Family History   Problem Relation Age of Onset    Prostate cancer Father     Prostate cancer Maternal Grandfather     Stomach cancer Maternal Aunt         malignant tumor of pharynx    Stomach cancer Maternal Uncle         malignant tumor of pharynx    Mental illness Family     Depression Family     Schizophrenia Family        SOCIAL HISTORY:  Social History     Socioeconomic History    Marital status: /Civil Union     Spouse name: Not on file    Number of children: Not on file    Years of education: Not on file    Highest education level: Not on file   Occupational History    Occupation: Retired   Social Needs    Financial resource strain: Not on file    Food insecurity:     Worry: Not on file     Inability: Not on file   [employer] needs:     Medical: Not on file     Non-medical: Not on file   Tobacco Use    Smoking status: Former Smoker    Smokeless tobacco: Never Used    Tobacco comment: 1ppw   Substance and Sexual Activity    Alcohol use: Yes     Comment: socially    Drug use: No    Sexual activity: Yes     Partners: Female     Comment: denied history of high risk sexual behavior   Lifestyle    Physical activity:     Days per week: Not on file     Minutes per session: Not on file    Stress: Not on file   Relationships    Social connections:     Talks on phone: Not on file     Gets together: Not on file     Attends Mu-ism service: Not on file     Active member of club or organization: Not on file     Attends meetings of clubs or organizations: Not on file     Relationship status: Not on file    Intimate partner violence:     Fear of current or ex partner: Not on file     Emotionally abused: Not on file     Physically abused: Not on file     Forced sexual activity: Not on file   Other Topics Concern    Not on file   Social History Narrative No active advance directive    Always uses seat belt    Exercises occasionally, moderate    Five children    Occasional caffeine consumption       Review of Systems   Respiratory: Negative for shortness of breath  Cardiovascular: Negative for chest pain  Gastrointestinal: Negative for abdominal pain  Objective:  Vitals:    07/31/19 1144   BP: 128/76   BP Location: Left arm   Patient Position: Sitting   Cuff Size: Large   Pulse: 80   Resp: 18   SpO2: 97%   Weight: 85 3 kg (188 lb)   Height: 6' (1 829 m)     Body mass index is 25 5 kg/m²  Physical Exam   Constitutional: He is oriented to person, place, and time  He appears well-developed and well-nourished  Abdominal: There is no tenderness  Musculoskeletal: He exhibits no edema  Neurological: He is alert and oriented to person, place, and time  Nursing note and vitals reviewed  RESULTS:    No results found for this or any previous visit (from the past 1008 hour(s))  This note was created with voice recognition software  Phonic, grammatical and spelling errors may be present within the note as a result  Yes Yes Yes Yes Yes Yes Yes Yes Yes Yes Yes Yes Yes Yes Yes Yes Yes Yes Yes Yes Yes Yes Yes Yes Yes Yes

## 2024-04-16 ENCOUNTER — OFFICE VISIT (OUTPATIENT)
Dept: INTERNAL MEDICINE CLINIC | Facility: CLINIC | Age: 74
End: 2024-04-16
Payer: COMMERCIAL

## 2024-04-16 VITALS
HEIGHT: 72 IN | WEIGHT: 185 LBS | SYSTOLIC BLOOD PRESSURE: 114 MMHG | HEART RATE: 86 BPM | DIASTOLIC BLOOD PRESSURE: 70 MMHG | OXYGEN SATURATION: 98 % | BODY MASS INDEX: 25.06 KG/M2

## 2024-04-16 DIAGNOSIS — Z12.5 SCREENING FOR PROSTATE CANCER: ICD-10-CM

## 2024-04-16 DIAGNOSIS — Z13.6 SCREENING FOR HEART DISEASE: ICD-10-CM

## 2024-04-16 DIAGNOSIS — K42.9 UMBILICAL HERNIA WITHOUT OBSTRUCTION AND WITHOUT GANGRENE: ICD-10-CM

## 2024-04-16 DIAGNOSIS — I10 PRIMARY HYPERTENSION: ICD-10-CM

## 2024-04-16 DIAGNOSIS — Z00.00 ENCOUNTER FOR MEDICARE ANNUAL WELLNESS EXAM: Primary | ICD-10-CM

## 2024-04-16 DIAGNOSIS — N18.31 STAGE 3A CHRONIC KIDNEY DISEASE (HCC): ICD-10-CM

## 2024-04-16 PROCEDURE — G0439 PPPS, SUBSEQ VISIT: HCPCS | Performed by: PHYSICIAN ASSISTANT

## 2024-04-16 PROCEDURE — 99214 OFFICE O/P EST MOD 30 MIN: CPT | Performed by: PHYSICIAN ASSISTANT

## 2024-04-16 NOTE — PATIENT INSTRUCTIONS
Continue current medications.  Follow up with specialists as scheduled.  Give trial of Tumeric for inflammation.  Follow up in 6 months, sooner as needed.        Medicare Preventive Visit Patient Instructions  Thank you for completing your Welcome to Medicare Visit or Medicare Annual Wellness Visit today. Your next wellness visit will be due in one year (4/17/2025).  The screening/preventive services that you may require over the next 5-10 years are detailed below. Some tests may not apply to you based off risk factors and/or age. Screening tests ordered at today's visit but not completed yet may show as past due. Also, please note that scanned in results may not display below.  Preventive Screenings:  Service Recommendations Previous Testing/Comments   Colorectal Cancer Screening  Colonoscopy    Fecal Occult Blood Test (FOBT)/Fecal Immunochemical Test (FIT)  Fecal DNA/Cologuard Test  Flexible Sigmoidoscopy Age: 45-75 years old   Colonoscopy: every 10 years (May be performed more frequently if at higher risk)  OR  FOBT/FIT: every 1 year  OR  Cologuard: every 3 years  OR  Sigmoidoscopy: every 5 years  Screening may be recommended earlier than age 45 if at higher risk for colorectal cancer. Also, an individualized decision between you and your healthcare provider will decide whether screening between the ages of 76-85 would be appropriate. Colonoscopy: 11/20/2019  FOBT/FIT: Not on file  Cologuard: Not on file  Sigmoidoscopy: Not on file    Screening Current     Prostate Cancer Screening Individualized decision between patient and health care provider in men between ages of 55-69   Medicare will cover every 12 months beginning on the day after your 50th birthday PSA: 3.51 ng/mL     Screening Current     Hepatitis C Screening Once for adults born between 1945 and 1965  More frequently in patients at high risk for Hepatitis C Hep C Antibody: 06/06/2019    Screening Current   Diabetes Screening 1-2 times per year if  you're at risk for diabetes or have pre-diabetes Fasting glucose: 93 mg/dL (4/8/2024)  A1C: No results in last 5 years (No results in last 5 years)  Screening Current   Cholesterol Screening Once every 5 years if you don't have a lipid disorder. May order more often based on risk factors. Lipid panel: 04/08/2024  Screening Current      Other Preventive Screenings Covered by Medicare:  Abdominal Aortic Aneurysm (AAA) Screening: covered once if your at risk. You're considered to be at risk if you have a family history of AAA or a male between the age of 65-75 who smoking at least 100 cigarettes in your lifetime.  Lung Cancer Screening: covers low dose CT scan once per year if you meet all of the following conditions: (1) Age 55-77; (2) No signs or symptoms of lung cancer; (3) Current smoker or have quit smoking within the last 15 years; (4) You have a tobacco smoking history of at least 20 pack years (packs per day x number of years you smoked); (5) You get a written order from a healthcare provider.  Glaucoma Screening: covered annually if you're considered high risk: (1) You have diabetes OR (2) Family history of glaucoma OR (3)  aged 50 and older OR (4)  American aged 65 and older  Osteoporosis Screening: covered every 2 years if you meet one of the following conditions: (1) Have a vertebral abnormality; (2) On glucocorticoid therapy for more than 3 months; (3) Have primary hyperparathyroidism; (4) On osteoporosis medications and need to assess response to drug therapy.  HIV Screening: covered annually if you're between the age of 15-65. Also covered annually if you are younger than 15 and older than 65 with risk factors for HIV infection. For pregnant patients, it is covered up to 3 times per pregnancy.    Immunizations:  Immunization Recommendations   Influenza Vaccine Annual influenza vaccination during flu season is recommended for all persons aged >= 6 months who do not have  contraindications   Pneumococcal Vaccine   * Pneumococcal conjugate vaccine = PCV13 (Prevnar 13), PCV15 (Vaxneuvance), PCV20 (Prevnar 20)  * Pneumococcal polysaccharide vaccine = PPSV23 (Pneumovax) Adults 19-65 yo with certain risk factors or if 65+ yo  If never received any pneumonia vaccine: recommend Prevnar 20 (PCV20)  Give PCV20 if previously received 1 dose of PCV13 or PPSV23   Hepatitis B Vaccine 3 dose series if at intermediate or high risk (ex: diabetes, end stage renal disease, liver disease)   Respiratory syncytial virus (RSV) Vaccine - COVERED BY MEDICARE PART D  * RSVPreF3 (Arexvy) CDC recommends that adults 60 years of age and older may receive a single dose of RSV vaccine using shared clinical decision-making (SCDM)   Tetanus (Td) Vaccine - COST NOT COVERED BY MEDICARE PART B Following completion of primary series, a booster dose should be given every 10 years to maintain immunity against tetanus. Td may also be given as tetanus wound prophylaxis.   Tdap Vaccine - COST NOT COVERED BY MEDICARE PART B Recommended at least once for all adults. For pregnant patients, recommended with each pregnancy.   Shingles Vaccine (Shingrix) - COST NOT COVERED BY MEDICARE PART B  2 shot series recommended in those 19 years and older who have or will have weakened immune systems or those 50 years and older     Health Maintenance Due:      Topic Date Due    Colorectal Cancer Screening  11/20/2022    Hepatitis C Screening  Completed     Immunizations Due:      Topic Date Due    Pneumococcal Vaccine: 65+ Years (1 of 1 - PCV) Never done    Influenza Vaccine (1) 09/01/2023    COVID-19 Vaccine (3 - 2023-24 season) 09/01/2023     Advance Directives   What are advance directives?  Advance directives are legal documents that state your wishes and plans for medical care. These plans are made ahead of time in case you lose your ability to make decisions for yourself. Advance directives can apply to any medical decision, such as  the treatments you want, and if you want to donate organs.   What are the types of advance directives?  There are many types of advance directives, and each state has rules about how to use them. You may choose a combination of any of the following:  Living will:  This is a written record of the treatment you want. You can also choose which treatments you do not want, which to limit, and which to stop at a certain time. This includes surgery, medicine, IV fluid, and tube feedings.   Durable power of  for healthcare (DPAHC):  This is a written record that states who you want to make healthcare choices for you when you are unable to make them for yourself. This person, called a proxy, is usually a family member or a friend. You may choose more than 1 proxy.  Do not resuscitate (DNR) order:  A DNR order is used in case your heart stops beating or you stop breathing. It is a request not to have certain forms of treatment, such as CPR. A DNR order may be included in other types of advance directives.  Medical directive:  This covers the care that you want if you are in a coma, near death, or unable to make decisions for yourself. You can list the treatments you want for each condition. Treatment may include pain medicine, surgery, blood transfusions, dialysis, IV or tube feedings, and a ventilator (breathing machine).  Values history:  This document has questions about your views, beliefs, and how you feel and think about life. This information can help others choose the care that you would choose.  Why are advance directives important?  An advance directive helps you control your care. Although spoken wishes may be used, it is better to have your wishes written down. Spoken wishes can be misunderstood, or not followed. Treatments may be given even if you do not want them. An advance directive may make it easier for your family to make difficult choices about your care.   Weight Management   Why it is important  to manage your weight:  Being overweight increases your risk of health conditions such as heart disease, high blood pressure, type 2 diabetes, and certain types of cancer. It can also increase your risk for osteoarthritis, sleep apnea, and other respiratory problems. Aim for a slow, steady weight loss. Even a small amount of weight loss can lower your risk of health problems.  How to lose weight safely:  A safe and healthy way to lose weight is to eat fewer calories and get regular exercise. You can lose up about 1 pound a week by decreasing the number of calories you eat by 500 calories each day.   Healthy meal plan for weight management:  A healthy meal plan includes a variety of foods, contains fewer calories, and helps you stay healthy. A healthy meal plan includes the following:  Eat whole-grain foods more often.  A healthy meal plan should contain fiber. Fiber is the part of grains, fruits, and vegetables that is not broken down by your body. Whole-grain foods are healthy and provide extra fiber in your diet. Some examples of whole-grain foods are whole-wheat breads and pastas, oatmeal, brown rice, and bulgur.  Eat a variety of vegetables every day.  Include dark, leafy greens such as spinach, kale, dena greens, and mustard greens. Eat yellow and orange vegetables such as carrots, sweet potatoes, and winter squash.   Eat a variety of fruits every day.  Choose fresh or canned fruit (canned in its own juice or light syrup) instead of juice. Fruit juice has very little or no fiber.  Eat low-fat dairy foods.  Drink fat-free (skim) milk or 1% milk. Eat fat-free yogurt and low-fat cottage cheese. Try low-fat cheeses such as mozzarella and other reduced-fat cheeses.  Choose meat and other protein foods that are low in fat.  Choose beans or other legumes such as split peas or lentils. Choose fish, skinless poultry (chicken or turkey), or lean cuts of red meat (beef or pork). Before you cook meat or poultry, cut off  any visible fat.   Use less fat and oil.  Try baking foods instead of frying them. Add less fat, such as margarine, sour cream, regular salad dressing and mayonnaise to foods. Eat fewer high-fat foods. Some examples of high-fat foods include french fries, doughnuts, ice cream, and cakes.  Eat fewer sweets.  Limit foods and drinks that are high in sugar. This includes candy, cookies, regular soda, and sweetened drinks.  Exercise:  Exercise at least 30 minutes per day on most days of the week. Some examples of exercise include walking, biking, dancing, and swimming. You can also fit in more physical activity by taking the stairs instead of the elevator or parking farther away from stores. Ask your healthcare provider about the best exercise plan for you.      © Copyright [employer] 2018 Information is for End User's use only and may not be sold, redistributed or otherwise used for commercial purposes. All illustrations and images included in CareNotes® are the copyrighted property of [employer] or [employer]

## 2024-04-16 NOTE — PROGRESS NOTES
Assessment and Plan:   Continue current medications.  Follow up with specialists as scheduled.  Give trial of Tumeric for inflammation.  Follow up in 6 months, sooner as needed.  Problem List Items Addressed This Visit        Cardiovascular and Mediastinum    Primary hypertension    Relevant Orders    CBC and differential    Comprehensive metabolic panel       Genitourinary    CKD (chronic kidney disease) stage 3, GFR 30-59 ml/min ([facility])       Other    Umbilical hernia without obstruction and without gangrene    Relevant Orders    Ambulatory referral to General Surgery   Other Visit Diagnoses     Encounter for Medicare annual wellness exam    -  Primary    Screening for heart disease        Relevant Orders    Lipid panel    Screening for prostate cancer        Relevant Orders    PSA, Total Screen           Preventive health issues were discussed with patient, and age appropriate screening tests were ordered as noted in patient's After Visit Summary.  Personalized health advice and appropriate referrals for health education or preventive services given if needed, as noted in patient's After Visit Summary.     History of Present Illness:     Patient presents for a Medicare Wellness Visit.  Questionnaire has been reviewed, clarified, and updated with the patient today.    73-year-old male presents for his annual Medicare wellness visit.    Hypertension: On amlodipine 10 mg daily.  Good control.  Eduarda cp, palp, sob, edema, HA, dizziness, diaphoresis, syncope, visual disturbance.  Patient works out at the gym regularly.    CKD 3A: BUNs/creatinine/GFR are all stable.  Hydrating well.  Avoiding nephrotoxic substances.  Follows with nephrology on a regular basis.    Continues with umbilical pain that radiates slightly to the right.  As stated patient works out at the gym on a regular basis, does work with heavy weights, does a lot of abdominal muscle work.  Also has previous history of abdominal surgery.  Patient remarks  to me when he is lying flat and starts to sit up he sees a bulge just above his navel.    Has history of GERD: Uses pantoprazole on an as-needed basis.    EMR has been reviewed, clarified, and updated with patient today.       Patient Care Team:  Nino Hancock PA-C as PCP - General (Internal Medicine)  MD Rocael Segal MD     Review of Systems:     Review of Systems   Constitutional:  Negative for activity change, chills, fatigue and fever.   HENT:  Negative for congestion.    Eyes:  Negative for discharge.   Respiratory:  Negative for cough, chest tightness and shortness of breath.    Cardiovascular:  Negative for chest pain, palpitations and leg swelling.   Gastrointestinal:  Positive for abdominal pain. Negative for blood in stool, constipation, diarrhea, nausea and vomiting.   Endocrine: Negative for polydipsia, polyphagia and polyuria.   Genitourinary:  Negative for difficulty urinating.   Musculoskeletal:  Negative for arthralgias and myalgias.   Skin:  Negative for rash.   Allergic/Immunologic: Negative for immunocompromised state.   Neurological:  Negative for dizziness, syncope, weakness, light-headedness and headaches.   Hematological:  Negative for adenopathy. Does not bruise/bleed easily.   Psychiatric/Behavioral:  Negative for dysphoric mood, sleep disturbance and suicidal ideas. The patient is not nervous/anxious.         Problem List:     Patient Active Problem List   Diagnosis   • Benign prostatic hyperplasia, presence of lower urinary tract symptoms unspecified   • Erectile dysfunction   • Lumbar radiculopathy   • CKD (chronic kidney disease) stage 3, GFR 30-59 ml/min ([facility])   • Vertigo   • Chronic kidney disease-mineral and bone disorder   • Colon polyps   • Pain in left knee   • Primary hypertension   • Dysesthesia of multiple sites   • Umbilical pain   • Umbilical hernia without obstruction and without gangrene      Past Medical and Surgical History:     Past Medical History:    Diagnosis Date   • Benign prostatic hyperplasia    • Chronic kidney disease    • COVID-19 03/2020   • Esophageal reflux    • Hypertension    • Hypertensive urgency 3/26/2019   • Vertigo      Past Surgical History:   Procedure Laterality Date   • APPENDECTOMY  05/21/2015   • CYST REMOVAL      Fatty cyst removed from back      Family History:     Family History   Problem Relation Age of Onset   • Prostate cancer Father    • Prostate cancer Maternal Grandfather    • Stomach cancer Maternal Aunt         malignant tumor of pharynx   • Stomach cancer Maternal Uncle         malignant tumor of pharynx   • Mental illness Family    • Depression Family    • Schizophrenia Family    • Hypertension Mother    • No Known Problems Sister    • Dementia Sister       Social History:     Social History     Socioeconomic History   • Marital status: /Civil Union     Spouse name: None   • Number of children: None   • Years of education: None   • Highest education level: None   Occupational History   • Occupation: Retired   Tobacco Use   • Smoking status: Former   • Smokeless tobacco: Never   • Tobacco comments:     1ppw   Vaping Use   • Vaping status: Never Used   Substance and Sexual Activity   • Alcohol use: Yes     Comment: socially   • Drug use: No   • Sexual activity: Yes     Partners: Female     Comment: denied history of high risk sexual behavior   Other Topics Concern   • None   Social History Narrative    No active advance directive    Always uses seat belt    Exercises occasionally, moderate    Five children    Occasional caffeine consumption      Regular dental care. Brushes teeth twice daily       Retired     Social Determinants of Health     Financial Resource Strain: Low Risk  (4/5/2023)    Overall Financial Resource Strain (CARDIA)    • Difficulty of Paying Living Expenses: Not hard at all   Food Insecurity: No Food Insecurity (4/16/2024)    Hunger Vital Sign    • Worried About Running Out of Food in the Last Year:  Never true    • Ran Out of Food in the Last Year: Never true   Transportation Needs: No Transportation Needs (4/16/2024)    PRAPARE - Transportation    • Lack of Transportation (Medical): No    • Lack of Transportation (Non-Medical): No   Physical Activity: Not on file   Stress: Not on file   Social Connections: Not on file   Intimate Partner Violence: Not on file   Housing Stability: Low Risk  (4/16/2024)    Housing Stability Vital Sign    • Unable to Pay for Housing in the Last Year: No    • Number of Places Lived in the Last Year: 1    • Unstable Housing in the Last Year: No      Medications and Allergies:     Current Outpatient Medications   Medication Sig Dispense Refill   • amLODIPine (NORVASC) 10 mg tablet Take 1 tablet (10 mg total) by mouth daily 90 tablet 1   • cyclobenzaprine (FLEXERIL) 10 mg tablet Take 1 tablet (10 mg total) by mouth 2 (two) times a day as needed for muscle spasms (Patient taking differently: Take 10 mg by mouth if needed for muscle spasms) 30 tablet 1   • lidocaine (LIDODERM) 5 % Apply 1 patch topically over 12 hours if needed (Intermittent right low back pain with radiculopathy) Remove & Discard patch within 12 hours or as directed by MD 30 patch 3   • meclizine (ANTIVERT) 25 mg tablet Take 1 tablet (25 mg total) by mouth 3 (three) times a day as needed for dizziness 30 tablet 0   • multivitamin (THERAGRAN) TABS Take 1 tablet by mouth daily     • pantoprazole (PROTONIX) 40 mg tablet Take 40 mg by mouth daily     • tadalafil (CIALIS) 20 MG tablet if needed     • tamsulosin (FLOMAX) 0.4 mg Take 1 capsule (0.4 mg total) by mouth daily with dinner 90 capsule 3     No current facility-administered medications for this visit.     Allergies   Allergen Reactions   • Penicillin V Anaphylaxis      Immunizations:     Immunization History   Administered Date(s) Administered   • COVID-19 PFIZER VACCINE 0.3 ML IM 04/14/2021, 05/06/2021   • Influenza, high dose seasonal 0.7 mL 11/06/2019      Health  Maintenance:         Topic Date Due   • Colorectal Cancer Screening  11/20/2022   • Hepatitis C Screening  Completed         Topic Date Due   • Pneumococcal Vaccine: 65+ Years (1 of 1 - PCV) Never done   • Influenza Vaccine (1) 09/01/2023   • COVID-19 Vaccine (3 - 2023-24 season) 09/01/2023      Medicare Screening Tests and Risk Assessments:     Tomas is here for his Subsequent Wellness visit. Last Medicare Wellness visit information reviewed, patient interviewed and updates made to the record today.      Health Risk Assessment:   Patient rates overall health as good. Patient feels that their physical health rating is slightly better. Patient is satisfied with their life. Eyesight was rated as slightly worse. Hearing was rated as same. Patient feels that their emotional and mental health rating is same. Patients states they are never, rarely angry. Patient states they are never, rarely unusually tired/fatigued. Pain experienced in the last 7 days has been some. Patient's pain rating has been 1/10. Patient states that he has experienced no weight loss or gain in last 6 months. Umbilical pain    Fall Risk Screening:   In the past year, patient has experienced: no history of falling in past year      Home Safety:  Patient does not have trouble with stairs inside or outside of their home. Patient has working smoke alarms and has working carbon monoxide detector. Home safety hazards include: none.     Nutrition:   Current diet is Limited junk food.     Medications:   Patient is not currently taking any over-the-counter supplements. Patient is able to manage medications.     Activities of Daily Living (ADLs)/Instrumental Activities of Daily Living (IADLs):   Walk and transfer into and out of bed and chair?: Yes  Dress and groom yourself?: Yes    Bathe or shower yourself?: Yes    Feed yourself? Yes  Do your laundry/housekeeping?: Yes  Manage your money, pay your bills and track your expenses?: Yes  Make your own meals?:  Yes    Do your own shopping?: Yes    Previous Hospitalizations:   Any hospitalizations or ED visits within the last 12 months?: Yes    How many hospitalizations have you had in the last year?: 1-2    Hospitalization Comments: ER visit-dizziness    Advance Care Planning:   Living will: No    Durable POA for healthcare: No    Advanced directive counseling given: Yes    ACP document given: Yes      Cognitive Screening:   Provider or family/friend/caregiver concerned regarding cognition?: No    PREVENTIVE SCREENINGS      Cardiovascular Screening:    General: Screening Current    Due for: Lipid Panel      Diabetes Screening:     General: Screening Current    Due for: Blood Glucose      Colorectal Cancer Screening:     General: Screening Current    Due for: Colonoscopy - Low Risk      Prostate Cancer Screening:    General: Screening Current    Due for: PSA      Osteoporosis Screening:    General: Screening Not Indicated      Abdominal Aortic Aneurysm (AAA) Screening:    Risk factors include: age between 65-76 yo and tobacco use        General: Screening Current      Lung Cancer Screening:     General: Screening Not Indicated      Hepatitis C Screening:    General: Screening Current    Screening, Brief Intervention, and Referral to Treatment (SBIRT)    Screening  Typical number of drinks in a day: 0  Typical number of drinks in a week: 0  Interpretation: Low risk drinking behavior.    Brief Intervention  Alcohol & drug use screenings were reviewed. No concerns regarding substance use disorder identified.     No results found.     Physical Exam:     /70 (BP Location: Right arm, Patient Position: Sitting, Cuff Size: Standard)   Pulse 86   Ht 6' (1.829 m)   Wt 83.9 kg (185 lb)   SpO2 98%   BMI 25.09 kg/m²     Physical Exam  Vitals and nursing note reviewed.   Constitutional:       General: He is not in acute distress.     Appearance: He is well-developed. He is not ill-appearing.   HENT:      Head: Normocephalic  and atraumatic.      Nose: Nose normal.      Mouth/Throat:      Mouth: Mucous membranes are moist.      Pharynx: Oropharynx is clear.   Eyes:      Extraocular Movements: Extraocular movements intact.      Conjunctiva/sclera: Conjunctivae normal.      Pupils: Pupils are equal, round, and reactive to light.   Neck:      Thyroid: No thyromegaly.      Vascular: No carotid bruit or JVD.   Cardiovascular:      Rate and Rhythm: Normal rate and regular rhythm.      Heart sounds: No murmur heard.  Pulmonary:      Effort: Pulmonary effort is normal. No respiratory distress.      Breath sounds: Normal breath sounds. Rales: [id number].   Abdominal:      Palpations: Abdomen is soft.      Tenderness: There is no abdominal tenderness.   Musculoskeletal:         General: No swelling.      Cervical back: Neck supple.      Right lower leg: No edema.      Left lower leg: No edema.   Lymphadenopathy:      Cervical: No cervical adenopathy.   Skin:     General: Skin is warm and dry.      Capillary Refill: Capillary refill takes less than 2 seconds.      Findings: No rash.   Neurological:      General: No focal deficit present.      Mental Status: He is alert and oriented to person, place, and time. Mental status is at baseline.      Cranial Nerves: No cranial nerve deficit.      Sensory: No sensory deficit.      Motor: No weakness.      Gait: Gait normal.      Deep Tendon Reflexes: Reflexes normal.   Psychiatric:         Mood and Affect: Mood normal.         Behavior: Behavior normal.         Thought Content: Thought content normal.          Nino Hancock PA-C

## 2024-05-08 ENCOUNTER — HOSPITAL ENCOUNTER (EMERGENCY)
Facility: HOSPITAL | Age: 74
Discharge: HOME | End: 2024-05-09
Attending: EMERGENCY MEDICINE
Payer: COMMERCIAL

## 2024-05-08 ENCOUNTER — APPOINTMENT (EMERGENCY)
Dept: RADIOLOGY | Facility: HOSPITAL | Age: 74
End: 2024-05-08
Payer: COMMERCIAL

## 2024-05-08 DIAGNOSIS — J40 BRONCHITIS: Primary | ICD-10-CM

## 2024-05-08 LAB
ALBUMIN SERPL BCP-MCNC: 4.2 G/DL (ref 3.5–5)
ALP SERPL-CCNC: 58 U/L (ref 34–104)
ALT SERPL W P-5'-P-CCNC: 20 U/L (ref 7–52)
ANION GAP SERPL CALCULATED.3IONS-SCNC: 6 MMOL/L (ref 4–13)
AST SERPL W P-5'-P-CCNC: 21 U/L (ref 13–39)
BASOPHILS # BLD AUTO: 0.03 THOUSANDS/ÂΜL (ref 0–0.1)
BASOPHILS NFR BLD AUTO: 1 % (ref 0–1)
BILIRUB SERPL-MCNC: 0.43 MG/DL (ref 0.2–1)
BUN SERPL-MCNC: 19 MG/DL (ref 5–25)
CALCIUM SERPL-MCNC: 9.1 MG/DL (ref 8.4–10.2)
CARDIAC TROPONIN I PNL SERPL HS: 3 NG/L
CHLORIDE SERPL-SCNC: 104 MMOL/L (ref 96–108)
CO2 SERPL-SCNC: 28 MMOL/L (ref 21–32)
CREAT SERPL-MCNC: 1.37 MG/DL (ref 0.6–1.3)
EOSINOPHIL # BLD AUTO: 0.08 THOUSAND/ÂΜL (ref 0–0.61)
EOSINOPHIL NFR BLD AUTO: 2 % (ref 0–6)
ERYTHROCYTE [DISTWIDTH] IN BLOOD BY AUTOMATED COUNT: 13.3 % (ref 11.6–15.1)
GFR SERPL CREATININE-BSD FRML MDRD: 50 ML/MIN/1.73SQ M
GLUCOSE SERPL-MCNC: 83 MG/DL (ref 65–140)
HCT VFR BLD AUTO: 39.2 % (ref 36.5–49.3)
HGB BLD-MCNC: 13.3 G/DL (ref 12–17)
IMM GRANULOCYTES # BLD AUTO: 0 THOUSAND/UL (ref 0–0.2)
IMM GRANULOCYTES NFR BLD AUTO: 0 % (ref 0–2)
LYMPHOCYTES # BLD AUTO: 1.86 THOUSANDS/ÂΜL (ref 0.6–4.47)
LYMPHOCYTES NFR BLD AUTO: 41 % (ref 14–44)
MCH RBC QN AUTO: 30.4 PG (ref 26.8–34.3)
MCHC RBC AUTO-ENTMCNC: 33.9 G/DL (ref 31.4–37.4)
MCV RBC AUTO: 90 FL (ref 82–98)
MONOCYTES # BLD AUTO: 0.42 THOUSAND/ÂΜL (ref 0.17–1.22)
MONOCYTES NFR BLD AUTO: 9 % (ref 4–12)
NEUTROPHILS # BLD AUTO: 2.16 THOUSANDS/ÂΜL (ref 1.85–7.62)
NEUTS SEG NFR BLD AUTO: 47 % (ref 43–75)
NRBC BLD AUTO-RTO: 0 /100 WBCS
PLATELET # BLD AUTO: 227 THOUSANDS/UL (ref 149–390)
PMV BLD AUTO: 10.6 FL (ref 8.9–12.7)
POTASSIUM SERPL-SCNC: 4 MMOL/L (ref 3.5–5.3)
PROT SERPL-MCNC: 7.1 G/DL (ref 6.4–8.4)
RBC # BLD AUTO: 4.38 MILLION/UL (ref 3.88–5.62)
SODIUM SERPL-SCNC: 138 MMOL/L (ref 135–147)
WBC # BLD AUTO: 4.55 THOUSAND/UL (ref 4.31–10.16)

## 2024-05-08 PROCEDURE — 85025 COMPLETE CBC W/AUTO DIFF WBC: CPT | Performed by: EMERGENCY MEDICINE

## 2024-05-08 PROCEDURE — 36415 COLL VENOUS BLD VENIPUNCTURE: CPT | Performed by: EMERGENCY MEDICINE

## 2024-05-08 PROCEDURE — 80053 COMPREHEN METABOLIC PANEL: CPT | Performed by: EMERGENCY MEDICINE

## 2024-05-08 PROCEDURE — 94640 AIRWAY INHALATION TREATMENT: CPT

## 2024-05-08 PROCEDURE — 84484 ASSAY OF TROPONIN QUANT: CPT | Performed by: EMERGENCY MEDICINE

## 2024-05-08 PROCEDURE — 99283 EMERGENCY DEPT VISIT LOW MDM: CPT

## 2024-05-08 PROCEDURE — 71045 X-RAY EXAM CHEST 1 VIEW: CPT

## 2024-05-08 RX ORDER — PREDNISONE 20 MG/1
40 TABLET ORAL ONCE
Status: COMPLETED | OUTPATIENT
Start: 2024-05-08 | End: 2024-05-08

## 2024-05-08 RX ORDER — ALBUTEROL SULFATE 2.5 MG/3ML
5 SOLUTION RESPIRATORY (INHALATION) ONCE
Status: COMPLETED | OUTPATIENT
Start: 2024-05-08 | End: 2024-05-08

## 2024-05-08 RX ADMIN — PREDNISONE 40 MG: 20 TABLET ORAL at 22:19

## 2024-05-08 RX ADMIN — ALBUTEROL SULFATE 5 MG: 2.5 SOLUTION RESPIRATORY (INHALATION) at 22:19

## 2024-05-08 RX ADMIN — IPRATROPIUM BROMIDE 0.5 MG: 0.5 SOLUTION RESPIRATORY (INHALATION) at 22:19

## 2024-05-09 VITALS
SYSTOLIC BLOOD PRESSURE: 141 MMHG | HEART RATE: 60 BPM | DIASTOLIC BLOOD PRESSURE: 73 MMHG | RESPIRATION RATE: 14 BRPM | OXYGEN SATURATION: 98 % | TEMPERATURE: 97.9 F

## 2024-05-09 LAB
2HR DELTA HS TROPONIN: 0 NG/L
CARDIAC TROPONIN I PNL SERPL HS: 3 NG/L

## 2024-05-09 PROCEDURE — 36415 COLL VENOUS BLD VENIPUNCTURE: CPT | Performed by: EMERGENCY MEDICINE

## 2024-05-09 PROCEDURE — 99284 EMERGENCY DEPT VISIT MOD MDM: CPT | Performed by: EMERGENCY MEDICINE

## 2024-05-09 PROCEDURE — 84484 ASSAY OF TROPONIN QUANT: CPT | Performed by: EMERGENCY MEDICINE

## 2024-05-09 RX ORDER — PREDNISONE 20 MG/1
40 TABLET ORAL DAILY
Qty: 10 TABLET | Refills: 0 | OUTPATIENT
Start: 2024-05-09 | End: 2024-05-15

## 2024-05-09 RX ORDER — ALBUTEROL SULFATE 90 UG/1
2 AEROSOL, METERED RESPIRATORY (INHALATION) ONCE
Status: COMPLETED | OUTPATIENT
Start: 2024-05-09 | End: 2024-05-09

## 2024-05-09 RX ADMIN — ALBUTEROL SULFATE 2 PUFF: 90 AEROSOL, METERED RESPIRATORY (INHALATION) at 01:18

## 2024-05-14 ENCOUNTER — TELEPHONE (OUTPATIENT)
Age: 74
End: 2024-05-14

## 2024-05-14 ENCOUNTER — HOSPITAL ENCOUNTER (OUTPATIENT)
Facility: HOSPITAL | Age: 74
Setting detail: OBSERVATION
Discharge: HOME | End: 2024-05-15
Attending: EMERGENCY MEDICINE | Admitting: FAMILY MEDICINE
Payer: COMMERCIAL

## 2024-05-14 ENCOUNTER — APPOINTMENT (EMERGENCY)
Dept: RADIOLOGY | Facility: HOSPITAL | Age: 74
End: 2024-05-14
Payer: COMMERCIAL

## 2024-05-14 ENCOUNTER — APPOINTMENT (EMERGENCY)
Dept: CT IMAGING | Facility: HOSPITAL | Age: 74
End: 2024-05-14
Payer: COMMERCIAL

## 2024-05-14 DIAGNOSIS — M51.36 DEGENERATIVE DISC DISEASE, LUMBAR: ICD-10-CM

## 2024-05-14 DIAGNOSIS — M54.50 RIGHT LOW BACK PAIN: ICD-10-CM

## 2024-05-14 DIAGNOSIS — R29.90 STROKE-LIKE SYMPTOMS: ICD-10-CM

## 2024-05-14 DIAGNOSIS — I16.0 HYPERTENSIVE URGENCY: Primary | ICD-10-CM

## 2024-05-14 PROBLEM — F40.240 CLAUSTROPHOBIA: Status: ACTIVE | Noted: 2024-05-14

## 2024-05-14 PROBLEM — R27.0 ATAXIA: Status: ACTIVE | Noted: 2024-05-14

## 2024-05-14 LAB
2HR DELTA HS TROPONIN: 0 NG/L
ALBUMIN SERPL BCP-MCNC: 3.9 G/DL (ref 3.5–5)
ALP SERPL-CCNC: 52 U/L (ref 34–104)
ALT SERPL W P-5'-P-CCNC: 18 U/L (ref 7–52)
ANION GAP SERPL CALCULATED.3IONS-SCNC: 6 MMOL/L (ref 4–13)
AST SERPL W P-5'-P-CCNC: 14 U/L (ref 13–39)
ATRIAL RATE: 61 BPM
BASOPHILS # BLD AUTO: 0.03 THOUSANDS/ÂΜL (ref 0–0.1)
BASOPHILS NFR BLD AUTO: 0 % (ref 0–1)
BILIRUB SERPL-MCNC: 0.32 MG/DL (ref 0.2–1)
BUN SERPL-MCNC: 25 MG/DL (ref 5–25)
CALCIUM SERPL-MCNC: 9 MG/DL (ref 8.4–10.2)
CARDIAC TROPONIN I PNL SERPL HS: 4 NG/L
CARDIAC TROPONIN I PNL SERPL HS: 4 NG/L
CHLORIDE SERPL-SCNC: 103 MMOL/L (ref 96–108)
CO2 SERPL-SCNC: 27 MMOL/L (ref 21–32)
CREAT SERPL-MCNC: 1.27 MG/DL (ref 0.6–1.3)
EOSINOPHIL # BLD AUTO: 0.06 THOUSAND/ÂΜL (ref 0–0.61)
EOSINOPHIL NFR BLD AUTO: 1 % (ref 0–6)
ERYTHROCYTE [DISTWIDTH] IN BLOOD BY AUTOMATED COUNT: 13.3 % (ref 11.6–15.1)
GFR SERPL CREATININE-BSD FRML MDRD: 55 ML/MIN/1.73SQ M
GLUCOSE SERPL-MCNC: 86 MG/DL (ref 65–140)
HCT VFR BLD AUTO: 40.8 % (ref 36.5–49.3)
HGB BLD-MCNC: 13.7 G/DL (ref 12–17)
IMM GRANULOCYTES # BLD AUTO: 0.03 THOUSAND/UL (ref 0–0.2)
IMM GRANULOCYTES NFR BLD AUTO: 0 % (ref 0–2)
LYMPHOCYTES # BLD AUTO: 3.26 THOUSANDS/ÂΜL (ref 0.6–4.47)
LYMPHOCYTES NFR BLD AUTO: 42 % (ref 14–44)
MCH RBC QN AUTO: 30.4 PG (ref 26.8–34.3)
MCHC RBC AUTO-ENTMCNC: 33.6 G/DL (ref 31.4–37.4)
MCV RBC AUTO: 91 FL (ref 82–98)
MONOCYTES # BLD AUTO: 0.58 THOUSAND/ÂΜL (ref 0.17–1.22)
MONOCYTES NFR BLD AUTO: 8 % (ref 4–12)
NEUTROPHILS # BLD AUTO: 3.74 THOUSANDS/ÂΜL (ref 1.85–7.62)
NEUTS SEG NFR BLD AUTO: 49 % (ref 43–75)
NRBC BLD AUTO-RTO: 0 /100 WBCS
P AXIS: 62 DEGREES
PLATELET # BLD AUTO: 232 THOUSANDS/UL (ref 149–390)
PMV BLD AUTO: 10.5 FL (ref 8.9–12.7)
POTASSIUM SERPL-SCNC: 3.6 MMOL/L (ref 3.5–5.3)
PR INTERVAL: 146 MS
PROT SERPL-MCNC: 6.3 G/DL (ref 6.4–8.4)
QRS AXIS: 50 DEGREES
QRSD INTERVAL: 90 MS
QT INTERVAL: 394 MS
QTC INTERVAL: 396 MS
RBC # BLD AUTO: 4.5 MILLION/UL (ref 3.88–5.62)
SODIUM SERPL-SCNC: 136 MMOL/L (ref 135–147)
T WAVE AXIS: 80 DEGREES
VENTRICULAR RATE: 61 BPM
WBC # BLD AUTO: 7.7 THOUSAND/UL (ref 4.31–10.16)

## 2024-05-14 PROCEDURE — 96360 HYDRATION IV INFUSION INIT: CPT

## 2024-05-14 PROCEDURE — 80053 COMPREHEN METABOLIC PANEL: CPT | Performed by: EMERGENCY MEDICINE

## 2024-05-14 PROCEDURE — 36415 COLL VENOUS BLD VENIPUNCTURE: CPT

## 2024-05-14 PROCEDURE — 96361 HYDRATE IV INFUSION ADD-ON: CPT

## 2024-05-14 PROCEDURE — 70498 CT ANGIOGRAPHY NECK: CPT

## 2024-05-14 PROCEDURE — 93005 ELECTROCARDIOGRAM TRACING: CPT

## 2024-05-14 PROCEDURE — 93010 ELECTROCARDIOGRAM REPORT: CPT | Performed by: INTERNAL MEDICINE

## 2024-05-14 PROCEDURE — 99285 EMERGENCY DEPT VISIT HI MDM: CPT | Performed by: EMERGENCY MEDICINE

## 2024-05-14 PROCEDURE — 99284 EMERGENCY DEPT VISIT MOD MDM: CPT

## 2024-05-14 PROCEDURE — 70496 CT ANGIOGRAPHY HEAD: CPT

## 2024-05-14 PROCEDURE — 99223 1ST HOSP IP/OBS HIGH 75: CPT | Performed by: FAMILY MEDICINE

## 2024-05-14 PROCEDURE — 85025 COMPLETE CBC W/AUTO DIFF WBC: CPT | Performed by: EMERGENCY MEDICINE

## 2024-05-14 PROCEDURE — 84484 ASSAY OF TROPONIN QUANT: CPT | Performed by: EMERGENCY MEDICINE

## 2024-05-14 PROCEDURE — 71045 X-RAY EXAM CHEST 1 VIEW: CPT

## 2024-05-14 RX ORDER — MECLIZINE HYDROCHLORIDE 25 MG/1
25 TABLET ORAL 3 TIMES DAILY PRN
Status: DISCONTINUED | OUTPATIENT
Start: 2024-05-14 | End: 2024-05-15 | Stop reason: HOSPADM

## 2024-05-14 RX ORDER — AMLODIPINE BESYLATE 5 MG/1
10 TABLET ORAL DAILY
Status: DISCONTINUED | OUTPATIENT
Start: 2024-05-15 | End: 2024-05-15 | Stop reason: HOSPADM

## 2024-05-14 RX ORDER — MECLIZINE HYDROCHLORIDE 25 MG/1
25 TABLET ORAL ONCE
Status: COMPLETED | OUTPATIENT
Start: 2024-05-14 | End: 2024-05-14

## 2024-05-14 RX ORDER — ASPIRIN 81 MG/1
81 TABLET, CHEWABLE ORAL DAILY
Status: DISCONTINUED | OUTPATIENT
Start: 2024-05-15 | End: 2024-05-15 | Stop reason: HOSPADM

## 2024-05-14 RX ORDER — ENOXAPARIN SODIUM 100 MG/ML
40 INJECTION SUBCUTANEOUS DAILY
Status: DISCONTINUED | OUTPATIENT
Start: 2024-05-15 | End: 2024-05-15 | Stop reason: HOSPADM

## 2024-05-14 RX ORDER — TAMSULOSIN HYDROCHLORIDE 0.4 MG/1
0.4 CAPSULE ORAL
Status: DISCONTINUED | OUTPATIENT
Start: 2024-05-15 | End: 2024-05-15 | Stop reason: HOSPADM

## 2024-05-14 RX ORDER — ATORVASTATIN CALCIUM 40 MG/1
40 TABLET, FILM COATED ORAL EVERY EVENING
Status: DISCONTINUED | OUTPATIENT
Start: 2024-05-14 | End: 2024-05-15 | Stop reason: HOSPADM

## 2024-05-14 RX ORDER — PANTOPRAZOLE SODIUM 40 MG/1
40 TABLET, DELAYED RELEASE ORAL DAILY
Status: DISCONTINUED | OUTPATIENT
Start: 2024-05-15 | End: 2024-05-15 | Stop reason: HOSPADM

## 2024-05-14 RX ADMIN — MECLIZINE HYDROCHLORIDE 25 MG: 25 TABLET ORAL at 16:49

## 2024-05-14 RX ADMIN — SODIUM CHLORIDE 1000 ML: 0.9 INJECTION, SOLUTION INTRAVENOUS at 16:49

## 2024-05-14 RX ADMIN — IOHEXOL 100 ML: 350 INJECTION, SOLUTION INTRAVENOUS at 17:20

## 2024-05-14 NOTE — ED PROVIDER NOTES
History  Chief Complaint   Patient presents with    Hypertension     Patient reports BP of 180 systolic today, reports hx of vertigo but states they became dizzy and blurred vision last night that has persisted today. Hx of HTN took their regular amlodipine dose today.        Hypertension      Prior to Admission Medications   Prescriptions Last Dose Informant Patient Reported? Taking?   amLODIPine (NORVASC) 10 mg tablet   No No   Sig: Take 1 tablet (10 mg total) by mouth daily   cyclobenzaprine (FLEXERIL) 10 mg tablet  Self No No   Sig: Take 1 tablet (10 mg total) by mouth 2 (two) times a day as needed for muscle spasms   Patient taking differently: Take 10 mg by mouth if needed for muscle spasms   lidocaine (LIDODERM) 5 %  Self No No   Sig: Apply 1 patch topically over 12 hours if needed (Intermittent right low back pain with radiculopathy) Remove & Discard patch within 12 hours or as directed by MD   meclizine (ANTIVERT) 25 mg tablet  Self No No   Sig: Take 1 tablet (25 mg total) by mouth 3 (three) times a day as needed for dizziness   multivitamin (THERAGRAN) TABS  Self Yes No   Sig: Take 1 tablet by mouth daily   pantoprazole (PROTONIX) 40 mg tablet  Self Yes No   Sig: Take 40 mg by mouth daily   predniSONE 20 mg tablet   No No   Sig: Take 2 tablets (40 mg total) by mouth daily for 5 days   tadalafil (CIALIS) 20 MG tablet  Self Yes No   Sig: if needed   tamsulosin (FLOMAX) 0.4 mg  Self No No   Sig: Take 1 capsule (0.4 mg total) by mouth daily with dinner      Facility-Administered Medications: None       Past Medical History:   Diagnosis Date    Benign prostatic hyperplasia     Chronic kidney disease     COVID-19 03/2020    Esophageal reflux     Hypertension     Hypertensive urgency 3/26/2019    Vertigo        Past Surgical History:   Procedure Laterality Date    APPENDECTOMY  05/21/2015    CYST REMOVAL      Fatty cyst removed from back       Family History   Problem Relation Age of Onset    Prostate cancer  Father     Prostate cancer Maternal Grandfather     Stomach cancer Maternal Aunt         malignant tumor of pharynx    Stomach cancer Maternal Uncle         malignant tumor of pharynx    Mental illness Family     Depression Family     Schizophrenia Family     Hypertension Mother     No Known Problems Sister     Dementia Sister      I have reviewed and agree with the history as documented.    E-Cigarette/Vaping    E-Cigarette Use Never User      E-Cigarette/Vaping Substances    Nicotine No     THC No     CBD No     Flavoring No     Other No     Unknown No      Social History     Tobacco Use    Smoking status: Former    Smokeless tobacco: Never    Tobacco comments:     1ppw   Vaping Use    Vaping status: Never Used   Substance Use Topics    Alcohol use: Yes     Comment: socially    Drug use: No       Review of Systems    Physical Exam  Physical Exam    Vital Signs  ED Triage Vitals [05/14/24 1404]   Temperature Pulse Respirations Blood Pressure SpO2   97.8 °F (36.6 °C) 66 18 156/82 99 %      Temp Source Heart Rate Source Patient Position - Orthostatic VS BP Location FiO2 (%)   Temporal Monitor Sitting Left arm --      Pain Score       No Pain           Vitals:    05/14/24 1404   BP: 156/82   Pulse: 66   Patient Position - Orthostatic VS: Sitting         Visual Acuity  Visual Acuity      Flowsheet Row Most Recent Value   L Pupil Size (mm) 3   R Pupil Size (mm) 3            ED Medications  Medications - No data to display    Diagnostic Studies  Results Reviewed       Procedure Component Value Units Date/Time    HS Troponin I 2hr [[id number]]     Lab Status: No result Specimen: Blood     HS Troponin 0hr (reflex protocol) [[id number]]  (Normal) Collected: 05/14/24 1411    Lab Status: Final result Specimen: Blood from Arm, Right Updated: 05/14/24 1439     hs TnI 0hr 4 ng/L     Comprehensive metabolic panel [[id number]]  (Abnormal) Collected: 05/14/24 1411    Lab Status: Final result Specimen: Blood from Arm, Right Updated:  05/14/24 1432     Sodium 136 mmol/L      Potassium 3.6 mmol/L      Chloride 103 mmol/L      CO2 27 mmol/L      ANION GAP 6 mmol/L      BUN 25 mg/dL      Creatinine 1.27 mg/dL      Glucose 86 mg/dL      Calcium 9.0 mg/dL      AST 14 U/L      ALT 18 U/L      Alkaline Phosphatase 52 U/L      Total Protein 6.3 g/dL      Albumin 3.9 g/dL      Total Bilirubin 0.32 mg/dL      eGFR 55 ml/min/1.73sq m     Narrative:      National Kidney Disease Foundation guidelines for Chronic Kidney Disease (CKD):     Stage 1 with normal or high GFR (GFR > 90 mL/min/1.73 square meters)    Stage 2 Mild CKD (GFR = 60-89 mL/min/1.73 square meters)    Stage 3A Moderate CKD (GFR = 45-59 mL/min/1.73 square meters)    Stage 3B Moderate CKD (GFR = 30-44 mL/min/1.73 square meters)    Stage 4 Severe CKD (GFR = 15-29 mL/min/1.73 square meters)    Stage 5 End Stage CKD (GFR <15 mL/min/1.73 square meters)  Note: GFR calculation is accurate only with a steady state creatinine    CBC and differential [[id number]] Collected: 05/14/24 1411    Lab Status: Final result Specimen: Blood from Arm, Right Updated: 05/14/24 1416     WBC 7.70 Thousand/uL      RBC 4.50 Million/uL      Hemoglobin 13.7 g/dL      Hematocrit 40.8 %      MCV 91 fL      MCH 30.4 pg      MCHC 33.6 g/dL      RDW 13.3 %      MPV 10.5 fL      Platelets 232 Thousands/uL      nRBC 0 /100 WBCs      Segmented % 49 %      Immature Grans % 0 %      Lymphocytes % 42 %      Monocytes % 8 %      Eosinophils Relative 1 %      Basophils Relative 0 %      Absolute Neutrophils 3.74 Thousands/µL      Absolute Immature Grans 0.03 Thousand/uL      Absolute Lymphocytes 3.26 Thousands/µL      Absolute Monocytes 0.58 Thousand/µL      Eosinophils Absolute 0.06 Thousand/µL      Basophils Absolute 0.03 Thousands/µL                    XR chest 1 view portable    (Results Pending)              Procedures  Procedures         ED Course                                             Medical Decision Making  Amount and/or  Complexity of Data Reviewed  Labs: ordered.  Radiology: ordered.             Disposition  Final diagnoses:   None     ED Disposition       None          Follow-up Information    None         Patient's Medications   Discharge Prescriptions    No medications on file       No discharge procedures on file.    PDMP Review       None            ED Provider  Electronically Signed by               Hemoglobin A1C 5.7 %       mg/dl     HS Troponin I 2hr [[id number]]  (Normal) Collected: 05/14/24 1649    Lab Status: Final result Specimen: Blood from Arm, Right Updated: 05/14/24 1722     hs TnI 2hr 4 ng/L      Delta 2hr hsTnI 0 ng/L     HS Troponin 0hr (reflex protocol) [[id number]]  (Normal) Collected: 05/14/24 1411    Lab Status: Final result Specimen: Blood from Arm, Right Updated: 05/14/24 1439     hs TnI 0hr 4 ng/L     Comprehensive metabolic panel [[id number]]  (Abnormal) Collected: 05/14/24 1411    Lab Status: Final result Specimen: Blood from Arm, Right Updated: 05/14/24 1432     Sodium 136 mmol/L      Potassium 3.6 mmol/L      Chloride 103 mmol/L      CO2 27 mmol/L      ANION GAP 6 mmol/L      BUN 25 mg/dL      Creatinine 1.27 mg/dL      Glucose 86 mg/dL      Calcium 9.0 mg/dL      AST 14 U/L      ALT 18 U/L      Alkaline Phosphatase 52 U/L      Total Protein 6.3 g/dL      Albumin 3.9 g/dL      Total Bilirubin 0.32 mg/dL      eGFR 55 ml/min/1.73sq m     Narrative:      National Kidney Disease Foundation guidelines for Chronic Kidney Disease (CKD):     Stage 1 with normal or high GFR (GFR > 90 mL/min/1.73 square meters)    Stage 2 Mild CKD (GFR = 60-89 mL/min/1.73 square meters)    Stage 3A Moderate CKD (GFR = 45-59 mL/min/1.73 square meters)    Stage 3B Moderate CKD (GFR = 30-44 mL/min/1.73 square meters)    Stage 4 Severe CKD (GFR = 15-29 mL/min/1.73 square meters)    Stage 5 End Stage CKD (GFR <15 mL/min/1.73 square meters)  Note: GFR calculation is accurate only with a steady state creatinine    CBC and differential [[id number]] Collected: 05/14/24 1411    Lab Status: Final result Specimen: Blood from Arm, Right Updated: 05/14/24 1416     WBC 7.70 Thousand/uL      RBC 4.50 Million/uL      Hemoglobin 13.7 g/dL      Hematocrit 40.8 %      MCV 91 fL      MCH 30.4 pg      MCHC 33.6 g/dL      RDW 13.3 %      MPV 10.5 fL      Platelets 232 Thousands/uL      nRBC 0 /100 WBCs      Segmented % 49 %       Immature Grans % 0 %      Lymphocytes % 42 %      Monocytes % 8 %      Eosinophils Relative 1 %      Basophils Relative 0 %      Absolute Neutrophils 3.74 Thousands/µL      Absolute Immature Grans 0.03 Thousand/uL      Absolute Lymphocytes 3.26 Thousands/µL      Absolute Monocytes 0.58 Thousand/µL      Eosinophils Absolute 0.06 Thousand/µL      Basophils Absolute 0.03 Thousands/µL                    MRI brain wo contrast   Final Result by E. Alec Schoenberger, MD (05/15 0903)      No acute intracranial pathology.      Workstation performed: [id number]         CTA head and neck with and without contrast   ED Interpretation by Mabel Cruz DO (05/14 1819)   No acute intracranial abnormality.     Negative CTA head and neck for large vessel occlusion, dissection, aneurysm, or high-grade stenosis.           Final Result by Saurav Jaime MD (05/14 1752)      No acute intracranial abnormality.      Negative CTA head and neck for large vessel occlusion, dissection, aneurysm, or high-grade stenosis.      Additional chronic/incidental findings as detailed above.                        Workstation performed: [id number]         XR chest 1 view portable   ED Interpretation by Mabel Cruz DO (05/14 1706)   No acute cardiopulmonary disease      Final Result by Trev Levy MD (05/15 0931)      No acute cardiopulmonary disease.            Workstation performed: [id number]                    Procedures  Procedures         ED Course                               SBIRT 22yo+      Flowsheet Row Most Recent Value   Initial Alcohol Screen: US AUDIT-C     1. How often do you have a drink containing alcohol? 1 Filed at: 05/14/2024 1651   2. How many drinks containing alcohol do you have on a typical day you are drinking?  1 Filed at: 05/14/2024 1651   3b. FEMALE Any Age, or MALE 65+: How often do you have 4 or more drinks on one occassion? 0 Filed at: 05/14/2024 1651   Audit-C Score 2 Filed at:  05/14/2024 1651   CRISTIAN: How many times in the past year have you...    Used an illegal drug or used a prescription medication for non-medical reasons? Never Filed at: 05/14/2024 1651                      Medical Decision Making  Hypertensive urgency with strokelike symptoms, dizziness.  Patient feels improved after hypertensive urgency is resolved, however he continues to have some residual dizziness and is admitted to the stroke pathway.    Amount and/or Complexity of Data Reviewed  Labs: ordered.  Radiology: ordered and independent interpretation performed.    Risk  Prescription drug management.  Decision regarding hospitalization.             Disposition  Final diagnoses:   Hypertensive urgency   Stroke-like symptoms     Time reflects when diagnosis was documented in both MDM as applicable and the Disposition within this note       Time User Action Codes Description Comment    5/14/2024  7:22 PM Mabel Cruz Add [I16.0] Hypertensive urgency     5/14/2024  7:22 PM Mabel Cruz Add [R29.90] Stroke-like symptoms     5/15/2024 11:14 AM Karine Huerta Add [M54.50] Right low back pain     5/15/2024 11:14 AM Karine Huerta Add [M51.36] Degenerative disc disease, lumbar           ED Disposition       ED Disposition   Admit    Condition   Stable    Date/Time   Tue May 14, 2024 1922    Comment   Case was discussed with Truong Randolph and the patient's admission status was agreed to be Admission Status: observation status to the service of Dr. Randolph .               Follow-up Information       Follow up With Specialties Details Why Contact Info    Nino Hancock PA-C Internal Medicine, Physician Assistant Schedule an appointment as soon as possible for a visit in 1 week(s)  [address]  [facility] [address]  [phone]              Discharge Medication List as of 5/15/2024 12:32 PM        CONTINUE these medications which have CHANGED    Details   cyclobenzaprine (FLEXERIL) 10 mg tablet Take 1  tablet (10 mg total) by mouth if needed for muscle spasms, Starting Wed 5/15/2024, No Print           CONTINUE these medications which have NOT CHANGED    Details   amLODIPine (NORVASC) 10 mg tablet Take 1 tablet (10 mg total) by mouth daily, Starting Mon 4/1/2024, Normal      lidocaine (LIDODERM) 5 % Apply 1 patch topically over 12 hours if needed (Intermittent right low back pain with radiculopathy) Remove & Discard patch within 12 hours or as directed by MD, Starting Wed 5/3/2023, Normal      meclizine (ANTIVERT) 25 mg tablet Take 1 tablet (25 mg total) by mouth 3 (three) times a day as needed for dizziness, Starting Wed 10/18/2023, Normal      multivitamin (THERAGRAN) TABS Take 1 tablet by mouth daily, Historical Med      pantoprazole (PROTONIX) 40 mg tablet Take 40 mg by mouth daily, Starting Fri 5/19/2023, Historical Med      tadalafil (CIALIS) 20 MG tablet if needed, Historical Med      tamsulosin (FLOMAX) 0.4 mg Take 1 capsule (0.4 mg total) by mouth daily with dinner, Starting Tue 1/5/2021, Normal           STOP taking these medications       predniSONE 20 mg tablet Comments:   Reason for Stopping:               Outpatient Discharge Orders   Activity as tolerated     Call provider for:  severe uncontrolled pain     Call provider for:  persistent dizziness or light-headedness       PDMP Review       None            ED Provider  Electronically Signed by             Mabel Cruz DO  06/01/24 0512

## 2024-05-14 NOTE — Clinical Note
Eduarda Hsu accompanied Tomas Robert to the emergency department on 5/14/2024.    Return date if applicable: 05/16/2024        If you have any questions or concerns, please don't hesitate to call.      Giorgio Regalado RN

## 2024-05-14 NOTE — ED NOTES
Patient ambulated to restroom and back to assigned room independently. Patient alert and oriented with no current requests. Plan of care ongoing. Vital signs stable.      Daina Cardona RN  05/14/24 8073

## 2024-05-14 NOTE — Clinical Note
accompanied Tomas Robert to the emergency department on 5/14/2024.    Return date if applicable: 05/16/2024        If you have any questions or concerns, please don't hesitate to call.      Giorgio Regalado RN

## 2024-05-14 NOTE — Clinical Note
accompanied Tomas Robert to the emergency department on 5/14/2024.    Return date if applicable:         If you have any questions or concerns, please don't hesitate to call.      Mabel Cruz, DO

## 2024-05-15 ENCOUNTER — APPOINTMENT (OUTPATIENT)
Dept: MRI IMAGING | Facility: HOSPITAL | Age: 74
End: 2024-05-15
Payer: COMMERCIAL

## 2024-05-15 VITALS
OXYGEN SATURATION: 99 % | TEMPERATURE: 98 F | RESPIRATION RATE: 18 BRPM | SYSTOLIC BLOOD PRESSURE: 135 MMHG | DIASTOLIC BLOOD PRESSURE: 70 MMHG | HEART RATE: 58 BPM

## 2024-05-15 LAB
EST. AVERAGE GLUCOSE BLD GHB EST-MCNC: 117 MG/DL
HBA1C MFR BLD: 5.7 %

## 2024-05-15 PROCEDURE — 36415 COLL VENOUS BLD VENIPUNCTURE: CPT | Performed by: FAMILY MEDICINE

## 2024-05-15 PROCEDURE — 83036 HEMOGLOBIN GLYCOSYLATED A1C: CPT | Performed by: FAMILY MEDICINE

## 2024-05-15 PROCEDURE — 99214 OFFICE O/P EST MOD 30 MIN: CPT | Performed by: STUDENT IN AN ORGANIZED HEALTH CARE EDUCATION/TRAINING PROGRAM

## 2024-05-15 PROCEDURE — 70551 MRI BRAIN STEM W/O DYE: CPT

## 2024-05-15 PROCEDURE — NC001 PR NO CHARGE: Performed by: STUDENT IN AN ORGANIZED HEALTH CARE EDUCATION/TRAINING PROGRAM

## 2024-05-15 RX ORDER — LORAZEPAM 0.5 MG/1
0.5 TABLET ORAL ONCE AS NEEDED
Status: COMPLETED | OUTPATIENT
Start: 2024-05-15 | End: 2024-05-15

## 2024-05-15 RX ORDER — CYCLOBENZAPRINE HCL 10 MG
10 TABLET ORAL AS NEEDED
Start: 2024-05-15

## 2024-05-15 RX ADMIN — LORAZEPAM 0.5 MG: 0.5 TABLET ORAL at 07:05

## 2024-05-15 RX ADMIN — PANTOPRAZOLE SODIUM 40 MG: 40 TABLET, DELAYED RELEASE ORAL at 09:01

## 2024-05-15 RX ADMIN — AMLODIPINE BESYLATE 10 MG: 5 TABLET ORAL at 09:00

## 2024-05-15 NOTE — ASSESSMENT & PLAN NOTE
Patient is reluctant to do MRI without antianxiety medications.  Patient said he will try it with Ativan tomorrow and see if he is able to do it.

## 2024-05-15 NOTE — CASE MANAGEMENT
Case Management Assessment & Discharge Planning Note    Patient name Tomas Robert  Location ED 22/ED 22 MRN [id number]  : 1950 Date 5/15/2024       Current Admission Date: 2024  Current Admission Diagnosis:Stroke-like symptoms   Patient Active Problem List    Diagnosis Date Noted Date Diagnosed    Ataxia 2024     Claustrophobia 2024     Umbilical hernia without obstruction and without gangrene 2024     Umbilical pain 2024     Dysesthesia of multiple sites 2023     Primary hypertension 2022     Stroke-like symptoms 2022     Pain in left knee 2021     Colon polyps 2021     Chronic kidney disease-mineral and bone disorder 2020     Vertigo 2020     CKD (chronic kidney disease) stage 3, GFR 30-59 ml/min ([facility]) 2019     Lumbar radiculopathy 2018     Erectile dysfunction 10/11/2017     Benign prostatic hyperplasia, presence of lower urinary tract symptoms unspecified 2016       LOS (days): 0  Geometric Mean LOS (GMLOS) (days):   Days to GMLOS:     OBJECTIVE:          Current admission status: Observation       Preferred Pharmacy:   [facility]/pharmacy #[id number] - [facility] [address] [address]  Phone: [phone] Fax: [phone]    [facility] [place] [facility]  [place] [address]  Phone: [phone] Fax: [phone]    Primary Care Provider: Nino Hancock PA-C    Primary Insurance: [facility] REP  Secondary Insurance:     ASSESSMENT:  Active Health Care Proxies       Eduarda Hsu Health Care Agent - Spouse   Primary Phone: [phone] (Mobile)  Home Phone: [phone]                 Advance Directives  Does patient have a Health Care POA?: Yes  Does patient have Advance Directives?: Yes  Advance Directives: Living will, Power of  for health care  Primary Contact: Eduarda (Spouse)   [phone]    Readmission Root Cause  30 Day Readmission: No    Patient Information  Admitted from:: Home  Mental Status: Alert  During Assessment patient was accompanied by: Friend  Assessment information provided by:: Patient  Primary Caregiver: Self  Support Systems: Self, Spouse/significant other  County of Residence: [place]  [place]  Home entry access options. Select all that apply.: Stairs  Number of steps to enter home.: 2  Do the steps have railings?: No  Type of Current Residence: 2 story home  Upon entering residence, is there a bedroom on the main floor (no further steps)?: No  A bedroom is located on the following floor levels of residence (select all that apply):: 2nd Floor  Upon entering residence, is there a bathroom on the main floor (no further steps)?: No  Indicate which floors of current residence have a bathroom (select all the apply):: 2nd Floor  Number of steps to 2nd floor from main floor: One Flight  Living Arrangements: Lives w/ Spouse/significant other  Is patient a ?: No    Activities of Daily Living Prior to Admission  Functional Status: Independent  Completes ADLs independently?: Yes  Ambulates independently?: Yes  Does patient use assisted devices?: No  Does patient currently own DME?: No  Does patient have a history of Outpatient Therapy (PT/OT)?: No  Does the patient have a history of Short-Term Rehab?: No  Does patient have a history of HHC?: Yes (Post Op around 2015, does not recall agency name)  Does patient currently have HHC?: No    Patient Information Continued  Income Source: Pension/FDC  Does patient have prescription coverage?: Yes  Does patient receive dialysis treatments?: No  Does patient have a history of substance abuse?: No  Does patient have a history of Mental Health Diagnosis?: No    Means of Transportation  Means of Transport to Appts:: Drives Self      Social Determinants of Health (SDOH)      Flowsheet Row Most  Recent Value   Housing Stability    In the last 12 months, was there a time when you were not able to pay the mortgage or rent on time? N   In the past 12 months, how many times have you moved where you were living? 1   At any time in the past 12 months, were you homeless or living in a shelter (including now)? N   Transportation Needs    In the past 12 months, has lack of transportation kept you from medical appointments or from getting medications? no   In the past 12 months, has lack of transportation kept you from meetings, work, or from getting things needed for daily living? No   Food Insecurity    Within the past 12 months, the food you bought just didn't last and you didn't have money to get more. Never true   Utilities    In the past 12 months has the electric, gas, oil, or water company threatened to shut off services in your home? No            DISCHARGE DETAILS:    Discharge planning discussed with:: Patient at bedside  Freedom of Choice: Yes  Comments - Freedom of Choice: CM maintained freedom of choice as it pertains to discharge planning. Patient plans to return home at discharge. Patient declined HHC, DME needs, and an OP CM. Patient independent at baseline, lives at home w wife, drives. Patient reports no barriers to obtaining his prescriptions and reports he will follow up w his PCP at discharge. No CM needs anticipated. Patient reports his friend who is at bedside with him currently will transport him home at discharge.  CM contacted family/caregiver?: No- see comments (pt declined)  Were Treatment Team discharge recommendations reviewed with patient/caregiver?: Yes  Did patient/caregiver verbalize understanding of patient care needs?: Yes  Were patient/caregiver advised of the risks associated with not following Treatment Team discharge recommendations?: Yes    Requested Home Health Care         Is the patient interested in HHC at discharge?: No    DME Referral Provided  Referral made for DME?:  No    Other Referral/Resources/Interventions Provided:  Interventions: None Indicated    Would you like to participate in our [facility] service program?  : No - Declined    Treatment Team Recommendation: Home  Discharge Destination Plan:: Home  Transport at Discharge : Family

## 2024-05-15 NOTE — ASSESSMENT & PLAN NOTE
See plan under #1  This could also be secondary to uncontrolled hypertension and benign positional vertigo which the patient has a history of

## 2024-05-15 NOTE — SPEECH THERAPY NOTE
Speech Language/Pathology      Patient passed nursing dysphagia screen; presently on oral diet.  Need for formal evaluation of swallow function is not indicated at this time.  Please re-order should status change or concerns arise.     Saloni Valle MS, CCC-SLP  Speech-Language Pathologist  [place] #[id number]  [place] #[id number]

## 2024-05-15 NOTE — ASSESSMENT & PLAN NOTE
Patient presented to the ER with complaints of lightheadedness, dizziness and ataxia with associated hypertensive urgency prior to admission.  Noted to have a blood pressure of 220/110 per EMS documentation  CTA Head/Neck (5/14/24): No acute intracranial abnormality. Negative CTA head and neck for large vessel occlusion, dissection, aneurysm, or high-grade stenosis.  Neurology consult, appreciate input  Initiate patient on stroke pathway  MRI Brain ordered, pending.  Echocardiogram  Check updated lipid panel/hemoglobin A1C  Continue with Aspirin/Statin  Monitor on telemetry  PT/OT/Speech consult  Case management consult for dispo planning

## 2024-05-15 NOTE — ASSESSMENT & PLAN NOTE
Lab Results   Component Value Date    EGFR 55 05/14/2024    EGFR 50 05/08/2024    EGFR 49 04/08/2024    CREATININE 1.27 05/14/2024    CREATININE 1.37 (H) 05/08/2024    CREATININE 1.40 (H) 04/08/2024   Renal functions at baseline  Continue to monitor

## 2024-05-15 NOTE — ASSESSMENT & PLAN NOTE
73-year-old with past medical history of hypertension, vertigo, GERD, CKD who presents to [facility] with symptoms of lightheadedness, dizziness and ataxia yesterday and today, the symptoms were transient and have resolved.  It was reported in the ED the NIH stroke scale to be 0.      CTA of head and neck:  No acute intracranial abnormality.  Negative CTA head and neck for large vessel occlusion, dissection, aneurysm, or high-grade stenosis.  MRI of brain with: No acute intracranial pathology.   Vital signs in the ED, temperature was 97.8, pulse was 66, blood pressure is 156/82.    /110mmhg by EMS.      Labs/results:  CBC was unremarkable  CMP was normal, except for slightly low protein  Troponins were negative  LDL/A1c pending    Transient stroke like symptoms of lightheadedness/dizziness, blurred vision and dysequilibrium, in the setting of hypertensive urgency, MRI with out evidence of stroke, this does not appear to be ischemic in nature.     MRI brain with out contrast completed, as above, negative for acute stroke.  No further inpatient neurological work up at this time.   Normotensive blood pressure goal.  PT/OT/ST  Frequent neuro checks. Continue to monitor and notify neurology with any changes.   - Medical management and supportive care per primary team. Correction of any metabolic or infectious disturbances.    - Follow up with out patient neurology, stroke clinic in 4 weeks.   - Reviewed with attending, plan of care per attending physician.  Please see attestation.

## 2024-05-15 NOTE — H&P
Formerly [facility]  H&P  Name: Tomas Robert 73 y.o. male I MRN: [id number]  Unit/Bed#: ED 22 I Date of Admission: 5/14/2024   Date of Service: 5/14/2024 I Hospital Day: 0      Assessment/Plan   * Stroke-like symptoms  Assessment & Plan  Pt who had uncontrolled hypertension/hypertensive urgency prior to admission to the hospital also reported lightheadedness, dizziness and ataxia yesterday and today  His symptoms have resolved now.  Difficult to establish last well-known as a symptoms started yesterday around morning time and then resolved and reappeared today in the morning  Stroke scale 0 currently  No stroke alert in the emergency room  CTA head and neck does not show any large vessel occlusion or infarct  Patient will be hospitalized under stroke pathway, neuroconsultation, PT OT consultation  MRI has been ordered but patient is claustrophobic so he will need premedicated  If the MRI is positive, obtain echocardiogram, monitor on telemetry  Give aspirin and statin    Ataxia  Assessment & Plan  See plan under #1  This could also be secondary to uncontrolled hypertension and benign positional vertigo which the patient has a history of    Claustrophobia  Assessment & Plan  Patient is reluctant to do MRI without antianxiety medications.  Patient said he will try it with Ativan tomorrow and see if he is able to do it.    Vertigo  Assessment & Plan  Continue as needed meclizine    CKD (chronic kidney disease) stage 3, GFR 30-59 ml/min ([facility])  Assessment & Plan  Lab Results   Component Value Date    EGFR 55 05/14/2024    EGFR 50 05/08/2024    EGFR 49 04/08/2024    CREATININE 1.27 05/14/2024    CREATININE 1.37 (H) 05/08/2024    CREATININE 1.40 (H) 04/08/2024   Renal functions at baseline  Continue to monitor         VTE Pharmacologic Prophylaxis: VTE Score: 2  lovenox  Code Status: Level 1 - Full Code  Discussion with family:  discussed with pts brother at bedside.     Anticipated Length of Stay:  Patient will be admitted on an observation basis with an anticipated length of stay of less than 2 midnights secondary to stroke rule out.    Total Time Spent on Date of Encounter in care of patient: 75 mins. This time was spent on one or more of the following: performing physical exam; counseling and coordination of care; obtaining or reviewing history; documenting in the medical record; reviewing/ordering tests, medications or procedures; communicating with other healthcare professionals and discussing with patient's family/caregivers.    Chief Complaint: Lightheadedness, dizziness, blurry vision, gait abnormality    History of Present Illness:  Tomas Robert is a 73 y.o. male with a PMH of hypertension, vertigo who presents with symptoms worth of 2 days.  Symptoms started yesterday when patient started feeling woozy, lightheaded and dizzy along with some blurry vision that started around noon time and resolved in about 4 to 5 hours.  Yesterday patient reports that he went to the gym and did his activities of daily living without any issues but was just interrupted with the symptoms for a few hours.  This morning again when he woke up he was able to go to the gym and then after the gym and started feeling woozy with blurry vision.  He states that his gait was also affected and he was drifting to one side.  Patient states that he does have vertigo and he did take meclizine but it did not help.  He states he checked his blood pressure and it was ranging anywhere from 160 to 170 mmHg systolic and 100 to 11 millimeters mercury diastolic.  As reported by EMS patient's blood pressure prior to arrival to the ER was around 220/110.  During my encounter patient is normotensive and his symptoms have all resolved.    Review of Systems:  Review of Systems   Constitutional:  Negative for chills and fever.   HENT:  Negative for ear pain and sore throat.    Eyes:  Positive for visual disturbance. Negative for pain.    Respiratory:  Negative for cough and shortness of breath.    Cardiovascular:  Negative for chest pain and palpitations.   Gastrointestinal:  Negative for abdominal pain and vomiting.   Genitourinary:  Negative for dysuria and hematuria.   Musculoskeletal:  Negative for arthralgias and back pain.   Skin:  Negative for color change and rash.   Neurological:  Positive for dizziness and light-headedness. Negative for seizures and syncope.        Gait abnormality   Psychiatric/Behavioral:  Negative for agitation, behavioral problems, confusion and decreased concentration.    All other systems reviewed and are negative.      Past Medical and Surgical History:   Past Medical History:   Diagnosis Date    Benign prostatic hyperplasia     Chronic kidney disease     COVID-19 03/2020    Esophageal reflux     Hypertension     Hypertensive urgency 3/26/2019    Vertigo        Past Surgical History:   Procedure Laterality Date    APPENDECTOMY  05/21/2015    CYST REMOVAL      Fatty cyst removed from back       Meds/Allergies:  Prior to Admission medications    Medication Sig Start Date End Date Taking? Authorizing Provider   amLODIPine (NORVASC) 10 mg tablet Take 1 tablet (10 mg total) by mouth daily 4/1/24   Nino Hancock PA-C   cyclobenzaprine (FLEXERIL) 10 mg tablet Take 1 tablet (10 mg total) by mouth 2 (two) times a day as needed for muscle spasms  Patient taking differently: Take 10 mg by mouth if needed for muscle spasms 5/3/23   Nino Hancock PA-C   lidocaine (LIDODERM) 5 % Apply 1 patch topically over 12 hours if needed (Intermittent right low back pain with radiculopathy) Remove & Discard patch within 12 hours or as directed by MD 5/3/23   Nino Hancock PA-C   meclizine (ANTIVERT) 25 mg tablet Take 1 tablet (25 mg total) by mouth 3 (three) times a day as needed for dizziness 10/18/23   Mabel Cruz,    multivitamin (THERAGRAN) TABS Take 1 tablet by mouth daily    Historical Provider, MD   pantoprazole  (PROTONIX) 40 mg tablet Take 40 mg by mouth daily 5/19/23   Historical Provider, MD   predniSONE 20 mg tablet Take 2 tablets (40 mg total) by mouth daily for 5 days 5/9/24 5/14/24  Jose Luis Franklin MD   tadalafil (CIALIS) 20 MG tablet if needed    Historical Provider, MD   tamsulosin (FLOMAX) 0.4 mg Take 1 capsule (0.4 mg total) by mouth daily with dinner 1/5/21   Nino Hancock PA-C     I have reviewed home medications with patient personally.    Allergies:   Allergies   Allergen Reactions    Penicillin V Anaphylaxis       Social History:  Marital Status: /Civil Union     Substance Use History:   Social History     Substance and Sexual Activity   Alcohol Use Yes    Comment: socially     Social History     Tobacco Use   Smoking Status Former   Smokeless Tobacco Never   Tobacco Comments    1ppw     Social History     Substance and Sexual Activity   Drug Use No       Family History:  Family History   Problem Relation Age of Onset    Prostate cancer Father     Prostate cancer Maternal Grandfather     Stomach cancer Maternal Aunt         malignant tumor of pharynx    Stomach cancer Maternal Uncle         malignant tumor of pharynx    Mental illness Family     Depression Family     Schizophrenia Family     Hypertension Mother     No Known Problems Sister     Dementia Sister        Physical Exam:     Vitals:   Blood Pressure: 147/86 (05/14/24 1910)  Pulse: 61 (05/14/24 1910)  Temperature: 97.8 °F (36.6 °C) (05/14/24 1404)  Temp Source: Temporal (05/14/24 1404)  Respirations: 20 (05/14/24 1910)  SpO2: 99 % (05/14/24 1910)    Physical Exam General- Awake, alert and oriented x 3, looks comfortable  HEENT- Normocephalic, atraumatic, oral mucosa- moist  Neck- Supple, No carotid bruit, no JVD  CVS- Normal S1/ S2, Regular rate and rhythm, No murmur, No edema  Respiratory system- B/L clear breath sounds, no wheezing  Abdomen- Soft, Non distended, no tenderness, Bowel sound- present 4 quads  Genitourinary- No suprapubic  tenderness, No CVA tenderness  Skin- No new bruise or rash  Musculoskeletal- No gross deformity  Psych- No acute psychosis  CNS- CN II- XII grossly intact, No acute focal neurologic deficit noted      Additional Data:     Lab Results:  Results from last 7 days   Lab Units 05/14/24  1411   WBC Thousand/uL 7.70   HEMOGLOBIN g/dL 13.7   HEMATOCRIT % 40.8   PLATELETS Thousands/uL 232   SEGS PCT % 49   LYMPHO PCT % 42   MONO PCT % 8   EOS PCT % 1     Results from last 7 days   Lab Units 05/14/24  1411   SODIUM mmol/L 136   POTASSIUM mmol/L 3.6   CHLORIDE mmol/L 103   CO2 mmol/L 27   BUN mg/dL 25   CREATININE mg/dL 1.27   ANION GAP mmol/L 6   CALCIUM mg/dL 9.0   ALBUMIN g/dL 3.9   TOTAL BILIRUBIN mg/dL 0.32   ALK PHOS U/L 52   ALT U/L 18   AST U/L 14   GLUCOSE RANDOM mg/dL 86                       Lines/Drains:  Invasive Devices       Peripheral Intravenous Line  Duration             Peripheral IV 05/08/24 Left Antecubital 5 days                        Imaging: Reviewed radiology reports from this admission including: CTA head and neck  CTA head and neck with and without contrast   ED Interpretation by Mabel Cruz DO (05/14 1819)   No acute intracranial abnormality.     Negative CTA head and neck for large vessel occlusion, dissection, aneurysm, or high-grade stenosis.           Final Result by Saurav Jaime MD (05/14 1752)      No acute intracranial abnormality.      Negative CTA head and neck for large vessel occlusion, dissection, aneurysm, or high-grade stenosis.      Additional chronic/incidental findings as detailed above.                        Workstation performed: [id number]         XR chest 1 view portable   ED Interpretation by Mabel Cruz DO (05/14 1707)   No acute cardiopulmonary disease      MRI Inpatient Order    (Results Pending)       EKG and Other Studies Reviewed on Admission:   EKG:  Personally reviewed the EKG.  Normal sinus rhythm, nonspecific T wave changes seen, no  acute ST elevations or depressions noted.    ** Please Note: This note has been constructed using a voice recognition system. **

## 2024-05-15 NOTE — ASSESSMENT & PLAN NOTE
Chronic, baseline creatinine noted to be 1.3-1.4  Currently below baseline  Avoid nephrotoxins, hypotension  Monitor BMP

## 2024-05-15 NOTE — ASSESSMENT & PLAN NOTE
Pt who had uncontrolled hypertension/hypertensive urgency prior to admission to the hospital also reported lightheadedness, dizziness and ataxia yesterday and today  His symptoms have resolved now.  Difficult to establish last well-known as a symptoms started yesterday around morning time and then resolved and reappeared today in the morning  Stroke scale 0 currently  No stroke alert in the emergency room  CTA head and neck does not show any large vessel occlusion or infarct  Patient will be hospitalized under stroke pathway, neuroconsultation, PT OT consultation  MRI has been ordered but patient is claustrophobic so he will need premedicated  If the MRI is positive, obtain echocardiogram, monitor on telemetry  Give aspirin and statin

## 2024-05-15 NOTE — CONSULTS
Consultation - Neurology   Tomas Robert 73 y.o. male MRN: [id number]  Unit/Bed#: ED 22 Encounter: [id number]    Assessment & Plan   * Stroke-like symptoms  Assessment & Plan  73-year-old with past medical history of hypertension, vertigo, GERD, CKD who presents to [facility] with symptoms of lightheadedness, dizziness and ataxia yesterday and today, the symptoms were transient and have resolved.  It was reported in the ED the NIH stroke scale to be 0.      CTA of head and neck:  No acute intracranial abnormality.  Negative CTA head and neck for large vessel occlusion, dissection, aneurysm, or high-grade stenosis.  MRI of brain with: No acute intracranial pathology.   Vital signs in the ED, temperature was 97.8, pulse was 66, blood pressure is 156/82.    /110mmhg by EMS.      Labs/results:  CBC was unremarkable  CMP was normal, except for slightly low protein  Troponins were negative  LDL/A1c pending    Transient stroke like symptoms of lightheadedness/dizziness, blurred vision and dysequilibrium, in the setting of hypertensive urgency, MRI with out evidence of stroke, this does not appear to be ischemic in nature.     MRI brain with out contrast completed, as above, negative for acute stroke.  No further inpatient neurological work up at this time.   Normotensive blood pressure goal.  PT/OT/ST  Frequent neuro checks. Continue to monitor and notify neurology with any changes.   - Medical management and supportive care per primary team. Correction of any metabolic or infectious disturbances.    - Follow up with out patient neurology, stroke clinic in 4 weeks.   - Reviewed with attending, plan of care per attending physician.  Please see attestation.       Tomas Robert will need follow up in in 4 weeks with neurovascular attending or advance practitioner. He will not require outpatient neurological testing.    History of Present Illness   Reason for Consult / Principal Problem:   Stroke like  symptoms  HPI: Tomas Robert is a 73 y.o. with past medical history of hypertension, vertigo, GERD, CKD who presents to [facility] with strokelike symptoms for 2 days.  Is reported the patient was having lightheadedness dizziness blurred vision and gait abnormality which started yesterday.  As documented the patient started feeling woozy lightheaded and dizzy along with some blurring of vision, lasted for 5 hours and resolved.  The patient went to the gym and did activities daily living without any issues the morning of arrival he woke up and went to the gym and then after gym he felt woozy with blurring of vision, he was also drifting to one side. He has vertigo and took a meclizine but this did not help.   It was reported by EMS his blood pressure to be 220/110 by EMS.      CTA of head and neck:  No acute intracranial abnormality.  Negative CTA head and neck for large vessel occlusion, dissection, aneurysm, or high-grade stenosis.    Vital signs in the ED, temperature was 97.8, pulse was 66, blood pressure is 156/82.    Labs/results:  CBC was unremarkable  CMP was normal, except for slightly low protein  Troponins were negative  LDL/A1c pending    The patient reports started Monday, with dizziness, lightheadedness, blurred vision (both eyes) off balance and felt this may be vertigo, took meclizine.   He reported this was off and on, it lasted only a few hours.  He reports he felt as if he had to many to drink.  He reports he was going to the right side more, he had a feeling of lightheadedness, this is different than his vertigo attacks.  Meclizine did not help.   No incoordination.   They lasted a few hours and resolved.     Yesterday, he had similar symptoms and he took his blood pressure and her felt this was high.   EMS reported his blood pressure to be high.  He recently had the flu and had to take steriods, recently for bronchitis.  No lateralizing symptoms associated with this, no focal  neurological symptoms associated with this.  He did have a slight ha with this.     Now he is feeling better, with no focal neurological deficits.     Consults    Review of Systems  12 point ROS as per HPI, otherwise negative, currently the patient feels fine with no neurological complaints.     Historical Information   Past Medical History:   Diagnosis Date    Benign prostatic hyperplasia     Chronic kidney disease     COVID-19 03/2020    Esophageal reflux     Hypertension     Hypertensive urgency 3/26/2019    Vertigo      Past Surgical History:   Procedure Laterality Date    APPENDECTOMY  05/21/2015    CYST REMOVAL      Fatty cyst removed from back     Social History   Social History     Substance and Sexual Activity   Alcohol Use Yes    Comment: socially     Social History     Substance and Sexual Activity   Drug Use No     E-Cigarette/Vaping    E-Cigarette Use Never User      E-Cigarette/Vaping Substances    Nicotine No     THC No     CBD No     Flavoring No     Other No     Unknown No      Social History     Tobacco Use   Smoking Status Former   Smokeless Tobacco Never   Tobacco Comments    1ppw     Family History:   Family History   Problem Relation Age of Onset    Prostate cancer Father     Prostate cancer Maternal Grandfather     Stomach cancer Maternal Aunt         malignant tumor of pharynx    Stomach cancer Maternal Uncle         malignant tumor of pharynx    Mental illness Family     Depression Family     Schizophrenia Family     Hypertension Mother     No Known Problems Sister     Dementia Sister      Patient was seen by neurology in the past and 2023, intermittent sharp headache for 2 days facial tingling.    He reported that he has similar episode a year prior and CTA of head and neck and MRI at that time showed no acute significant pathology.  During the 2023 admission, patient had a CTA of the head and neck which did not show any significant pathology, the patient was treated for headache and was  discharged home.  (Please see notes for details)      Meds/Allergies   all current active meds have been reviewed, current meds:   Current Facility-Administered Medications   Medication Dose Route Frequency    amLODIPine (NORVASC) tablet 10 mg  10 mg Oral Daily    aspirin chewable tablet 81 mg  81 mg Oral Daily    atorvastatin (LIPITOR) tablet 40 mg  40 mg Oral QPM    enoxaparin (LOVENOX) subcutaneous injection 40 mg  40 mg Subcutaneous Daily    meclizine (ANTIVERT) tablet 25 mg  25 mg Oral TID PRN    pantoprazole (PROTONIX) EC tablet 40 mg  40 mg Oral Daily    tamsulosin (FLOMAX) capsule 0.4 mg  0.4 mg Oral Daily With Dinner   , and PTA meds:   Prior to Admission Medications   Prescriptions Last Dose Informant Patient Reported? Taking?   amLODIPine (NORVASC) 10 mg tablet   No No   Sig: Take 1 tablet (10 mg total) by mouth daily   cyclobenzaprine (FLEXERIL) 10 mg tablet  Self No No   Sig: Take 1 tablet (10 mg total) by mouth 2 (two) times a day as needed for muscle spasms   Patient taking differently: Take 10 mg by mouth if needed for muscle spasms   lidocaine (LIDODERM) 5 %  Self No No   Sig: Apply 1 patch topically over 12 hours if needed (Intermittent right low back pain with radiculopathy) Remove & Discard patch within 12 hours or as directed by MD   meclizine (ANTIVERT) 25 mg tablet  Self No No   Sig: Take 1 tablet (25 mg total) by mouth 3 (three) times a day as needed for dizziness   multivitamin (THERAGRAN) TABS  Self Yes No   Sig: Take 1 tablet by mouth daily   pantoprazole (PROTONIX) 40 mg tablet  Self Yes No   Sig: Take 40 mg by mouth daily   predniSONE 20 mg tablet   No No   Sig: Take 2 tablets (40 mg total) by mouth daily for 5 days   tadalafil (CIALIS) 20 MG tablet  Self Yes No   Sig: if needed   tamsulosin (FLOMAX) 0.4 mg  Self No No   Sig: Take 1 capsule (0.4 mg total) by mouth daily with dinner      Facility-Administered Medications: None     Allergies   Allergen Reactions    Penicillin V Anaphylaxis      Objective   Vitals:Blood pressure 139/65, pulse 64, temperature 97.9 °F (36.6 °C), temperature source Oral, resp. rate 17, SpO2 98%.,There is no height or weight on file to calculate BMI.    Intake/Output Summary (Last 24 hours) at 5/15/2024 1104  Last data filed at 5/14/2024 1838  Gross per 24 hour   Intake 1000 ml   Output --   Net 1000 ml     Invasive Devices:   Invasive Devices       Peripheral Intravenous Line  Duration             Peripheral IV 05/08/24 Left Antecubital 6 days                  Physical Exam  Vitals reviewed.   Constitutional:       Appearance: He is not ill-appearing or diaphoretic.   HENT:      Head: Normocephalic.   Eyes:      Extraocular Movements: Extraocular movements intact and EOM normal.      Pupils: Pupils are equal, round, and reactive to light.   Cardiovascular:      Rate and Rhythm: Normal rate.   Pulmonary:      Effort: No respiratory distress.   Abdominal:      General: There is no distension.   Musculoskeletal:         General: No swelling or tenderness.   Skin:     General: Skin is warm and dry.   Neurological:      Mental Status: He is oriented to person, place, and time.      Motor: Motor strength is normal.     Coordination: Finger-Nose-Finger Test and Heel to Shin Test normal.      Deep Tendon Reflexes:      Reflex Scores:       Tricep reflexes are 2+ on the right side and 2+ on the left side.       Bicep reflexes are 2+ on the right side and 2+ on the left side.       Brachioradialis reflexes are 2+ on the right side and 2+ on the left side.       Patellar reflexes are 2+ on the right side and 2+ on the left side.  Psychiatric:         Speech: Speech normal.       Neurologic Exam     Mental Status   Oriented to person, place, and time.   Follows 2 step commands.   Attention: normal. Concentration: normal.   Speech: speech is normal   Level of consciousness: alert  Able to name object. Able to read. Able to repeat. Normal comprehension.     Cranial Nerves     CN II  "  Visual fields full to confrontation.     CN III, IV, VI   Pupils are equal, round, and reactive to light.  Extraocular motions are normal.     CN V   Facial sensation intact.     CN VII   Facial expression full, symmetric.     CN VIII   CN VIII normal.     CN IX, X   CN IX normal.   CN X normal.     CN XI   CN XI normal.     CN XII   CN XII normal.     Motor Exam   Muscle bulk: normal  Overall muscle tone: normal  Right arm pronator drift: absent  Left arm pronator drift: absent    Strength   Strength 5/5 throughout.     Sensory Exam   Light touch normal.     Gait, Coordination, and Reflexes     Coordination   Finger to nose coordination: normal  Heel to shin coordination: normal    Reflexes   Right brachioradialis: 2+  Left brachioradialis: 2+  Right biceps: 2+  Left biceps: 2+  Right triceps: 2+  Left triceps: 2+  Right patellar: 2+  Left patellar: 2+  Right plantar: normal  Left plantar: normalNo seizure activity seen.      (Acted as a scribe, examined by attending)  Lab Results: I have personally reviewed pertinent reports.  , CBC:   Results from last 7 days   Lab Units 05/14/24  1411 05/08/24  2215   WBC Thousand/uL 7.70 4.55   RBC Million/uL 4.50 4.38   HEMOGLOBIN g/dL 13.7 13.3   HEMATOCRIT % 40.8 39.2   MCV fL 91 90   PLATELETS Thousands/uL 232 227   , BMP/CMP:   Results from last 7 days   Lab Units 05/14/24  1411 05/08/24  2215   SODIUM mmol/L 136 138   POTASSIUM mmol/L 3.6 4.0   CHLORIDE mmol/L 103 104   CO2 mmol/L 27 28   BUN mg/dL 25 19   CREATININE mg/dL 1.27 1.37*   CALCIUM mg/dL 9.0 9.1   AST U/L 14 21   ALT U/L 18 20   ALK PHOS U/L 52 58   EGFR ml/min/1.73sq m 55 50   , Vitamin B12:   , HgBA1C:   Results from last 7 days   Lab Units 05/15/24  0535   HEMOGLOBIN A1C % 5.7*   , TSH:   , Coagulation:   , Lipid Profile:   , Ammonia:   , Urinalysis:       Invalid input(s): \"URIBILINOGEN\", Drug Screen:   , Medication Drug Levels:       Invalid input(s): \"CARBAMAZEPINE\", \"OXCARBAZEPINE\"    Per radiology " "interpretation -    \"  Procedure: XR chest 1 view portable    Result Date: 5/15/2024  Narrative: XR CHEST PORTABLE INDICATION: dizziness. COMPARISON: 05/08/2024 FINDINGS: Clear lungs. No pneumothorax or pleural effusion. Normal cardiomediastinal silhouette. Bones are unremarkable for age. Normal upper abdomen.     Impression: No acute cardiopulmonary disease. Workstation performed: [id number]     Procedure: MRI brain wo contrast    Result Date: 5/15/2024  Narrative: MRI BRAIN WITHOUT CONTRAST INDICATION: Pt who had uncontrolled hypertension/hypertensive urgency prior to admission to the hospital also reported lightheadedness, dizziness and ataxia yesterday and today, symptoms have resolved now.. COMPARISON:   CT/CTA from yesterday and MRI brain dated 1/25/2022. TECHNIQUE:  Multiplanar, multisequence imaging of the brain was performed. IMAGE QUALITY:  Diagnostic. FINDINGS: BRAIN PARENCHYMA:  There is no discrete mass, mass effect or midline shift. There is no intracranial hemorrhage.  There is no evidence of acute infarction and diffusion imaging is unremarkable. Minimal nonspecific white matter changes likely due to chronic microangiopathy VENTRICLES:  Normal for the patient's age. SELLA AND PITUITARY GLAND:  Normal. ORBITS:  Normal. PARANASAL SINUSES: Maxillary and left sphenoid sinus mucous retention cyst. VASCULATURE:  Evaluation of the major intracranial vasculature demonstrates appropriate flow voids. CALVARIUM AND SKULL BASE:  Normal. EXTRACRANIAL SOFT TISSUES:  Normal.     Impression: No acute intracranial pathology. Workstation performed: [id number]     Procedure: CTA head and neck with and without contrast    Result Date: 5/14/2024  Narrative: CTA NECK AND BRAIN WITH AND WITHOUT CONTRAST INDICATION: dizziness, out of stroke window (Patient reports BP of 180 systolic today, reports hx of vertigo but states they became dizzy and blurred vision last night that has persisted today. Hx of HTN took their regular " amlodipine dose today. COMPARISON:   CTA head and neck with and without contrast 10/18/2023, 3/13/2023. Brain without contrast 1/25/2022. TECHNIQUE:  Routine CT imaging of the Brain without contrast.  Post contrast imaging was performed after administration of iodinated contrast through the neck and brain. Post contrast axial 0.625 mm images timed to opacify the arterial system. 3D rendering was performed on an independent workstation.   MIP reconstructions performed. Coronal reconstructions were performed of the noncontrast portion of the brain. Radiation dose length product (DLP) for this visit:  1388 mGy-cm .  This examination, like all CT scans performed in the [facility], was performed utilizing techniques to minimize radiation dose exposure, including the use of iterative reconstruction and automated exposure control. IV Contrast:  100 mL of iohexol (OMNIPAQUE) IMAGE QUALITY:   Diagnostic FINDINGS: NONCONTRAST BRAIN PARENCHYMA:  No intracranial mass, mass effect or midline shift. No CT signs of acute infarction.  No acute parenchymal hemorrhage. VENTRICLES AND EXTRA-AXIAL SPACES:  Normal for the patient's age. VISUALIZED ORBITS: No acute orbital abnormality. Similar slightly tortuous bilateral optic nerves orbital segments. PARANASAL SINUSES: Mild mucosal thickening of the visualized paranasal sinuses. CERVICAL VASCULATURE AORTIC ARCH AND GREAT VESSELS:  Normal aortic arch and great vessel origins. Common origin of brachiocephalic and left common carotid artery, normal variant. Normal visualized subclavian vessels. RIGHT VERTEBRAL ARTERY CERVICAL SEGMENT:  Normal origin. The vessel is normal in caliber throughout the neck. LEFT VERTEBRAL ARTERY CERVICAL SEGMENT:  Normal origin. The vessel is normal in caliber throughout the neck. RIGHT EXTRACRANIAL CAROTID SEGMENT:  Normal caliber common carotid artery. Minor atherosclerotic disease in right carotid bifurcation. Normal cervical internal  "carotid artery.  No stenosis or dissection. LEFT EXTRACRANIAL CAROTID SEGMENT:  Normal caliber common carotid artery. Mild calcified atherosclerotic disease in the left carotid bifurcation. Normal cervical internal carotid artery.  No stenosis or dissection. NASCET criteria was used to determine the degree of internal carotid artery diameter stenosis. INTRACRANIAL VASCULATURE INTERNAL CAROTID ARTERIES:  Normal enhancement of the intracranial portions of the internal carotid arteries.  Normal ophthalmic artery origins.  Normal ICA terminus. ANTERIOR CIRCULATION:  Symmetric A1 segments and anterior cerebral arteries with normal enhancement.  Normal anterior communicating artery. MIDDLE CEREBRAL ARTERY CIRCULATION:  M1 segment and middle cerebral artery branches demonstrate normal enhancement bilaterally. DISTAL VERTEBRAL ARTERIES:  Normal distal vertebral arteries.  Posterior inferior cerebellar artery origins are normal. Normal vertebral basilar junction. BASILAR ARTERY:  Basilar artery is normal in caliber.  Normal superior cerebellar arteries. POSTERIOR CEREBRAL ARTERIES: Both posterior cerebral arteries arises from the basilar tip.  Both arteries demonstrate normal enhancement. Tiny posterior communicating arteries. VENOUS STRUCTURES:  Normal. NON VASCULAR ANATOMY BONY STRUCTURES:  No acute osseous abnormality. Multilevel spinal degenerative changes, worse at C5-C6. SOFT TISSUES OF THE NECK:  Unremarkable. THORACIC INLET: Emphysema with mild biapical fibrotic change. No focal consolidation in visualized upper lung zones. Small calcified right hilar lymph node, likely sequela of prior granulomatous disease.     Impression: No acute intracranial abnormality. Negative CTA head and neck for large vessel occlusion, dissection, aneurysm, or high-grade stenosis. Additional chronic/incidental findings as detailed above. Workstation performed: [id number]     \"    Imaging Studies: I have personally reviewed pertinent " reports.    EKG, Pathology, and Other Studies: I have personally reviewed pertinent reports.      Code Status: Level 1 - Full Code     Counseling / Coordination of Care  Reviewed case with neurology attending, history and physical examination, labs and imaging completed, plan of care as per attending physician.    Please see attestation for further details.    Examined alongside attending physician.

## 2024-05-15 NOTE — PHYSICAL THERAPY NOTE
Physical Therapy Screen    Patient Name: Tomas Robert    Today's Date: 5/15/2024     Problem List  Principal Problem:    Stroke-like symptoms  Active Problems:    CKD (chronic kidney disease) stage 3, GFR 30-59 ml/min ([facility])    Vertigo    Ataxia    Claustrophobia       Past Medical History  Past Medical History:   Diagnosis Date    Benign prostatic hyperplasia     Chronic kidney disease     COVID-19 03/2020    Esophageal reflux     Hypertension     Hypertensive urgency 3/26/2019    Vertigo         Past Surgical History  Past Surgical History:   Procedure Laterality Date    APPENDECTOMY  05/21/2015    CYST REMOVAL      Fatty cyst removed from back        05/15/24 1048   PT Last Visit   PT Visit Date 05/15/24   Note Type   Note type Screen       Pt seen due to stroke like symptoms, blurry vision, and gait abnormalities. Pt reports symptoms have resolved and denies any one sided weakness, decreased sensation, nor worsening in symptoms. Pt reports he has support and feels safe being discharged to home. D/C physical therapy orders at this time. Please re-consult if needs arise/ symptoms change.     Wild Adamson, PT, DPT

## 2024-05-16 NOTE — ED PROVIDER NOTES
History  Chief Complaint   Patient presents with    Cough     2 wks with cough and bringing up mucus      73-year-old male presents emergency department for evaluation of cough.  Patient had 2 weeks of cough which has become increasingly productive, associated with some chest tightness.  Describes it productive of white mucus.  Has had no fevers or chills.  Has had no lightheadedness syncope or presyncope.        Prior to Admission Medications   Prescriptions Last Dose Informant Patient Reported? Taking?   amLODIPine (NORVASC) 10 mg tablet   No No   Sig: Take 1 tablet (10 mg total) by mouth daily   lidocaine (LIDODERM) 5 %  Self No No   Sig: Apply 1 patch topically over 12 hours if needed (Intermittent right low back pain with radiculopathy) Remove & Discard patch within 12 hours or as directed by MD   meclizine (ANTIVERT) 25 mg tablet  Self No No   Sig: Take 1 tablet (25 mg total) by mouth 3 (three) times a day as needed for dizziness   multivitamin (THERAGRAN) TABS  Self Yes No   Sig: Take 1 tablet by mouth daily   pantoprazole (PROTONIX) 40 mg tablet  Self Yes No   Sig: Take 40 mg by mouth daily   tadalafil (CIALIS) 20 MG tablet  Self Yes No   Sig: if needed   tamsulosin (FLOMAX) 0.4 mg  Self No No   Sig: Take 1 capsule (0.4 mg total) by mouth daily with dinner      Facility-Administered Medications: None       Past Medical History:   Diagnosis Date    Benign prostatic hyperplasia     Chronic kidney disease     COVID-19 03/2020    Esophageal reflux     Hypertension     Hypertensive urgency 3/26/2019    Vertigo        Past Surgical History:   Procedure Laterality Date    APPENDECTOMY  05/21/2015    CYST REMOVAL      Fatty cyst removed from back       Family History   Problem Relation Age of Onset    Prostate cancer Father     Prostate cancer Maternal Grandfather     Stomach cancer Maternal Aunt         malignant tumor of pharynx    Stomach cancer Maternal Uncle         malignant tumor of pharynx    Mental illness  Family     Depression Family     Schizophrenia Family     Hypertension Mother     No Known Problems Sister     Dementia Sister      I have reviewed and agree with the history as documented.    E-Cigarette/Vaping    E-Cigarette Use Never User      E-Cigarette/Vaping Substances    Nicotine No     THC No     CBD No     Flavoring No     Other No     Unknown No      Social History     Tobacco Use    Smoking status: Former    Smokeless tobacco: Never    Tobacco comments:     1ppw   Vaping Use    Vaping status: Never Used   Substance Use Topics    Alcohol use: Yes     Comment: socially    Drug use: No       Review of Systems   Constitutional:  Negative for appetite change, chills, fatigue and fever.   HENT:  Negative for sneezing and sore throat.    Eyes:  Negative for visual disturbance.   Respiratory:  Positive for cough and chest tightness. Negative for choking, shortness of breath and wheezing.    Cardiovascular:  Negative for chest pain and palpitations.   Gastrointestinal:  Negative for abdominal pain, constipation, diarrhea, nausea and vomiting.   Genitourinary:  Negative for difficulty urinating and dysuria.   Neurological:  Negative for dizziness, weakness, light-headedness, numbness and headaches.   All other systems reviewed and are negative.      Physical Exam  Physical Exam  Vitals and nursing note reviewed.   Constitutional:       General: He is not in acute distress.     Appearance: He is well-developed. He is not diaphoretic.   HENT:      Head: Normocephalic and atraumatic.   Eyes:      Pupils: Pupils are equal, round, and reactive to light.   Neck:      Vascular: No JVD.      Trachea: No tracheal deviation.   Cardiovascular:      Rate and Rhythm: Normal rate and regular rhythm.      Heart sounds: Normal heart sounds. No murmur heard.     No friction rub. No gallop.   Pulmonary:      Effort: Pulmonary effort is normal. No respiratory distress.      Breath sounds: Wheezing present. No rales.   Abdominal:       General: Bowel sounds are normal. There is no distension.      Palpations: Abdomen is soft.      Tenderness: There is no abdominal tenderness. There is no guarding or rebound.   Skin:     General: Skin is warm and dry.      Coloration: Skin is not pale.   Neurological:      Mental Status: He is alert and oriented to person, place, and time.      Cranial Nerves: No cranial nerve deficit.      Motor: No abnormal muscle tone.   Psychiatric:         Behavior: Behavior normal.         Vital Signs  ED Triage Vitals [05/08/24 2149]   Temperature Pulse Respirations Blood Pressure SpO2   97.9 °F (36.6 °C) 62 16 160/84 98 %      Temp Source Heart Rate Source Patient Position - Orthostatic VS BP Location FiO2 (%)   Temporal Monitor Sitting Left arm --      Pain Score       --           Vitals:    05/08/24 2149 05/08/24 2300 05/08/24 2358 05/09/24 0115   BP: 160/84  141/73    Pulse: 62 59 63 60   Patient Position - Orthostatic VS: Sitting            Visual Acuity      ED Medications  Medications   albuterol inhalation solution 5 mg (5 mg Nebulization Given 5/8/24 2219)   ipratropium (ATROVENT) 0.02 % inhalation solution 0.5 mg (0.5 mg Nebulization Given 5/8/24 2219)   predniSONE tablet 40 mg (40 mg Oral Given 5/8/24 2219)   albuterol (PROVENTIL HFA,VENTOLIN HFA) inhaler 2 puff (2 puffs Inhalation Given 5/9/24 0118)       Diagnostic Studies  Results Reviewed       Procedure Component Value Units Date/Time    HS Troponin I 2hr [[id number]]  (Normal) Collected: 05/09/24 0025    Lab Status: Final result Specimen: Blood from Arm, Left Updated: 05/09/24 0051     hs TnI 2hr 3 ng/L      Delta 2hr hsTnI 0 ng/L     HS Troponin 0hr (reflex protocol) [[id number]]  (Normal) Collected: 05/08/24 2215    Lab Status: Final result Specimen: Blood from Arm, Left Updated: 05/08/24 2246     hs TnI 0hr 3 ng/L     Comprehensive metabolic panel [[id number]]  (Abnormal) Collected: 05/08/24 2215    Lab Status: Final result Specimen: Blood from Arm, Left  Updated: 05/08/24 2244     Sodium 138 mmol/L      Potassium 4.0 mmol/L      Chloride 104 mmol/L      CO2 28 mmol/L      ANION GAP 6 mmol/L      BUN 19 mg/dL      Creatinine 1.37 mg/dL      Glucose 83 mg/dL      Calcium 9.1 mg/dL      AST 21 U/L      ALT 20 U/L      Alkaline Phosphatase 58 U/L      Total Protein 7.1 g/dL      Albumin 4.2 g/dL      Total Bilirubin 0.43 mg/dL      eGFR 50 ml/min/1.73sq m     Narrative:      National Kidney Disease Foundation guidelines for Chronic Kidney Disease (CKD):     Stage 1 with normal or high GFR (GFR > 90 mL/min/1.73 square meters)    Stage 2 Mild CKD (GFR = 60-89 mL/min/1.73 square meters)    Stage 3A Moderate CKD (GFR = 45-59 mL/min/1.73 square meters)    Stage 3B Moderate CKD (GFR = 30-44 mL/min/1.73 square meters)    Stage 4 Severe CKD (GFR = 15-29 mL/min/1.73 square meters)    Stage 5 End Stage CKD (GFR <15 mL/min/1.73 square meters)  Note: GFR calculation is accurate only with a steady state creatinine    CBC and differential [[id number]] Collected: 05/08/24 2215    Lab Status: Final result Specimen: Blood from Arm, Left Updated: 05/08/24 2224     WBC 4.55 Thousand/uL      RBC 4.38 Million/uL      Hemoglobin 13.3 g/dL      Hematocrit 39.2 %      MCV 90 fL      MCH 30.4 pg      MCHC 33.9 g/dL      RDW 13.3 %      MPV 10.6 fL      Platelets 227 Thousands/uL      nRBC 0 /100 WBCs      Segmented % 47 %      Immature Grans % 0 %      Lymphocytes % 41 %      Monocytes % 9 %      Eosinophils Relative 2 %      Basophils Relative 1 %      Absolute Neutrophils 2.16 Thousands/µL      Absolute Immature Grans 0.00 Thousand/uL      Absolute Lymphocytes 1.86 Thousands/µL      Absolute Monocytes 0.42 Thousand/µL      Eosinophils Absolute 0.08 Thousand/µL      Basophils Absolute 0.03 Thousands/µL                    XR chest 1 view portable   Final Result by Kristi Merida MD (05/09 1434)   No acute consolidation or congestion            Workstation performed: [id number]                     Procedures  Procedures         ED Course                                             Medical Decision Making  3-year-old male with cough, has some wheeze/bronchospasm here.  Will treat for bronchitis check x-ray labs reassess.    Amount and/or Complexity of Data Reviewed  Labs: ordered.  Radiology: ordered.    Risk  Prescription drug management.             Disposition  Final diagnoses:   Bronchitis     Time reflects when diagnosis was documented in both MDM as applicable and the Disposition within this note       Time User Action Codes Description Comment    5/9/2024  1:07 AM Jose Luis Franklin [[id number]] Bronchitis           ED Disposition       ED Disposition   Discharge    Condition   Stable    Date/Time   Thu May 9, 2024  1:07 AM    Comment   Tomas Robert discharge to home/self care.                   Follow-up Information       Follow up With Specialties Details Why Contact Info    Nino Hancock PA-C Internal Medicine, Physician Assistant   [id number]  [place] [address] [phone]              Discharge Medication List as of 5/9/2024  1:11 AM        START taking these medications    Details   predniSONE 20 mg tablet Take 2 tablets (40 mg total) by mouth daily for 5 days, Starting Thu 5/9/2024, Until Tue 5/14/2024, Normal           CONTINUE these medications which have NOT CHANGED    Details   amLODIPine (NORVASC) 10 mg tablet Take 1 tablet (10 mg total) by mouth daily, Starting Mon 4/1/2024, Normal      lidocaine (LIDODERM) 5 % Apply 1 patch topically over 12 hours if needed (Intermittent right low back pain with radiculopathy) Remove & Discard patch within 12 hours or as directed by MD, Starting Wed 5/3/2023, Normal      meclizine (ANTIVERT) 25 mg tablet Take 1 tablet (25 mg total) by mouth 3 (three) times a day as needed for dizziness, Starting Wed 10/18/2023, Normal      multivitamin (THERAGRAN) TABS Take 1 tablet by mouth daily, Historical Med      pantoprazole (PROTONIX) 40 mg tablet Take 40 mg  by mouth daily, Starting Fri 5/19/2023, Historical Med      tadalafil (CIALIS) 20 MG tablet if needed, Historical Med      tamsulosin (FLOMAX) 0.4 mg Take 1 capsule (0.4 mg total) by mouth daily with dinner, Starting Tue 1/5/2021, Normal      cyclobenzaprine (FLEXERIL) 10 mg tablet Take 1 tablet (10 mg total) by mouth 2 (two) times a day as needed for muscle spasms, Starting Wed 5/3/2023, Normal             No discharge procedures on file.    PDMP Review       None            ED Provider  Electronically Signed by             Jose Luis Franklin MD  05/16/24 [id number]

## 2024-05-30 NOTE — TELEPHONE ENCOUNTER
Pt called to say his BP is very high. 164/102. Dizzy lightheaded. Tried to contact one of the pod nurses but couldn't get thru after 5 minutes. Got back on with pt and he said he is just going to head to the ER.    Bill For Wasted Drug (Kenalog)?: no How Many Mls Were Removed From The 40 Mg/Ml (10ml) Vial When Preparing The Injectable Solution?: 0 Consent: The risks of atrophy were reviewed with the patient. Kenalog Preparation: Kenalog Total Volume (Ccs): 0.6 Administered By (Optional): Dewey Paul MD Detail Level: Detailed Kenalog Type Of Vial: Multiple Dose Medical Necessity Clause: This procedure was medically necessary because the lesions that were treated were: Validate Note Data When Using Inventory: Yes Concentration Of Kenalog Solution Injected (Mg/Ml): 5.0 Show Inventory Tab: Hide

## 2024-06-12 DIAGNOSIS — I10 ESSENTIAL HYPERTENSION: ICD-10-CM

## 2024-06-12 RX ORDER — AMLODIPINE BESYLATE 10 MG/1
10 TABLET ORAL DAILY
Qty: 90 TABLET | Refills: 1 | Status: SHIPPED | OUTPATIENT
Start: 2024-06-12

## 2024-06-12 RX ORDER — AMLODIPINE BESYLATE 10 MG/1
10 TABLET ORAL DAILY
Qty: 14 TABLET | Refills: 0 | Status: SHIPPED | OUTPATIENT
Start: 2024-06-12

## 2024-06-22 ENCOUNTER — TELEPHONE (OUTPATIENT)
Dept: NEUROLOGY | Facility: CLINIC | Age: 74
End: 2024-06-22

## 2024-06-22 NOTE — TELEPHONE ENCOUNTER
Called patient and spoke with him regarding HFU. Patient declined to schedule. I did advise him that he can schedule an HFU up to 1 yr from his d/c date, anything after that would be a new patient appt. He verbalized understanding.     Not scheduling HFU at this time      Thank you,     [facility] Neurology        HFU/ NU GAY / Stroke-like symptoms     DC- HOME- 5/15/2024    fredirolf Robert will need follow up in in 4 weeks with neurovascular attending or advance practitioner. He will not require outpatient neurological testing.

## 2024-06-25 ENCOUNTER — APPOINTMENT (OUTPATIENT)
Dept: LAB | Facility: HOSPITAL | Age: 74
End: 2024-06-25
Payer: COMMERCIAL

## 2024-06-25 DIAGNOSIS — Z12.5 ENCOUNTER FOR SCREENING FOR MALIGNANT NEOPLASM OF PROSTATE: ICD-10-CM

## 2024-06-25 LAB — PSA SERPL-MCNC: 4.52 NG/ML (ref 0–4)

## 2024-06-25 PROCEDURE — 36415 COLL VENOUS BLD VENIPUNCTURE: CPT

## 2024-06-25 PROCEDURE — G0103 PSA SCREENING: HCPCS

## 2024-07-17 ENCOUNTER — TELEPHONE (OUTPATIENT)
Dept: NEPHROLOGY | Facility: CLINIC | Age: 74
End: 2024-07-17

## 2024-07-17 NOTE — TELEPHONE ENCOUNTER
Called to  remind pt to get labs prior to 07/31 appt. Was unable to LM the voice mail box was full.

## 2024-07-20 ENCOUNTER — HOSPITAL ENCOUNTER (INPATIENT)
Facility: HOSPITAL | Age: 74
LOS: 1 days | Discharge: HOME | DRG: 093 | End: 2024-07-22
Attending: EMERGENCY MEDICINE | Admitting: STUDENT IN AN ORGANIZED HEALTH CARE EDUCATION/TRAINING PROGRAM
Payer: COMMERCIAL

## 2024-07-20 ENCOUNTER — APPOINTMENT (EMERGENCY)
Dept: CT IMAGING | Facility: HOSPITAL | Age: 74
DRG: 093 | End: 2024-07-20
Payer: COMMERCIAL

## 2024-07-20 DIAGNOSIS — R29.90 STROKE-LIKE SYMPTOMS: Primary | ICD-10-CM

## 2024-07-20 LAB
ANION GAP SERPL CALCULATED.3IONS-SCNC: 8 MMOL/L (ref 4–13)
APTT PPP: 28 SECONDS (ref 23–37)
BUN SERPL-MCNC: 19 MG/DL (ref 5–25)
CALCIUM SERPL-MCNC: 9.3 MG/DL (ref 8.4–10.2)
CARDIAC TROPONIN I PNL SERPL HS: 4 NG/L
CHLORIDE SERPL-SCNC: 104 MMOL/L (ref 96–108)
CO2 SERPL-SCNC: 27 MMOL/L (ref 21–32)
CREAT SERPL-MCNC: 1.52 MG/DL (ref 0.6–1.3)
ERYTHROCYTE [DISTWIDTH] IN BLOOD BY AUTOMATED COUNT: 13.8 % (ref 11.6–15.1)
FLUAV RNA RESP QL NAA+PROBE: NEGATIVE
FLUBV RNA RESP QL NAA+PROBE: NEGATIVE
GFR SERPL CREATININE-BSD FRML MDRD: 44 ML/MIN/1.73SQ M
GLUCOSE SERPL-MCNC: 104 MG/DL (ref 65–140)
GLUCOSE SERPL-MCNC: 108 MG/DL (ref 65–140)
HCT VFR BLD AUTO: 40.1 % (ref 36.5–49.3)
HGB BLD-MCNC: 13.6 G/DL (ref 12–17)
INR PPP: 0.9 (ref 0.84–1.19)
MCH RBC QN AUTO: 30.8 PG (ref 26.8–34.3)
MCHC RBC AUTO-ENTMCNC: 33.9 G/DL (ref 31.4–37.4)
MCV RBC AUTO: 91 FL (ref 82–98)
PLATELET # BLD AUTO: 203 THOUSANDS/UL (ref 149–390)
PMV BLD AUTO: 10.5 FL (ref 8.9–12.7)
POTASSIUM SERPL-SCNC: 3.7 MMOL/L (ref 3.5–5.3)
PROTHROMBIN TIME: 12.7 SECONDS (ref 11.6–14.5)
RBC # BLD AUTO: 4.42 MILLION/UL (ref 3.88–5.62)
RSV RNA RESP QL NAA+PROBE: NEGATIVE
SARS-COV-2 RNA RESP QL NAA+PROBE: NEGATIVE
SODIUM SERPL-SCNC: 139 MMOL/L (ref 135–147)
WBC # BLD AUTO: 4.4 THOUSAND/UL (ref 4.31–10.16)

## 2024-07-20 PROCEDURE — 80048 BASIC METABOLIC PNL TOTAL CA: CPT

## 2024-07-20 PROCEDURE — 0241U HB NFCT DS VIR RESP RNA 4 TRGT: CPT

## 2024-07-20 PROCEDURE — 70498 CT ANGIOGRAPHY NECK: CPT

## 2024-07-20 PROCEDURE — 36415 COLL VENOUS BLD VENIPUNCTURE: CPT

## 2024-07-20 PROCEDURE — 99223 1ST HOSP IP/OBS HIGH 75: CPT

## 2024-07-20 PROCEDURE — 85730 THROMBOPLASTIN TIME PARTIAL: CPT

## 2024-07-20 PROCEDURE — 85027 COMPLETE CBC AUTOMATED: CPT

## 2024-07-20 PROCEDURE — 85610 PROTHROMBIN TIME: CPT

## 2024-07-20 PROCEDURE — 70496 CT ANGIOGRAPHY HEAD: CPT

## 2024-07-20 PROCEDURE — 93005 ELECTROCARDIOGRAM TRACING: CPT

## 2024-07-20 PROCEDURE — 99285 EMERGENCY DEPT VISIT HI MDM: CPT

## 2024-07-20 PROCEDURE — 84484 ASSAY OF TROPONIN QUANT: CPT

## 2024-07-20 PROCEDURE — 82948 REAGENT STRIP/BLOOD GLUCOSE: CPT

## 2024-07-20 RX ORDER — SODIUM CHLORIDE 9 MG/ML
75 INJECTION, SOLUTION INTRAVENOUS CONTINUOUS
Status: DISCONTINUED | OUTPATIENT
Start: 2024-07-20 | End: 2024-07-21

## 2024-07-20 RX ORDER — DOCUSATE SODIUM 100 MG/1
100 CAPSULE, LIQUID FILLED ORAL 2 TIMES DAILY
Status: DISCONTINUED | OUTPATIENT
Start: 2024-07-21 | End: 2024-07-22 | Stop reason: HOSPADM

## 2024-07-20 RX ORDER — TAMSULOSIN HYDROCHLORIDE 0.4 MG/1
0.4 CAPSULE ORAL
Status: DISCONTINUED | OUTPATIENT
Start: 2024-07-21 | End: 2024-07-21

## 2024-07-20 RX ORDER — HEPARIN SODIUM 5000 [USP'U]/ML
5000 INJECTION, SOLUTION INTRAVENOUS; SUBCUTANEOUS EVERY 8 HOURS SCHEDULED
Status: DISCONTINUED | OUTPATIENT
Start: 2024-07-20 | End: 2024-07-22 | Stop reason: HOSPADM

## 2024-07-20 RX ORDER — CLOPIDOGREL BISULFATE 75 MG/1
300 TABLET ORAL ONCE
Status: COMPLETED | OUTPATIENT
Start: 2024-07-20 | End: 2024-07-20

## 2024-07-20 RX ORDER — CLOPIDOGREL BISULFATE 75 MG/1
75 TABLET ORAL DAILY
Status: DISCONTINUED | OUTPATIENT
Start: 2024-07-21 | End: 2024-07-22 | Stop reason: HOSPADM

## 2024-07-20 RX ORDER — ATORVASTATIN CALCIUM 40 MG/1
40 TABLET, FILM COATED ORAL EVERY EVENING
Status: DISCONTINUED | OUTPATIENT
Start: 2024-07-20 | End: 2024-07-20

## 2024-07-20 RX ORDER — AMLODIPINE BESYLATE 10 MG/1
10 TABLET ORAL DAILY
Status: DISCONTINUED | OUTPATIENT
Start: 2024-07-21 | End: 2024-07-22 | Stop reason: HOSPADM

## 2024-07-20 RX ORDER — ASPIRIN 81 MG/1
324 TABLET, CHEWABLE ORAL ONCE
Status: COMPLETED | OUTPATIENT
Start: 2024-07-20 | End: 2024-07-20

## 2024-07-20 RX ORDER — ONDANSETRON 2 MG/ML
4 INJECTION INTRAMUSCULAR; INTRAVENOUS EVERY 6 HOURS PRN
Status: DISCONTINUED | OUTPATIENT
Start: 2024-07-20 | End: 2024-07-22 | Stop reason: HOSPADM

## 2024-07-20 RX ORDER — ACETAMINOPHEN 325 MG/1
650 TABLET ORAL EVERY 4 HOURS PRN
Status: DISCONTINUED | OUTPATIENT
Start: 2024-07-20 | End: 2024-07-22 | Stop reason: HOSPADM

## 2024-07-20 RX ADMIN — IOHEXOL 85 ML: 350 INJECTION, SOLUTION INTRAVENOUS at 21:53

## 2024-07-20 RX ADMIN — SODIUM CHLORIDE 1000 ML: 0.9 INJECTION, SOLUTION INTRAVENOUS at 22:43

## 2024-07-20 RX ADMIN — CLOPIDOGREL 300 MG: 75 TABLET ORAL at 22:43

## 2024-07-20 RX ADMIN — ASPIRIN 324 MG: 81 TABLET, CHEWABLE ORAL at 22:43

## 2024-07-21 ENCOUNTER — APPOINTMENT (OUTPATIENT)
Dept: MRI IMAGING | Facility: HOSPITAL | Age: 74
DRG: 093 | End: 2024-07-21
Payer: COMMERCIAL

## 2024-07-21 LAB
2HR DELTA HS TROPONIN: 0 NG/L
4HR DELTA HS TROPONIN: 0 NG/L
ANION GAP SERPL CALCULATED.3IONS-SCNC: 7 MMOL/L (ref 4–13)
ATRIAL RATE: 69 BPM
BUN SERPL-MCNC: 19 MG/DL (ref 5–25)
CALCIUM SERPL-MCNC: 8.8 MG/DL (ref 8.4–10.2)
CARDIAC TROPONIN I PNL SERPL HS: 4 NG/L
CARDIAC TROPONIN I PNL SERPL HS: 4 NG/L
CHLORIDE SERPL-SCNC: 105 MMOL/L (ref 96–108)
CHOLEST SERPL-MCNC: 162 MG/DL
CO2 SERPL-SCNC: 26 MMOL/L (ref 21–32)
CREAT SERPL-MCNC: 1.33 MG/DL (ref 0.6–1.3)
CRP SERPL QL: 1.4 MG/L
ERYTHROCYTE [DISTWIDTH] IN BLOOD BY AUTOMATED COUNT: 13.7 % (ref 11.6–15.1)
GFR SERPL CREATININE-BSD FRML MDRD: 52 ML/MIN/1.73SQ M
GLUCOSE P FAST SERPL-MCNC: 102 MG/DL (ref 65–99)
GLUCOSE SERPL-MCNC: 102 MG/DL (ref 65–140)
HCT VFR BLD AUTO: 39.3 % (ref 36.5–49.3)
HDLC SERPL-MCNC: 67 MG/DL
HGB BLD-MCNC: 13.1 G/DL (ref 12–17)
LDLC SERPL CALC-MCNC: 75 MG/DL (ref 0–100)
MCH RBC QN AUTO: 30.7 PG (ref 26.8–34.3)
MCHC RBC AUTO-ENTMCNC: 33.3 G/DL (ref 31.4–37.4)
MCV RBC AUTO: 92 FL (ref 82–98)
P AXIS: 55 DEGREES
PLATELET # BLD AUTO: 196 THOUSANDS/UL (ref 149–390)
PMV BLD AUTO: 10.7 FL (ref 8.9–12.7)
POTASSIUM SERPL-SCNC: 4 MMOL/L (ref 3.5–5.3)
PR INTERVAL: 160 MS
QRS AXIS: -5 DEGREES
QRSD INTERVAL: 92 MS
QT INTERVAL: 392 MS
QTC INTERVAL: 420 MS
RBC # BLD AUTO: 4.27 MILLION/UL (ref 3.88–5.62)
SODIUM SERPL-SCNC: 138 MMOL/L (ref 135–147)
T WAVE AXIS: -5 DEGREES
TRIGL SERPL-MCNC: 98 MG/DL
VENTRICULAR RATE: 69 BPM
WBC # BLD AUTO: 3.82 THOUSAND/UL (ref 4.31–10.16)

## 2024-07-21 PROCEDURE — 93010 ELECTROCARDIOGRAM REPORT: CPT | Performed by: INTERNAL MEDICINE

## 2024-07-21 PROCEDURE — 70551 MRI BRAIN STEM W/O DYE: CPT

## 2024-07-21 PROCEDURE — 80061 LIPID PANEL: CPT | Performed by: FAMILY MEDICINE

## 2024-07-21 PROCEDURE — 99233 SBSQ HOSP IP/OBS HIGH 50: CPT | Performed by: STUDENT IN AN ORGANIZED HEALTH CARE EDUCATION/TRAINING PROGRAM

## 2024-07-21 PROCEDURE — 80048 BASIC METABOLIC PNL TOTAL CA: CPT | Performed by: FAMILY MEDICINE

## 2024-07-21 PROCEDURE — 99221 1ST HOSP IP/OBS SF/LOW 40: CPT | Performed by: PSYCHIATRY & NEUROLOGY

## 2024-07-21 PROCEDURE — 82746 ASSAY OF FOLIC ACID SERUM: CPT | Performed by: STUDENT IN AN ORGANIZED HEALTH CARE EDUCATION/TRAINING PROGRAM

## 2024-07-21 PROCEDURE — 82607 VITAMIN B-12: CPT | Performed by: STUDENT IN AN ORGANIZED HEALTH CARE EDUCATION/TRAINING PROGRAM

## 2024-07-21 PROCEDURE — 85027 COMPLETE CBC AUTOMATED: CPT | Performed by: FAMILY MEDICINE

## 2024-07-21 PROCEDURE — 84484 ASSAY OF TROPONIN QUANT: CPT

## 2024-07-21 PROCEDURE — 99215 OFFICE O/P EST HI 40 MIN: CPT | Performed by: PSYCHIATRY & NEUROLOGY

## 2024-07-21 PROCEDURE — 86140 C-REACTIVE PROTEIN: CPT | Performed by: FAMILY MEDICINE

## 2024-07-21 RX ORDER — TAMSULOSIN HYDROCHLORIDE 0.4 MG/1
0.4 CAPSULE ORAL
Status: DISCONTINUED | OUTPATIENT
Start: 2024-07-21 | End: 2024-07-22 | Stop reason: HOSPADM

## 2024-07-21 RX ORDER — FLUTICASONE PROPIONATE 50 MCG
1 SPRAY, SUSPENSION (ML) NASAL DAILY
Status: DISCONTINUED | OUTPATIENT
Start: 2024-07-21 | End: 2024-07-22 | Stop reason: HOSPADM

## 2024-07-21 RX ORDER — ATORVASTATIN CALCIUM 40 MG/1
40 TABLET, FILM COATED ORAL
Status: DISCONTINUED | OUTPATIENT
Start: 2024-07-21 | End: 2024-07-22 | Stop reason: HOSPADM

## 2024-07-21 RX ORDER — FLUTICASONE PROPIONATE 50 MCG
1 SPRAY, SUSPENSION (ML) NASAL DAILY
Status: DISCONTINUED | OUTPATIENT
Start: 2024-07-22 | End: 2024-07-21

## 2024-07-21 RX ORDER — LORAZEPAM 2 MG/ML
1 INJECTION INTRAMUSCULAR
Status: COMPLETED | OUTPATIENT
Start: 2024-07-21 | End: 2024-07-21

## 2024-07-21 RX ADMIN — FLUTICASONE PROPIONATE 1 SPRAY: 50 SPRAY, METERED NASAL at 21:09

## 2024-07-21 RX ADMIN — ASPIRIN 81 MG: 81 TABLET, COATED ORAL at 08:45

## 2024-07-21 RX ADMIN — SODIUM CHLORIDE 75 ML/HR: 0.9 INJECTION, SOLUTION INTRAVENOUS at 11:09

## 2024-07-21 RX ADMIN — CLOPIDOGREL 75 MG: 75 TABLET ORAL at 08:44

## 2024-07-21 RX ADMIN — AMLODIPINE BESYLATE 10 MG: 10 TABLET ORAL at 08:45

## 2024-07-21 RX ADMIN — DOCUSATE SODIUM 100 MG: 100 CAPSULE, LIQUID FILLED ORAL at 08:45

## 2024-07-21 RX ADMIN — SODIUM CHLORIDE 75 ML/HR: 0.9 INJECTION, SOLUTION INTRAVENOUS at 00:32

## 2024-07-21 RX ADMIN — TAMSULOSIN HYDROCHLORIDE 0.4 MG: 0.4 CAPSULE ORAL at 02:57

## 2024-07-21 RX ADMIN — HEPARIN SODIUM 5000 UNITS: 5000 INJECTION INTRAVENOUS; SUBCUTANEOUS at 15:13

## 2024-07-21 RX ADMIN — LORAZEPAM 1 MG: 2 INJECTION INTRAMUSCULAR; INTRAVENOUS at 11:56

## 2024-07-21 RX ADMIN — TAMSULOSIN HYDROCHLORIDE 0.4 MG: 0.4 CAPSULE ORAL at 16:49

## 2024-07-21 NOTE — H&P
[facility]  H&P  Name: Tomas Robert 74 y.o. male I MRN: [id number]  Unit/Bed#: 2 E 260-01 I Date of Admission: 7/20/2024   Date of Service: 7/20/2024 I Hospital Day: 0      Assessment & Plan   * Stroke-like symptoms  Assessment & Plan  Presents with Left side leg cramping that progressed to left hand , left face and head numbness that started around 8:30pm after going fishing.  Patient now reports symptoms have some what subsided with mild left side numbness and tingling. Denies speech or visual disturbance, facial droop, chest pain or shortness of breath.     CTH (7/20): No acute intracranial or angiographic abnormality   CTA head/neck (7/20): No acute intracranial or angiographic abnormality   NIHSS at presentation 1, not a candidate tPA/TNK symptoms non-disabling  Stroke pathway initiated,  Neuro consult  MRI   Neurochecks per protocol  Telemetry monitor   Permissible hypertension, SBP < 210 mmHg   ASA, Plavix load in ED,   Recent A1c 5.7 , lipid panel pending  Stat CTH for any acute neuro changes  Heparin DVT prophylaxis      Primary hypertension  Assessment & Plan  Vitals:    07/20/24 2210   BP: 143/72   Pulse: 71   Resp: 16   Temp:    SpO2: 100%       Plan:  Blood pressure within  acceptable range  Continue home dose amlodipine  Monitor         CKD (chronic kidney disease) stage 3, GFR 30-59 ml/min (Formerly [facility])  Assessment & Plan  Lab Results   Component Value Date    EGFR 44 07/20/2024    EGFR 55 05/14/2024    EGFR 50 05/08/2024    CREATININE 1.52 (H) 07/20/2024    CREATININE 1.27 05/14/2024    CREATININE 1.37 (H) 05/08/2024   Creatinine slight elevated today at 1.52. Baseline appears to be 1.2-1.4  IV Hydration  Monitor BMP  Avoid nephrotoxic agents  Follows with Dr. Pyle outpatient    Benign prostatic hyperplasia, presence of lower urinary tract symptoms unspecified  Assessment & Plan  Continue home dose tamulosin         VTE Pharmacologic Prophylaxis: VTE Score: 4 Moderate Risk (Score  3-4) - Pharmacological DVT Prophylaxis Ordered: apixaban (Eliquis).  Code Status: Level 1 - Full Code   Discussion with family: Updated  (wife and nephew) at bedside.    Anticipated Length of Stay: Patient will be admitted on an observation basis with an anticipated length of stay of less than 2 midnights secondary to stroke-like symptoms.    Total Time Spent on Date of Encounter in care of patient: 76 mins. This time was spent on one or more of the following: performing physical exam; counseling and coordination of care; obtaining or reviewing history; documenting in the medical record; reviewing/ordering tests, medications or procedures; communicating with other healthcare professionals and discussing with patient's family/caregivers.    Chief Complaint:    Chief Complaint   Patient presents with    Numbness     Pt c/o left sided facial, arm and leg numbness x 20 minutes. Pt states he had cramps in his left leg earlier today          History of Present Illness:  Tomas Robert is a 74 y.o. male with a PMH of Hypertension, CKD  Stage 3 and BPH who presents with left side numbness.  Patient reports this evening around 8:30 PM he started to feel left-sided leg cramping.  Early thereafter left hand numbness that traveled up his arm, to the left side of his neck, left side of face and head.  At that time he reported to the emergency department.  He denies any facial droop, visual disturbance, slurred speech, chest pain or shortness of breath.  Of note, May 2024 patient had similar presentation related to hypertensive urgency.  Blood pressure on admission SBP was in the 170s.  And is now within acceptable range without intervention.  Stroke pathway initiated in the ED. CT scans showed no acute intracranial abnormality.  Neuro recommending Asa and platelet load.  Will continue with DAPT.  Will admit under observation for further evaluation and MRI in the a.m. all questions answered to patient's satisfaction  patient amendable to plan.    Review of Systems:  Review of Systems   Respiratory:  Negative for shortness of breath.    Cardiovascular:  Negative for chest pain.   Neurological:  Positive for weakness and numbness.       Past Medical and Surgical History:   Past Medical History:   Diagnosis Date    Benign prostatic hyperplasia     Chronic kidney disease     COVID-19 03/2020    Esophageal reflux     Hypertension     Hypertensive urgency 3/26/2019    Vertigo        Past Surgical History:   Procedure Laterality Date    APPENDECTOMY  05/21/2015    CYST REMOVAL      Fatty cyst removed from back       Meds/Allergies:  Prior to Admission medications    Medication Sig Start Date End Date Taking? Authorizing Provider   amLODIPine (NORVASC) 10 mg tablet Take 1 tablet (10 mg total) by mouth daily 6/12/24   Nino Hancock PA-C   amLODIPine (NORVASC) 10 mg tablet Take 1 tablet (10 mg total) by mouth daily 6/12/24   Nino Hancock PA-C   cyclobenzaprine (FLEXERIL) 10 mg tablet Take 1 tablet (10 mg total) by mouth if needed for muscle spasms 5/15/24   IRMA Ivy   lidocaine (LIDODERM) 5 % Apply 1 patch topically over 12 hours if needed (Intermittent right low back pain with radiculopathy) Remove & Discard patch within 12 hours or as directed by MD 5/3/23   Nino Hancock PA-C   meclizine (ANTIVERT) 25 mg tablet Take 1 tablet (25 mg total) by mouth 3 (three) times a day as needed for dizziness 10/18/23   Mabel Cruz,    multivitamin (THERAGRAN) TABS Take 1 tablet by mouth daily    Historical Provider, MD   pantoprazole (PROTONIX) 40 mg tablet Take 40 mg by mouth daily 5/19/23   Historical Provider, MD   tadalafil (CIALIS) 20 MG tablet if needed    Historical Provider, MD   tamsulosin (FLOMAX) 0.4 mg Take 1 capsule (0.4 mg total) by mouth daily with dinner 1/5/21   Nino Hancock PA-C     I have reviewed home medications with patient personally.    Allergies:   Allergies   Allergen Reactions    Penicillin V  Anaphylaxis       Social History:  Marital Status: /Civil Union   Occupation:   Patient Pre-hospital Living Situation: Home, With spouse  Patient Pre-hospital Level of Mobility: walks  Patient Pre-hospital Diet Restrictions:   Substance Use History:   Social History     Substance and Sexual Activity   Alcohol Use Yes    Comment: socially     Social History     Tobacco Use   Smoking Status Former   Smokeless Tobacco Never   Tobacco Comments    1ppw     Social History     Substance and Sexual Activity   Drug Use No       Family History:  Family History   Problem Relation Age of Onset    Prostate cancer Father     Prostate cancer Maternal Grandfather     Stomach cancer Maternal Aunt         malignant tumor of pharynx    Stomach cancer Maternal Uncle         malignant tumor of pharynx    Mental illness Family     Depression Family     Schizophrenia Family     Hypertension Mother     No Known Problems Sister     Dementia Sister        Physical Exam:     Vitals:   Blood Pressure: 143/72 (07/20/24 2210)  Pulse: 71 (07/20/24 2210)  Temperature: 97.6 °F (36.4 °C) (07/20/24 2126)  Respirations: 16 (07/20/24 2210)  Weight - Scale: 83.9 kg (185 lb) (07/20/24 2140)  SpO2: 100 % (07/20/24 2210)    Physical Exam  Constitutional:       General: He is not in acute distress.     Appearance: Normal appearance.   HENT:      Head: Normocephalic and atraumatic.   Eyes:      Conjunctiva/sclera: Conjunctivae normal.      Pupils: Pupils are equal, round, and reactive to light.   Cardiovascular:      Rate and Rhythm: Normal rate and regular rhythm.      Pulses: Normal pulses.      Heart sounds: Normal heart sounds.   Pulmonary:      Effort: Pulmonary effort is normal.      Breath sounds: Normal breath sounds.   Musculoskeletal:         General: No swelling.   Skin:     General: Skin is warm.      Capillary Refill: Capillary refill takes less than 2 seconds.   Neurological:      General: No focal deficit present.      Mental Status: He  is alert and oriented to person, place, and time. Mental status is at baseline.   Psychiatric:         Mood and Affect: Mood normal.         Behavior: Behavior normal.         Thought Content: Thought content normal.          Additional Data:     Lab Results:  Results from last 7 days   Lab Units 07/20/24  2141   WBC Thousand/uL 4.40   HEMOGLOBIN g/dL 13.6   HEMATOCRIT % 40.1   PLATELETS Thousands/uL 203     Results from last 7 days   Lab Units 07/20/24  2141   SODIUM mmol/L 139   POTASSIUM mmol/L 3.7   CHLORIDE mmol/L 104   CO2 mmol/L 27   BUN mg/dL 19   CREATININE mg/dL 1.52*   ANION GAP mmol/L 8   CALCIUM mg/dL 9.3   GLUCOSE RANDOM mg/dL 104     Results from last 7 days   Lab Units 07/20/24  2141   INR  0.90     Results from last 7 days   Lab Units 07/20/24  2138   POC GLUCOSE mg/dl 108     Lab Results   Component Value Date    HGBA1C 5.7 (H) 05/15/2024           Lines/Drains:  Invasive Devices       Peripheral Intravenous Line  Duration             Peripheral IV Proximal;Right;Ventral (anterior) Forearm -- days                        Imaging: Reviewed radiology reports from this admission including: CT head  CTA stroke alert (head/neck)   Final Result by Carlos Lindo MD (07/20 2218)      No acute intracranial or angiographic abnormality      I personally discussed this study with Aylin Bustamante at 7/20/24 10:08 PM            Workstation performed: [id number]         CT stroke alert brain   Final Result by Carlos Lindo MD (07/20 2218)      No acute intracranial or angiographic abnormality      I personally discussed this study with Aylin Bustamante at 7/20/24 10:08 PM            Workstation performed: [id number]         MRI Inpatient Order    (Results Pending)       EKG and Other Studies Reviewed on Admission:   EKG: Personally Reviewed. NSR. HR 69.    ** Please Note: This note has been constructed using a voice recognition system. **

## 2024-07-21 NOTE — ASSESSMENT & PLAN NOTE
Lab Results   Component Value Date    EGFR 52 07/21/2024    EGFR 44 07/20/2024    EGFR 55 05/14/2024    CREATININE 1.33 (H) 07/21/2024    CREATININE 1.52 (H) 07/20/2024    CREATININE 1.27 05/14/2024   Creatinine slight elevated today at 1.52. Baseline appears to be 1.2-1.4  IV Hydration  Monitor BMP  Avoid nephrotoxic agents  Follows with Dr. Pyle outpatient

## 2024-07-21 NOTE — CONSULTS
Consultation - Neurology   Tomas Robert 74 y.o. male MRN: [id number]  Unit/Bed#: 2 E 260-01 Encounter: [id number]      Assessment & Plan     * Stroke-like symptoms  Assessment & Plan  74 y.o. male with hypertension, CKD, BPH, sensorineural hearing loss and a known history of lightheadedness/dizziness/ataxia in the setting of hypertensive urgency who presented to the ED on 7/20/2024 with strokelike symptoms of  left leg cramping followed by left facial, arm and leg numbness. Last known well 8 PM. NIHSS 1. Initial CT/CTA imaging was negative. Not a TNK candidate. Admitted on stroke pathway.    Workup:  CTH negative for acute abnormality.  CTA H/N negative for large vessel occlusion or flow limiting stenosis.  LDL 75  CRP 1.4  5/15/24 A1C 5.7  Influenza/RSV/COVID-negative  On arrival BUN/Cr 191.52 GFR 44    On neuro exam today, patient confirmed story of L sided numbness that began with a cramp in the left leg, then was noticed in the left hand progressing to L arm and L face. Currently he is unsure but states that he may have minimal residual L hand numbness. Denies weakness. Neurologic exam is unremarkable. Broad differential at this time. Proceed with stroke work-up, await MRI brain results.     Plan:  Acute ischemic stroke protocol  S/p Aspirin 324mg  S/p Plavix 300mg  Continue DAPT with Aspirin 81mg and Plavix 75mg daily  Atorvastatin 40mg  MRI brain without contrast   Will need anxiolytic prior for claustrophobia  Echo   Plan for outpatient routine EEG if MRI is unrevealing  Telemetry  B12, folate pending  Secondary stroke risk factor modification  Permissive hypertension with max of /110   Goal of normothermia and euglycemia   PT/OT/ST  Stroke Education  Frequent neurological checks  Stat CT head with worsening in neuro exam  Notify neurology with any changes in exam         Recommendations for outpatient neurological follow up have yet to be determined.    History of Present Illness     Reason for  Consult / Principal Problem: Stroke like symptoms  Hx and PE limited by:NA  HPI: Tomas Robert is a 74 y.o. male with hypertension, CKD, BPH, sensorineural hearing loss and a known history of lightheadedness/dizziness/ataxia in the setting of hypertensive urgency who presented to the ED on 7/20/2024 with strokelike symptoms of left leg cramping followed by left facial, arm and leg numbness. Last known well 8 PM when initial symptoms began.  Initial /85.  CTH/CTA H/N negative for acute intracranial abnormality, LVO or flow-limiting stenosis.  NIHSS 1 in the ED. Patient was not deemed a candidate for TNK, nor endovascular intervention.   Patient is AP naive and was loaded with Aspirin/Plavix and was admitted for stroke pathway.    Patient was recently hospitalized on 5/15/2024 for a stroke alert in the setting of uncontrolled hypertension, lightheadedness, dizziness and ataxia. MRI at that time was negative for acute ischemia. CTA H/N negative for large vessel occlusion or flow limiting stenosis.  Previous to that he was also hospitalized in October 2023 for dizziness/vertigo.    Today on neuro exam, patient explained that he initially felt a cramp/aching pain in his left calf. He got in the car and began driving and then he developed L hand followed by arm numbness, and then subsequent L face numbness over the course of 5-10 minutes. Patient notes that his symptoms resolved after a few hours after the Aspirin and Plavix were given. This morning he initially denied symptoms, but towards end of exam he mentioned he may have some numbness in his L hand.   In further conversation, patient also mentioned that remotely he had a TIA but denies taking Aspirin on a daily basis.     Inpatient consult to Neurology  Consult performed by: Shannan Coleman PA-C  Consult ordered by: IRMA Oates      Inpatient consult to Neurology  Consult performed by: Shannan Coleman PA-C  Consult ordered by: Mar IVEY  "FLOWER Mahoney        Review of Systems   Eyes:  Negative for visual disturbance.   Respiratory:  Negative for shortness of breath.    Cardiovascular:  Negative for chest pain.   Neurological:  Positive for numbness (L hand residual numbness). Negative for speech difficulty, weakness and headaches.       Historical Information   Past Medical History:   Diagnosis Date    Benign prostatic hyperplasia     Chronic kidney disease     COVID-19 03/2020    Esophageal reflux     Hypertension     Hypertensive urgency 3/26/2019    Vertigo      Past Surgical History:   Procedure Laterality Date    APPENDECTOMY  05/21/2015    CYST REMOVAL      Fatty cyst removed from back     Social History   Social History     Substance and Sexual Activity   Alcohol Use Yes    Comment: socially     Social History     Substance and Sexual Activity   Drug Use No     E-Cigarette/Vaping    E-Cigarette Use Never User      E-Cigarette/Vaping Substances    Nicotine No     THC No     CBD No     Flavoring No     Other No     Unknown No      Social History     Tobacco Use   Smoking Status Former   Smokeless Tobacco Never   Tobacco Comments    1ppw     Family History:   Family History   Problem Relation Age of Onset    Prostate cancer Father     Prostate cancer Maternal Grandfather     Stomach cancer Maternal Aunt         malignant tumor of pharynx    Stomach cancer Maternal Uncle         malignant tumor of pharynx    Mental illness Family     Depression Family     Schizophrenia Family     Hypertension Mother     No Known Problems Sister     Dementia Sister        Review of previous medical records was completed.     Meds/Allergies   all current active meds have been reviewed    Allergies   Allergen Reactions    Penicillin V Anaphylaxis       Objective   Vitals:Blood pressure 128/74, pulse 57, temperature 98 °F (36.7 °C), temperature source Oral, resp. rate 20, height (!) 6\" (0.152 m), weight 83 kg (182 lb 14.4 oz), SpO2 98%.,Body mass index is 3,572.02 " kg/m².  No intake or output data in the 24 hours ending 07/21/24 1140    Invasive Devices:   Invasive Devices       Peripheral Intravenous Line  Duration             Peripheral IV 07/20/24 Proximal;Right;Ventral (anterior) Forearm <1 day                    Physical Exam  Constitutional:       General: He is not in acute distress.     Appearance: Normal appearance. He is not ill-appearing or toxic-appearing.   HENT:      Head: Normocephalic and atraumatic.   Eyes:      Extraocular Movements: EOM normal.   Pulmonary:      Effort: Pulmonary effort is normal. No respiratory distress.   Neurological:      Mental Status: He is alert and oriented to person, place, and time.      Motor: Motor strength is normal.     Coordination: Finger-Nose-Finger Test normal.   Psychiatric:         Speech: Speech normal.       Neurologic Exam     Mental Status   Oriented to person, place, and time.   Follows 2 step commands.   Attention: normal.   Speech: speech is normal   Level of consciousness: alert  Knowledge: good.   Normal comprehension.     Cranial Nerves     CN II   Visual acuity: normal  Right visual field deficit: none  Left visual field deficit: none     CN III, IV, VI   Extraocular motions are normal.   Nystagmus: none   Conjugate gaze: present    CN V   Facial sensation intact.     CN VII   Facial expression full, symmetric.     Motor Exam   Right arm pronator drift: absent  Left arm pronator drift: absent    Strength   Strength 5/5 throughout.     Sensory Exam   Light touch normal.     Gait, Coordination, and Reflexes     Coordination   Finger to nose coordination: normal      Lab Results: I have personally reviewed pertinent reports.    Imaging Studies: I have personally reviewed pertinent films in PACS  EKG, Pathology, and Other Studies: I have personally reviewed pertinent reports.    VTE Prophylaxis: Heparin    Code Status: Level 1 - Full Code

## 2024-07-21 NOTE — PLAN OF CARE
Problem: PAIN - ADULT  Goal: Verbalizes/displays adequate comfort level or baseline comfort level  Description: Interventions:  - Encourage patient to monitor pain and request assistance  - Assess pain using appropriate pain scale  - Administer analgesics based on type and severity of pain and evaluate response  - Implement non-pharmacological measures as appropriate and evaluate response  - Consider cultural and social influences on pain and pain management  - Notify physician/advanced practitioner if interventions unsuccessful or patient reports new pain  Outcome: Progressing     Problem: INFECTION - ADULT  Goal: Absence or prevention of progression during hospitalization  Description: INTERVENTIONS:  - Assess and monitor for signs and symptoms of infection  - Monitor lab/diagnostic results  - Monitor all insertion sites, i.e. indwelling lines, tubes, and drains  - Monitor endotracheal if appropriate and nasal secretions for changes in amount and color  - [place] appropriate cooling/warming therapies per order  - Administer medications as ordered  - Instruct and encourage patient and family to use good hand hygiene technique  - Identify and instruct in appropriate isolation precautions for identified infection/condition  Outcome: Progressing  Goal: Absence of fever/infection during neutropenic period  Description: INTERVENTIONS:  - Monitor WBC    Outcome: Progressing     Problem: SAFETY ADULT  Goal: Patient will remain free of falls  Description: INTERVENTIONS:  - Educate patient/family on patient safety including physical limitations  - Instruct patient to call for assistance with activity   - Consult OT/PT to assist with strengthening/mobility   - Keep Call bell within reach  - Keep bed low and locked with side rails adjusted as appropriate  - Keep care items and personal belongings within reach  - Initiate and maintain comfort rounds  - Make Fall Risk Sign visible to staff  - Offer Toileting every  Hours,  in advance of need  - Initiate/Maintain alarm  - Obtain necessary fall risk management equipment:   - Apply yellow socks and bracelet for high fall risk patients  - Consider moving patient to room near nurses station  Outcome: Progressing  Goal: Maintain or return to baseline ADL function  Description: INTERVENTIONS:  -  Assess patient's ability to carry out ADLs; assess patient's baseline for ADL function and identify physical deficits which impact ability to perform ADLs (bathing, care of mouth/teeth, toileting, grooming, dressing, etc.)  - Assess/evaluate cause of self-care deficits   - Assess range of motion  - Assess patient's mobility; develop plan if impaired  - Assess patient's need for assistive devices and provide as appropriate  - Encourage maximum independence but intervene and supervise when necessary  - Involve family in performance of ADLs  - Assess for home care needs following discharge   - Consider OT consult to assist with ADL evaluation and planning for discharge  - Provide patient education as appropriate  Outcome: Progressing  Goal: Maintains/Returns to pre admission functional level  Description: INTERVENTIONS:  - Perform AM-PAC 6 Click Basic Mobility/ Daily Activity assessment daily.  - Set and communicate daily mobility goal to care team and patient/family/caregiver.   - Collaborate with rehabilitation services on mobility goals if consulted  - Perform Range of Motion  times a day.  - Reposition patient every  hours.  - Dangle patient  times a day  - Stand patient  times a day  - Ambulate patient  times a day  - Out of bed to chair  times a day   - Out of bed for meals  times a day  - Out of bed for toileting  - Record patient progress and toleration of activity level   Outcome: Progressing     Problem: DISCHARGE PLANNING  Goal: Discharge to home or other facility with appropriate resources  Description: INTERVENTIONS:  - Identify barriers to discharge w/patient and caregiver  - Arrange for  needed discharge resources and transportation as appropriate  - Identify discharge learning needs (meds, wound care, etc.)  - Arrange for interpretive services to assist at discharge as needed  - Refer to Case Management Department for coordinating discharge planning if the patient needs post-hospital services based on physician/advanced practitioner order or complex needs related to functional status, cognitive ability, or social support system  Outcome: Progressing     Problem: Knowledge Deficit  Goal: Patient/family/caregiver demonstrates understanding of disease process, treatment plan, medications, and discharge instructions  Description: Complete learning assessment and assess knowledge base.  Interventions:  - Provide teaching at level of understanding  - Provide teaching via preferred learning methods  Outcome: Progressing

## 2024-07-21 NOTE — PLAN OF CARE
Problem: PAIN - ADULT  Goal: Verbalizes/displays adequate comfort level or baseline comfort level  Description: Interventions:  - Encourage patient to monitor pain and request assistance  - Assess pain using appropriate pain scale  - Administer analgesics based on type and severity of pain and evaluate response  - Implement non-pharmacological measures as appropriate and evaluate response  - Consider cultural and social influences on pain and pain management  - Notify physician/advanced practitioner if interventions unsuccessful or patient reports new pain  Outcome: Progressing     Problem: INFECTION - ADULT  Goal: Absence or prevention of progression during hospitalization  Description: INTERVENTIONS:  - Assess and monitor for signs and symptoms of infection  - Monitor lab/diagnostic results  - Monitor all insertion sites, i.e. indwelling lines, tubes, and drains  - Monitor endotracheal if appropriate and nasal secretions for changes in amount and color  - [place] appropriate cooling/warming therapies per order  - Administer medications as ordered  - Instruct and encourage patient and family to use good hand hygiene technique  - Identify and instruct in appropriate isolation precautions for identified infection/condition  Outcome: Progressing  Goal: Absence of fever/infection during neutropenic period  Description: INTERVENTIONS:  - Monitor WBC    Outcome: Progressing     Problem: SAFETY ADULT  Goal: Patient will remain free of falls  Description: INTERVENTIONS:  - Educate patient/family on patient safety including physical limitations  - Instruct patient to call for assistance with activity   - Consult OT/PT to assist with strengthening/mobility   - Keep Call bell within reach  - Keep bed low and locked with side rails adjusted as appropriate  - Keep care items and personal belongings within reach  - Initiate and maintain comfort rounds  - Make Fall Risk Sign visible to staff  - Offer Toileting every 2 Hours,  in advance of need  - Initiate/Maintain alarm  - Obtain necessary fall risk management equipment:   - Apply yellow socks and bracelet for high fall risk patients  - Consider moving patient to room near nurses station  Outcome: Progressing  Goal: Maintain or return to baseline ADL function  Description: INTERVENTIONS:  -  Assess patient's ability to carry out ADLs; assess patient's baseline for ADL function and identify physical deficits which impact ability to perform ADLs (bathing, care of mouth/teeth, toileting, grooming, dressing, etc.)  - Assess/evaluate cause of self-care deficits   - Assess range of motion  - Assess patient's mobility; develop plan if impaired  - Assess patient's need for assistive devices and provide as appropriate  - Encourage maximum independence but intervene and supervise when necessary  - Involve family in performance of ADLs  - Assess for home care needs following discharge   - Consider OT consult to assist with ADL evaluation and planning for discharge  - Provide patient education as appropriate  Outcome: Progressing  Goal: Maintains/Returns to pre admission functional level  Description: INTERVENTIONS:  - Perform AM-PAC 6 Click Basic Mobility/ Daily Activity assessment daily.  - Set and communicate daily mobility goal to care team and patient/family/caregiver.   - Collaborate with rehabilitation services on mobility goals if consulted  - Perform Range of Motion 3 times a day.  - Reposition patient every 2 hours.  - Dangle patient 3 times a day  - Stand patient 3 times a day  - Ambulate patient 3 times a day  - Out of bed to chair 3 times a day   - Out of bed for meals 3 times a day  - Out of bed for toileting  - Record patient progress and toleration of activity level   Outcome: Progressing     Problem: DISCHARGE PLANNING  Goal: Discharge to home or other facility with appropriate resources  Description: INTERVENTIONS:  - Identify barriers to discharge w/patient and caregiver  -  Arrange for needed discharge resources and transportation as appropriate  - Identify discharge learning needs (meds, wound care, etc.)  - Arrange for interpretive services to assist at discharge as needed  - Refer to Case Management Department for coordinating discharge planning if the patient needs post-hospital services based on physician/advanced practitioner order or complex needs related to functional status, cognitive ability, or social support system  Outcome: Progressing     Problem: Knowledge Deficit  Goal: Patient/family/caregiver demonstrates understanding of disease process, treatment plan, medications, and discharge instructions  Description: Complete learning assessment and assess knowledge base.  Interventions:  - Provide teaching at level of understanding  - Provide teaching via preferred learning methods  Outcome: Progressing

## 2024-07-21 NOTE — ASSESSMENT & PLAN NOTE
Lab Results   Component Value Date    EGFR 44 07/20/2024    EGFR 55 05/14/2024    EGFR 50 05/08/2024    CREATININE 1.52 (H) 07/20/2024    CREATININE 1.27 05/14/2024    CREATININE 1.37 (H) 05/08/2024   Creatinine slight elevated today at 1.52. Baseline appears to be 1.2-1.4  IV Hydration  Monitor BMP  Avoid nephrotoxic agents  Follows with Dr. Pyle outpatient

## 2024-07-21 NOTE — ASSESSMENT & PLAN NOTE
74 y.o. male with hypertension, CKD, BPH, sensorineural hearing loss and a known history of lightheadedness/dizziness/ataxia in the setting of hypertensive urgency who presented to the ED on 7/20/2024 with strokelike symptoms of  left leg cramping followed by left facial, arm and leg numbness. Last known well 8 PM. NIHSS 1. Initial CT/CTA imaging was negative. Not a TNK candidate. Admitted on stroke pathway.    Workup:  CTH negative for acute abnormality.  CTA H/N negative for large vessel occlusion or flow limiting stenosis.  LDL 75  CRP 1.4  5/15/24 A1C 5.7  Influenza/RSV/COVID-negative  On arrival BUN/Cr 191.52 GFR 44    On neuro exam today, patient confirmed story of L sided numbness that began with a cramp in the left leg, then was noticed in the left hand progressing to L arm and L face. Currently he is unsure but states that he may have minimal residual L hand numbness. Denies weakness. Neurologic exam is unremarkable. Broad differential at this time. Proceed with stroke work-up, await MRI brain results.     Plan:  Acute ischemic stroke protocol  S/p Aspirin 324mg  S/p Plavix 300mg  Continue DAPT with Aspirin 81mg and Plavix 75mg daily  Atorvastatin 40mg  MRI brain without contrast   Will need anxiolytic prior for claustrophobia  Echo   Plan for outpatient routine EEG if MRI is unrevealing  Telemetry  B12, folate pending  Secondary stroke risk factor modification  Permissive hypertension with max of /110   Goal of normothermia and euglycemia   PT/OT/ST  Stroke Education  Frequent neurological checks  Stat CT head with worsening in neuro exam  Notify neurology with any changes in exam

## 2024-07-21 NOTE — PROGRESS NOTES
[facility]  Progress Note  Name: Tomas Robert I  MRN: [id number]  Unit/Bed#: 2 E 260-01 I Date of Admission: 7/20/2024   Date of Service: 7/21/2024  Hospital Day: 0    Assessment & Plan   * Stroke-like symptoms  Assessment & Plan  Presents with Left side leg cramping that progressed to left hand , left face and head numbness that started around 8:30pm after going fishing.  Patient now reports symptoms have some what subsided with mild left side numbness and tingling. Denies speech or visual disturbance, facial droop, chest pain or shortness of breath.     CTH (7/20): No acute intracranial or angiographic abnormality   CTA head/neck (7/20): No acute intracranial or angiographic abnormality   NIHSS at presentation 1, not a candidate tPA/TNK symptoms non-disabling  Today's update and plan:  MRI negative for stroke  Flu panel/BMP are insignificant  Echo pending  Outpatient EEG  Continue dual antiplatelet therapy as per neurology  Will follow B12/folate        Primary hypertension  Assessment & Plan  Vitals:    07/21/24 1100   BP: 128/74   Pulse: 57   Resp:    Temp:    SpO2: 98%       Plan:  Blood pressure within  acceptable range  Resume amlodipine when appropriate  Patient reported his ankles swell up once he started taking amlodipine 10 mg.  He also reported that he has been checking his blood pressure twice daily for 2 months and his average blood pressures 120 to 140.  It goes up whenever he is preoccupied with a lot of things.  I recommended him to keep check on his blood pressure daily and be on the same dose of amlodipine and if it does happen reach out to primary care doctor to adjust her medications  Monitor         Vertigo  Assessment & Plan  Continue meclizine 25 mg 3 times daily as needed    CKD (chronic kidney disease) stage 3, GFR 30-59 ml/min ([facility])  Assessment & Plan  Lab Results   Component Value Date    EGFR 52 07/21/2024    EGFR 44 07/20/2024    EGFR 55 05/14/2024     CREATININE 1.33 (H) 07/21/2024    CREATININE 1.52 (H) 07/20/2024    CREATININE 1.27 05/14/2024   Creatinine slight elevated today at 1.52. Baseline appears to be 1.2-1.4  IV Hydration  Monitor BMP  Avoid nephrotoxic agents  Follows with Dr. Pyle outpatient    Benign prostatic hyperplasia, presence of lower urinary tract symptoms unspecified  Assessment & Plan  Continue home dose tamsulosin         VTE Pharmacologic Prophylaxis: VTE Score: 4 Moderate Risk (Score 3-4) - Pharmacological DVT Prophylaxis Ordered: heparin.    Mobility:   Basic Mobility Inpatient Raw Score: 23  JH-HLM Goal: 7: Walk 25 feet or more  JH-HLM Achieved: 8: Walk 250 feet ot more  JH-HLM Goal achieved. Continue to encourage appropriate mobility.    Patient Centered Rounds: I performed bedside rounds with nursing staff today.   Discussions with Specialists or Other Care Team Provider: Neurology    Education and Discussions with Family / Patient:  Updated patient and his family.     Total Time Spent on Date of Encounter in care of patient: 51 mins. This time was spent on one or more of the following: performing physical exam; counseling and coordination of care; obtaining or reviewing history; documenting in the medical record; reviewing/ordering tests, medications or procedures; communicating with other healthcare professionals and discussing with patient's family/caregivers.    Current Length of Stay: 0 day(s)  Current Patient Status: Observation   Certification Statement: The patient will continue to require additional inpatient hospital stay due to echo pending  Discharge Plan: Anticipate discharge tomorrow to home.    Code Status: Level 1 - Full Code    Subjective:   Patient reported he felt a cramp in his left calf followed by left hand, arm, face numbness over the course of 5 to 10 minutes patient's symptoms resolved after aspirin and Plavix were given he just reported he has some numbness in his left hand patient also reported he had a  remote history of TIA but denied taking aspirin on a daily basis    Objective:     Vitals:   Temp (24hrs), Av.7 °F (36.5 °C), Min:97.2 °F (36.2 °C), Max:98.4 °F (36.9 °C)    Temp:  [97.2 °F (36.2 °C)-98.4 °F (36.9 °C)] 98 °F (36.7 °C)  HR:  [54-98] 57  Resp:  [16-22] 20  BP: (125-178)/(69-91) 128/74  SpO2:  [96 %-100 %] 98 %  Body mass index is 3,572.02 kg/m².     Input and Output Summary (last 24 hours):     Intake/Output Summary (Last 24 hours) at 2024 1525  Last data filed at 2024 1300  Gross per 24 hour   Intake 300 ml   Output --   Net 300 ml       Physical Exam:   Constitutional: No acute distress  HEENT: Pallor or icterus negative  CVS: S1 plus S2  Respiratory: Normal vascular breathe without crackles and wheeze  Gastroenterology: Soft nontender without any palpable mass  Skin: No bruises or ecchymosis  Neurology: No focal logical deficit      Additional Data:     Labs:  Results from last 7 days   Lab Units 24  0250   WBC Thousand/uL 3.82*   HEMOGLOBIN g/dL 13.1   HEMATOCRIT % 39.3   PLATELETS Thousands/uL 196     Results from last 7 days   Lab Units 24  0250   SODIUM mmol/L 138   POTASSIUM mmol/L 4.0   CHLORIDE mmol/L 105   CO2 mmol/L 26   BUN mg/dL 19   CREATININE mg/dL 1.33*   ANION GAP mmol/L 7   CALCIUM mg/dL 8.8   GLUCOSE RANDOM mg/dL 102     Results from last 7 days   Lab Units 24  2141   INR  0.90     Results from last 7 days   Lab Units 24  2138   POC GLUCOSE mg/dl 108               Lines/Drains:  Invasive Devices       Peripheral Intravenous Line  Duration             Peripheral IV 24 Proximal;Right;Ventral (anterior) Forearm <1 day                      Telemetry:  Telemetry Orders (From admission, onward)               24 Hour Telemetry Monitoring  Continuous x 24 Hours (Telem)        Question:  Reason for 24 Hour Telemetry  Answer:  Syncope suspected to be cardiac in origin                     Telemetry Reviewed: Normal Sinus Rhythm  Indication for  Continued Telemetry Use: Syncope             Imaging: Reviewed radiology reports from this admission including: MRI brain and No pertinent imaging reviewed.    Recent Cultures (last 7 days):         Last 24 Hours Medication List:   Current Facility-Administered Medications   Medication Dose Route Frequency Provider Last Rate    acetaminophen  650 mg Oral Q4H PRN IRMA Oates      amLODIPine  10 mg Oral Daily IRMA Oates      aspirin  81 mg Oral Daily Philipp Colorado MD      atorvastatin  40 mg Oral Daily With Dinner Shannan Coleman PA-C      clopidogrel  75 mg Oral Daily IRMA Oates      docusate sodium  100 mg Oral BID IRMA Oates      heparin (porcine)  5,000 Units Subcutaneous Q8H J LUIS IRAM Oates      ondansetron  4 mg Intravenous Q6H PRN IRMA Oates      sodium chloride  75 mL/hr Intravenous Continuous IRMA Oates 75 mL/hr (07/21/24 1109)    tamsulosin  0.4 mg Oral Daily With Dinner IRMA Oates          Today, Patient Was Seen By: Philipp Colorado MD    **Please Note: This note may have been constructed using a voice recognition system.**

## 2024-07-21 NOTE — ASSESSMENT & PLAN NOTE
Vitals:    07/21/24 1100   BP: 128/74   Pulse: 57   Resp:    Temp:    SpO2: 98%       Plan:  Blood pressure within  acceptable range  Hold amlodipine.  Will resume it tomorrow  Monitor

## 2024-07-21 NOTE — ASSESSMENT & PLAN NOTE
Presents with Left side leg cramping that progressed to left hand , left face and head numbness that started around 8:30pm after going fishing.  Patient now reports symptoms have some what subsided with mild left side numbness and tingling. Denies speech or visual disturbance, facial droop, chest pain or shortness of breath.     CTH (7/20): No acute intracranial or angiographic abnormality   CTA head/neck (7/20): No acute intracranial or angiographic abnormality   NIHSS at presentation 1, not a candidate tPA/TNK symptoms non-disabling  Stroke pathway initiated,  Neuro consult  MRI   Neurochecks per protocol  Telemetry monitor   Permissible hypertension, SBP < 210 mmHg   ASA, Plavix load in ED,   Recent A1c 5.7 , lipid panel pending  Stat CTH for any acute neuro changes  Heparin DVT prophylaxis

## 2024-07-21 NOTE — ED ATTENDING ATTESTATION
7/20/2024  I, Mabel Cruz DO, saw and evaluated the patient. I have discussed the patient with the resident/non-physician practitioner and agree with the resident's/non-physician practitioner's findings, Plan of Care, and MDM as documented in the resident's/non-physician practitioner's note, except where noted. All available labs and Radiology studies were reviewed.  I was present for key portions of any procedure(s) performed by the resident/non-physician practitioner and I was immediately available to provide assistance.       At this point I agree with the current assessment done in the Emergency Department.  I have conducted an independent evaluation of this patient a history and physical is as follows:    74 y.o. M presents w stroke like symptoms.   1.5 hours prior to arrival the patient presented with left leg numbness  30 minutes prior to arrival the patient developed left arm numbness, and left perioral numbness with a pressure to the left side of his head.  No other cranial nerve deficit, no motor deficit, normal strength in upper and lower extremities.  No speech deficit  No slurred speech  Patient is alert and oriented x 4    A stroke alert is called but due to NIH of 1, nondisabling symptoms, patient is not a TNK candidate.  Patient will be admitted to the stroke pathway, given aspirin, Plavix, admitted for further care.      ED Course  ED Course as of 07/20/24 2217   Sat Jul 20, 2024 2215 Mild RISHI - will give IVF         Critical Care Time  Procedures

## 2024-07-21 NOTE — ASSESSMENT & PLAN NOTE
Presents with Left side leg cramping that progressed to left hand , left face and head numbness that started around 8:30pm after going fishing.  Patient now reports symptoms have some what subsided with mild left side numbness and tingling. Denies speech or visual disturbance, facial droop, chest pain or shortness of breath.     CTH (7/20): No acute intracranial or angiographic abnormality   CTA head/neck (7/20): No acute intracranial or angiographic abnormality   NIHSS at presentation 1, not a candidate tPA/TNK symptoms non-disabling  Today's update and plan:  MRI negative for stroke  Flu panel/BMP are insignificant  Echo pending  Outpatient EEG  Continue dual antiplatelet therapy as per neurology  Will follow B12/folate

## 2024-07-21 NOTE — CONSULTS
INTERPROFESSIONAL (PHONE) CONSULTATION   Tomas Robert 74 y.o. male MRN: [id number]  Encounter Date: 07/20/24      Reason for Consult / Principal Problem: stroke alert    Consulting Provider: CHRISTOPHE Cheema    Requesting Provider:        ASSESSMENT:  Mr. Robert is a 74-year-old male who presented to [facility] ER for evaluation.  Patient has known history of lightheadedness, dizziness and ataxia, hypertension, CKD, SNHL.  Patient had developed left-sided facial, arm and leg numbness after patient had cramps in his left leg.  Symptoms started 1h 45 min ago and sensory deficit started 30 min PTA; decreased sensation in nLUE and left face to left side of his had persisted; no focal weakness, no vertigo, no facial asymmetry, no diplopia or dysarthria.   Patient is not on antiplatelet/AC regimens.     Recent hospitalization as stroke alert took place on May 15, 2024 and was due to hypertensive emergency; patient was admitted for stroke workup with normal MRI and CT angiogram head and neck advised;  Patient was hospitalized and had workup on October 18, 2023 for dizziness/vertigo.    Time stroke alert was called: 9:38 pm  Time neurology provider spoke with the ED:  9:59 pm  Patient Last Known Well (LKW) 1h 45 min ago  BP upon arrival to ED: 178/85 mmHg  PT/INR/aPTT/BS/Platelets: 203  NIHS Score: 1    CTH: no acute pathology  CTA head/neck: no LVO or other significant stenosis;    RECOMMENDATIONS:  IV thrombolysis was not given due to patient symptoms are non-disabling.   Recommend loading aspirin 325 mg PO and Plavix 300 mg once and continue DAPT  Goal MAP> 100, and SBP < 210 mmHg  LDL Goal <70 mg/dL  Admit to stroke pathway    Medical management and supportive care per primary team. Correction of any metabolic or infectious disturbances.   I discussed this case with the managing team from a remote location. I did not personally see or examine this patient. I have provided limited recommendations as  above, but ultimate patient disposition as per managing team.     Total time spent in review of data, discussion with requesting provider and rendering advice was  21-30 minutes, >50% of the total time devoted to medical consultative verbal/EMR discussion between providers. Written report will be generated in the EMR.

## 2024-07-21 NOTE — ASSESSMENT & PLAN NOTE
Vitals:    07/20/24 2210   BP: 143/72   Pulse: 71   Resp: 16   Temp:    SpO2: 100%       Plan:  Blood pressure within  acceptable range  Continue home dose amlodipine  Monitor

## 2024-07-21 NOTE — UTILIZATION REVIEW
Initial Clinical Review  OBS 07-20-24 @ 2222 CONVERTED TO INPATIENT 07-21-24 @ 1551 FOR CONTINUATION OF CARE FOR STROKE LIKE SYMPTOMS     INPATIENT ADMISSION  Once        Transfer Service: Hospitalist   Question Answer Comment   Level of Care Med Surg    Estimated length of stay More than 2 Midnights    Certification I certify that inpatient services are medically necessary for this patient for a duration of greater than two midnights. See H&P and MD Progress Notes for additional information about the patient's course of treatment.        07/21/24 1551                     Admission: Date/Time/Statement:   Admission Orders (From admission, onward)       Ordered        07/20/24 2222  Place in Observation  Once                          Orders Placed This Encounter   Procedures    Place in Observation     Standing Status:   Standing     Number of Occurrences:   1     Order Specific Question:   Level of Care     Answer:   Med Surg [16]     ED Arrival Information       Expected   -    Arrival   7/20/2024 21:21    Acuity   Emergent              Means of arrival   Walk-In    Escorted by   Self    Service   Hospitalist    Admission type   Emergency              Arrival complaint   left side numbness             Chief Complaint   Patient presents with    Numbness     Pt c/o left sided facial, arm and leg numbness x 20 minutes. Pt states he had cramps in his left leg earlier today        Initial Presentation: 74 y.o. male presented to ED as observation for  stork like symptoms .PMH of Hypertension, CKD  Stage 3 and BPH who presents with left side numbness.  Patient reports this evening around 8:30 PM he started to feel left-sided leg cramping.  Early thereafter left hand numbness that traveled up his arm, to the left side of his neck, left side of face and head.  CT scans showed no acute intracranial abnormality.  Neuro recommending Asa and platelet load.  Will continue with DAPT.   Exam benign.   Plan VS & neuro checks q 1 hr  x 4 hrs  q 2 hr x 8 hrs than q 4 hrs x 72, telemetry SCD MRI and supportive care   Neurology consult:   Recent hospitalization as stroke alert took place on May 15, 2024 and was due to hypertensive emergency; patient was admitted for stroke workup with normal MRI and CT angiogram head and neck advised;  Patient was hospitalized and had workup on October 18, 2023 for dizziness/vertigo.  RECOMMENDATIONS:  IV thrombolysis was not given due to patient symptoms are non-disabling.   Recommend loading aspirin 325 mg PO and Plavix 300 mg once and continue DAPT  Goal MAP> 100, and SBP < 210 mmHg  LDL Goal <70 mg/dL  Anticipated Length of Stay/Certification Statement: Patient will be admitted on an observation basis with an anticipated length of stay of less than 2 midnights secondary to stroke-like symptoms.    07-21-24 INPATIENT Patient continues with mild left side numbness and tingling Creatinine 1.52 down to 1.33 base line 1.2-1.4 continue IVF, MRI negative for stroke ECHO pending, Continue dual antiplatelet therapy as per neurology ASA and sq heparin Subjective:  Patient reported he felt a cramp in his left calf followed by left hand, arm, face numbness over the course of 5 to 10 minutes patient's symptoms resolved after aspirin and Plavix were given he just reported he has some numbness in his left hand patient also reported he had a remote history of TIA but denied taking aspirin on a daily basis   Certification Statement: The patient will continue to require additional inpatient hospital stay due to echo pending     ED Triage Vitals   Temperature Pulse Respirations Blood Pressure SpO2 Pain Score   07/20/24 2126 07/20/24 2126 07/20/24 2126 07/20/24 2126 07/20/24 2126 07/20/24 2225   97.6 °F (36.4 °C) 73 20 (!) 178/85 98 % No Pain     Weight (last 2 days)       Date/Time Weight    07/21/24 0001 83 (182.9)    07/20/24 2140 83.9 (185)            Vital Signs (last 3 days)       Date/Time Temp Pulse Resp BP MAP (mmHg) SpO2  O2 Device Patient Position - Orthostatic VS [place] Coma Scale Score Pain    07/21/24 0740 -- -- -- -- -- -- -- -- 15 No Pain    07/21/24 0700 98 °F (36.7 °C) 59 -- 140/77 -- 97 % None (Room air) Lying 15 --    07/21/24 0500 97.7 °F (36.5 °C) 59 20 125/69 100 96 % None (Room air) Lying 15 --    07/21/24 0300 97.2 °F (36.2 °C) 54 18 153/73 100 99 % None (Room air) Lying 15 --    07/21/24 0202 97.4 °F (36.3 °C) 98 18 144/74 97 98 % None (Room air) Lying -- --    07/21/24 0200 -- -- -- -- -- -- -- -- 15 --    07/21/24 0100 97.7 °F (36.5 °C) 84 18 150/73 99 98 % None (Room air) Lying 15 --    07/21/24 0001 98.4 °F (36.9 °C) 62 20 163/87 -- 100 % None (Room air) Lying 15 No Pain    07/20/24 2255 -- 60 20 136/75 -- 99 % None (Room air) -- 15 --    07/20/24 2240 -- 64 16 143/77 -- 100 % None (Room air) -- 15 No Pain    07/20/24 2225 -- 75 16 143/72 -- 100 % None (Room air) -- 15 No Pain    07/20/24 2210 -- 71 16 143/72 -- 100 % None (Room air) -- 15 --    07/20/24 2155 -- 78 18 159/91 -- 98 % None (Room air) -- 15 --    07/20/24 21:41:06 -- 72 16 159/91 -- 98 % -- -- -- --    07/20/24 2140 -- 69 22 159/91 -- 99 % None (Room air) -- 15 --    07/20/24 2126 97.6 °F (36.4 °C) 73 20 178/85 -- 98 % None (Room air) -- -- --              Pertinent Labs/Diagnostic Test Results:   Radiology:  CTA stroke alert (head/neck)   Final Interpretation by Carlos Lindo MD (07/20 2218)      No acute intracranial or angiographic abnormality      I personally discussed this study with Aylin Bustamante at 7/20/24 10:08 PM            Workstation performed: [id number]         CT stroke alert brain   Final Interpretation by Carlos Lindo MD (07/20 2218)      No acute intracranial or angiographic abnormality      I personally discussed this study with Aylin Bustamante at 7/20/24 10:08 PM            Workstation performed: [id number]         MRI Inpatient Order    (Results Pending)     Cardiology: 07-20-24  EKG   ECG rate:  69     ECG rate  assessment: normal    Rhythm:     Rhythm: sinus rhythm    Ectopy:     Ectopy: none    QRS:     QRS axis:  Normal     QRS intervals:  Normal   Conduction:     Conduction: normal    ST segments:     ST segments:  Normal   T waves:     T waves: normal   GI:  No orders to display       Results from last 7 days   Lab Units 07/20/24  2141   SARS-COV-2  Negative     Results from last 7 days   Lab Units 07/21/24  0250 07/20/24  2141   WBC Thousand/uL 3.82* 4.40   HEMOGLOBIN g/dL 13.1 13.6   HEMATOCRIT % 39.3 40.1   PLATELETS Thousands/uL 196 203         Results from last 7 days   Lab Units 07/21/24  0250 07/20/24  2141   SODIUM mmol/L 138 139   POTASSIUM mmol/L 4.0 3.7   CHLORIDE mmol/L 105 104   CO2 mmol/L 26 27   ANION GAP mmol/L 7 8   BUN mg/dL 19 19   CREATININE mg/dL 1.33* 1.52*   EGFR ml/min/1.73sq m 52 44   CALCIUM mg/dL 8.8 9.3         Results from last 7 days   Lab Units 07/20/24  2138   POC GLUCOSE mg/dl 108     Results from last 7 days   Lab Units 07/21/24  0250 07/20/24  2141   GLUCOSE RANDOM mg/dL 102 104         Results from last 7 days   Lab Units 07/21/24  0250 07/21/24  0027 07/20/24  2141   HS TNI 0HR ng/L  --   --  4   HS TNI 2HR ng/L  --  4  --    HSTNI D2 ng/L  --  0  --    HS TNI 4HR ng/L 4  --   --    HSTNI D4 ng/L 0  --   --          Results from last 7 days   Lab Units 07/20/24  2141   PROTIME seconds 12.7   INR  0.90   PTT seconds 28       Results from last 7 days   Lab Units 07/21/24  0250   CRP mg/L 1.4     Results from last 7 days   Lab Units 07/20/24  2141   INFLUENZA A PCR  Negative   INFLUENZA B PCR  Negative   RSV PCR  Negative     ED Treatment-Medication Administration from 07/20/2024 2121 to 07/20/2024 2332         Date/Time Order Dose Route Action     07/20/2024 2153 iohexol (OMNIPAQUE) 350 MG/ML injection (MULTI-DOSE) 85 mL 85 mL Intravenous Given     07/20/2024 2243 sodium chloride 0.9 % bolus 1,000 mL 1,000 mL Intravenous New Bag     07/20/2024 2243 aspirin chewable tablet 324 mg 324  mg Oral Given     07/20/2024 2243 clopidogrel (PLAVIX) tablet 300 mg 300 mg Oral Given            Past Medical History:   Diagnosis Date    Benign prostatic hyperplasia     Chronic kidney disease     COVID-19 03/2020    Esophageal reflux     Hypertension     Hypertensive urgency 3/26/2019    Vertigo      Present on Admission:   Stroke-like symptoms   Benign prostatic hyperplasia, presence of lower urinary tract symptoms unspecified   CKD (chronic kidney disease) stage 3, GFR 30-59 ml/min ([facility])   Primary hypertension      Admitting Diagnosis: Numbness [R20.0]  Stroke-like symptoms [R29.90]  Age/Sex: 74 y.o. male  Admission Orders:  Scheduled Medications:  amLODIPine, 10 mg, Oral, Daily  aspirin, 81 mg, Oral, Daily  atorvastatin, 40 mg, Oral, Daily With Dinner  clopidogrel, 75 mg, Oral, Daily  docusate sodium, 100 mg, Oral, BID  heparin (porcine), 5,000 Units, Subcutaneous, Q8H J LUIS  tamsulosin, 0.4 mg, Oral, Daily With Dinner      Continuous IV Infusions:  sodium chloride, 75 mL/hr, Intravenous, Continuous      PRN Meds:  acetaminophen, 650 mg, Oral, Q4H PRN  ondansetron, 4 mg, Intravenous, Q6H PRN        IP CONSULT TO NEUROLOGY  IP CONSULT TO NEUROLOGY    Network Utilization Review Department  ATTENTION: Please call with any questions or concerns to [phone] and carefully listen to the prompts so that you are directed to the right person. All voicemails are confidential.   For Discharge needs, contact Care Management DC Support Team at [phone] opt. [phone]  Send all requests for admission clinical reviews, approved or denied determinations and any other requests to dedicated fax number below belonging to the campus where the patient is receiving treatment. List of dedicated fax numbers for the Facilities:  FACILITY NAME UR FAX NUMBER   ADMISSION DENIALS (Administrative/Medical Necessity) [phone]   DISCHARGE SUPPORT TEAM (NETWORK) [phone]   PARENT CHILD HEALTH (Maternity/NICU/Pediatrics) [phone]    [facility] [phone]   [facility] [phone]   [facility] [phone]   [facility] [phone]   [facility] [phone]   [facility] [phone]   [facility] [phone]   [facility] [phone]   [facility] [phone]   [facility] [phone]   [facility] [phone]   [facility] [phone]

## 2024-07-21 NOTE — ED PROVIDER NOTES
History  Chief Complaint   Patient presents with    Numbness     Pt c/o left sided facial, arm and leg numbness x 20 minutes. Pt states he had cramps in his left leg earlier today      Tomas is a 73 yo male presenting with leg, arm, and facial numbness that started about a an hour and a half ago. He reports that about 1.5 hours ago he began having decreased sensation in his left leg. This then progressed to the same feeling of numbness/decreased sensation in his left arm, and then around the left side of his lips. This was followed by some feelings of pressure to the left side of his face. He denies any headache, weakness, difficulty ambulating, visual disturbances, difficulty speaking. He denies taking any blood thinning medications. He has not experienced these symptoms before. He is still currently feeling the decreased sensation in his lower leg and the tingling in his lips.       History provided by:  Patient   used: No        Prior to Admission Medications   Prescriptions Last Dose Informant Patient Reported? Taking?   amLODIPine (NORVASC) 10 mg tablet   No No   Sig: Take 1 tablet (10 mg total) by mouth daily   amLODIPine (NORVASC) 10 mg tablet   No No   Sig: Take 1 tablet (10 mg total) by mouth daily   cyclobenzaprine (FLEXERIL) 10 mg tablet   No No   Sig: Take 1 tablet (10 mg total) by mouth if needed for muscle spasms   lidocaine (LIDODERM) 5 %  Self No No   Sig: Apply 1 patch topically over 12 hours if needed (Intermittent right low back pain with radiculopathy) Remove & Discard patch within 12 hours or as directed by MD   meclizine (ANTIVERT) 25 mg tablet  Self No No   Sig: Take 1 tablet (25 mg total) by mouth 3 (three) times a day as needed for dizziness   multivitamin (THERAGRAN) TABS  Self Yes No   Sig: Take 1 tablet by mouth daily   pantoprazole (PROTONIX) 40 mg tablet  Self Yes No   Sig: Take 40 mg by mouth daily   tadalafil (CIALIS) 20 MG tablet  Self Yes No   Sig: if needed    tamsulosin (FLOMAX) 0.4 mg  Self No No   Sig: Take 1 capsule (0.4 mg total) by mouth daily with dinner      Facility-Administered Medications: None       Past Medical History:   Diagnosis Date    Benign prostatic hyperplasia     Chronic kidney disease     COVID-19 03/2020    Esophageal reflux     Hypertension     Hypertensive urgency 3/26/2019    Vertigo        Past Surgical History:   Procedure Laterality Date    APPENDECTOMY  05/21/2015    CYST REMOVAL      Fatty cyst removed from back       Family History   Problem Relation Age of Onset    Prostate cancer Father     Prostate cancer Maternal Grandfather     Stomach cancer Maternal Aunt         malignant tumor of pharynx    Stomach cancer Maternal Uncle         malignant tumor of pharynx    Mental illness Family     Depression Family     Schizophrenia Family     Hypertension Mother     No Known Problems Sister     Dementia Sister      I have reviewed and agree with the history as documented.    E-Cigarette/Vaping    E-Cigarette Use Never User      E-Cigarette/Vaping Substances    Nicotine No     THC No     CBD No     Flavoring No     Other No     Unknown No      Social History     Tobacco Use    Smoking status: Former    Smokeless tobacco: Never    Tobacco comments:     1ppw   Vaping Use    Vaping status: Never Used   Substance Use Topics    Alcohol use: Yes     Comment: socially    Drug use: No       Review of Systems   Constitutional:  Negative for fatigue and fever.   HENT:  Negative for congestion.    Respiratory:  Negative for shortness of breath.    Cardiovascular:  Negative for chest pain and palpitations.   Gastrointestinal:  Negative for abdominal pain, nausea and vomiting.   Musculoskeletal:  Negative for arthralgias.   Skin:  Negative for color change and pallor.   Neurological:  Positive for numbness. Negative for dizziness, syncope, facial asymmetry, weakness and light-headedness.        Decreased sensation/numbness   Psychiatric/Behavioral:   Negative for agitation.    All other systems reviewed and are negative.      Physical Exam  Physical Exam  Vitals and nursing note reviewed.   Constitutional:       General: He is not in acute distress.     Appearance: Normal appearance. He is not ill-appearing.   HENT:      Head: Normocephalic and atraumatic.      Right Ear: External ear normal.      Left Ear: External ear normal.      Nose: Nose normal.      Mouth/Throat:      Mouth: Mucous membranes are moist.      Pharynx: Oropharynx is clear.   Eyes:      General: No visual field deficit.     Extraocular Movements: Extraocular movements intact.      Conjunctiva/sclera: Conjunctivae normal.      Pupils: Pupils are equal, round, and reactive to light.   Cardiovascular:      Rate and Rhythm: Normal rate and regular rhythm.      Pulses: Normal pulses.      Heart sounds: Normal heart sounds.   Pulmonary:      Effort: Pulmonary effort is normal.      Breath sounds: No wheezing or rales.   Musculoskeletal:         General: Normal range of motion.      Cervical back: Normal range of motion.   Skin:     General: Skin is warm and dry.      Capillary Refill: Capillary refill takes less than 2 seconds.   Neurological:      General: No focal deficit present.      Mental Status: He is alert and oriented to person, place, and time. Mental status is at baseline.      GCS: GCS eye subscore is 4. GCS verbal subscore is 5. GCS motor subscore is 6.      Cranial Nerves: Cranial nerves 2-12 are intact. No cranial nerve deficit, dysarthria or facial asymmetry.      Sensory: Sensory deficit (Decreased sensation of left lower extremity) present.      Motor: Motor function is intact. No weakness, tremor, abnormal muscle tone or pronator drift.      Coordination: Coordination is intact. Finger-Nose-Finger Test normal.         Vital Signs  ED Triage Vitals [07/20/24 2126]   Temperature Pulse Respirations Blood Pressure SpO2   97.6 °F (36.4 °C) 73 20 (!) 178/85 98 %      Temp src Heart  Rate Source Patient Position - Orthostatic VS BP Location FiO2 (%)   -- Monitor -- Left arm --      Pain Score       --           Vitals:    07/20/24 2140 07/20/24 2141 07/20/24 2155 07/20/24 2210   BP: 159/91 159/91 159/91 143/72   Pulse: 69 72 78 71         Visual Acuity  Visual Acuity      Flowsheet Row Most Recent Value   L Pupil Size (mm) 2   R Pupil Size (mm) 2            ED Medications  Medications   sodium chloride 0.9 % bolus 1,000 mL (1,000 mL Intravenous New Bag 7/20/24 2243)   amLODIPine (NORVASC) tablet 10 mg (has no administration in time range)   tamsulosin (FLOMAX) capsule 0.4 mg (has no administration in time range)   sodium chloride 0.9 % infusion (has no administration in time range)   acetaminophen (TYLENOL) tablet 650 mg (has no administration in time range)   docusate sodium (COLACE) capsule 100 mg (has no administration in time range)   ondansetron (ZOFRAN) injection 4 mg (has no administration in time range)   clopidogrel (PLAVIX) tablet 75 mg (has no administration in time range)   heparin (porcine) subcutaneous injection 5,000 Units (has no administration in time range)   iohexol (OMNIPAQUE) 350 MG/ML injection (MULTI-DOSE) 85 mL (85 mL Intravenous Given 7/20/24 2153)   aspirin chewable tablet 324 mg (324 mg Oral Given 7/20/24 2243)   clopidogrel (PLAVIX) tablet 300 mg (300 mg Oral Given 7/20/24 2243)       Diagnostic Studies  Results Reviewed       Procedure Component Value Units Date/Time    HS Troponin I 4hr [[id number]]     Lab Status: No result Specimen: Blood     FLU/RSV/COVID - if FLU/RSV clinically relevant [[id number]]  (Normal) Collected: 07/20/24 2141    Lab Status: Final result Specimen: Nares from Nose Updated: 07/20/24 2227     SARS-CoV-2 Negative     INFLUENZA A PCR Negative     INFLUENZA B PCR Negative     RSV PCR Negative    Narrative:      FOR PEDIATRIC PATIENTS - copy/paste COVID Guidelines URL to browser:  [email or web address]    SARS-CoV-2 assay is a Nucleic Acid Amplification assay intended for the  qualitative detection of nucleic acid from SARS-CoV-2 in nasopharyngeal  swabs. Results are for the presumptive identification of SARS-CoV-2 RNA.    Positive results are indicative of infection with SARS-CoV-2, the virus  causing COVID-19, but do not rule out bacterial infection or co-infection  with other viruses. Laboratories within the [place] and its  territories are required to report all positive results to the appropriate  public health authorities. Negative results do not preclude SARS-CoV-2  infection and should not be used as the sole basis for treatment or other  patient management decisions. Negative results must be combined with  clinical observations, patient history, and epidemiological information.  This test has not been FDA cleared or approved.    This test has been authorized by FDA under an Emergency Use Authorization  (EUA). This test is only authorized for the duration of time the  declaration that circumstances exist justifying the authorization of the  emergency use of an in vitro diagnostic tests for detection of SARS-CoV-2  virus and/or diagnosis of COVID-19 infection under section 564(b)(1) of  the Act, 21 U.S.C. 360bbb-3(b)(1), unless the authorization is terminated  or revoked sooner. The test has been validated but independent review by FDA  and CLIA is pending.    Test performed using [employer] GeneXpert: This RT-PCR assay targets N2,  a region unique to SARS-CoV-2. A conserved region in the E-gene was chosen  for pan-Sarbecovirus detection which includes SARS-CoV-2.    According to CMS-2020-01-R, this platform meets the definition of high-throughput technology.    HS Troponin I 2hr [[id number]]     Lab Status: No result Specimen: Blood     HS Troponin 0hr (reflex protocol) [[id number]]  (Normal) Collected: 07/20/24 4521    Lab Status:  Final result Specimen: Blood from Arm, Right Updated: 07/20/24 2213     hs TnI 0hr 4 ng/L     Basic metabolic panel [[id number]]  (Abnormal) Collected: 07/20/24 2141    Lab Status: Final result Specimen: Blood from Arm, Right Updated: 07/20/24 2210     Sodium 139 mmol/L      Potassium 3.7 mmol/L      Chloride 104 mmol/L      CO2 27 mmol/L      ANION GAP 8 mmol/L      BUN 19 mg/dL      Creatinine 1.52 mg/dL      Glucose 104 mg/dL      Calcium 9.3 mg/dL      eGFR 44 ml/min/1.73sq m     Narrative:      National Kidney Disease Foundation guidelines for Chronic Kidney Disease (CKD):     Stage 1 with normal or high GFR (GFR > 90 mL/min/1.73 square meters)    Stage 2 Mild CKD (GFR = 60-89 mL/min/1.73 square meters)    Stage 3A Moderate CKD (GFR = 45-59 mL/min/1.73 square meters)    Stage 3B Moderate CKD (GFR = 30-44 mL/min/1.73 square meters)    Stage 4 Severe CKD (GFR = 15-29 mL/min/1.73 square meters)    Stage 5 End Stage CKD (GFR <15 mL/min/1.73 square meters)  Note: GFR calculation is accurate only with a steady state creatinine    Protime-INR [[id number]]  (Normal) Collected: 07/20/24 2141    Lab Status: Final result Specimen: Blood from Arm, Right Updated: 07/20/24 2209     Protime 12.7 seconds      INR 0.90    APTT [[id number]]  (Normal) Collected: 07/20/24 2141    Lab Status: Final result Specimen: Blood from Arm, Right Updated: 07/20/24 2209     PTT 28 seconds     CBC and Platelet [[id number]]  (Normal) Collected: 07/20/24 2141    Lab Status: Final result Specimen: Blood from Arm, Right Updated: 07/20/24 2150     WBC 4.40 Thousand/uL      RBC 4.42 Million/uL      Hemoglobin 13.6 g/dL      Hematocrit 40.1 %      MCV 91 fL      MCH 30.8 pg      MCHC 33.9 g/dL      RDW 13.8 %      Platelets 203 Thousands/uL      MPV 10.5 fL     Fingerstick Glucose (POCT) [[id number]]  (Normal) Collected: 07/20/24 2138    Lab Status: Final result Specimen: Blood Updated: 07/20/24 2138     POC Glucose 108 mg/dl                    CTA stroke  alert (head/neck)   Final Result by Carlos Lindo MD (07/20 2218)      No acute intracranial or angiographic abnormality      I personally discussed this study with Aylin Bustamante at 7/20/24 10:08 PM            Workstation performed: [id number]         CT stroke alert brain   Final Result by Carlos Lindo MD (07/20 2218)      No acute intracranial or angiographic abnormality      I personally discussed this study with Aylin Bustamante at 7/20/24 10:08 PM            Workstation performed: [id number]         MRI Inpatient Order    (Results Pending)              Procedures  ECG 12 Lead Documentation Only    Date/Time: 7/20/2024 9:34 PM    Performed by: Mar Mahoney PA-C  Authorized by: Mar Mahoney PA-C    Indications / Diagnosis:  Stroke-like symptoms  ECG reviewed by me, the ED Provider: yes    Patient location:  ED  Previous ECG:     Previous ECG:  Compared to current    Comparison to cardiac monitor: Yes    Interpretation:     Interpretation: normal    Rate:     ECG rate:  69    ECG rate assessment: normal    Rhythm:     Rhythm: sinus rhythm    Ectopy:     Ectopy: none    QRS:     QRS axis:  Normal    QRS intervals:  Normal  Conduction:     Conduction: normal    ST segments:     ST segments:  Normal  T waves:     T waves: normal             ED Course  ED Course as of 07/20/24 2342   Sat Jul 20, 2024   2140 POC Glucose: 108   2152 CBC and Platelet   2216 Basic metabolic panel(!)   2219 hs TnI 0hr: 4   2219 PTT: 28   2219 PROTIME: 12.7   2219 POCT INR: 0.90   2219 All results discussed with patient and family. Updated on current care plan to admit to hospital for further workup and evaluation of symptoms.    2221 CT stroke alert brain   2221 CTA stroke alert (head/neck)  IMPRESSION:  No acute intracranial or angiographic abnormality                      Stroke Assessment       Row Name 07/20/24 2138             NIH Stroke Scale    Interval Baseline      Level of Consciousness (1a.) 0      LOC  Questions (1b.) 0      LOC Commands (1c.) 0      Best Gaze (2.) 0      Visual (3.) 0      Facial Palsy (4.) 0      Motor Arm, Left (5a.) 0      Motor Arm, Right (5b.) 0      Motor Leg, Left (6a.) 0      Motor Leg, Right (6b.) 0      Limb Ataxia (7.) 0      Sensory (8.) 1      Best Language (9.) 0      Dysarthria (10.) 0      Extinction and Inattention (11.) (Formerly Neglect) 0      Total 1                     Stroke Assessment       Row Name 07/20/24 2138             NIH Stroke Scale    Interval Baseline      Level of Consciousness (1a.) 0      LOC Questions (1b.) 0      LOC Commands (1c.) 0      Best Gaze (2.) 0      Visual (3.) 0      Facial Palsy (4.) 0      Motor Arm, Left (5a.) 0      Motor Arm, Right (5b.) 0      Motor Leg, Left (6a.) 0      Motor Leg, Right (6b.) 0      Limb Ataxia (7.) 0      Sensory (8.) 1      Best Language (9.) 0      Dysarthria (10.) 0      Extinction and Inattention (11.) (Formerly Neglect) 0      Total 1                                SBIRT 20yo+      Flowsheet Row Most Recent Value   Initial Alcohol Screen: US AUDIT-C     1. How often do you have a drink containing alcohol? 1 Filed at: 07/20/2024 2128   2. How many drinks containing alcohol do you have on a typical day you are drinking?  0 Filed at: 07/20/2024 2128   3b. FEMALE Any Age, or MALE 65+: How often do you have 4 or more drinks on one occassion? 0 Filed at: 07/20/2024 2128   Audit-C Score 1 Filed at: 07/20/2024 2128   CRISTIAN: How many times in the past year have you...    Used an illegal drug or used a prescription medication for non-medical reasons? Never Filed at: 07/20/2024 2128                      Medical Decision Making  75 yo male presenting with 1.5 hr of stroke-like symptoms. Started as decreased sensation in LLE & arm, then perioral numbness with pressure in side of head. On exam, no focal deficits. NIH scale score of 1 for decreased sensation over LLE.     Plan: Stroke alert called; CBC to evaluate for any  significant leukocytosis or anemia. CMP to evaluate for electrolyte or renal function abnormality. Troponin for myocardial ischemia. ECG for dysrhythmias or ischemic changes. CT/CTA stroke study.     Case discussed with neurology. No abnormality on neuroimaging. Due to no functional deficits, will not proceed with TNK at this time. Patient will be admitted following the stroke pathway + DAPT. Case was discussed with [facility]. Patient admitted to the hopsitalist service for further evaluation and management. All results and plan discussed with patient. All questions answered.     Amount and/or Complexity of Data Reviewed  Labs: ordered. Decision-making details documented in ED Course.  Radiology: ordered. Decision-making details documented in ED Course.    Risk  OTC drugs.  Prescription drug management.  Decision regarding hospitalization.                 Disposition  Final diagnoses:   Stroke-like symptoms     Time reflects when diagnosis was documented in both MDM as applicable and the Disposition within this note       Time User Action Codes Description Comment    7/20/2024  9:39 PM Mar Mahoney [R29.90] Stroke-like symptoms           ED Disposition       ED Disposition   Admit    Condition   Stable    Date/Time   Sat Jul 20, 2024 2222    Comment   Case was discussed with Mariela SOLIS and the patient's admission status was agreed to be Admission Status: observation status to the service of Dr. Rocha.               Follow-up Information    None         Current Discharge Medication List        CONTINUE these medications which have NOT CHANGED    Details   !! amLODIPine (NORVASC) 10 mg tablet Take 1 tablet (10 mg total) by mouth daily  Qty: 90 tablet, Refills: 1    Associated Diagnoses: Essential hypertension      !! amLODIPine (NORVASC) 10 mg tablet Take 1 tablet (10 mg total) by mouth daily  Qty: 14 tablet, Refills: 0    Associated Diagnoses: Essential hypertension      cyclobenzaprine  (FLEXERIL) 10 mg tablet Take 1 tablet (10 mg total) by mouth if needed for muscle spasms    Associated Diagnoses: Right low back pain; Degenerative disc disease, lumbar      lidocaine (LIDODERM) 5 % Apply 1 patch topically over 12 hours if needed (Intermittent right low back pain with radiculopathy) Remove & Discard patch within 12 hours or as directed by MD  Qty: 30 patch, Refills: 3    Associated Diagnoses: Back pain, unspecified back location, unspecified back pain laterality, unspecified chronicity      meclizine (ANTIVERT) 25 mg tablet Take 1 tablet (25 mg total) by mouth 3 (three) times a day as needed for dizziness  Qty: 30 tablet, Refills: 0    Associated Diagnoses: Vertigo      multivitamin (THERAGRAN) TABS Take 1 tablet by mouth daily      pantoprazole (PROTONIX) 40 mg tablet Take 40 mg by mouth daily      tadalafil (CIALIS) 20 MG tablet if needed      tamsulosin (FLOMAX) 0.4 mg Take 1 capsule (0.4 mg total) by mouth daily with dinner  Qty: 90 capsule, Refills: 3    Associated Diagnoses: Benign prostatic hyperplasia, presence of lower urinary tract symptoms unspecified       !! - Potential duplicate medications found. Please discuss with provider.          No discharge procedures on file.    PDMP Review       None            ED Provider  Electronically Signed by             Mar Mahoney PA-C  07/20/24 8296

## 2024-07-22 ENCOUNTER — APPOINTMENT (INPATIENT)
Dept: NON INVASIVE DIAGNOSTICS | Facility: HOSPITAL | Age: 74
DRG: 093 | End: 2024-07-22
Payer: COMMERCIAL

## 2024-07-22 LAB
AORTIC ROOT: 3.3 CM
APICAL FOUR CHAMBER EJECTION FRACTION: 65 %
ASCENDING AORTA: 3.5 CM
BSA FOR ECHO PROCEDURE: 0.34 M2
E WAVE DECELERATION TIME: 299 MS
E/A RATIO: 1.35
FOLATE SERPL-MCNC: 13.5 NG/ML
FRACTIONAL SHORTENING: 31 (ref 28–44)
INTERVENTRICULAR SEPTUM IN DIASTOLE (PARASTERNAL SHORT AXIS VIEW): 0.8 CM
INTERVENTRICULAR SEPTUM: 0.8 CM (ref 0.6–1.1)
LA/AORTA RATIO 2D: 1.12
LAAS-AP2: 20.5 CM2
LAAS-AP4: 17.5 CM2
LEFT ATRIUM SIZE: 3.7 CM
LEFT ATRIUM VOLUME (MOD BIPLANE): 57 ML
LEFT ATRIUM VOLUME INDEX (MOD BIPLANE): 167.6 ML/M2
LEFT INTERNAL DIMENSION IN SYSTOLE: 3.8 CM (ref 2.1–4)
LEFT VENTRICULAR INTERNAL DIMENSION IN DIASTOLE: 5.5 CM (ref 3.5–6)
LEFT VENTRICULAR POSTERIOR WALL IN END DIASTOLE: 0.9 CM
LEFT VENTRICULAR STROKE VOLUME: 88 ML
LVSV (TEICH): 88 ML
MV E'TISSUE VEL-SEP: 10 CM/S
MV PEAK A VEL: 0.71 M/S
MV PEAK E VEL: 96 CM/S
MV STENOSIS PRESSURE HALF TIME: 88 MS
MV VALVE AREA P 1/2 METHOD: 2.5
RIGHT VENTRICLE ID DIMENSION: 4 CM
SL CV LV EF: 60
SL CV PED ECHO LEFT VENTRICLE DIASTOLIC VOLUME (MOD BIPLANE) 2D: 150 ML
SL CV PED ECHO LEFT VENTRICLE SYSTOLIC VOLUME (MOD BIPLANE) 2D: 62 ML
TRICUSPID ANNULAR PLANE SYSTOLIC EXCURSION: 2.5 CM
VIT B12 SERPL-MCNC: 728 PG/ML (ref 180–914)

## 2024-07-22 PROCEDURE — 93306 TTE W/DOPPLER COMPLETE: CPT

## 2024-07-22 PROCEDURE — 93306 TTE W/DOPPLER COMPLETE: CPT | Performed by: INTERNAL MEDICINE

## 2024-07-22 PROCEDURE — 99239 HOSP IP/OBS DSCHRG MGMT >30: CPT | Performed by: STUDENT IN AN ORGANIZED HEALTH CARE EDUCATION/TRAINING PROGRAM

## 2024-07-22 RX ORDER — CLOPIDOGREL BISULFATE 75 MG/1
75 TABLET ORAL DAILY
Qty: 21 TABLET | Refills: 0 | Status: SHIPPED | OUTPATIENT
Start: 2024-07-23

## 2024-07-22 RX ORDER — ATORVASTATIN CALCIUM 40 MG/1
40 TABLET, FILM COATED ORAL
Qty: 30 TABLET | Refills: 0 | Status: SHIPPED | OUTPATIENT
Start: 2024-07-22

## 2024-07-22 RX ADMIN — AMLODIPINE BESYLATE 10 MG: 10 TABLET ORAL at 08:33

## 2024-07-22 RX ADMIN — DOCUSATE SODIUM 100 MG: 100 CAPSULE, LIQUID FILLED ORAL at 08:32

## 2024-07-22 RX ADMIN — ASPIRIN 81 MG: 81 TABLET, COATED ORAL at 08:32

## 2024-07-22 RX ADMIN — CLOPIDOGREL 75 MG: 75 TABLET ORAL at 08:32

## 2024-07-22 RX ADMIN — ACETAMINOPHEN 650 MG: 325 TABLET, FILM COATED ORAL at 08:33

## 2024-07-22 NOTE — DISCHARGE INSTR - AVS FIRST PAGE
Please start taking aspirin 81 mg and Plavix 75 mg for 21 days followed by aspirin alone daily Lipitor 40 mg daily  Please get outpatient EEG as per neurology  Please follow-up with neurology  Please keep check your blood pressure at home twice daily for a week and share data with your PCP for the optimization of antihypertensives  Please follow-up with cardiology for Zio patch or event monitor

## 2024-07-22 NOTE — PLAN OF CARE
Problem: PAIN - ADULT  Goal: Verbalizes/displays adequate comfort level or baseline comfort level  Description: Interventions:  - Encourage patient to monitor pain and request assistance  - Assess pain using appropriate pain scale  - Administer analgesics based on type and severity of pain and evaluate response  - Implement non-pharmacological measures as appropriate and evaluate response  - Consider cultural and social influences on pain and pain management  - Notify physician/advanced practitioner if interventions unsuccessful or patient reports new pain  7/21/2024 2230 by Vickie Walker RN  Outcome: Progressing  7/21/2024 2230 by Vickie Walker RN  Outcome: Progressing     Problem: INFECTION - ADULT  Goal: Absence or prevention of progression during hospitalization  Description: INTERVENTIONS:  - Assess and monitor for signs and symptoms of infection  - Monitor lab/diagnostic results  - Monitor all insertion sites, i.e. indwelling lines, tubes, and drains  - Monitor endotracheal if appropriate and nasal secretions for changes in amount and color  - [place] appropriate cooling/warming therapies per order  - Administer medications as ordered  - Instruct and encourage patient and family to use good hand hygiene technique  - Identify and instruct in appropriate isolation precautions for identified infection/condition  7/21/2024 2230 by Vickie Walker RN  Outcome: Progressing  7/21/2024 2230 by Vickie Walker RN  Outcome: Progressing  Goal: Absence of fever/infection during neutropenic period  Description: INTERVENTIONS:  - Monitor WBC    7/21/2024 2230 by Vickie Walker RN  Outcome: Progressing  7/21/2024 2230 by Vickie Walker RN  Outcome: Progressing     Problem: SAFETY ADULT  Goal: Patient will remain free of falls  Description: INTERVENTIONS:  - Educate patient/family on patient safety including physical limitations  - Instruct patient to call for assistance with activity   - Consult OT/PT  to assist with strengthening/mobility   - Keep Call bell within reach  - Keep bed low and locked with side rails adjusted as appropriate  - Keep care items and personal belongings within reach  - Initiate and maintain comfort rounds  - Make Fall Risk Sign visible to staff  - Offer Toileting every 2 Hours, in advance of need  - Initiate/Maintain alarm  - Obtain necessary fall risk management equipment:   - Apply yellow socks and bracelet for high fall risk patients  - Consider moving patient to room near nurses station  7/21/2024 2230 by Vickie Walker RN  Outcome: Progressing  7/21/2024 2230 by Vickie Walker RN  Outcome: Progressing  Goal: Maintain or return to baseline ADL function  Description: INTERVENTIONS:  -  Assess patient's ability to carry out ADLs; assess patient's baseline for ADL function and identify physical deficits which impact ability to perform ADLs (bathing, care of mouth/teeth, toileting, grooming, dressing, etc.)  - Assess/evaluate cause of self-care deficits   - Assess range of motion  - Assess patient's mobility; develop plan if impaired  - Assess patient's need for assistive devices and provide as appropriate  - Encourage maximum independence but intervene and supervise when necessary  - Involve family in performance of ADLs  - Assess for home care needs following discharge   - Consider OT consult to assist with ADL evaluation and planning for discharge  - Provide patient education as appropriate  7/21/2024 2230 by Vickie Walker RN  Outcome: Progressing  7/21/2024 2230 by Vickie Walker RN  Outcome: Progressing  Goal: Maintains/Returns to pre admission functional level  Description: INTERVENTIONS:  - Perform AM-PAC 6 Click Basic Mobility/ Daily Activity assessment daily.  - Set and communicate daily mobility goal to care team and patient/family/caregiver.   - Collaborate with rehabilitation services on mobility goals if consulted  - Perform Range of Motion  times a day.  -  Reposition patient every  hours.  - Dangle patient  times a day  - Stand patient  times a day  - Ambulate patient  times a day  - Out of bed to chair  times a day   - Out of bed for meals  times a day  - Out of bed for toileting  - Record patient progress and toleration of activity level   7/21/2024 2230 by Vickie Walker RN  Outcome: Progressing  7/21/2024 2230 by Vickie Walker RN  Outcome: Progressing     Problem: DISCHARGE PLANNING  Goal: Discharge to home or other facility with appropriate resources  Description: INTERVENTIONS:  - Identify barriers to discharge w/patient and caregiver  - Arrange for needed discharge resources and transportation as appropriate  - Identify discharge learning needs (meds, wound care, etc.)  - Arrange for interpretive services to assist at discharge as needed  - Refer to Case Management Department for coordinating discharge planning if the patient needs post-hospital services based on physician/advanced practitioner order or complex needs related to functional status, cognitive ability, or social support system  7/21/2024 2230 by Vickie Walker RN  Outcome: Progressing  7/21/2024 2230 by Vickie Walker RN  Outcome: Progressing     Problem: Knowledge Deficit  Goal: Patient/family/caregiver demonstrates understanding of disease process, treatment plan, medications, and discharge instructions  Description: Complete learning assessment and assess knowledge base.  Interventions:  - Provide teaching at level of understanding  - Provide teaching via preferred learning methods  7/21/2024 2230 by Vickie Walker RN  Outcome: Progressing  7/21/2024 2230 by Vickie Walker RN  Outcome: Progressing

## 2024-07-22 NOTE — CASE MANAGEMENT
Case Management Assessment & Discharge Planning Note    Patient name Tomas Robert  Location 2 [facility] 260/2 E 260-01 MRN [id number]  : 1950 Date 2024       Current Admission Date: 2024  Current Admission Diagnosis:Stroke-like symptoms   Patient Active Problem List    Diagnosis Date Noted Date Diagnosed    Ataxia 2024     Claustrophobia 2024     Umbilical hernia without obstruction and without gangrene 2024     Umbilical pain 2024     Dysesthesia of multiple sites 2023     Primary hypertension 2022     Stroke-like symptoms 2022     Pain in left knee 2021     Colon polyps 2021     Chronic kidney disease-mineral and bone disorder 2020     Vertigo 2020     CKD (chronic kidney disease) stage 3, GFR 30-59 ml/min ([facility]) 2019     Lumbar radiculopathy 2018     Erectile dysfunction 10/11/2017     Benign prostatic hyperplasia, presence of lower urinary tract symptoms unspecified 2016       LOS (days): 1  Geometric Mean LOS (GMLOS) (days):   Days to GMLOS:     OBJECTIVE:    Risk of Unplanned Readmission Score: 10.86      Current admission status: Inpatient     Preferred Pharmacy:   [facility]/pharmacy #[id number] - [facility] [address] [address]  Phone: [phone] Fax: [phone]    [facility] [place] [place] - [facility]  [place] [address]  Phone: [phone] Fax: [phone]    Primary Care Provider: Nino Hancock PA-C  Primary Insurance: [facility]  Secondary Insurance:     ASSESSMENT:  Active Health Care Proxies       Eduarda Hsu Health Care Agent - Spouse   Primary Phone: [phone] (Mobile)  Home Phone: [phone]                 Advance Directives  Does patient have a Health Care POA?: Yes  Does patient currently have a Health Care decision maker?: Yes, please see Health Care Proxy  section  Does patient have Advance Directives?: Yes  Advance Directives: Living will, Power of  for health care  Primary Contact: Eduarda Hsu (Spouse) - [phone]    Readmission Root Cause  30 Day Readmission: No    Patient Information  Admitted from:: Home  Mental Status: Alert  During Assessment patient was accompanied by: Not accompanied during assessment  Primary Caregiver: Self  Support Systems: Self, Spouse/significant other  County of Residence: [place]  What city do you live in?: [place]  Home entry access options. Select all that apply.: Stairs  Number of steps to enter home.: 2  Type of Current Residence: 2 story home  Upon entering residence, is there a bedroom on the main floor (no further steps)?: No  A bedroom is located on the following floor levels of residence (select all that apply):: 2nd Floor  Upon entering residence, is there a bathroom on the main floor (no further steps)?: No  Indicate which floors of current residence have a bathroom (select all the apply):: 2nd Floor  Number of steps to 2nd floor from main floor: One Flight  Living Arrangements: Lives w/ Spouse/significant other  Is patient a ?: No    Activities of Daily Living Prior to Admission  Functional Status: Independent  Completes ADLs independently?: Yes  Ambulates independently?: Yes  Does patient use assisted devices?: No  Does patient currently own DME?: No  Does patient have a history of Outpatient Therapy (PT/OT)?: Yes  Does the patient have a history of Short-Term Rehab?: No  Does patient have a history of HHC?: No  Does patient currently have HHC?: No    Patient Information Continued  Does patient have prescription coverage?: Yes  Does patient receive dialysis treatments?: No  Does patient have a history of substance abuse?: No  Does patient have a history of Mental Health Diagnosis?: No    PHQ 2/9 Screening   Reviewed PHQ 2/9 Depression Screening Score?: No    Means of Transportation  Means of  Transport to Appts:: Drives Self      Social Determinants of Health (SDOH)      Flowsheet Row Most Recent Value   Housing Stability    In the last 12 months, was there a time when you were not able to pay the mortgage or rent on time? N   In the past 12 months, how many times have you moved where you were living? 1   At any time in the past 12 months, were you homeless or living in a shelter (including now)? N   Transportation Needs    In the past 12 months, has lack of transportation kept you from medical appointments or from getting medications? no   In the past 12 months, has lack of transportation kept you from meetings, work, or from getting things needed for daily living? No   Food Insecurity    Within the past 12 months, you worried that your food would run out before you got the money to buy more. Never true   Within the past 12 months, the food you bought just didn't last and you didn't have money to get more. Never true   Utilities    In the past 12 months has the electric, gas, oil, or water company threatened to shut off services in your home? No          DISCHARGE DETAILS:    Other Referral/Resources/Interventions Provided:  Interventions: None Indicated    Treatment Team Recommendation: Home  Discharge Destination Plan:: Home  Transport at Discharge : Family

## 2024-07-22 NOTE — PLAN OF CARE
Problem: PAIN - ADULT  Goal: Verbalizes/displays adequate comfort level or baseline comfort level  Description: Interventions:  - Encourage patient to monitor pain and request assistance  - Assess pain using appropriate pain scale  - Administer analgesics based on type and severity of pain and evaluate response  - Implement non-pharmacological measures as appropriate and evaluate response  - Consider cultural and social influences on pain and pain management  - Notify physician/advanced practitioner if interventions unsuccessful or patient reports new pain  Outcome: Progressing     Problem: INFECTION - ADULT  Goal: Absence or prevention of progression during hospitalization  Description: INTERVENTIONS:  - Assess and monitor for signs and symptoms of infection  - Monitor lab/diagnostic results  - Monitor all insertion sites, i.e. indwelling lines, tubes, and drains  - Monitor endotracheal if appropriate and nasal secretions for changes in amount and color  - [place] appropriate cooling/warming therapies per order  - Administer medications as ordered  - Instruct and encourage patient and family to use good hand hygiene technique  - Identify and instruct in appropriate isolation precautions for identified infection/condition  Outcome: Progressing  Goal: Absence of fever/infection during neutropenic period  Description: INTERVENTIONS:  - Monitor WBC    Outcome: Progressing     Problem: SAFETY ADULT  Goal: Patient will remain free of falls  Description: INTERVENTIONS:  - Educate patient/family on patient safety including physical limitations  - Instruct patient to call for assistance with activity   - Consult OT/PT to assist with strengthening/mobility   - Keep Call bell within reach  - Keep bed low and locked with side rails adjusted as appropriate  - Keep care items and personal belongings within reach  - Initiate and maintain comfort rounds  - Make Fall Risk Sign visible to staff  - Offer Toileting every 2 Hours,  in advance of need  - Initiate/Maintain 2alarm  - Obtain necessary fall risk management equipment:   - Apply yellow socks and bracelet for high fall risk patients  - Consider moving patient to room near nurses station  Outcome: Progressing  Goal: Maintain or return to baseline ADL function  Description: INTERVENTIONS:  -  Assess patient's ability to carry out ADLs; assess patient's baseline for ADL function and identify physical deficits which impact ability to perform ADLs (bathing, care of mouth/teeth, toileting, grooming, dressing, etc.)  - Assess/evaluate cause of self-care deficits   - Assess range of motion  - Assess patient's mobility; develop plan if impaired  - Assess patient's need for assistive devices and provide as appropriate  - Encourage maximum independence but intervene and supervise when necessary  - Involve family in performance of ADLs  - Assess for home care needs following discharge   - Consider OT consult to assist with ADL evaluation and planning for discharge  - Provide patient education as appropriate  Outcome: Progressing  Goal: Maintains/Returns to pre admission functional level  Description: INTERVENTIONS:  - Perform AM-PAC 6 Click Basic Mobility/ Daily Activity assessment daily.  - Set and communicate daily mobility goal to care team and patient/family/caregiver.   - Collaborate with rehabilitation services on mobility goals if consulted  - Perform Range of Motion 3 times a day.  - Reposition patient every 3 hours.  - Dangle patient 3 times a day  - Stand patient 3 times a day  - Ambulate patient 3 times a day  - Out of bed to chair 3 times a day   - Out of bed for meals 3 times a day  - Out of bed for toileting  - Record patient progress and toleration of activity level   Outcome: Progressing     Problem: DISCHARGE PLANNING  Goal: Discharge to home or other facility with appropriate resources  Description: INTERVENTIONS:  - Identify barriers to discharge w/patient and caregiver  -  Arrange for needed discharge resources and transportation as appropriate  - Identify discharge learning needs (meds, wound care, etc.)  - Arrange for interpretive services to assist at discharge as needed  - Refer to Case Management Department for coordinating discharge planning if the patient needs post-hospital services based on physician/advanced practitioner order or complex needs related to functional status, cognitive ability, or social support system  Outcome: Progressing     Problem: Knowledge Deficit  Goal: Patient/family/caregiver demonstrates understanding of disease process, treatment plan, medications, and discharge instructions  Description: Complete learning assessment and assess knowledge base.  Interventions:  - Provide teaching at level of understanding  - Provide teaching via preferred learning methods  Outcome: Progressing

## 2024-07-22 NOTE — QUICK NOTE
MRI brain completed: no acute infarct or hemorrhage, no significant white matter changes noted. TTE with EF 60%, no PFO, normal LA size. Pt reported to be at his baseline. Although history not entirely suggestive of TIA, cannot entirely rule out with with left-sided involvement. Continue aspirin 81 mg daily and Plavix 75 mg daily x21 days, then switch to aspirin monotherapy (not on AP at home prior to admission). Outpatient cardiac monitoring. BP goal normotension. Rest of care per primary.    Tomas Robert will need follow up in 4-6 weeks with a neurovascular AP or attending. He will not need outpatient neurologic testing otherwise at this time.

## 2024-07-22 NOTE — UTILIZATION REVIEW
Continued Stay Review    Date: 7/22                          Current Patient Class: IP   Current Level of Care:     HPI:74 y.o. male initially admitted on  7/21 w/ stroke like sx    Assessment/Plan:     7/22 DC summary    CT head/CTA head and neck all came back negative.  MRI ruled out stroke.  Echocardiogram did not show any LV thrombus, aortic stenosis with EF more than 60%.  Neurology recommended dual antiplatelet therapy for 21 days followed by aspirin alone with Lipitor 40 mg and normotension.     Vital Signs (last 3 days)       Date/Time Temp Pulse Resp BP MAP (mmHg) SpO2 O2 Device Patient Position - Orthostatic VS Jade Coma Scale Score Pain    07/22/24 0833 -- -- -- 141/79 -- -- -- -- -- --    07/22/24 0823 -- -- -- -- -- -- None (Room air) -- 15 No Pain    07/22/24 07:11:56 97.4 °F (36.3 °C) 60 18 141/79 100 93 % -- -- -- --    07/22/24 0300 97.7 °F (36.5 °C) 56 -- 158/81 107 98 % None (Room air) Lying 15 --    07/21/24 2300 97.7 °F (36.5 °C) 48 18 157/89 112 98 % None (Room air) Lying 15 --    07/21/24 1902 -- -- -- -- -- 97 % None (Room air) -- 15 No Pain    07/21/24 1900 97.1 °F (36.2 °C) 60 -- 140/74 96 97 % None (Room air) Sitting -- --    07/21/24 1500 -- 58 -- 133/82 -- 98 % None (Room air) Lying 15 --    07/21/24 1100 -- 57 -- 128/74 85 98 % None (Room air) Lying 15 --    07/21/24 0900 -- 59 -- 145/76 98 98 % None (Room air) Lying 15 --    07/21/24 0740 -- -- -- -- -- -- -- -- 15 No Pain    07/21/24 0700 98 °F (36.7 °C) 59 -- 140/77 -- 97 % None (Room air) Lying 15 --    07/21/24 0500 97.7 °F (36.5 °C) 59 20 125/69 100 96 % None (Room air) Lying 15 --    07/21/24 0300 97.2 °F (36.2 °C) 54 18 153/73 100 99 % None (Room air) Lying 15 --    07/21/24 0202 97.4 °F (36.3 °C) 98 18 144/74 97 98 % None (Room air) Lying -- --    07/21/24 0200 -- -- -- -- -- -- -- -- 15 --    07/21/24 0100 97.7 °F (36.5 °C) 84 18 150/73 99 98 % None (Room air) Lying 15 --    07/21/24 0001 98.4 °F (36.9 °C) 62 20 163/87 --  100 % None (Room air) Lying 15 No Pain    07/20/24 2255 -- 60 20 136/75 -- 99 % None (Room air) -- 15 --    07/20/24 2240 -- 64 16 143/77 -- 100 % None (Room air) -- 15 No Pain    07/20/24 2225 -- 75 16 143/72 -- 100 % None (Room air) -- 15 No Pain    07/20/24 2210 -- 71 16 143/72 -- 100 % None (Room air) -- 15 --    07/20/24 2155 -- 78 18 159/91 -- 98 % None (Room air) -- 15 --    07/20/24 21:41:06 -- 72 16 159/91 -- 98 % -- -- -- --    07/20/24 2140 -- 69 22 159/91 -- 99 % None (Room air) -- 15 --    07/20/24 2126 97.6 °F (36.4 °C) 73 20 178/85 -- 98 % None (Room air) -- -- --          Weight (last 2 days)       Date/Time Weight    07/21/24 0001 83 (182.9)    07/20/24 2140 83.9 (185)              Pertinent Labs/Diagnostic Results:   Radiology:  MRI brain wo contrast   Final Interpretation by Pallav N Shah, MD (07/21 1414)      Unremarkable MRI of the brain aside from chronic inflammatory paranasal sinus disease.      Workstation performed: [id number]         CTA stroke alert (head/neck)   Final Interpretation by Carlos Lindo MD (07/20 2218)      No acute intracranial or angiographic abnormality      I personally discussed this study with Aylin Bustamante at 7/20/24 10:08 PM            Workstation performed: [id number]         CT stroke alert brain   Final Interpretation by Carlos Lindo MD (07/20 2218)      No acute intracranial or angiographic abnormality      I personally discussed this study with Aylin Bustamante at 7/20/24 10:08 PM            Workstation performed: [id number]           Cardiology:  ECG 12 lead   Final Result by Brittany Gallegos MD (07/21 1434)   Normal sinus rhythm   Inferior infarct , age undetermined   Abnormal ECG      Confirmed by Brittany Gallegos ([id number]) on 7/21/2024 2:34:42 PM        GI:  No orders to display       Results from last 7 days   Lab Units 07/20/24 2141   SARS-COV-2  Negative     Results from last 7 days   Lab Units 07/21/24  0250 07/20/24 2141   WBC Thousand/uL  3.82* 4.40   HEMOGLOBIN g/dL 13.1 13.6   HEMATOCRIT % 39.3 40.1   PLATELETS Thousands/uL 196 203         Results from last 7 days   Lab Units 07/21/24  0250 07/20/24  2141   SODIUM mmol/L 138 139   POTASSIUM mmol/L 4.0 3.7   CHLORIDE mmol/L 105 104   CO2 mmol/L 26 27   ANION GAP mmol/L 7 8   BUN mg/dL 19 19   CREATININE mg/dL 1.33* 1.52*   EGFR ml/min/1.73sq m 52 44   CALCIUM mg/dL 8.8 9.3         Results from last 7 days   Lab Units 07/20/24  2138   POC GLUCOSE mg/dl 108     Results from last 7 days   Lab Units 07/21/24  0250 07/20/24  2141   GLUCOSE RANDOM mg/dL 102 104       Results from last 7 days   Lab Units 07/21/24  0250 07/21/24  0027 07/20/24  2141   HS TNI 0HR ng/L  --   --  4   HS TNI 2HR ng/L  --  4  --    HSTNI D2 ng/L  --  0  --    HS TNI 4HR ng/L 4  --   --    HSTNI D4 ng/L 0  --   --          Results from last 7 days   Lab Units 07/20/24  2141   PROTIME seconds 12.7   INR  0.90   PTT seconds 28     Results from last 7 days   Lab Units 07/21/24  0250   CRP mg/L 1.4       Results from last 7 days   Lab Units 07/20/24  2141   INFLUENZA A PCR  Negative   INFLUENZA B PCR  Negative   RSV PCR  Negative     Medications:   Scheduled Medications:  amLODIPine, 10 mg, Oral, Daily  aspirin, 81 mg, Oral, Daily  atorvastatin, 40 mg, Oral, Daily With Dinner  clopidogrel, 75 mg, Oral, Daily  docusate sodium, 100 mg, Oral, BID  fluticasone, 1 spray, Each Nare, Daily  heparin (porcine), 5,000 Units, Subcutaneous, Q8H J LUIS  tamsulosin, 0.4 mg, Oral, Daily With Dinner      Continuous IV Infusions:     PRN Meds:  acetaminophen, 650 mg, Oral, Q4H PRN  ondansetron, 4 mg, Intravenous, Q6H PRN        Discharge Plan: D     Network Utilization Review Department  ATTENTION: Please call with any questions or concerns to [phone] and carefully listen to the prompts so that you are directed to the right person. All voicemails are confidential.   For Discharge needs, contact Care Management DC Support Team at [phone] opt.  2  Send all requests for admission clinical reviews, approved or denied determinations and any other requests to dedicated fax number below belonging to the campus where the patient is receiving treatment. List of dedicated fax numbers for the Facilities:  FACILITY NAME UR FAX NUMBER   ADMISSION DENIALS (Administrative/Medical Necessity) [phone]   DISCHARGE SUPPORT TEAM (NETWORK) [phone]   PARENT CHILD HEALTH (Maternity/NICU/Pediatrics) [phone]   [facility] [phone]   [facility] [phone]   [facility] [phone]   [facility] [phone]   [facility] [phone]   [facility] [phone]   [facility] [phone]   [facility] [phone]   [facility] [phone]   Critical access hospital [phone]   [facility] [phone]   [facility] [phone]

## 2024-07-22 NOTE — DISCHARGE SUMMARY
Medical Problems       Resolved Problems  Date Reviewed: 7/20/2024   None       Discharging Physician / Practitioner: Philipp Colorado MD  PCP: Nino Hancock PA-C  Admission Date:   Admission Orders (From admission, onward)       Ordered        07/21/24 1551  INPATIENT ADMISSION  Once            07/20/24 2222  Place in Observation  Once                          Discharge Date: 07/22/24    Consultations During Hospital Stay:  Neurology    Procedures Performed:   None    Significant Findings / Test Results:   MRI brain wo contrast   Final Result      Unremarkable MRI of the brain aside from chronic inflammatory paranasal sinus disease.      Workstation performed: [id number]         CTA stroke alert (head/neck)   Final Result      No acute intracranial or angiographic abnormality      I personally discussed this study with Aylin Bustamante at 7/20/24 10:08 PM            Workstation performed: [id number]         CT stroke alert brain   Final Result      No acute intracranial or angiographic abnormality      I personally discussed this study with Aylin Bustamante at 7/20/24 10:08 PM            Workstation performed: [id number]                 Incidental Findings:   none    Test Results Pending at Discharge (will require follow up):   none     Outpatient Tests Requested:  none    Complications:  none    Reason for Admission:   Chief Complaint   Patient presents with    Numbness       Pt c/o left sided facial, arm and leg numbness x 20 minutes. Pt states he had cramps in his left leg earlier today        Hospital Course:   Tomas Robert is a 74 y.o. male patient who originally presented to the hospital on 7/20/2024 due to strokelike symptoms.  CT head/CTA head and neck all came back negative.  MRI ruled out stroke.  Echocardiogram did not show any LV thrombus, aortic stenosis with EF more than 60%.  Neurology recommended dual antiplatelet therapy for 21 days followed by aspirin alone with Lipitor 40 mg and normotension.  " Patient has been given follow-up with cardiology and neurology.      Condition at Discharge: good    Discharge Day Visit / Exam:   Subjective: No overnight event.  No active complaint  Vitals: Blood Pressure: 135/76 (07/22/24 1111)  Pulse: 57 (07/22/24 1111)  Temperature: 97.8 °F (36.6 °C) (07/22/24 1111)  Temp Source: Oral (07/22/24 1111)  Respirations: 19 (07/22/24 1111)  Height: (!) 6\" (15.2 cm) (07/21/24 0001)  Weight - Scale: 83 kg (182 lb 14.4 oz) (07/21/24 0001)  SpO2: 99 % (07/22/24 1111)  Exam:   Constitutional: Acute distress  HEENT: Pallor or icterus  CVS: S1 plus S2  Respiratory: Normal vascular breathe without crackles and wheeze  Gastroenterology: Soft nontender without any palpable mass  Skin: No bruises or ecchymosis  Neurology: No focal logical deficit      Discussion with Family:  Updated patient.     Discharge instructions/Information to patient and family:   See after visit summary for information provided to patient and family.      Provisions for Follow-Up Care:  See after visit summary for information related to follow-up care and any pertinent home health orders.      Mobility at time of Discharge:   Basic Mobility Inpatient Raw Score: 23  JH-HLM Goal: 7: Walk 25 feet or more  JH-HLM Achieved: 7: Walk 25 feet or more  HLM Goal achieved. Continue to encourage appropriate mobility.     Disposition:   Home    Planned Readmission: none     Discharge Statement:  I spent 33 minutes discharging the patient. This time was spent on the day of discharge. I had direct contact with the patient on the day of discharge. Greater than 50% of the total time was spent examining patient, answering all patient questions, arranging and discussing plan of care with patient as well as directly providing post-discharge instructions.  Additional time then spent on discharge activities.    Discharge Medications:  See after visit summary for reconciled discharge medications provided to patient and/or family.  "     **Please Note: This note may have been constructed using a voice recognition system**

## 2024-07-23 ENCOUNTER — TRANSITIONAL CARE MANAGEMENT (OUTPATIENT)
Dept: INTERNAL MEDICINE CLINIC | Facility: CLINIC | Age: 74
End: 2024-07-23

## 2024-07-23 VITALS
HEART RATE: 70 BPM | TEMPERATURE: 97.9 F | DIASTOLIC BLOOD PRESSURE: 109 MMHG | WEIGHT: 182 LBS | BODY MASS INDEX: 35.73 KG/M2 | RESPIRATION RATE: 16 BRPM | HEIGHT: 60 IN | OXYGEN SATURATION: 98 % | SYSTOLIC BLOOD PRESSURE: 177 MMHG

## 2024-07-23 NOTE — UTILIZATION REVIEW
NOTIFICATION OF ADMISSION DISCHARGE   This is a Notification of Discharge from [facility]. Please be advised that this patient has been discharge from our facility. Below you will find the admission and discharge date and time including the patient’s disposition.   UTILIZATION REVIEW CONTACT:  Bijal Gastelum  Utilization   Network Utilization Review Department  Phone: [phone] x carefully listen to the prompts. All voicemails are confidential.  Email: [email or web address]     ADMISSION INFORMATION  PRESENTATION DATE: 7/20/2024  9:32 PM  OBERVATION ADMISSION DATE: 07/20/2024 2222  INPATIENT ADMISSION DATE: 7/21/24  3:51 PM   DISCHARGE DATE: 7/22/2024  6:31 PM   DISPOSITION:Home/Self Care    Network Utilization Review Department  ATTENTION: Please call with any questions or concerns to [phone] and carefully listen to the prompts so that you are directed to the right person. All voicemails are confidential.   For Discharge needs, contact Care Management DC Support Team at [phone] opt. 2  Send all requests for admission clinical reviews, approved or denied determinations and any other requests to dedicated fax number below belonging to the campus where the patient is receiving treatment. List of dedicated fax numbers for the Facilities:  FACILITY NAME UR FAX NUMBER   ADMISSION DENIALS (Administrative/Medical Necessity) [phone]   DISCHARGE SUPPORT TEAM ([facility]) [phone]   PARENT CHILD HEALTH (Maternity/NICU/Pediatrics) [phone]   [facility] [phone]   [facility] [phone]   [facility] [phone]   [facility] [phone]   [facility] [phone]   [facility] [phone]   [facility] [phone]   [facility]  [place] [phone]   [facility] [phone]   [facility] [phone]   [facility] [phone]   [facility] [phone]

## 2024-07-24 ENCOUNTER — TELEPHONE (OUTPATIENT)
Age: 74
End: 2024-07-24

## 2024-07-24 NOTE — TELEPHONE ENCOUNTER
"Hello,    The following message was sent via e-mail to the leadership team:     Please advise if you can help facilitate the following overbook request:    Patient Name: HARSHAL GARCIA    Patient MRN: [id number]    Call back #:     Insurance: [employer] , MEDICARE A+B    Department:Cardiology    Speciality: General Cardiology    Reason for overbook request: 7 DAY HOSPITAL FOLLOW UP FOR HEART ATTACK    Comments (Write \"N/a\" if no comments): n/a    Requested doctor and location: [place]    Date of current appointment: 10/10/24       Thank you.      "

## 2024-07-26 RX ORDER — MELOXICAM 15 MG/1
15 TABLET ORAL DAILY
COMMUNITY
Start: 2024-06-04

## 2024-07-30 DIAGNOSIS — N18.31 STAGE 3A CHRONIC KIDNEY DISEASE (HCC): Primary | ICD-10-CM

## 2024-07-31 ENCOUNTER — OFFICE VISIT (OUTPATIENT)
Dept: NEPHROLOGY | Facility: CLINIC | Age: 74
End: 2024-07-31
Payer: COMMERCIAL

## 2024-07-31 VITALS
SYSTOLIC BLOOD PRESSURE: 140 MMHG | TEMPERATURE: 97.7 F | HEART RATE: 67 BPM | HEIGHT: 72 IN | BODY MASS INDEX: 25.81 KG/M2 | WEIGHT: 190.6 LBS | DIASTOLIC BLOOD PRESSURE: 80 MMHG | OXYGEN SATURATION: 96 % | RESPIRATION RATE: 18 BRPM

## 2024-07-31 DIAGNOSIS — N18.9 CHRONIC KIDNEY DISEASE-MINERAL AND BONE DISORDER: ICD-10-CM

## 2024-07-31 DIAGNOSIS — N40.0 BENIGN PROSTATIC HYPERPLASIA, PRESENCE OF LOWER URINARY TRACT SYMPTOMS UNSPECIFIED: ICD-10-CM

## 2024-07-31 DIAGNOSIS — E66.01 OBESITY, MORBID (HCC): ICD-10-CM

## 2024-07-31 DIAGNOSIS — N18.31 STAGE 3A CHRONIC KIDNEY DISEASE (HCC): Primary | ICD-10-CM

## 2024-07-31 DIAGNOSIS — I10 PRIMARY HYPERTENSION: ICD-10-CM

## 2024-07-31 DIAGNOSIS — R29.90 STROKE-LIKE SYMPTOMS: ICD-10-CM

## 2024-07-31 DIAGNOSIS — E83.9 CHRONIC KIDNEY DISEASE-MINERAL AND BONE DISORDER: ICD-10-CM

## 2024-07-31 DIAGNOSIS — M89.9 CHRONIC KIDNEY DISEASE-MINERAL AND BONE DISORDER: ICD-10-CM

## 2024-07-31 PROCEDURE — 99214 OFFICE O/P EST MOD 30 MIN: CPT | Performed by: INTERNAL MEDICINE

## 2024-07-31 NOTE — ASSESSMENT & PLAN NOTE
Lab Results   Component Value Date    EGFR 52 07/21/2024    EGFR 44 07/20/2024    EGFR 55 05/14/2024    CREATININE 1.33 (H) 07/21/2024    CREATININE 1.52 (H) 07/20/2024    CREATININE 1.27 05/14/2024   PTH and phosphorus along with vitamin D are within acceptable range and will continue to monitor

## 2024-07-31 NOTE — ASSESSMENT & PLAN NOTE
Lab Results   Component Value Date    EGFR 52 07/21/2024    EGFR 44 07/20/2024    EGFR 55 05/14/2024    CREATININE 1.33 (H) 07/21/2024    CREATININE 1.52 (H) 07/20/2024    CREATININE 1.27 05/14/2024   Renal function is stable overall.  Patient got intravenous contrast in the hospital surgery check BMP again as part of the close monitoring    Advise hydration and avoiding nephrotoxic medication

## 2024-07-31 NOTE — PROGRESS NOTES
NEPHROLOGY OFFICE FOLLOW UP  Tomas Robert 74 y.o. male MRN: [id number]    Encounter: [id number] 7/31/2024    REASON FOR VISIT: Tomas Robert is a 74 y.o. male who is here on 7/31/2024 for Chronic Kidney Disease and Follow-up  .    HPI:    Jaime came in today for follow-up of CKD.  Since I saw him last he was hospitalized with strokelike symptoms with weakness on the left side.  Workup in the hospital including CTA and MRI was negative for any acute stroke.  He was discharged home    He denies any complaint today    No chest pain no palpitation or shortness of breath    No headache or dizziness    No further weakness        REVIEW OF SYSTEMS:    Review of Systems   Constitutional:  Negative for fatigue.   HENT:  Negative for congestion.    Eyes:  Negative for photophobia and pain.   Respiratory:  Negative for chest tightness and shortness of breath.    Cardiovascular:  Negative for chest pain and palpitations.   Gastrointestinal:  Negative for abdominal distention, abdominal pain and blood in stool.   Endocrine: Negative for polydipsia.   Genitourinary:  Negative for difficulty urinating, dysuria, flank pain, hematuria and urgency.   Musculoskeletal:  Negative for arthralgias and back pain.   Skin:  Negative for rash.   Neurological:  Negative for dizziness, light-headedness and headaches.   Hematological:  Does not bruise/bleed easily.   Psychiatric/Behavioral:  Negative for behavioral problems. The patient is not nervous/anxious.          PAST MEDICAL HISTORY:  Past Medical History:   Diagnosis Date    Benign prostatic hyperplasia     Chronic kidney disease     COVID-19 03/2020    Esophageal reflux     Hypertension     Hypertensive urgency 3/26/2019    Vertigo        PAST SURGICAL HISTORY:  Past Surgical History:   Procedure Laterality Date    APPENDECTOMY  05/21/2015    CYST REMOVAL      Fatty cyst removed from back       SOCIAL HISTORY:  Social History     Substance and Sexual Activity   Alcohol Use Yes     Comment: socially     Social History     Substance and Sexual Activity   Drug Use No     Social History     Tobacco Use   Smoking Status Former    Passive exposure: Past   Smokeless Tobacco Never   Tobacco Comments    1ppw       FAMILY HISTORY:  Family History   Problem Relation Age of Onset    Prostate cancer Father     Prostate cancer Maternal Grandfather     Stomach cancer Maternal Aunt         malignant tumor of pharynx    Stomach cancer Maternal Uncle         malignant tumor of pharynx    Mental illness Family     Depression Family     Schizophrenia Family     Hypertension Mother     No Known Problems Sister     Dementia Sister        MEDICATIONS:    Current Outpatient Medications:     amLODIPine (NORVASC) 10 mg tablet, Take 1 tablet (10 mg total) by mouth daily, Disp: 14 tablet, Rfl: 0    aspirin (ECOTRIN LOW STRENGTH) 81 mg EC tablet, Take 1 tablet (81 mg total) by mouth daily, Disp: 30 tablet, Rfl: 0    atorvastatin (LIPITOR) 40 mg tablet, Take 1 tablet (40 mg total) by mouth daily with dinner, Disp: 30 tablet, Rfl: 0    clopidogrel (PLAVIX) 75 mg tablet, Take 1 tablet (75 mg total) by mouth daily, Disp: 21 tablet, Rfl: 0    cyclobenzaprine (FLEXERIL) 10 mg tablet, Take 1 tablet (10 mg total) by mouth if needed for muscle spasms, Disp: , Rfl:     lidocaine (LIDODERM) 5 %, Apply 1 patch topically over 12 hours if needed (Intermittent right low back pain with radiculopathy) Remove & Discard patch within 12 hours or as directed by MD, Disp: 30 patch, Rfl: 3    meclizine (ANTIVERT) 25 mg tablet, Take 1 tablet (25 mg total) by mouth 3 (three) times a day as needed for dizziness (Patient taking differently: Take 25 mg by mouth if needed for dizziness), Disp: 30 tablet, Rfl: 0    meloxicam (MOBIC) 15 mg tablet, Take 15 mg by mouth daily As directed, Disp: , Rfl:     multivitamin (THERAGRAN) TABS, Take 1 tablet by mouth daily, Disp: , Rfl:     pantoprazole (PROTONIX) 40 mg tablet, Take 40 mg by mouth daily, Disp: ,  Rfl:     tadalafil (CIALIS) 20 MG tablet, if needed, Disp: , Rfl:     tamsulosin (FLOMAX) 0.4 mg, Take 1 capsule (0.4 mg total) by mouth daily with dinner, Disp: 90 capsule, Rfl: 3    PHYSICAL EXAM:  Vitals:    07/31/24 1013   BP: 140/80   BP Location: Right arm   Patient Position: Sitting   Pulse: 67   Resp: 18   Temp: 97.7 °F (36.5 °C)   TempSrc: Temporal   SpO2: 96%   Weight: 86.5 kg (190 lb 9.6 oz)   Height: 6' (1.829 m)     Body mass index is 25.85 kg/m².    Physical Exam  Constitutional:       General: He is not in acute distress.     Appearance: He is well-developed.   HENT:      Head: Normocephalic.      Mouth/Throat:      Mouth: Mucous membranes are moist.   Eyes:      General: No scleral icterus.     Conjunctiva/sclera: Conjunctivae normal.   Neck:      Vascular: No JVD.   Cardiovascular:      Rate and Rhythm: Normal rate.      Heart sounds: Normal heart sounds.   Pulmonary:      Effort: Pulmonary effort is normal.      Breath sounds: No wheezing.   Abdominal:      Palpations: Abdomen is soft.      Tenderness: There is no abdominal tenderness.   Musculoskeletal:         General: Normal range of motion.      Cervical back: Neck supple.   Skin:     General: Skin is warm.      Findings: No rash.   Neurological:      Mental Status: He is alert and oriented to person, place, and time.   Psychiatric:         Behavior: Behavior normal.         LAB RESULTS:  Results for orders placed or performed during the hospital encounter of 07/20/24   FLU/RSV/COVID - if FLU/RSV clinically relevant    Specimen: Nose; Nares   Result Value Ref Range    SARS-CoV-2 Negative Negative    INFLUENZA A PCR Negative Negative    INFLUENZA B PCR Negative Negative    RSV PCR Negative Negative   Basic metabolic panel   Result Value Ref Range    Sodium 139 135 - 147 mmol/L    Potassium 3.7 3.5 - 5.3 mmol/L    Chloride 104 96 - 108 mmol/L    CO2 27 21 - 32 mmol/L    ANION GAP 8 4 - 13 mmol/L    BUN 19 5 - 25 mg/dL    Creatinine 1.52 (H) 0.60  "- 1.30 mg/dL    Glucose 104 65 - 140 mg/dL    Calcium 9.3 8.4 - 10.2 mg/dL    eGFR 44 ml/min/1.73sq m   CBC and Platelet   Result Value Ref Range    WBC 4.40 4.31 - 10.16 Thousand/uL    RBC 4.42 3.88 - 5.62 Million/uL    Hemoglobin 13.6 12.0 - 17.0 g/dL    Hematocrit 40.1 36.5 - 49.3 %    MCV 91 82 - 98 fL    MCH 30.8 26.8 - 34.3 pg    MCHC 33.9 31.4 - 37.4 g/dL    RDW 13.8 11.6 - 15.1 %    Platelets 203 149 - 390 Thousands/uL    MPV 10.5 8.9 - 12.7 fL   Protime-INR   Result Value Ref Range    Protime 12.7 11.6 - 14.5 seconds    INR 0.90 0.84 - 1.19   APTT   Result Value Ref Range    PTT 28 23 - 37 seconds   HS Troponin 0hr (reflex protocol)   Result Value Ref Range    hs TnI 0hr 4 \"Refer to ACS Flowchart\"- see link ng/L   HS Troponin I 2hr   Result Value Ref Range    hs TnI 2hr 4 \"Refer to ACS Flowchart\"- see link ng/L    Delta 2hr hsTnI 0 <20 ng/L   HS Troponin I 4hr   Result Value Ref Range    hs TnI 4hr 4 \"Refer to ACS Flowchart\"- see link ng/L    Delta 4hr hsTnI 0 <20 ng/L   Basic metabolic panel   Result Value Ref Range    Sodium 138 135 - 147 mmol/L    Potassium 4.0 3.5 - 5.3 mmol/L    Chloride 105 96 - 108 mmol/L    CO2 26 21 - 32 mmol/L    ANION GAP 7 4 - 13 mmol/L    BUN 19 5 - 25 mg/dL    Creatinine 1.33 (H) 0.60 - 1.30 mg/dL    Glucose 102 65 - 140 mg/dL    Glucose, Fasting 102 (H) 65 - 99 mg/dL    Calcium 8.8 8.4 - 10.2 mg/dL    eGFR 52 ml/min/1.73sq m   C-reactive protein   Result Value Ref Range    CRP 1.4 <3.0 mg/L   CBC (With Platelets)   Result Value Ref Range    WBC 3.82 (L) 4.31 - 10.16 Thousand/uL    RBC 4.27 3.88 - 5.62 Million/uL    Hemoglobin 13.1 12.0 - 17.0 g/dL    Hematocrit 39.3 36.5 - 49.3 %    MCV 92 82 - 98 fL    MCH 30.7 26.8 - 34.3 pg    MCHC 33.3 31.4 - 37.4 g/dL    RDW 13.7 11.6 - 15.1 %    Platelets 196 149 - 390 Thousands/uL    MPV 10.7 8.9 - 12.7 fL   Lipid Panel with Direct LDL reflex   Result Value Ref Range    Cholesterol 162 See Comment mg/dL    Triglycerides 98 See Comment " mg/dL    HDL, Direct 67 >=40 mg/dL    LDL Calculated 75 0 - 100 mg/dL   Vitamin B12   Result Value Ref Range    Vitamin B-12 728 180 - 914 pg/mL   Folate   Result Value Ref Range    Folate 13.5 >5.9 ng/mL   ECG 12 lead   Result Value Ref Range    Ventricular Rate 69 BPM    Atrial Rate 69 BPM    CT Interval 160 ms    QRSD Interval 92 ms    QT Interval 392 ms    QTC Interval 420 ms    P [place] 55 degrees    QRS Axis -5 degrees    T Wave Axis -5 degrees   Echo complete w/ contrast if indicated   Result Value Ref Range    BSA 0.34 m2    A4C EF 65 %    LVIDd 5.50 cm    LVIDS 3.80 cm    IVSd 0.80 cm    LVPWd 0.90 cm    FS 31 28 - 44    MV E' Tissue Velocity Septal 10 cm/s    LA Volume Index (BP) 167.6 mL/m2    E/A ratio 1.35     E wave deceleration time 299 ms    MV Peak E Bradford 96 cm/s    MV Peak A Bradford 0.71 m/s    RVID d 4.0 cm    Tricuspid annular plane systolic excursion 2.50 cm    LA size 3.7 cm    LA/Ao Ratio 2D 1.12     LA volume (BP) 57 mL    MV stenosis pressure 1/2 time 88 ms    MV valve area p 1/2 method 2.50     Ao root 3.30 cm    Asc Ao 3.5 cm    Left ventricular stroke volume (2D) 88.00 mL    IVS 0.8 cm    LEFT VENTRICLE SYSTOLIC VOLUME (MOD BIPLANE) 2D 62 mL    LV DIASTOLIC VOLUME (MOD BIPLANE) 2D 150 mL    Left Atrium Area-systolic Four Chamber 17.5 cm2    Left Atrium Area-systolic Apical Two Chamber 20.5 cm2    LVSV, 2D 88 mL    LV EF 60    Fingerstick Glucose (POCT)   Result Value Ref Range    POC Glucose 108 65 - 140 mg/dl       ASSESSMENT and PLAN:      CKD (chronic kidney disease) stage 3, GFR 30-59 ml/min ([facility])  Lab Results   Component Value Date    EGFR 52 07/21/2024    EGFR 44 07/20/2024    EGFR 55 05/14/2024    CREATININE 1.33 (H) 07/21/2024    CREATININE 1.52 (H) 07/20/2024    CREATININE 1.27 05/14/2024   Renal function is stable overall.  Patient got intravenous contrast in the hospital surgery check BMP again as part of the close monitoring    Advise hydration and avoiding nephrotoxic  "medication    Chronic kidney disease-mineral and bone disorder  Lab Results   Component Value Date    EGFR 52 07/21/2024    EGFR 44 07/20/2024    EGFR 55 05/14/2024    CREATININE 1.33 (H) 07/21/2024    CREATININE 1.52 (H) 07/20/2024    CREATININE 1.27 05/14/2024   PTH and phosphorus along with vitamin D are within acceptable range and will continue to monitor    Primary hypertension  Blood pressure is very well-controlled.  In the hospital it was on the higher side      Patient is stable overall for now.  As a mention above will recheck the blood test to assess any worsening kidney function after contrast.  I will see him back in 6 months and we will get blood and urine test before that visit also        Portions of the record may have been created with voice recognition software. Occasional wrong word or \"sound a like\" substitutions may have occurred due to the inherent limitations of voice recognition software. Read the chart carefully and recognize, using context, where substitutions have occurred.If you have any questions, please contact the dictating provider.   "

## 2024-08-01 ENCOUNTER — OFFICE VISIT (OUTPATIENT)
Dept: INTERNAL MEDICINE CLINIC | Facility: CLINIC | Age: 74
End: 2024-08-01
Payer: COMMERCIAL

## 2024-08-01 VITALS
OXYGEN SATURATION: 97 % | HEIGHT: 72 IN | HEART RATE: 77 BPM | SYSTOLIC BLOOD PRESSURE: 126 MMHG | BODY MASS INDEX: 25.47 KG/M2 | DIASTOLIC BLOOD PRESSURE: 72 MMHG | WEIGHT: 188 LBS

## 2024-08-01 DIAGNOSIS — R29.90 STROKE-LIKE SYMPTOMS: Primary | ICD-10-CM

## 2024-08-01 DIAGNOSIS — N18.31 STAGE 3A CHRONIC KIDNEY DISEASE (HCC): ICD-10-CM

## 2024-08-01 DIAGNOSIS — I10 PRIMARY HYPERTENSION: ICD-10-CM

## 2024-08-01 PROBLEM — E66.01 OBESITY, MORBID (HCC): Status: RESOLVED | Noted: 2024-07-31 | Resolved: 2024-08-01

## 2024-08-01 PROCEDURE — 1111F DSCHRG MED/CURRENT MED MERGE: CPT | Performed by: PHYSICIAN ASSISTANT

## 2024-08-01 PROCEDURE — 99495 TRANSJ CARE MGMT MOD F2F 14D: CPT | Performed by: PHYSICIAN ASSISTANT

## 2024-08-01 NOTE — ASSESSMENT & PLAN NOTE
Blood pressure well-controlled today.  He does monitor at home.  Currently on amlodipine 10 mg daily.  He will continue this medication.  Eduarda cp, palp, sob, edema, HA, dizziness, diaphoresis, syncope, visual disturbance.

## 2024-08-01 NOTE — PATIENT INSTRUCTIONS
Continue current medications.  Follow-up with specialist as scheduled.  Follow-up as scheduled here in October.  Have labs done for that visit.  Follow-up sooner as needed.

## 2024-08-01 NOTE — PROGRESS NOTES
Ambulatory Visit  Name: Tomas Robert      : 1950      MRN: [id number]  Encounter Provider: Nino Hancock PA-C  Encounter Date: 2024   Encounter department: [facility] INTERNAL MEDICINE [place]    Assessment & Plan   1. Stroke-like symptoms  Assessment & Plan:  As per HPI.  Patient discharged on Plavix and aspirin to continue both for 21 days, then stop the Plavix and continue the baby aspirin.  Patient has been asymptomatic since being in the hospital.  Reports he is going back to the gym where he is doing light workouts.  2. Primary hypertension  Assessment & Plan:  Blood pressure well-controlled today.  He does monitor at home.  Currently on amlodipine 10 mg daily.  He will continue this medication.  Eduarda cp, palp, sob, edema, HA, dizziness, diaphoresis, syncope, visual disturbance.  3. Stage 3a chronic kidney disease (HCC)  Assessment & Plan:  Follows with nephrology.  Most recent nephrology note reviewed.  Patient to repeat renal function studies as he did receive imaging studies with dye.  This to make sure his renal functions remained stable.  Patient is staying hydrated, states he is avoiding nephrotoxic substances but is on meloxicam.  I have encouraged him to not use this on a regular basis.            Lab Results   Component Value Date    EGFR 52 2024    EGFR 44 2024    EGFR 55 2024    CREATININE 1.33 (H) 2024    CREATININE 1.52 (H) 2024    CREATININE 1.27 2024         Depression Screening and Follow-up Plan: Patient was screened for depression during today's encounter. They screened negative with a PHQ-2 score of 0.      History of Present Illness     Hospital follow-up/transition of care    Patient was hospitalized at [facility] from 2024 through 2024 secondary to strokelike symptoms when he presented with complaints of left-sided facial arm and leg numbness for a 20-minute duration.  Hospital workup was essentially  unremarkable, CTA showed no acute intracranial or angiographic abnormality.  Brain MRI showed no evidence of CVA but chronic inflammatory paranasal sinus disease.  Patient and I discussed these findings.  He does see an ENT on a regular basis for cerumen cleaning and will discuss the findings with ENT.  At this time patient has no significant ENT symptomatology.  Patient is reporting to me today that he believes his symptoms may have been brought on by stress.  He is undergoing significant psychosocial stressors at home which had crescendoed at that time.  He has since been working on the issues and states he is making progress.  Eduarda cp, palp, sob, edema, HA, dizziness, diaphoresis, syncope, visual disturbance.  Hospital records have been reviewed.  Patient had been placed on Plavix and baby aspirin for 21 days.  To continue the baby aspirin after the 21 days.    TCM Call     Date and time call was made  7/23/2024  6:29 AM    Hospital care reviewed  Records reviewed    Patient was hospitialized at  [facility]    Date of Admission  07/20/24    Date of discharge  07/22/24    Diagnosis  stroke like symptoms    Disposition  Home    Were the patients medications reviewed and updated  Yes    Current Symptoms  None      TCM Call     Post hospital issues  None    Should patient be enrolled in anticoag monitoring?  Yes  Started on   Plavix    Scheduled for follow up?  Yes    Patients specialists  Cardiologist; Neurologist    Cardiologist name  Beto Francis DO    Cardiologist contact #  [phone]    Neurologist name  Mushtaq May PA-C    Neurologist contact #  [phone]    Did you obtain your prescribed medications  Yes    Do you need help managing your prescriptions or medications  No    Is transportation to your appointment needed  No    I have advised the patient to call PCP with any new or worsening symptoms    MEGAN Love    Living Arrangements  Spouse or Significiant other    Support System   Spouse    The type of support provided  Emotional; Physical; Financial    Do you have social support  Yes, as much as I need    Are you recieving any outpatient services  No    Are you recieving home care services  No    Are you using any community resources  No    Current waiver services  No    Have you fallen in the last 12 months  No    Interperter language line needed  No    Counseling  Patient    Counseling topics  Risk factor reduction              Review of Systems   Constitutional:  Negative for activity change, chills, fatigue and fever.   HENT:  Negative for congestion.    Eyes:  Negative for discharge.   Respiratory:  Negative for cough, chest tightness and shortness of breath.    Cardiovascular:  Negative for chest pain, palpitations and leg swelling.   Gastrointestinal:  Negative for abdominal pain, blood in stool, constipation, diarrhea, nausea and vomiting.   Endocrine: Negative for polydipsia, polyphagia and polyuria.   Genitourinary:  Negative for difficulty urinating.   Musculoskeletal:  Negative for arthralgias and myalgias.   Skin:  Negative for rash.   Allergic/Immunologic: Negative for immunocompromised state.   Neurological:  Negative for dizziness, syncope, speech difficulty, weakness, light-headedness, numbness and headaches.   Hematological:  Negative for adenopathy. Does not bruise/bleed easily.   Psychiatric/Behavioral:  Negative for dysphoric mood, sleep disturbance and suicidal ideas. The patient is not nervous/anxious.        Objective     /72 (BP Location: Left arm, Patient Position: Sitting, Cuff Size: Standard)   Pulse 77   Ht 6' (1.829 m)   Wt 85.3 kg (188 lb)   SpO2 97%   BMI 25.50 kg/m²     Physical Exam  Vitals and nursing note reviewed.   Constitutional:       General: He is not in acute distress.     Appearance: Normal appearance. He is well-developed. He is not ill-appearing.   HENT:      Head: Normocephalic.   Eyes:      Extraocular Movements: Extraocular  movements intact.      Pupils: Pupils are equal, round, and reactive to light.   Neck:      Thyroid: No thyromegaly.      Vascular: No carotid bruit or JVD.   Cardiovascular:      Rate and Rhythm: Normal rate and regular rhythm.      Heart sounds: Normal heart sounds.   Pulmonary:      Effort: Pulmonary effort is normal.      Breath sounds: Normal breath sounds.   Abdominal:      Palpations: Abdomen is soft.   Musculoskeletal:         General: No swelling.      Cervical back: Normal range of motion and neck supple.      Right lower leg: No edema.      Left lower leg: No edema.   Lymphadenopathy:      Cervical: No cervical adenopathy.   Skin:     General: Skin is warm and dry.      Findings: No rash.   Neurological:      General: No focal deficit present.      Mental Status: He is alert and oriented to person, place, and time. Mental status is at baseline.      Cranial Nerves: No cranial nerve deficit.      Motor: No weakness.      Coordination: Coordination normal.      Gait: Gait normal.      Deep Tendon Reflexes: Reflexes normal.   Psychiatric:         Mood and Affect: Mood normal.         Behavior: Behavior normal.         Thought Content: Thought content normal.       Administrative Statements

## 2024-08-01 NOTE — ASSESSMENT & PLAN NOTE
Follows with nephrology.  Most recent nephrology note reviewed.  Patient to repeat renal function studies as he did receive imaging studies with dye.  This to make sure his renal functions remained stable.  Patient is staying hydrated, states he is avoiding nephrotoxic substances but is on meloxicam.  I have encouraged him to not use this on a regular basis.            Lab Results   Component Value Date    EGFR 52 07/21/2024    EGFR 44 07/20/2024    EGFR 55 05/14/2024    CREATININE 1.33 (H) 07/21/2024    CREATININE 1.52 (H) 07/20/2024    CREATININE 1.27 05/14/2024

## 2024-08-01 NOTE — ASSESSMENT & PLAN NOTE
As per HPI.  Patient discharged on Plavix and aspirin to continue both for 21 days, then stop the Plavix and continue the baby aspirin.  Patient has been asymptomatic since being in the hospital.  Reports he is going back to the gym where he is doing light workouts.

## 2024-08-05 NOTE — PROGRESS NOTES
Outpatient Cardiology Consult Note - Tomas Robert 74 y.o. male MRN: [id number]    @ Encounter: [id number]        Patient Active Problem List    Diagnosis Date Noted    Ataxia 05/14/2024    Claustrophobia 05/14/2024    Umbilical hernia without obstruction and without gangrene 04/16/2024    Umbilical pain 04/08/2024    Dysesthesia of multiple sites 03/13/2023    Primary hypertension 03/18/2022    Stroke-like symptoms 01/25/2022    Pain in left knee 01/20/2021    Colon polyps 01/11/2021    Chronic kidney disease-mineral and bone disorder 06/02/2020    Vertigo 01/07/2020    CKD (chronic kidney disease) stage 3, GFR 30-59 ml/min ([facility]) 08/05/2019    Lumbar radiculopathy 04/26/2018    Erectile dysfunction 10/11/2017    Benign prostatic hyperplasia, presence of lower urinary tract symptoms unspecified 09/07/2016       Assessment:  # Stroke like symptoms  Rx: asa, plavix, statin  May be related to HTN    Studies- personally reviewed by me  EKG- SR, inf infarct pattern    Echo 7/22/24:  LVEF: 60%  RV: normal    # HTN- amlodipine 10 mg daily  # CKD3, Cr 1.3    Today's Plan:  Discussed possible Zio Patch but pt did not seem to have true infarction but may have had a TIA  BP ok on amlodipine 10 mg   Vascular testing has been negative for obstruction  Asa 81 mg daily, atorvastatin 40 mg     HPI:     73 yo male is s/p hospital admit for stroke like symptoms. Leg, arm and facial numbness and decreased sensation in left leg. No motor weakness. CT head, neck and MRI brain were all normal  Echo was normal as well. He was discharged on asa and Plavix.     He was in ED 5/15 for transient dizziness and blurred vision. Of note, on both occasions he BP was around 170 mmHg.    Past Medical History:   Diagnosis Date    Benign prostatic hyperplasia     Chronic kidney disease     COVID-19 03/2020    Esophageal reflux     Hypertension     Hypertensive urgency 3/26/2019    Vertigo        Review of Systems   Constitutional:  Negative for  activity change, appetite change, fatigue and unexpected weight change.   HENT:  Negative for congestion and nosebleeds.    Eyes: Negative.    Respiratory:  Negative for cough, chest tightness and shortness of breath.    Cardiovascular:  Negative for chest pain, palpitations and leg swelling.   Gastrointestinal:  Negative for abdominal distention.   Endocrine: Negative.    Genitourinary: Negative.    Musculoskeletal: Negative.    Skin: Negative.    Neurological:  Negative for dizziness, syncope and weakness.   Hematological: Negative.    Psychiatric/Behavioral: Negative.         Allergies   Allergen Reactions    Penicillin V Anaphylaxis     .    Current Outpatient Medications:     amLODIPine (NORVASC) 10 mg tablet, Take 1 tablet (10 mg total) by mouth daily, Disp: 14 tablet, Rfl: 0    aspirin (ECOTRIN LOW STRENGTH) 81 mg EC tablet, Take 1 tablet (81 mg total) by mouth daily, Disp: 30 tablet, Rfl: 0    atorvastatin (LIPITOR) 40 mg tablet, Take 1 tablet (40 mg total) by mouth daily with dinner, Disp: 30 tablet, Rfl: 0    clopidogrel (PLAVIX) 75 mg tablet, Take 1 tablet (75 mg total) by mouth daily, Disp: 21 tablet, Rfl: 0    cyclobenzaprine (FLEXERIL) 10 mg tablet, Take 1 tablet (10 mg total) by mouth if needed for muscle spasms, Disp: , Rfl:     lidocaine (LIDODERM) 5 %, Apply 1 patch topically over 12 hours if needed (Intermittent right low back pain with radiculopathy) Remove & Discard patch within 12 hours or as directed by MD, Disp: 30 patch, Rfl: 3    multivitamin (THERAGRAN) TABS, Take 1 tablet by mouth daily, Disp: , Rfl:     pantoprazole (PROTONIX) 40 mg tablet, Take 40 mg by mouth daily, Disp: , Rfl:     tadalafil (CIALIS) 20 MG tablet, if needed, Disp: , Rfl:     tamsulosin (FLOMAX) 0.4 mg, Take 1 capsule (0.4 mg total) by mouth daily with dinner, Disp: 90 capsule, Rfl: 3    meclizine (ANTIVERT) 25 mg tablet, Take 1 tablet (25 mg total) by mouth 3 (three) times a day as needed for dizziness (Patient not  taking: Reported on 8/7/2024), Disp: 30 tablet, Rfl: 0    meloxicam (MOBIC) 15 mg tablet, Take 15 mg by mouth daily As directed (Patient not taking: Reported on 8/7/2024), Disp: , Rfl:     Social History     Socioeconomic History    Marital status: /Civil Union     Spouse name: Not on file    Number of children: Not on file    Years of education: Not on file    Highest education level: Not on file   Occupational History    Occupation: Retired   Tobacco Use    Smoking status: Former     Passive exposure: Past    Smokeless tobacco: Never    Tobacco comments:     1ppw   Vaping Use    Vaping status: Never Used   Substance and Sexual Activity    Alcohol use: Yes     Comment: socially    Drug use: No    Sexual activity: Yes     Partners: Female     Comment: denied history of high risk sexual behavior   Other Topics Concern    Not on file   Social History Narrative    No active advance directive    Always uses seat belt    Exercises occasionally, moderate    Five children    Occasional caffeine consumption      Regular dental care. Brushes teeth twice daily       Retired     Social Determinants of Health     Financial Resource Strain: Low Risk  (4/5/2023)    Overall Financial Resource Strain (CARDIA)     Difficulty of Paying Living Expenses: Not hard at all   Food Insecurity: No Food Insecurity (7/22/2024)    Hunger Vital Sign     Worried About Running Out of Food in the Last Year: Never true     Ran Out of Food in the Last Year: Never true   Transportation Needs: No Transportation Needs (7/22/2024)    PRAPARE - Transportation     Lack of Transportation (Medical): No     Lack of Transportation (Non-Medical): No   Physical Activity: Not on file   Stress: Not on file   Social Connections: Not on file   Intimate Partner Violence: Not on file   Housing Stability: Low Risk  (7/22/2024)    Housing Stability Vital Sign     Unable to Pay for Housing in the Last Year: No     Number of Times Moved in the Last Year: 1      "Homeless in the Last Year: No       Family History   Problem Relation Age of Onset    Prostate cancer Father     Prostate cancer Maternal Grandfather     Stomach cancer Maternal Aunt         malignant tumor of pharynx    Stomach cancer Maternal Uncle         malignant tumor of pharynx    Mental illness Family     Depression Family     Schizophrenia Family     Hypertension Mother     No Known Problems Sister     Dementia Sister        Physical Exam:    Vitals: Blood pressure 138/80, pulse 88, height 6' (1.829 m), weight 86.6 kg (191 lb), SpO2 98%., Body mass index is 25.9 kg/m².,   Wt Readings from Last 3 Encounters:   08/07/24 86.6 kg (191 lb)   08/01/24 85.3 kg (188 lb)   07/31/24 86.5 kg (190 lb 9.6 oz)       Physical Exam:  Vitals:    08/07/24 0830   BP: 138/80   BP Location: Left arm   Patient Position: Sitting   Cuff Size: Standard   Pulse: 88   SpO2: 98%   Weight: 86.6 kg (191 lb)   Height: 6' (1.829 m)       Physical Exam    Labs & Results:    Lab Results   Component Value Date    WBC 3.58 (L) 08/06/2024    HGB 13.7 08/06/2024    HCT 41.1 08/06/2024    MCV 90 08/06/2024     08/06/2024     Lab Results   Component Value Date    SODIUM 140 08/06/2024    K 4.4 08/06/2024     08/06/2024    CO2 30 08/06/2024    BUN 20 08/06/2024    CREATININE 1.30 08/06/2024    GLUC 102 07/21/2024    CALCIUM 9.4 08/06/2024     No results found for: \"BNP\"   Lab Results   Component Value Date    CHOLESTEROL 162 07/21/2024    CHOLESTEROL 177 04/08/2024    CHOLESTEROL 169 10/11/2023     Lab Results   Component Value Date    HDL 67 07/21/2024    HDL 69 04/08/2024    HDL 70 10/11/2023     Lab Results   Component Value Date    TRIG 98 07/21/2024    TRIG 60 04/08/2024    TRIG 63 10/11/2023     Lab Results   Component Value Date    NONHDLC 108 04/08/2024    NONHDLC 99 10/11/2023    NONHDLC 112 07/05/2023             EKG personally reviewed by Beto Tito, DO.     Counseling / Coordination of Care  Time spent today 40 minutes.  " Greater than 50% of total time was spent with the patient and / or family counseling and / or coordination of care. We discussed diagnoses, most recent studies and any changes in treatment  Thank you for the opportunity to participate in the care of this patient.    COURTNEY EUCEDA D.O.  DIRECTOR OF HEART FAILURE/ PULMONARY HYPERTENSION  MEDICAL DIRECTOR OF LVAD PROGRAM  [facility]

## 2024-08-06 ENCOUNTER — APPOINTMENT (OUTPATIENT)
Dept: LAB | Facility: HOSPITAL | Age: 74
End: 2024-08-06
Payer: COMMERCIAL

## 2024-08-06 DIAGNOSIS — N18.31 STAGE 3A CHRONIC KIDNEY DISEASE (HCC): ICD-10-CM

## 2024-08-06 LAB
25(OH)D3 SERPL-MCNC: 44.7 NG/ML (ref 30–100)
ANION GAP SERPL CALCULATED.3IONS-SCNC: 5 MMOL/L (ref 4–13)
BACTERIA UR QL AUTO: NORMAL /HPF
BASOPHILS # BLD AUTO: 0.02 THOUSANDS/ÂΜL (ref 0–0.1)
BASOPHILS NFR BLD AUTO: 1 % (ref 0–1)
BILIRUB UR QL STRIP: NEGATIVE
BUN SERPL-MCNC: 20 MG/DL (ref 5–25)
CALCIUM SERPL-MCNC: 9.4 MG/DL (ref 8.4–10.2)
CHLORIDE SERPL-SCNC: 105 MMOL/L (ref 96–108)
CLARITY UR: CLEAR
CO2 SERPL-SCNC: 30 MMOL/L (ref 21–32)
COLOR UR: NORMAL
CREAT SERPL-MCNC: 1.3 MG/DL (ref 0.6–1.3)
CREAT UR-MCNC: 183.9 MG/DL
EOSINOPHIL # BLD AUTO: 0.07 THOUSAND/ÂΜL (ref 0–0.61)
EOSINOPHIL NFR BLD AUTO: 2 % (ref 0–6)
ERYTHROCYTE [DISTWIDTH] IN BLOOD BY AUTOMATED COUNT: 13.4 % (ref 11.6–15.1)
GFR SERPL CREATININE-BSD FRML MDRD: 53 ML/MIN/1.73SQ M
GLUCOSE P FAST SERPL-MCNC: 93 MG/DL (ref 65–99)
GLUCOSE UR STRIP-MCNC: NEGATIVE MG/DL
HCT VFR BLD AUTO: 41.1 % (ref 36.5–49.3)
HGB BLD-MCNC: 13.7 G/DL (ref 12–17)
HGB UR QL STRIP.AUTO: NEGATIVE
IMM GRANULOCYTES # BLD AUTO: 0 THOUSAND/UL (ref 0–0.2)
IMM GRANULOCYTES NFR BLD AUTO: 0 % (ref 0–2)
KETONES UR STRIP-MCNC: NEGATIVE MG/DL
LEUKOCYTE ESTERASE UR QL STRIP: NEGATIVE
LYMPHOCYTES # BLD AUTO: 1.27 THOUSANDS/ÂΜL (ref 0.6–4.47)
LYMPHOCYTES NFR BLD AUTO: 36 % (ref 14–44)
MCH RBC QN AUTO: 30 PG (ref 26.8–34.3)
MCHC RBC AUTO-ENTMCNC: 33.3 G/DL (ref 31.4–37.4)
MCV RBC AUTO: 90 FL (ref 82–98)
MONOCYTES # BLD AUTO: 0.27 THOUSAND/ÂΜL (ref 0.17–1.22)
MONOCYTES NFR BLD AUTO: 8 % (ref 4–12)
NEUTROPHILS # BLD AUTO: 1.95 THOUSANDS/ÂΜL (ref 1.85–7.62)
NEUTS SEG NFR BLD AUTO: 53 % (ref 43–75)
NITRITE UR QL STRIP: NEGATIVE
NON-SQ EPI CELLS URNS QL MICRO: NORMAL /HPF
NRBC BLD AUTO-RTO: 0 /100 WBCS
PH UR STRIP.AUTO: 6 [PH]
PHOSPHATE SERPL-MCNC: 3.2 MG/DL (ref 2.3–4.1)
PLATELET # BLD AUTO: 221 THOUSANDS/UL (ref 149–390)
PMV BLD AUTO: 10.8 FL (ref 8.9–12.7)
POTASSIUM SERPL-SCNC: 4.4 MMOL/L (ref 3.5–5.3)
PROT UR STRIP-MCNC: NEGATIVE MG/DL
PROT UR-MCNC: 9 MG/DL
PROT/CREAT UR: 0.05 MG/G{CREAT} (ref 0–0.1)
PTH-INTACT SERPL-MCNC: 55 PG/ML (ref 12–88)
RBC # BLD AUTO: 4.56 MILLION/UL (ref 3.88–5.62)
RBC #/AREA URNS AUTO: NORMAL /HPF
SODIUM SERPL-SCNC: 140 MMOL/L (ref 135–147)
SP GR UR STRIP.AUTO: 1.02 (ref 1–1.03)
UROBILINOGEN UR STRIP-ACNC: <2 MG/DL
WBC # BLD AUTO: 3.58 THOUSAND/UL (ref 4.31–10.16)
WBC #/AREA URNS AUTO: NORMAL /HPF

## 2024-08-06 PROCEDURE — 84100 ASSAY OF PHOSPHORUS: CPT

## 2024-08-06 PROCEDURE — 84156 ASSAY OF PROTEIN URINE: CPT

## 2024-08-06 PROCEDURE — 82570 ASSAY OF URINE CREATININE: CPT

## 2024-08-06 PROCEDURE — 82306 VITAMIN D 25 HYDROXY: CPT

## 2024-08-06 PROCEDURE — 36415 COLL VENOUS BLD VENIPUNCTURE: CPT

## 2024-08-06 PROCEDURE — 80048 BASIC METABOLIC PNL TOTAL CA: CPT

## 2024-08-06 PROCEDURE — 81001 URINALYSIS AUTO W/SCOPE: CPT

## 2024-08-06 PROCEDURE — 85025 COMPLETE CBC W/AUTO DIFF WBC: CPT

## 2024-08-06 PROCEDURE — 83970 ASSAY OF PARATHORMONE: CPT

## 2024-08-07 ENCOUNTER — CONSULT (OUTPATIENT)
Dept: CARDIOLOGY CLINIC | Facility: CLINIC | Age: 74
End: 2024-08-07
Payer: COMMERCIAL

## 2024-08-07 VITALS
BODY MASS INDEX: 25.87 KG/M2 | HEART RATE: 88 BPM | DIASTOLIC BLOOD PRESSURE: 80 MMHG | WEIGHT: 191 LBS | SYSTOLIC BLOOD PRESSURE: 138 MMHG | HEIGHT: 72 IN | OXYGEN SATURATION: 98 %

## 2024-08-07 DIAGNOSIS — I10 PRIMARY HYPERTENSION: ICD-10-CM

## 2024-08-07 DIAGNOSIS — E83.9 CHRONIC KIDNEY DISEASE-MINERAL AND BONE DISORDER: Primary | ICD-10-CM

## 2024-08-07 DIAGNOSIS — M89.9 CHRONIC KIDNEY DISEASE-MINERAL AND BONE DISORDER: Primary | ICD-10-CM

## 2024-08-07 DIAGNOSIS — N18.9 CHRONIC KIDNEY DISEASE-MINERAL AND BONE DISORDER: Primary | ICD-10-CM

## 2024-08-07 DIAGNOSIS — R29.90 STROKE-LIKE SYMPTOMS: ICD-10-CM

## 2024-08-07 PROCEDURE — 99204 OFFICE O/P NEW MOD 45 MIN: CPT | Performed by: INTERNAL MEDICINE

## 2024-08-09 ENCOUNTER — OFFICE VISIT (OUTPATIENT)
Age: 74
End: 2024-08-09
Payer: COMMERCIAL

## 2024-08-09 VITALS
HEIGHT: 72 IN | BODY MASS INDEX: 25.73 KG/M2 | WEIGHT: 190 LBS | SYSTOLIC BLOOD PRESSURE: 130 MMHG | HEART RATE: 75 BPM | DIASTOLIC BLOOD PRESSURE: 60 MMHG

## 2024-08-09 DIAGNOSIS — I10 PRIMARY HYPERTENSION: ICD-10-CM

## 2024-08-09 DIAGNOSIS — G45.9 TIA (TRANSIENT ISCHEMIC ATTACK): Primary | ICD-10-CM

## 2024-08-09 DIAGNOSIS — E78.5 HYPERLIPIDEMIA: ICD-10-CM

## 2024-08-09 PROCEDURE — 99214 OFFICE O/P EST MOD 30 MIN: CPT

## 2024-08-09 NOTE — PROGRESS NOTES
Patient ID: Tomas Robert is a 74 y.o. male who presents to the [facility].    Assessment/Plan:    TIA (transient ischemic attack):    I had the pleasure of seeing Tomas today in the office at [facility] in [place].  He is presenting today for a hospital follow-up appointment in regards to his concern for TIA/strokelike symptoms.  Unfortunately, in the middle of her appointment today the power had went out at the office in [place].  We had to adjust and modify some aspects of the appointment and some aspects such as the physical exam were not entirely completed.  However, the patient stated that he was not having any new strokelike symptoms.  He was not having any residual deficits.  He still continued on aspirin 81 mg daily for antiplatelet monotherapy.  Had since discontinued with Plavix 75 mg daily.  Patient stated that his blood pressure was much improved, today in the office was 130/60.  He was eating much healthier and trying to exercise for the most part.  No history of smoking.  The patient did follow with cardiology in regards to Zio patch, he stated that cardiology did not believe that he needed a further Zio patch at this time.  The only thing I recommend is that the patient follow-up with having a lipid panel in a few months to evaluate the effectiveness of his atorvastatin.  Outside of this, no other issues or concerns reported by the patient and no other further testing required by myself.    - For ongoing stroke prevention continue: Aspirin 81 mg once daily, atorvastatin 40 mg once daily  - Discussed the importance of antiplatelet management with the patient to prevent future strokes.   - Recommend to check blood pressure occasionally away from the doctor's office to make sure that those numbers are typically less than 130/80.  If they are frequently higher than that, we recommend checking a little more often and to follow up with primary care team   - Will defer to  "primary care team for monitoring of cholesterol panel and blood sugar numbers with target LDL cholesterol of less than 70 and hemoglobin A1c less than 7%  - Recommend following a low salt, mediterranean diet   - Recommend routine physical exercise as tolerated     We will plan for him to return to the office in 6 months time to see on of the APPs or Dr. Wilde but would be happy to see him sooner if the need should arise.  If he has any symptoms concerning for TIA or stroke including sudden painless loss of vision or double vision, difficulty speaking or swallowing, vertigo/room spinning that does not quickly resolve, or weakness/numbness/loss of coordination affecting 1 side of the face or body he should proceed by ambulance to the nearest emergency room immediately.     Subjective:    HPI      For Review/Hospital Course:    \" 74 y.o. male with hypertension, CKD, BPH, sensorineural hearing loss and a known history of lightheadedness/dizziness/ataxia in the setting of hypertensive urgency who presented to the ED on 7/20/2024 with strokelike symptoms of  left leg cramping followed by left facial, arm and leg numbness. Last known well 8 PM. NIHSS 1. Initial CT/CTA imaging was negative. Not a TNK candidate. Admitted on stroke pathway.     Workup:  CTH negative for acute abnormality.  CTA H/N negative for large vessel occlusion or flow limiting stenosis.  LDL 75  CRP 1.4  5/15/24 A1C 5.7  Influenza/RSV/COVID-negative  On arrival BUN/Cr 191.52 GFR 44     On neuro exam today, patient confirmed story of L sided numbness that began with a cramp in the left leg, then was noticed in the left hand progressing to L arm and L face. Currently he is unsure but states that he may have minimal residual L hand numbness. Denies weakness. Neurologic exam is unremarkable. Broad differential at this time. Proceed with stroke work-up, await MRI brain results. \" - per Shannan Coleman PA-C, 07/21/2024    MRI brain head showed no acute " infarct or hemorrhage, no significant white matter changes noted.  TTE with EF of 60%, no PFO, normal left atrial size.  Although history not entirely suggestive of TIA, cannot be entirely ruled out with left-sided involvement.  Patient was to continue aspirin 81 mg once daily and Plavix 75 mg once daily for 21 days, then transition to aspirin monotherapy afterwards.  Patient was recommended to continue with statin medication as well at this time for secondary stroke prevention.    Back in May 2024, patient presented to [facility] with symptoms of lightheadedness, dizziness and ataxia, symptoms were transient and resolved.  It was noted that the patient underwent significant stroke workup, including a negative CTA head and neck with no significant intracranial abnormality.  Patient had MRI brain which showed no significant acute intracranial pathology.  Patient had a BP of 220/110 by EMS.  Seems likely that the patient's symptoms could be related to hypertensive urgency at the time.  MRI ruled out evidence of acute stroke.      Interval History:    Full patient history was unable to be taken at this appointment unfortunately due to unforeseen circumstances of a power outage at the [place] office.  The visit was ended shorter than intended although the majority of the history was able to be successfully obtained.    No new strokelike symptoms since the patient's hospitalization.  No residual deficits of stroke.  Patient has since discontinued Plavix 75 mg daily but continues with aspirin 81 mg once daily for antiplatelet monotherapy.  Patient takes atorvastatin 40 mg once daily.  He notes that his blood pressure is much better than it was before, blood pressure 130/60 today.  Noting to be eating healthier and exercising almost every day.  Patient does not have any significant smoking history.  Not having any issues with sleep, no issues with depression/anxiety.  Patient followed with cardiology after his  hospitalization and was determined not to need a Zio patch for further evaluation of any significant underlying arrhythmia.      Lab Results   Component Value Date/Time    CHOLESTEROL 162 07/21/2024 02:50 AM     Lab Results   Component Value Date/Time    TRIG 98 07/21/2024 02:50 AM     Lab Results   Component Value Date/Time    HDL 67 07/21/2024 02:50 AM     Lab Results   Component Value Date/Time    LDLCALC 75 07/21/2024 02:50 AM       Lab Results   Component Value Date/Time    HGBA1C 5.7 (H) 05/15/2024 05:35 AM     Lab Results   Component Value Date/Time     05/15/2024 05:35 AM           Past Medical History:   Diagnosis Date    Benign prostatic hyperplasia     Chronic kidney disease     COVID-19 03/2020    Esophageal reflux     Hypertension     Hypertensive urgency 3/26/2019    Vertigo        Current Outpatient Medications:     amLODIPine (NORVASC) 10 mg tablet, Take 1 tablet (10 mg total) by mouth daily, Disp: 14 tablet, Rfl: 0    aspirin (ECOTRIN LOW STRENGTH) 81 mg EC tablet, Take 1 tablet (81 mg total) by mouth daily, Disp: 30 tablet, Rfl: 0    atorvastatin (LIPITOR) 40 mg tablet, Take 1 tablet (40 mg total) by mouth daily with dinner, Disp: 30 tablet, Rfl: 0    clopidogrel (PLAVIX) 75 mg tablet, Take 1 tablet (75 mg total) by mouth daily, Disp: 21 tablet, Rfl: 0    cyclobenzaprine (FLEXERIL) 10 mg tablet, Take 1 tablet (10 mg total) by mouth if needed for muscle spasms, Disp: , Rfl:     lidocaine (LIDODERM) 5 %, Apply 1 patch topically over 12 hours if needed (Intermittent right low back pain with radiculopathy) Remove & Discard patch within 12 hours or as directed by MD, Disp: 30 patch, Rfl: 3    multivitamin (THERAGRAN) TABS, Take 1 tablet by mouth daily, Disp: , Rfl:     pantoprazole (PROTONIX) 40 mg tablet, Take 40 mg by mouth daily, Disp: , Rfl:     tamsulosin (FLOMAX) 0.4 mg, Take 1 capsule (0.4 mg total) by mouth daily with dinner, Disp: 90 capsule, Rfl: 3    meclizine (ANTIVERT) 25 mg tablet,  Take 1 tablet (25 mg total) by mouth 3 (three) times a day as needed for dizziness (Patient not taking: Reported on 8/7/2024), Disp: 30 tablet, Rfl: 0    meloxicam (MOBIC) 15 mg tablet, Take 15 mg by mouth daily As directed (Patient not taking: Reported on 8/7/2024), Disp: , Rfl:     tadalafil (CIALIS) 20 MG tablet, if needed (Patient not taking: Reported on 8/9/2024), Disp: , Rfl:      Objective:    Physical Exam:                                                                 Vitals:            Constitutional:    /60 (BP Location: Left arm, Patient Position: Sitting, Cuff Size: Adult)   Pulse 75   Ht 6' (1.829 m)   Wt 86.2 kg (190 lb)   BMI 25.77 kg/m²   BP Readings from Last 3 Encounters:   08/09/24 130/60   08/07/24 138/80   08/01/24 126/72     Pulse Readings from Last 3 Encounters:   08/09/24 75   08/07/24 88   08/01/24 77         Well developed, well nourished, well groomed. No dysmorphic features.       Psychiatric:  Normal behavior and appropriate affect        Neurological Examination:     Mental status/cognitive function:   Orientated to time, place and person. Recent and remote memory intact. Attention span and concentration as well as fund of knowledge are appropriate for age. Normal language and spontaneous speech.     Due to power outage during the appointment at the [place] office, unable to complete full neurologic examination.  Although the patient reported not having any focal neurologic deficits and not having any significant difficulty since his hospitalization.      I have spent 20 minutes today on this case including chart review, performing history and exam, patient counseling, and documentation/communication      Mushtaq May PA-C  8/9/2024 12:32 PM

## 2024-08-12 PROBLEM — E78.5 HYPERLIPIDEMIA: Status: ACTIVE | Noted: 2024-08-12

## 2024-08-12 PROBLEM — G45.9 TIA (TRANSIENT ISCHEMIC ATTACK): Status: ACTIVE | Noted: 2024-08-12

## 2024-08-12 NOTE — PATIENT INSTRUCTIONS
TIA (transient ischemic attack):    I had the pleasure of seeing Tomas today in the office at [facility] in [place].  He is presenting today for a hospital follow-up appointment in regards to his concern for TIA/strokelike symptoms.  Unfortunately, in the middle of her appointment today the power had went out at the office in [place].  We had to adjust and modify some aspects of the appointment and some aspects such as the physical exam were not entirely completed.  However, the patient stated that he was not having any new strokelike symptoms.  He was not having any residual deficits.  He still continued on aspirin 81 mg daily for antiplatelet monotherapy.  Had since discontinued with Plavix 75 mg daily.  Patient stated that his blood pressure was much improved, today in the office was 130/60.  He was eating much healthier and trying to exercise for the most part.  No history of smoking.  The patient did follow with cardiology in regards to Zio patch, he stated that cardiology did not believe that he needed a further Zio patch at this time.  The only thing I recommend is that the patient follow-up with having a lipid panel in a few months to evaluate the effectiveness of his atorvastatin.  Outside of this, no other issues or concerns reported by the patient and no other further testing required by myself.    - For ongoing stroke prevention continue: Aspirin 81 mg once daily, atorvastatin 40 mg once daily  - Discussed the importance of antiplatelet management with the patient to prevent future strokes.   - Recommend to check blood pressure occasionally away from the doctor's office to make sure that those numbers are typically less than 130/80.  If they are frequently higher than that, we recommend checking a little more often and to follow up with primary care team   - Will defer to primary care team for monitoring of cholesterol panel and blood sugar numbers with target LDL cholesterol of less than  70 and hemoglobin A1c less than 7%  - Recommend following a low salt, mediterranean diet   - Recommend routine physical exercise as tolerated     We will plan for him to return to the office in 6 months time to see on of the APPs or Dr. Wilde but would be happy to see him sooner if the need should arise.  If he has any symptoms concerning for TIA or stroke including sudden painless loss of vision or double vision, difficulty speaking or swallowing, vertigo/room spinning that does not quickly resolve, or weakness/numbness/loss of coordination affecting 1 side of the face or body he should proceed by ambulance to the nearest emergency room immediately.

## 2024-10-03 ENCOUNTER — APPOINTMENT (OUTPATIENT)
Dept: LAB | Facility: HOSPITAL | Age: 74
End: 2024-10-03
Payer: COMMERCIAL

## 2024-10-03 DIAGNOSIS — M25.511 PAIN IN JOINT OF RIGHT SHOULDER: ICD-10-CM

## 2024-10-03 LAB
ERYTHROCYTE [DISTWIDTH] IN BLOOD BY AUTOMATED COUNT: 13.5 % (ref 11.6–15.1)
HCT VFR BLD AUTO: 42.2 % (ref 36.5–49.3)
HGB BLD-MCNC: 13.9 G/DL (ref 12–17)
MCH RBC QN AUTO: 30.4 PG (ref 26.8–34.3)
MCHC RBC AUTO-ENTMCNC: 32.9 G/DL (ref 31.4–37.4)
MCV RBC AUTO: 92 FL (ref 82–98)
PLATELET # BLD AUTO: 197 THOUSANDS/UL (ref 149–390)
PMV BLD AUTO: 10.8 FL (ref 8.9–12.7)
RBC # BLD AUTO: 4.57 MILLION/UL (ref 3.88–5.62)
WBC # BLD AUTO: 3.45 THOUSAND/UL (ref 4.31–10.16)

## 2024-10-03 PROCEDURE — 85027 COMPLETE CBC AUTOMATED: CPT

## 2024-10-03 PROCEDURE — 36415 COLL VENOUS BLD VENIPUNCTURE: CPT

## 2024-10-04 ENCOUNTER — APPOINTMENT (OUTPATIENT)
Dept: LAB | Facility: HOSPITAL | Age: 74
End: 2024-10-04
Payer: COMMERCIAL

## 2024-10-04 DIAGNOSIS — I10 PRIMARY HYPERTENSION: ICD-10-CM

## 2024-10-04 DIAGNOSIS — E78.5 HYPERLIPIDEMIA: ICD-10-CM

## 2024-10-04 DIAGNOSIS — G45.9 TIA (TRANSIENT ISCHEMIC ATTACK): ICD-10-CM

## 2024-10-04 DIAGNOSIS — Z12.5 SCREENING FOR PROSTATE CANCER: ICD-10-CM

## 2024-10-04 DIAGNOSIS — Z13.6 SCREENING FOR HEART DISEASE: ICD-10-CM

## 2024-10-04 LAB
ALBUMIN SERPL BCG-MCNC: 4.3 G/DL (ref 3.5–5)
ALP SERPL-CCNC: 53 U/L (ref 34–104)
ALT SERPL W P-5'-P-CCNC: 21 U/L (ref 7–52)
ANION GAP SERPL CALCULATED.3IONS-SCNC: 5 MMOL/L (ref 4–13)
AST SERPL W P-5'-P-CCNC: 21 U/L (ref 13–39)
BASOPHILS # BLD AUTO: 0.02 THOUSANDS/ΜL (ref 0–0.1)
BASOPHILS NFR BLD AUTO: 1 % (ref 0–1)
BILIRUB SERPL-MCNC: 0.63 MG/DL (ref 0.2–1)
BUN SERPL-MCNC: 18 MG/DL (ref 5–25)
CALCIUM SERPL-MCNC: 9.1 MG/DL (ref 8.4–10.2)
CHLORIDE SERPL-SCNC: 106 MMOL/L (ref 96–108)
CHOLEST SERPL-MCNC: 167 MG/DL
CO2 SERPL-SCNC: 29 MMOL/L (ref 21–32)
CREAT SERPL-MCNC: 1.33 MG/DL (ref 0.6–1.3)
EOSINOPHIL # BLD AUTO: 0.05 THOUSAND/ΜL (ref 0–0.61)
EOSINOPHIL NFR BLD AUTO: 2 % (ref 0–6)
ERYTHROCYTE [DISTWIDTH] IN BLOOD BY AUTOMATED COUNT: 13.5 % (ref 11.6–15.1)
GFR SERPL CREATININE-BSD FRML MDRD: 52 ML/MIN/1.73SQ M
GLUCOSE P FAST SERPL-MCNC: 88 MG/DL (ref 65–99)
HCT VFR BLD AUTO: 41.1 % (ref 36.5–49.3)
HDLC SERPL-MCNC: 77 MG/DL
HGB BLD-MCNC: 13.3 G/DL (ref 12–17)
IMM GRANULOCYTES # BLD AUTO: 0.01 THOUSAND/UL (ref 0–0.2)
IMM GRANULOCYTES NFR BLD AUTO: 0 % (ref 0–2)
LDLC SERPL CALC-MCNC: 78 MG/DL (ref 0–100)
LYMPHOCYTES # BLD AUTO: 1.33 THOUSANDS/ΜL (ref 0.6–4.47)
LYMPHOCYTES NFR BLD AUTO: 42 % (ref 14–44)
MCH RBC QN AUTO: 29.8 PG (ref 26.8–34.3)
MCHC RBC AUTO-ENTMCNC: 32.4 G/DL (ref 31.4–37.4)
MCV RBC AUTO: 92 FL (ref 82–98)
MONOCYTES # BLD AUTO: 0.24 THOUSAND/ΜL (ref 0.17–1.22)
MONOCYTES NFR BLD AUTO: 8 % (ref 4–12)
NEUTROPHILS # BLD AUTO: 1.52 THOUSANDS/ΜL (ref 1.85–7.62)
NEUTS SEG NFR BLD AUTO: 47 % (ref 43–75)
NRBC BLD AUTO-RTO: 0 /100 WBCS
PLATELET # BLD AUTO: 189 THOUSANDS/UL (ref 149–390)
PMV BLD AUTO: 11 FL (ref 8.9–12.7)
POTASSIUM SERPL-SCNC: 4 MMOL/L (ref 3.5–5.3)
PROT SERPL-MCNC: 7 G/DL (ref 6.4–8.4)
PSA SERPL-MCNC: 5.21 NG/ML (ref 0–4)
RBC # BLD AUTO: 4.47 MILLION/UL (ref 3.88–5.62)
SODIUM SERPL-SCNC: 140 MMOL/L (ref 135–147)
TRIGL SERPL-MCNC: 61 MG/DL
WBC # BLD AUTO: 3.17 THOUSAND/UL (ref 4.31–10.16)

## 2024-10-04 PROCEDURE — 80061 LIPID PANEL: CPT

## 2024-10-04 PROCEDURE — 85025 COMPLETE CBC W/AUTO DIFF WBC: CPT

## 2024-10-04 PROCEDURE — 36415 COLL VENOUS BLD VENIPUNCTURE: CPT

## 2024-10-04 PROCEDURE — G0103 PSA SCREENING: HCPCS

## 2024-10-04 PROCEDURE — 80053 COMPREHEN METABOLIC PANEL: CPT

## 2024-10-09 ENCOUNTER — RA CDI HCC (OUTPATIENT)
Dept: OTHER | Facility: HOSPITAL | Age: 74
End: 2024-10-09

## 2024-10-17 ENCOUNTER — OFFICE VISIT (OUTPATIENT)
Dept: INTERNAL MEDICINE CLINIC | Facility: CLINIC | Age: 74
End: 2024-10-17
Payer: COMMERCIAL

## 2024-10-17 VITALS
WEIGHT: 188 LBS | OXYGEN SATURATION: 99 % | HEIGHT: 72 IN | HEART RATE: 86 BPM | SYSTOLIC BLOOD PRESSURE: 118 MMHG | BODY MASS INDEX: 25.47 KG/M2 | RESPIRATION RATE: 17 BRPM | DIASTOLIC BLOOD PRESSURE: 72 MMHG

## 2024-10-17 DIAGNOSIS — E78.2 MIXED HYPERLIPIDEMIA: ICD-10-CM

## 2024-10-17 DIAGNOSIS — N40.0 BENIGN PROSTATIC HYPERPLASIA, PRESENCE OF LOWER URINARY TRACT SYMPTOMS UNSPECIFIED: ICD-10-CM

## 2024-10-17 DIAGNOSIS — G45.9 TIA (TRANSIENT ISCHEMIC ATTACK): ICD-10-CM

## 2024-10-17 DIAGNOSIS — I10 PRIMARY HYPERTENSION: Primary | ICD-10-CM

## 2024-10-17 DIAGNOSIS — N18.31 STAGE 3A CHRONIC KIDNEY DISEASE (HCC): ICD-10-CM

## 2024-10-17 PROBLEM — R29.90 STROKE-LIKE SYMPTOMS: Status: RESOLVED | Noted: 2022-01-25 | Resolved: 2024-10-17

## 2024-10-17 PROBLEM — R42 VERTIGO: Status: RESOLVED | Noted: 2020-01-07 | Resolved: 2024-10-17

## 2024-10-17 PROCEDURE — 99214 OFFICE O/P EST MOD 30 MIN: CPT | Performed by: PHYSICIAN ASSISTANT

## 2024-10-17 PROCEDURE — G2211 COMPLEX E/M VISIT ADD ON: HCPCS | Performed by: PHYSICIAN ASSISTANT

## 2024-10-17 NOTE — ASSESSMENT & PLAN NOTE
Asymptomatic and has been asymptomatic since he had the hospitalization.  I think his symptoms were related to accelerated hypertension secondary to psychosocial stressors he was having at the time.  He has seen neurology, continues to take a baby aspirin daily.

## 2024-10-17 NOTE — ASSESSMENT & PLAN NOTE
Blood pressure very well-controlled on current medications of amlodipine 10 mg daily.  Eduarda cp, palp, sob, edema, HA, dizziness, diaphoresis, syncope, visual disturbance.  Patient will continue this medication.  He will periodically monitor his blood pressure with goal less than 130/80.  We will continue to monitor in the office.    Orders:    CBC and differential; Future    Comprehensive metabolic panel; Future

## 2024-10-17 NOTE — PATIENT INSTRUCTIONS
Please contact your urologist Dr. Mckinney, inform him of your elevated PSA, your increased PSA velocity.  No medication changes on our part.  Plan follow-up in 6 months for Medicare annual wellness with physical and follow-up to review labs.

## 2024-10-17 NOTE — ASSESSMENT & PLAN NOTE
Patient follows with urology.  PSA done for me for this visit shows increased PSA velocity with PSA above 5.2.  I have asked patient to contact his urologist to discuss this with him as patient has family history of prostate cancer in his father.

## 2024-10-17 NOTE — PROGRESS NOTES
Ambulatory Visit  Name: Tomas Robert      : 1950      MRN: [id number]  Encounter Provider: Nino Hancock PA-C  Encounter Date: 10/17/2024   Encounter department: [facility] [place]    Assessment & Plan  Primary hypertension  Blood pressure very well-controlled on current medications of amlodipine 10 mg daily.  Eduarda cp, palp, sob, edema, HA, dizziness, diaphoresis, syncope, visual disturbance.  Patient will continue this medication.  He will periodically monitor his blood pressure with goal less than 130/80.  We will continue to monitor in the office.    Orders:    CBC and differential; Future    Comprehensive metabolic panel; Future    Stage 3a chronic kidney disease (HCC)  Lab Results   Component Value Date    EGFR 52 10/04/2024    EGFR 53 2024    EGFR 52 2024    CREATININE 1.33 (H) 10/04/2024    CREATININE 1.30 2024    CREATININE 1.33 (H) 2024            Mixed hyperlipidemia  Very good control on atorvastatin 40 mg daily.  Patient will continue this medication.  We will continue to monitor.  Patient will remain active and continue to watch his diet.    Orders:    Lipid panel; Future    TIA (transient ischemic attack)  Asymptomatic and has been asymptomatic since he had the hospitalization.  I think his symptoms were related to accelerated hypertension secondary to psychosocial stressors he was having at the time.  He has seen neurology, continues to take a baby aspirin daily.         Benign prostatic hyperplasia, presence of lower urinary tract symptoms unspecified  Patient follows with urology.  PSA done for me for this visit shows increased PSA velocity with PSA above 5.2.  I have asked patient to contact his urologist to discuss this with him as patient has family history of prostate cancer in his father.            History of Present Illness     Follow-up, labs reviewed with patient.    Other than elevated PSA velocity no new current  complaints.          Review of Systems   Constitutional:  Negative for activity change, chills, fatigue and fever.   HENT:  Negative for congestion.    Eyes:  Negative for discharge.   Respiratory:  Negative for cough, chest tightness and shortness of breath.    Cardiovascular:  Negative for chest pain, palpitations and leg swelling.   Gastrointestinal:  Negative for abdominal pain, blood in stool, constipation, diarrhea, nausea and vomiting.   Endocrine: Negative for polydipsia, polyphagia and polyuria.   Genitourinary:  Negative for difficulty urinating.   Musculoskeletal:  Negative for arthralgias and myalgias.   Skin:  Negative for rash.   Allergic/Immunologic: Negative for immunocompromised state.   Neurological:  Negative for dizziness, syncope, weakness, light-headedness and headaches.   Hematological:  Negative for adenopathy. Does not bruise/bleed easily.   Psychiatric/Behavioral:  Negative for dysphoric mood, sleep disturbance and suicidal ideas. The patient is not nervous/anxious.            Objective     /72 (BP Location: Left arm, Patient Position: Sitting)   Pulse 86   Resp 17   Ht 6' (1.829 m)   Wt 85.3 kg (188 lb)   SpO2 99%   BMI 25.50 kg/m²     Physical Exam  Vitals and nursing note reviewed.   Constitutional:       General: He is not in acute distress.     Appearance: Normal appearance. He is well-developed. He is not ill-appearing.   HENT:      Head: Normocephalic.   Eyes:      Extraocular Movements: Extraocular movements intact.      Pupils: Pupils are equal, round, and reactive to light.   Neck:      Thyroid: No thyromegaly.      Vascular: No carotid bruit or JVD.   Cardiovascular:      Rate and Rhythm: Normal rate and regular rhythm.      Heart sounds: Normal heart sounds.   Pulmonary:      Effort: Pulmonary effort is normal.      Breath sounds: Normal breath sounds.   Abdominal:      Palpations: Abdomen is soft.   Musculoskeletal:         General: No swelling.      Cervical back:  Normal range of motion and neck supple.      Right lower leg: No edema.      Left lower leg: No edema.   Lymphadenopathy:      Cervical: No cervical adenopathy.   Skin:     General: Skin is warm and dry.      Findings: No rash.   Neurological:      General: No focal deficit present.      Mental Status: He is alert and oriented to person, place, and time. Mental status is at baseline.   Psychiatric:         Mood and Affect: Mood normal.         Behavior: Behavior normal.         Thought Content: Thought content normal.

## 2024-10-17 NOTE — ASSESSMENT & PLAN NOTE
Very good control on atorvastatin 40 mg daily.  Patient will continue this medication.  We will continue to monitor.  Patient will remain active and continue to watch his diet.    Orders:    Lipid panel; Future

## 2024-10-17 NOTE — ASSESSMENT & PLAN NOTE
Lab Results   Component Value Date    EGFR 52 10/04/2024    EGFR 53 08/06/2024    EGFR 52 07/21/2024    CREATININE 1.33 (H) 10/04/2024    CREATININE 1.30 08/06/2024    CREATININE 1.33 (H) 07/21/2024

## 2024-11-22 NOTE — PROGRESS NOTES
Assessment/Plan:  Assessment/Plan   Diagnoses and all orders for this visit:    Trigger middle finger of right hand  -     Ambulatory referral to Orthopedic Surgery  -     Diclofenac Sodium (VOLTAREN) 1 %; Apply 2 g topically 4 (four) times a day      51-year-old left-hand dominant male with right middle finger triggering more than few months duration  Discussed with patient physical exam, impression and plan  Physical exam right middle finger noted for flexion attitude at the PIP joint  There is mild tenderness at the A1 pulley  There is palpable flexor tendon nodule  He has extension at the PIP joint limited to -10° and full flexion of the digit  There is reproducible locking and snapping with flexion and extension  There is no appreciable collateral ligament laxity  He is intact neurovascularly  Clinical impression is that he is symptomatic from trigger finger  I discussed treatment of supplements and topical anti-inflammatory  He defers corticosteroid injection at this time  He is to take tumeric at least 1000 mg daily and tart cherry at least 1000 mg daily  He is to apply topical diclofenac gel 3 to 4 times a day for the next 10 days  He will follow up as needed  Subjective:   Patient ID: Lb Shields is a 70 y o  male  Chief Complaint   Patient presents with    Right Hand - Locking       51-year-old left hand dominant male presents for evaluation of right middle finger triggering more than few months duration  He denies any particular trauma or inciting event  He works out on a regular basis  He started having locking and snapping of the digit, but denies any pain  He states that sometimes after the finger locks he has to passively extend the digit with use of the of the other hand  He was seen by primary care provider and advised that he has trigger finger, and he was referred to orthopedic care            The following portions of the patient's history were reviewed and updated The patient is Stable - Low risk of patient condition declining or worsening    Shift Goals  Clinical Goals: Q4 Pulse Checks; Pain Control; Wound Care  Patient Goals: Comfort; Rest  Family Goals: Comfort    Progress made toward(s) clinical / shift goals:  Patient medicated per MAR. Non-pharmacologic comfort measures implemented. Safety discussed. Education provided. Ambulation and repositioning encouraged. IS use encouraged. Diet intake monitored.     Problem: Pain - Standard  Goal: Alleviation of pain or a reduction in pain to the patient’s comfort goal  Description: Target End Date:  Prior to discharge or change in level of care    Document on Vitals flowsheet    1.  Document pain using the appropriate pain scale per order or unit policy  2.  Educate and implement non-pharmacologic comfort measures (i.e. relaxation, distraction, massage, cold/heat therapy, etc.)  3.  Pain management medications as ordered  4.  Reassess pain after pain med administration per policy  5.  If opiods administered assess patient's response to pain medication is appropriate per POSS sedation scale  6.  Follow pain management plan developed in collaboration with patient and interdisciplinary team (including palliative care or pain specialists if applicable)  Outcome: Progressing     Problem: Knowledge Deficit - Standard  Goal: Patient and family/care givers will demonstrate understanding of plan of care, disease process/condition, diagnostic tests and medications  Description: Target End Date:  1-3 days or as soon as patient condition allows    Document in Patient Education    1.  Patient and family/caregiver oriented to unit, equipment, visitation policy and means for communicating concern  2.  Complete/review Learning Assessment  3.  Assess knowledge level of disease process/condition, treatment plan, diagnostic tests and medications  4.  Explain disease process/condition, treatment plan, diagnostic tests and medications  Outcome:  Progressing     Problem: Wound/ / Incision Healing  Goal: Patient's wound/surgical incision will decrease in size and heals properly  Description: Target End Date:  Prior to discharge or change in level of care    Document on LDA    1.  Assess and document surgical incision/wound  2.  Provide incision/wound care per policy and/or provider orders  3.  Manage surgical drains per policy if applicable  4.  Encourage adequate nutrition to promote wound healing  5.  Collaborate with Clinical Dietician  Outcome: Progressing          as appropriate: He  has a past medical history of Benign prostatic hyperplasia, Chronic kidney disease, COVID-19 (03/2020), Esophageal reflux, Hypertension, Hypertensive urgency (3/26/2019), and Vertigo  He  has a past surgical history that includes Appendectomy (05/21/2015) and Cyst Removal   His family history includes Dementia in his sister; Depression in his family; Hypertension in his mother; Mental illness in his family; No Known Problems in his sister; Prostate cancer in his father and maternal grandfather; Schizophrenia in his family; Stomach cancer in his maternal aunt and maternal uncle  He  reports that he has quit smoking  He has never used smokeless tobacco  He reports current alcohol use  He reports that he does not use drugs  He is allergic to penicillin v     Review of Systems   Constitutional: Negative for chills and fever  HENT: Negative for sore throat  Eyes: Negative for visual disturbance  Respiratory: Negative for shortness of breath  Cardiovascular: Negative for chest pain  Gastrointestinal: Negative for abdominal pain  Genitourinary: Negative for flank pain  Musculoskeletal: Negative for arthralgias and joint swelling  Skin: Negative for rash and wound  Neurological: Negative for weakness and numbness  Hematological: Does not bruise/bleed easily  Psychiatric/Behavioral: Negative for self-injury  Objective:  Vitals:    04/20/22 1330   BP: 137/86   Pulse: 57   Weight: 85 3 kg (188 lb)   Height: 6' (1 829 m)     Right Hand Exam     Muscle Strength   The patient has normal right wrist strength  Other   Sensation: normal  Pulse: present    Comments:  Middle finger   - flexion attitude at the PIP joint  There is mild tenderness at the A1 pulley  There is palpable flexor tendon nodule  He has extension at the PIP joint limited to -10° and full flexion of the digit  There is reproducible locking and snapping with flexion and extension    There is no appreciable collateral ligament laxity  Strength/Myotome Testing     Right Wrist/Hand   Normal wrist strength      Physical Exam  Vitals and nursing note reviewed  Constitutional:       General: He is not in acute distress  Appearance: He is well-developed  He is not ill-appearing or diaphoretic  HENT:      Head: Normocephalic and atraumatic  Right Ear: External ear normal       Left Ear: External ear normal    Eyes:      Conjunctiva/sclera: Conjunctivae normal    Neck:      Trachea: No tracheal deviation  Cardiovascular:      Rate and Rhythm: Normal rate  Pulmonary:      Effort: Pulmonary effort is normal  No respiratory distress  Abdominal:      General: There is no distension  Musculoskeletal:         General: Tenderness present  No swelling, deformity or signs of injury  Skin:     General: Skin is warm and dry  Coloration: Skin is not jaundiced or pale  Neurological:      Mental Status: He is alert and oriented to person, place, and time  Psychiatric:         Mood and Affect: Mood normal          Behavior: Behavior normal          Thought Content:  Thought content normal          Judgment: Judgment normal

## 2024-12-03 ENCOUNTER — TELEPHONE (OUTPATIENT)
Dept: NEUROLOGY | Facility: CLINIC | Age: 74
End: 2024-12-03

## 2024-12-03 NOTE — TELEPHONE ENCOUNTER
Called in regards to his upcoming appointment with elaina. As the provider will be out of office and we will need to r/s. Pt accepted a new appointment on  3/7/25 @ 11AM

## 2025-01-06 ENCOUNTER — TELEPHONE (OUTPATIENT)
Age: 75
End: 2025-01-06

## 2025-01-06 NOTE — TELEPHONE ENCOUNTER
Pt called requesting an appointment this morning with Dr. Clara Doe or Dr. Doyle.  No appointments available with Nader Elizabeth and offered patient an appointment with Dr. Doyle but he did not accept.  Pt also did not want to schedule with NP.

## 2025-01-07 DIAGNOSIS — I10 ESSENTIAL HYPERTENSION: ICD-10-CM

## 2025-01-07 RX ORDER — AMLODIPINE BESYLATE 10 MG/1
10 TABLET ORAL DAILY
Qty: 90 TABLET | Refills: 1 | Status: SHIPPED | OUTPATIENT
Start: 2025-01-07

## 2025-01-07 NOTE — TELEPHONE ENCOUNTER
Patient called to request a refill for their Amlodipine advised a refill was requested on 01/07/2024 and is pending approval. Patient verbalized understanding and is in agreement.

## 2025-01-08 ENCOUNTER — OFFICE VISIT (OUTPATIENT)
Dept: INTERNAL MEDICINE CLINIC | Facility: CLINIC | Age: 75
End: 2025-01-08
Payer: COMMERCIAL

## 2025-01-08 VITALS
HEIGHT: 72 IN | WEIGHT: 182 LBS | OXYGEN SATURATION: 97 % | SYSTOLIC BLOOD PRESSURE: 122 MMHG | DIASTOLIC BLOOD PRESSURE: 78 MMHG | HEART RATE: 72 BPM | RESPIRATION RATE: 17 BRPM | BODY MASS INDEX: 24.65 KG/M2

## 2025-01-08 DIAGNOSIS — K59.00 CONSTIPATION, UNSPECIFIED CONSTIPATION TYPE: ICD-10-CM

## 2025-01-08 DIAGNOSIS — R10.13 EPIGASTRIC ABDOMINAL PAIN: Primary | ICD-10-CM

## 2025-01-08 PROCEDURE — 99213 OFFICE O/P EST LOW 20 MIN: CPT | Performed by: PHYSICIAN ASSISTANT

## 2025-01-08 RX ORDER — PANTOPRAZOLE SODIUM 40 MG/1
40 TABLET, DELAYED RELEASE ORAL DAILY
Qty: 30 TABLET | Refills: 5 | Status: SHIPPED | OUTPATIENT
Start: 2025-01-08

## 2025-01-08 NOTE — PATIENT INSTRUCTIONS
Please start pantoprazole 1 daily in the morning on an empty stomach.  Try to avoid triggers such as spicy foods, alcohol, caffeine (chocolate), and try not to eat within 2 hours of lying down.  Also obtain over-the-counter Metamucil and take 1 tablespoon in water daily.  This will add order to your stool and improve your constipation.  Can also use the MiraLAX as we discussed.

## 2025-01-08 NOTE — PROGRESS NOTES
Name: Tomas Robert      : 1950      MRN: [id number]  Encounter Provider: Nino Hancock PA-C  Encounter Date: 2025   Encounter department: [facility] INTERNAL MEDICINE [place]  :  Assessment & Plan  Epigastric abdominal pain    Orders:    pantoprazole (PROTONIX) 40 mg tablet; Take 1 tablet (40 mg total) by mouth daily    Constipation, unspecified constipation type                History of Present Illness     Acute visit    Patient complaining of constipation symptoms and GERD symptoms.  He does have pantoprazole listed on his medications, however states he does not take it regularly.  He has noted epigastric burning and upset stomach when he eats spicy foods, drinks caffeinated beverages, or any alcohol.    Patient states his stools have been small, round and not on a daily basis as it used to be.  No blood or black stools.  Initially he denied any change in his diet however after talking for a period of time I learned that he had changed his diet significantly.  He will go back to his diet where in the morning he was eating oatmeal and fruit, less chocolate, and drinking more water.  We discussed use of Metamucil and MiraLAX as needed.  Patient will keep me informed as to effectiveness of this conservative treatment.      Review of Systems   Constitutional:  Negative for activity change, chills, fatigue and fever.   HENT:  Negative for congestion.    Eyes:  Negative for discharge.   Respiratory:  Negative for cough, chest tightness and shortness of breath.    Cardiovascular:  Negative for chest pain, palpitations and leg swelling.   Gastrointestinal:  Positive for abdominal pain and constipation. Negative for blood in stool, diarrhea, nausea and vomiting.   Endocrine: Negative for polydipsia, polyphagia and polyuria.   Genitourinary:  Negative for difficulty urinating.   Musculoskeletal:  Negative for arthralgias and myalgias.   Skin:  Negative for rash.   Allergic/Immunologic: Negative for  immunocompromised state.   Neurological:  Negative for dizziness, syncope, weakness, light-headedness and headaches.   Hematological:  Negative for adenopathy. Does not bruise/bleed easily.   Psychiatric/Behavioral:  Negative for dysphoric mood. The patient is not nervous/anxious.        Objective   /78 (BP Location: Left arm, Patient Position: Sitting, Cuff Size: Adult)   Pulse 72   Resp 17   Ht 6' (1.829 m)   Wt 82.6 kg (182 lb)   SpO2 97%   BMI 24.68 kg/m²      Physical Exam  Vitals and nursing note reviewed.   Constitutional:       General: He is not in acute distress.     Appearance: Normal appearance. He is well-developed. He is not ill-appearing.   HENT:      Head: Normocephalic.   Eyes:      Extraocular Movements: Extraocular movements intact.      Pupils: Pupils are equal, round, and reactive to light.   Neck:      Thyroid: No thyromegaly.      Vascular: No carotid bruit or JVD.   Cardiovascular:      Rate and Rhythm: Normal rate and regular rhythm.      Heart sounds: Normal heart sounds.   Pulmonary:      Effort: Pulmonary effort is normal.      Breath sounds: Normal breath sounds.   Abdominal:      General: Bowel sounds are normal. There is no distension or abdominal bruit.      Palpations: Abdomen is soft. There is no hepatomegaly, splenomegaly, mass or pulsatile mass.      Tenderness: There is abdominal tenderness in the epigastric area. There is no guarding or rebound.      Hernia: No hernia is present.   Musculoskeletal:         General: No swelling.      Cervical back: Normal range of motion and neck supple.      Right lower leg: No edema.      Left lower leg: No edema.   Lymphadenopathy:      Cervical: No cervical adenopathy.   Skin:     General: Skin is warm and dry.      Findings: No rash.   Neurological:      General: No focal deficit present.      Mental Status: He is alert and oriented to person, place, and time. Mental status is at baseline.   Psychiatric:         Mood and Affect:  Mood normal.         Behavior: Behavior normal.         Thought Content: Thought content normal.

## 2025-01-09 ENCOUNTER — APPOINTMENT (EMERGENCY)
Dept: RADIOLOGY | Facility: HOSPITAL | Age: 75
End: 2025-01-09
Payer: COMMERCIAL

## 2025-01-09 ENCOUNTER — HOSPITAL ENCOUNTER (EMERGENCY)
Facility: HOSPITAL | Age: 75
Discharge: HOME | End: 2025-01-09
Attending: EMERGENCY MEDICINE
Payer: COMMERCIAL

## 2025-01-09 ENCOUNTER — APPOINTMENT (EMERGENCY)
Dept: CT IMAGING | Facility: HOSPITAL | Age: 75
End: 2025-01-09
Payer: COMMERCIAL

## 2025-01-09 VITALS
DIASTOLIC BLOOD PRESSURE: 55 MMHG | HEART RATE: 61 BPM | OXYGEN SATURATION: 99 % | SYSTOLIC BLOOD PRESSURE: 110 MMHG | RESPIRATION RATE: 22 BRPM | WEIGHT: 191.8 LBS | BODY MASS INDEX: 26.01 KG/M2 | TEMPERATURE: 97.9 F

## 2025-01-09 DIAGNOSIS — J32.9 CHRONIC SINUSITIS, UNSPECIFIED LOCATION: Primary | ICD-10-CM

## 2025-01-09 LAB
ALBUMIN SERPL BCG-MCNC: 4.3 G/DL (ref 3.5–5)
ALP SERPL-CCNC: 62 U/L (ref 34–104)
ALT SERPL W P-5'-P-CCNC: 16 U/L (ref 7–52)
ANION GAP SERPL CALCULATED.3IONS-SCNC: 5 MMOL/L (ref 4–13)
AST SERPL W P-5'-P-CCNC: 17 U/L (ref 13–39)
ATRIAL RATE: 60 BPM
BASOPHILS # BLD AUTO: 0.02 THOUSANDS/ΜL (ref 0–0.1)
BASOPHILS NFR BLD AUTO: 1 % (ref 0–1)
BILIRUB SERPL-MCNC: 0.41 MG/DL (ref 0.2–1)
BNP SERPL-MCNC: 25 PG/ML (ref 0–100)
BUN SERPL-MCNC: 17 MG/DL (ref 5–25)
CALCIUM SERPL-MCNC: 9 MG/DL (ref 8.4–10.2)
CARDIAC TROPONIN I PNL SERPL HS: 3 NG/L (ref ?–50)
CHLORIDE SERPL-SCNC: 107 MMOL/L (ref 96–108)
CO2 SERPL-SCNC: 27 MMOL/L (ref 21–32)
CREAT SERPL-MCNC: 1.22 MG/DL (ref 0.6–1.3)
EOSINOPHIL # BLD AUTO: 0.07 THOUSAND/ΜL (ref 0–0.61)
EOSINOPHIL NFR BLD AUTO: 2 % (ref 0–6)
ERYTHROCYTE [DISTWIDTH] IN BLOOD BY AUTOMATED COUNT: 13.3 % (ref 11.6–15.1)
FLUAV AG UPPER RESP QL IA.RAPID: NEGATIVE
FLUBV AG UPPER RESP QL IA.RAPID: NEGATIVE
GFR SERPL CREATININE-BSD FRML MDRD: 58 ML/MIN/1.73SQ M
GLUCOSE SERPL-MCNC: 90 MG/DL (ref 65–140)
HCT VFR BLD AUTO: 42.4 % (ref 36.5–49.3)
HGB BLD-MCNC: 13.9 G/DL (ref 12–17)
IMM GRANULOCYTES # BLD AUTO: 0.01 THOUSAND/UL (ref 0–0.2)
IMM GRANULOCYTES NFR BLD AUTO: 0 % (ref 0–2)
LYMPHOCYTES # BLD AUTO: 1.53 THOUSANDS/ΜL (ref 0.6–4.47)
LYMPHOCYTES NFR BLD AUTO: 46 % (ref 14–44)
MCH RBC QN AUTO: 30.2 PG (ref 26.8–34.3)
MCHC RBC AUTO-ENTMCNC: 32.8 G/DL (ref 31.4–37.4)
MCV RBC AUTO: 92 FL (ref 82–98)
MONOCYTES # BLD AUTO: 0.25 THOUSAND/ΜL (ref 0.17–1.22)
MONOCYTES NFR BLD AUTO: 7 % (ref 4–12)
NEUTROPHILS # BLD AUTO: 1.48 THOUSANDS/ΜL (ref 1.85–7.62)
NEUTS SEG NFR BLD AUTO: 44 % (ref 43–75)
NRBC BLD AUTO-RTO: 0 /100 WBCS
P AXIS: 71 DEGREES
PLATELET # BLD AUTO: 187 THOUSANDS/UL (ref 149–390)
PMV BLD AUTO: 10.7 FL (ref 8.9–12.7)
POTASSIUM SERPL-SCNC: 3.8 MMOL/L (ref 3.5–5.3)
PR INTERVAL: 166 MS
PROT SERPL-MCNC: 6.8 G/DL (ref 6.4–8.4)
QRS AXIS: 53 DEGREES
QRSD INTERVAL: 86 MS
QT INTERVAL: 404 MS
QTC INTERVAL: 404 MS
RBC # BLD AUTO: 4.61 MILLION/UL (ref 3.88–5.62)
SARS-COV+SARS-COV-2 AG RESP QL IA.RAPID: NEGATIVE
SODIUM SERPL-SCNC: 139 MMOL/L (ref 135–147)
T WAVE AXIS: 15 DEGREES
VENTRICULAR RATE: 60 BPM
WBC # BLD AUTO: 3.36 THOUSAND/UL (ref 4.31–10.16)

## 2025-01-09 PROCEDURE — 87804 INFLUENZA ASSAY W/OPTIC: CPT | Performed by: EMERGENCY MEDICINE

## 2025-01-09 PROCEDURE — 87811 SARS-COV-2 COVID19 W/OPTIC: CPT | Performed by: EMERGENCY MEDICINE

## 2025-01-09 PROCEDURE — 36415 COLL VENOUS BLD VENIPUNCTURE: CPT | Performed by: EMERGENCY MEDICINE

## 2025-01-09 PROCEDURE — 71045 X-RAY EXAM CHEST 1 VIEW: CPT

## 2025-01-09 PROCEDURE — 99284 EMERGENCY DEPT VISIT MOD MDM: CPT | Performed by: EMERGENCY MEDICINE

## 2025-01-09 PROCEDURE — 80053 COMPREHEN METABOLIC PANEL: CPT | Performed by: EMERGENCY MEDICINE

## 2025-01-09 PROCEDURE — 84484 ASSAY OF TROPONIN QUANT: CPT | Performed by: EMERGENCY MEDICINE

## 2025-01-09 PROCEDURE — 70450 CT HEAD/BRAIN W/O DYE: CPT

## 2025-01-09 PROCEDURE — 96374 THER/PROPH/DIAG INJ IV PUSH: CPT

## 2025-01-09 PROCEDURE — 93005 ELECTROCARDIOGRAM TRACING: CPT

## 2025-01-09 PROCEDURE — 96375 TX/PRO/DX INJ NEW DRUG ADDON: CPT

## 2025-01-09 PROCEDURE — 83880 ASSAY OF NATRIURETIC PEPTIDE: CPT | Performed by: EMERGENCY MEDICINE

## 2025-01-09 PROCEDURE — 85025 COMPLETE CBC W/AUTO DIFF WBC: CPT | Performed by: EMERGENCY MEDICINE

## 2025-01-09 PROCEDURE — 96361 HYDRATE IV INFUSION ADD-ON: CPT

## 2025-01-09 PROCEDURE — 99285 EMERGENCY DEPT VISIT HI MDM: CPT

## 2025-01-09 RX ORDER — METOCLOPRAMIDE 10 MG/1
5 TABLET ORAL EVERY 8 HOURS PRN
Qty: 30 TABLET | Refills: 0 | Status: SHIPPED | OUTPATIENT
Start: 2025-01-09 | End: 2025-01-14

## 2025-01-09 RX ORDER — FLUTICASONE PROPIONATE 50 MCG
1 SPRAY, SUSPENSION (ML) NASAL DAILY
Qty: 16 G | Refills: 0 | Status: SHIPPED | OUTPATIENT
Start: 2025-01-09

## 2025-01-09 RX ORDER — DIPHENHYDRAMINE HYDROCHLORIDE 50 MG/ML
25 INJECTION INTRAMUSCULAR; INTRAVENOUS ONCE
Status: COMPLETED | OUTPATIENT
Start: 2025-01-09 | End: 2025-01-09

## 2025-01-09 RX ORDER — METOCLOPRAMIDE HYDROCHLORIDE 5 MG/ML
5 INJECTION INTRAMUSCULAR; INTRAVENOUS ONCE
Status: COMPLETED | OUTPATIENT
Start: 2025-01-09 | End: 2025-01-09

## 2025-01-09 RX ADMIN — DIPHENHYDRAMINE HYDROCHLORIDE 25 MG: 50 INJECTION, SOLUTION INTRAMUSCULAR; INTRAVENOUS at 09:26

## 2025-01-09 RX ADMIN — METOCLOPRAMIDE 5 MG: 5 INJECTION, SOLUTION INTRAMUSCULAR; INTRAVENOUS at 09:27

## 2025-01-09 RX ADMIN — SODIUM CHLORIDE 1000 ML: 0.9 INJECTION, SOLUTION INTRAVENOUS at 09:26

## 2025-01-09 NOTE — DISCHARGE INSTRUCTIONS
You were seen and evaluated today for headache.  Your test results demonstrated normal head CT, sinusitis, normal chest xray, normal laboratory studies  Please take all medications as instructed. Follow up with your PCP as discussed.   RETURN TO THE EMERGENCY DEPARTMENT if you develop new or worsening symptoms and are unable to see your PCP.

## 2025-01-09 NOTE — ED PROVIDER NOTES
Time reflects when diagnosis was documented in both MDM as applicable and the Disposition within this note       Time User Action Codes Description Comment    1/9/2025 12:18 PM Yuliya Denson Add [J32.9] Chronic sinusitis, unspecified location           ED Disposition       ED Disposition   Discharge    Condition   Stable    Date/Time   Thu Jan 9, 2025 12:18 PM    Comment   Tomas Robert discharge to home/self care.                   Assessment & Plan       Medical Decision Making  Patient is a 74-year-old male who presents to the emergency department with multiple complaints.  He states that he began having a mild headache yesterday which was mostly relieved by Tylenol and Motrin.  He states that he woke up this morning with persistent headache as well as chest tightness which prompted his evaluation in the emergency department.  He states that he ran out of his antihypertensives and was without medicine for a few days.  He has resumed them.  He also states that he was visiting family in the nursing home and there were multiple COVID-positive individuals.  He has had mild URI symptoms associated with these complaints.  Reports a remote history of migraines though nothing persistent or recent.  Ddx includes: URI, sinusitis, viral syndrome, hypertensive urgency/emergency, migrainous type headache, less likely ACS,     Amount and/or Complexity of Data Reviewed  External Data Reviewed: notes.     Details: Swallow patient notes reviewed.  Patient is currently under active surveillance for a recent diagnosis of prostate cancer.  Labs: ordered.  Radiology: ordered.  ECG/medicine tests: independent interpretation performed.     Details: NSR @ 60, normal axis, normal intervals. Nonspecific ST flattening throughout, abnormal no acute ischemia    Risk  OTC drugs.  Prescription drug management.             Medications   sodium chloride 0.9 % bolus 1,000 mL (0 mL Intravenous Stopped 1/9/25 1026)   metoclopramide (REGLAN)  injection 5 mg (5 mg Intravenous Given 1/9/25 2377)   diphenhydrAMINE (BENADRYL) injection 25 mg (25 mg Intravenous Given 1/9/25 2146)       ED Risk Strat Scores                                              History of Present Illness       Chief Complaint   Patient presents with    Headache     Pt presents with c/o HA since yesterday, taking tylenol and motrin for HA with minimal relief. Woke up this morning with slight headache and tightness over mid chest. Also c/o constipation x1m. Reports new dx of prostate CA in November, not currently undergoing tx.     Chest Pain       Past Medical History:   Diagnosis Date    Benign prostatic hyperplasia     Chronic kidney disease     COVID-19 03/2020    Esophageal reflux     Hypertension     Hypertensive urgency 3/26/2019    Vertigo       Past Surgical History:   Procedure Laterality Date    APPENDECTOMY  05/21/2015    CYST REMOVAL      Fatty cyst removed from back      Family History   Problem Relation Age of Onset    Prostate cancer Father     Prostate cancer Maternal Grandfather     Stomach cancer Maternal Aunt         malignant tumor of pharynx    Stomach cancer Maternal Uncle         malignant tumor of pharynx    Mental illness Family     Depression Family     Schizophrenia Family     Hypertension Mother     No Known Problems Sister     Dementia Sister     Hypertension Sister       Social History     Tobacco Use    Smoking status: Former     Current packs/day: 0.25     Average packs/day: 0.3 packs/day for 10.0 years (2.5 ttl pk-yrs)     Types: Cigarettes     Passive exposure: Past    Smokeless tobacco: Never    Tobacco comments:     1ppw   Vaping Use    Vaping status: Never Used   Substance Use Topics    Alcohol use: Yes     Comment: socially    Drug use: No      E-Cigarette/Vaping    E-Cigarette Use Never User       E-Cigarette/Vaping Substances    Nicotine No     THC No     CBD No     Flavoring No     Other No     Unknown No       I have reviewed and agree with the  history as documented.     Patient presnts with multiple complaints including headache, congestion, chest tightness.           Review of Systems   Constitutional:  Negative for fatigue and fever.   HENT:  Positive for congestion.    Neurological:  Positive for headaches.           Objective       ED Triage Vitals   Temperature Pulse Blood Pressure Respirations SpO2 Patient Position - Orthostatic VS   01/09/25 0837 01/09/25 0837 01/09/25 0837 01/09/25 0837 01/09/25 0837 01/09/25 0837   97.9 °F (36.6 °C) 65 144/70 18 100 % Sitting      Temp Source Heart Rate Source BP Location FiO2 (%) Pain Score    01/09/25 0837 01/09/25 0837 01/09/25 0837 -- 01/09/25 1100    Oral Monitor Left arm  2      Vitals      Date and Time Temp Pulse SpO2 Resp BP Pain Score FACES Pain Rating User   01/09/25 1200 -- 61 99 % 22 110/55 -- -- SB   01/09/25 1100 -- 56 97 % 13 137/68 2 -- NW   01/09/25 1015 -- 58 99 % 17 149/68 -- -- MOHSEN   01/09/25 0837 97.9 °F (36.6 °C) 65 100 % 18 144/70 -- -- CT            Physical Exam  Vitals and nursing note reviewed.   Constitutional:       General: He is not in acute distress.     Appearance: Normal appearance.   HENT:      Head: Normocephalic and atraumatic.      Right Ear: External ear normal.      Left Ear: External ear normal.      Nose: Nose normal.   Cardiovascular:      Rate and Rhythm: Normal rate and regular rhythm.   Pulmonary:      Effort: Pulmonary effort is normal.      Breath sounds: Normal breath sounds.   Abdominal:      General: There is no distension.      Palpations: Abdomen is soft.      Tenderness: There is no abdominal tenderness.   Musculoskeletal:      Right lower leg: No edema.      Left lower leg: No edema.   Skin:     General: Skin is warm and dry.   Neurological:      General: No focal deficit present.      Mental Status: He is alert and oriented to person, place, and time. Mental status is at baseline.   Psychiatric:         Behavior: Behavior normal.         Results Reviewed        Procedure Component Value Units Date/Time    HS Troponin 0hr (reflex protocol) [[id number]]  (Normal) Collected: 01/09/25 0929    Lab Status: Final result Specimen: Blood from Arm, Right Updated: 01/09/25 0956     hs TnI 0hr 3 ng/L     B-Type Natriuretic Peptide(BNP) [[id number]]  (Normal) Collected: 01/09/25 0929    Lab Status: Final result Specimen: Blood from Arm, Right Updated: 01/09/25 0956     BNP 25 pg/mL     FLU/COVID Rapid Antigen (30 min. TAT) - Preferred screening test in ED [[id number]]  (Normal) Collected: 01/09/25 0929    Lab Status: Final result Specimen: Nares from Nose Updated: 01/09/25 0950     SARS COV Rapid Antigen Negative     Influenza A Rapid Antigen Negative     Influenza B Rapid Antigen Negative    Narrative:      This test has been performed using the [employer] 2 FLU+SARS Antigen test under the Emergency Use Authorization (EUA). This test has been validated by the  and verified by the performing laboratory. The Christiane uses lateral flow immunofluorescent sandwich assay to detect SARS-COV, Influenza A and Influenza B Antigen.     The [employer]idel Christiane 2 SARS Antigen test does not differentiate between SARS-CoV and SARS-CoV-2.     Negative results are presumptive and may be confirmed with a molecular assay, if necessary, for patient management. Negative results do not rule out SARS-CoV-2 or influenza infection and should not be used as the sole basis for treatment or patient management decisions. A negative test result may occur if the level of antigen in a sample is below the limit of detection of this test.     Positive results are indicative of the presence of viral antigens, but do not rule out bacterial infection or co-infection with other viruses.     All test results should be used as an adjunct to clinical observations and other information available to the provider.    FOR PEDIATRIC PATIENTS - copy/paste COVID Guidelines URL to browser:  [email or web address]    Comprehensive metabolic panel [[id number]] Collected: 01/09/25 0929    Lab Status: Final result Specimen: Blood from Arm, Right Updated: 01/09/25 0947     Sodium 139 mmol/L      Potassium 3.8 mmol/L      Chloride 107 mmol/L      CO2 27 mmol/L      ANION GAP 5 mmol/L      BUN 17 mg/dL      Creatinine 1.22 mg/dL      Glucose 90 mg/dL      Calcium 9.0 mg/dL      AST 17 U/L      ALT 16 U/L      Alkaline Phosphatase 62 U/L      Total Protein 6.8 g/dL      Albumin 4.3 g/dL      Total Bilirubin 0.41 mg/dL      eGFR 58 ml/min/1.73sq m     Narrative:      National Kidney Disease Foundation guidelines for Chronic Kidney Disease (CKD):     Stage 1 with normal or high GFR (GFR > 90 mL/min/1.73 square meters)    Stage 2 Mild CKD (GFR = 60-89 mL/min/1.73 square meters)    Stage 3A Moderate CKD (GFR = 45-59 mL/min/1.73 square meters)    Stage 3B Moderate CKD (GFR = 30-44 mL/min/1.73 square meters)    Stage 4 Severe CKD (GFR = 15-29 mL/min/1.73 square meters)    Stage 5 End Stage CKD (GFR <15 mL/min/1.73 square meters)  Note: GFR calculation is accurate only with a steady state creatinine    CBC and differential [[id number]]  (Abnormal) Collected: 01/09/25 0929    Lab Status: Final result Specimen: Blood from Arm, Right Updated: 01/09/25 0934     WBC 3.36 Thousand/uL      RBC 4.61 Million/uL      Hemoglobin 13.9 g/dL      Hematocrit 42.4 %      MCV 92 fL      MCH 30.2 pg      MCHC 32.8 g/dL      RDW 13.3 %      MPV 10.7 fL      Platelets 187 Thousands/uL      nRBC 0 /100 WBCs      Segmented % 44 %      Immature Grans % 0 %      Lymphocytes % 46 %      Monocytes % 7 %      Eosinophils Relative 2 %      Basophils Relative 1 %      Absolute Neutrophils 1.48 Thousands/µL      Absolute Immature Grans 0.01 Thousand/uL      Absolute Lymphocytes 1.53 Thousands/µL      Absolute Monocytes 0.25 Thousand/µL      Eosinophils Absolute 0.07 Thousand/µL      Basophils  Absolute 0.02 Thousands/µL             CT head without contrast   Final Interpretation by Carlos Flores MD (01/09 1040)      No acute intracranial abnormality.                  Workstation performed: [id number]         XR chest 1 view portable   Final Interpretation by Brett Terrazas MD (01/09 1022)      No acute cardiopulmonary disease.            Resident: Raul Jiménez I, the attending radiologist, have reviewed the images and agree with the final report above.      Workstation performed: [id number]             Procedures    ED Medication and Procedure Management   Prior to Admission Medications   Prescriptions Last Dose Informant Patient Reported? Taking?   amLODIPine (NORVASC) 10 mg tablet   No No   Sig: TAKE 1 TABLET BY MOUTH EVERY DAY   aspirin (ECOTRIN LOW STRENGTH) 81 mg EC tablet  Self No No   Sig: Take 1 tablet (81 mg total) by mouth daily   atorvastatin (LIPITOR) 40 mg tablet  Self No No   Sig: Take 1 tablet (40 mg total) by mouth daily with dinner   cyclobenzaprine (FLEXERIL) 10 mg tablet  Self No No   Sig: Take 1 tablet (10 mg total) by mouth if needed for muscle spasms   lidocaine (LIDODERM) 5 %  Self No No   Sig: Apply 1 patch topically over 12 hours if needed (Intermittent right low back pain with radiculopathy) Remove & Discard patch within 12 hours or as directed by MD   multivitamin (THERAGRAN) TABS  Self Yes No   Sig: Take 1 tablet by mouth daily   pantoprazole (PROTONIX) 40 mg tablet   No No   Sig: Take 1 tablet (40 mg total) by mouth daily   tadalafil (CIALIS) 20 MG tablet  Self Yes No   Sig: if needed   tamsulosin (FLOMAX) 0.4 mg  Self No No   Sig: Take 1 capsule (0.4 mg total) by mouth daily with dinner      Facility-Administered Medications: None     Discharge Medication List as of 1/9/2025 12:20 PM        START taking these medications    Details   fluticasone (FLONASE) 50 mcg/act nasal spray 1 spray into each nostril daily, Starting Thu 1/9/2025, Normal      metoclopramide  (Reglan) 10 mg tablet Take 0.5 tablets (5 mg total) by mouth every 8 (eight) hours as needed (headache), Starting Thu 1/9/2025, Normal      sodium chloride (OCEAN) 0.65 % nasal spray 1 spray into each nostril as needed for congestion or rhinitis, Starting Thu 1/9/2025, Normal           CONTINUE these medications which have NOT CHANGED    Details   amLODIPine (NORVASC) 10 mg tablet TAKE 1 TABLET BY MOUTH EVERY DAY, Starting Tue 1/7/2025, Normal      aspirin (ECOTRIN LOW STRENGTH) 81 mg EC tablet Take 1 tablet (81 mg total) by mouth daily, Starting Tue 7/23/2024, Normal      atorvastatin (LIPITOR) 40 mg tablet Take 1 tablet (40 mg total) by mouth daily with dinner, Starting Mon 7/22/2024, Normal      cyclobenzaprine (FLEXERIL) 10 mg tablet Take 1 tablet (10 mg total) by mouth if needed for muscle spasms, Starting Wed 5/15/2024, No Print      lidocaine (LIDODERM) 5 % Apply 1 patch topically over 12 hours if needed (Intermittent right low back pain with radiculopathy) Remove & Discard patch within 12 hours or as directed by MD, Starting Wed 5/3/2023, Normal      multivitamin (THERAGRAN) TABS Take 1 tablet by mouth daily, Historical Med      pantoprazole (PROTONIX) 40 mg tablet Take 1 tablet (40 mg total) by mouth daily, Starting Wed 1/8/2025, Normal      tadalafil (CIALIS) 20 MG tablet if needed, Historical Med      tamsulosin (FLOMAX) 0.4 mg Take 1 capsule (0.4 mg total) by mouth daily with dinner, Starting Tue 1/5/2021, Normal           No discharge procedures on file.  ED SEPSIS DOCUMENTATION   Time reflects when diagnosis was documented in both MDM as applicable and the Disposition within this note       Time User Action Codes Description Comment    1/9/2025 12:18 PM Yuliya Denson Add [J32.9] Chronic sinusitis, unspecified location                  Yuliya Denson MD  01/09/25 9423

## 2025-01-13 ENCOUNTER — TELEPHONE (OUTPATIENT)
Age: 75
End: 2025-01-13

## 2025-01-13 NOTE — TELEPHONE ENCOUNTER
Pt called wishes to speak with Dr. Pyle regarding treatment and his kidneys. Pt has many questions only wants to speak with Dr. Pyle. Please advise.

## 2025-01-14 ENCOUNTER — HOSPITAL ENCOUNTER (EMERGENCY)
Facility: HOSPITAL | Age: 75
Discharge: HOME | End: 2025-01-14
Attending: EMERGENCY MEDICINE
Payer: COMMERCIAL

## 2025-01-14 ENCOUNTER — APPOINTMENT (EMERGENCY)
Dept: CT IMAGING | Facility: HOSPITAL | Age: 75
End: 2025-01-14
Payer: COMMERCIAL

## 2025-01-14 ENCOUNTER — TELEPHONE (OUTPATIENT)
Age: 75
End: 2025-01-14

## 2025-01-14 VITALS
DIASTOLIC BLOOD PRESSURE: 75 MMHG | RESPIRATION RATE: 18 BRPM | TEMPERATURE: 98.5 F | HEART RATE: 54 BPM | OXYGEN SATURATION: 99 % | SYSTOLIC BLOOD PRESSURE: 140 MMHG | BODY MASS INDEX: 27.69 KG/M2 | WEIGHT: 204.15 LBS

## 2025-01-14 DIAGNOSIS — R20.0 LEFT SIDED NUMBNESS: Primary | ICD-10-CM

## 2025-01-14 DIAGNOSIS — G43.009 ATYPICAL MIGRAINE: ICD-10-CM

## 2025-01-14 PROBLEM — R20.2 NUMBNESS AND TINGLING: Status: ACTIVE | Noted: 2025-01-14

## 2025-01-14 LAB
2HR DELTA HS TROPONIN: -1 NG/L
ANION GAP SERPL CALCULATED.3IONS-SCNC: 4 MMOL/L (ref 4–13)
APTT PPP: 31 SECONDS (ref 23–34)
BUN SERPL-MCNC: 22 MG/DL (ref 5–25)
CALCIUM SERPL-MCNC: 9.4 MG/DL (ref 8.4–10.2)
CARDIAC TROPONIN I PNL SERPL HS: 3 NG/L (ref ?–50)
CARDIAC TROPONIN I PNL SERPL HS: 4 NG/L (ref ?–50)
CHLORIDE SERPL-SCNC: 104 MMOL/L (ref 96–108)
CO2 SERPL-SCNC: 29 MMOL/L (ref 21–32)
CREAT SERPL-MCNC: 1.34 MG/DL (ref 0.6–1.3)
ERYTHROCYTE [DISTWIDTH] IN BLOOD BY AUTOMATED COUNT: 13.2 % (ref 11.6–15.1)
FLUAV AG UPPER RESP QL IA.RAPID: NEGATIVE
FLUBV AG UPPER RESP QL IA.RAPID: NEGATIVE
GFR SERPL CREATININE-BSD FRML MDRD: 51 ML/MIN/1.73SQ M
GLUCOSE SERPL-MCNC: 81 MG/DL (ref 65–140)
GLUCOSE SERPL-MCNC: 92 MG/DL (ref 65–140)
HCT VFR BLD AUTO: 42.1 % (ref 36.5–49.3)
HGB BLD-MCNC: 14.1 G/DL (ref 12–17)
INR PPP: 0.99 (ref 0.85–1.19)
MCH RBC QN AUTO: 30.2 PG (ref 26.8–34.3)
MCHC RBC AUTO-ENTMCNC: 33.5 G/DL (ref 31.4–37.4)
MCV RBC AUTO: 90 FL (ref 82–98)
PLATELET # BLD AUTO: 217 THOUSANDS/UL (ref 149–390)
PMV BLD AUTO: 10.3 FL (ref 8.9–12.7)
POTASSIUM SERPL-SCNC: 4.1 MMOL/L (ref 3.5–5.3)
PROTHROMBIN TIME: 13.8 SECONDS (ref 12.3–15)
RBC # BLD AUTO: 4.67 MILLION/UL (ref 3.88–5.62)
SARS-COV+SARS-COV-2 AG RESP QL IA.RAPID: NEGATIVE
SODIUM SERPL-SCNC: 137 MMOL/L (ref 135–147)
WBC # BLD AUTO: 3.89 THOUSAND/UL (ref 4.31–10.16)

## 2025-01-14 PROCEDURE — 80048 BASIC METABOLIC PNL TOTAL CA: CPT | Performed by: EMERGENCY MEDICINE

## 2025-01-14 PROCEDURE — 70498 CT ANGIOGRAPHY NECK: CPT

## 2025-01-14 PROCEDURE — 87811 SARS-COV-2 COVID19 W/OPTIC: CPT | Performed by: EMERGENCY MEDICINE

## 2025-01-14 PROCEDURE — 85610 PROTHROMBIN TIME: CPT | Performed by: EMERGENCY MEDICINE

## 2025-01-14 PROCEDURE — 99285 EMERGENCY DEPT VISIT HI MDM: CPT

## 2025-01-14 PROCEDURE — 87804 INFLUENZA ASSAY W/OPTIC: CPT | Performed by: EMERGENCY MEDICINE

## 2025-01-14 PROCEDURE — 82948 REAGENT STRIP/BLOOD GLUCOSE: CPT

## 2025-01-14 PROCEDURE — 85730 THROMBOPLASTIN TIME PARTIAL: CPT | Performed by: EMERGENCY MEDICINE

## 2025-01-14 PROCEDURE — 99285 EMERGENCY DEPT VISIT HI MDM: CPT | Performed by: EMERGENCY MEDICINE

## 2025-01-14 PROCEDURE — 85027 COMPLETE CBC AUTOMATED: CPT | Performed by: EMERGENCY MEDICINE

## 2025-01-14 PROCEDURE — 84484 ASSAY OF TROPONIN QUANT: CPT | Performed by: EMERGENCY MEDICINE

## 2025-01-14 PROCEDURE — 99291 CRITICAL CARE FIRST HOUR: CPT | Performed by: PHYSICIAN ASSISTANT

## 2025-01-14 PROCEDURE — 96365 THER/PROPH/DIAG IV INF INIT: CPT

## 2025-01-14 PROCEDURE — 70496 CT ANGIOGRAPHY HEAD: CPT

## 2025-01-14 PROCEDURE — 36415 COLL VENOUS BLD VENIPUNCTURE: CPT | Performed by: EMERGENCY MEDICINE

## 2025-01-14 RX ORDER — ACETAMINOPHEN 325 MG/1
650 TABLET ORAL ONCE
Status: COMPLETED | OUTPATIENT
Start: 2025-01-14 | End: 2025-01-14

## 2025-01-14 RX ORDER — MAGNESIUM SULFATE 1 G/100ML
1 INJECTION INTRAVENOUS ONCE
Status: COMPLETED | OUTPATIENT
Start: 2025-01-14 | End: 2025-01-14

## 2025-01-14 RX ORDER — PROCHLORPERAZINE MALEATE 10 MG
5 TABLET ORAL EVERY 8 HOURS PRN
Qty: 10 TABLET | Refills: 0 | Status: SHIPPED | OUTPATIENT
Start: 2025-01-14

## 2025-01-14 RX ADMIN — MAGNESIUM SULFATE HEPTAHYDRATE 1 G: 10 INJECTION, SOLUTION INTRAVENOUS at 10:11

## 2025-01-14 RX ADMIN — SODIUM CHLORIDE 500 ML: 0.9 INJECTION, SOLUTION INTRAVENOUS at 10:01

## 2025-01-14 RX ADMIN — VALPROATE SODIUM 1000 MG: 100 INJECTION, SOLUTION INTRAVENOUS at 10:05

## 2025-01-14 RX ADMIN — ACETAMINOPHEN 650 MG: 325 TABLET, FILM COATED ORAL at 10:04

## 2025-01-14 RX ADMIN — IOHEXOL 85 ML: 350 INJECTION, SOLUTION INTRAVENOUS at 08:48

## 2025-01-14 NOTE — TELEPHONE ENCOUNTER
Called patient and spoke with him regarding HFU. Patient declined to schedule. I did advise him that he can schedule an HFU up to 1 yr from his d/c date, anything after that would be a new patient appt. He verbalized understanding.     Not scheduling HFU at this time      Thank you,     [facility]        HFU/ NU GAY/ complex migraines    DC- 1/14/2025- HOME    ----- Message from Christianne Noble PA-C sent at 1/14/2025 12:03 PM EST -----  Regarding: Hospital Follow-Up  Tomas Robert will need follow-up in in 6 weeks with headache team for Other (complex migraines) In 60 minute appointment. They will not require outpatient neurological testing.

## 2025-01-14 NOTE — PROGRESS NOTES
Pastoral Care Progress Note          Chaplaincy Interventions Utilized:   Empowerment: Provided chaplaincy education    Exploration: Explored relational needs & resources and Facilitated story telling     Relationship Building: Cultivated a relationship of care and support and Listened empathically    Chaplaincy Outcomes Achieved:  Expressed gratitude and Expressed humor    Spiritual Coping Strategies Utilized:   Connectedness and Spiritual comfort     arrived to stroke alert upon pt's return to ct scan. Pt states nephew just left and that he has a multitude of family support. Pt welcomed the spiritual care visit and expressed gratitude.  listened empathetically to family connection and dynamics.  offered future availability.  will follow up as needed or requested.

## 2025-01-14 NOTE — CONSULTS
Consultation - Neurology   Name: Tomas Robert 74 y.o. male I MRN: [id number]  Unit/Bed#: FT 04 I Date of Admission: 1/14/2025   Date of Service: 1/14/2025 I Hospital Day: 0   Consult to Neurology  Consult performed by: Christianne Noble PA-C  Consult ordered by: Yuliya Denson MD      Physician Requesting Evaluation: Yuliya Denson MD   Reason for Evaluation / Principal Problem: Left-sided numbness    Assessment & Plan  Numbness and tingling  Tomas Robert is a 74 y.o. male with HTN, CKD, sensorineural hearing loss, history of lightheadedness/dizziness/ataxia in the setting of hypertensive urgency, recurrent episodes of left-sided paresthesias, most recent episode of left-sided paresthesias in July 2024 with concern for TIA on ASA daily who presented to [facility] on 1/14/2025 as a stroke alert with left-sided numbness and tingling.      - BP on presentation: 153/67  - NIHSS: 1 for LUE numbness/tingling  - CT imaging unremarkable for acute intracranial abnormality or large vessel occlusion    Thrombolytic Decision: Patient not a candidate. Unclear time of onset outside appropriate time window. and Symptoms resolved/clearly non disabling.    Workup:  - CT head: Unremarkable for acute intracranial abnormalities  - CTA head and neck: Unremarkable for large vessel occlusion or critical stenosis    Acute onset left-sided numbness and tingling affecting the left arm, left leg, left face.  Sensory changes associated with mild left hemispheric headache, currently 3/10 pain. Symptoms reportedly improving.    Low suspicion for CVA/TIA as patient reports similar left-sided sensory symptoms in the past, approximately 10 previous episodes in total. Given significant migraine history, concern for complex migraines given associated headache with the strokelike symptoms.    Plan:  - Hold off on stroke pathway at this time  - Will give migraine cocktail:  PO Acetaminophen prn  IV mag sulfate 2 g x 1  IV Depacon 1000 mg x  1  - Hold off on giving Reglan due to reported dyskinesia in the past  - Patient requesting to hold off on Benadryl due to significant sedation in the past  - Hold off on giving Toradol  - Medical management and supportive care per primary team, notify of changes    Neurology follow-up:  Tomas Robert will need follow-up in in 6 weeks with headache team for Other (complex migraines) In 60 minute appointment. They will not require outpatient neurological testing.     History of Present Illness   Hx and PE limited by: N/A  Patient last known well: 3:30 AM on 1/14/2025  Stroke alert called: 8:27 AM 1/14/2025  Neurology time of arrival: Attending neurologist responded immediately to the phone call  HPI: Tomas Robert is a 74 y.o.  male with HTN, CKD, sensorineural hearing loss, history of lightheadedness/dizziness/ataxia in the setting of hypertensive urgency, recurrent episodes of left-sided paresthesias, most recent episode of left-sided paresthesias in July 2024 with concern for TIA on ASA daily who presented to [facility] ED on 1/14/2025 as a stroke alert with left-sided numbness and tingling.      Patient reports he went to bed last night 1/13/2025 in normal state of health.  He woke up at 3:30 AM 1/14/2025 at the same time that his wife wakes up.  Patient states that when he woke up he was in his normal state of health, ambulated to the bathroom.  When ambulating patient developed a left hemispheric headache and went back to bed.  Patient then woke up at approximately 7 AM and noticed the numbness and tingling affecting left upper extremity initially, followed by left lower extremity, followed by left face. Patient denies experiencing any weakness at this time, word finding difficulties, slurred speech, visual deficits, right-sided involvement.  Patient states that he continued to have headache at this time.  He eventually contacted his nephew who brought him into the ED.    Patient reports similar presentation in  the past, most recently in July 2024 where he was admitted for strokelike symptoms.  Per chart review, patient had presented to [facility] as a stroke alert on 7/20/2024 for left-sided numbness and tingling.  MRI brain completed at that time unremarkable for evidence of acute infarct.  Patient was seen by inpatient neurology who felt TIA could not have been ruled out, so patient was placed on DAPT aspirin and Plavix x 21 days then transition to aspirin monotherapy.  Patient reports that he has been compliant with his aspirin since that last admission.  Patient states that in total he has had approximately 10 episodes of left-sided numbness and tingling in his entire life.  He admits to a history of migraines at a younger age, stating that his migraines were severe.  He states that recently he has been experiencing frequent headaches.  Patient denies any symptoms of transient visual loss, however does report blurred vision at times.  He states that he believes this is because he needs new glasses. Patient does report experiencing issues with congestion which he states may be attributing to the headaches.    Patient presented to [facility] ED on 1/14/2025 as a stroke alert, BP on presentation 153/67.  CT head unremarkable for acute intracranial abnormalities.  CTA head and neck unremarkable for large vessel occlusion.  Patient was not an IV TNK candidate as he was outside of the appropriate time window and symptoms were reportedly improving.  Plan to give migraine cocktail.  Patient reports poor reaction with Reglan in the past, reporting initially heaviness sensation in lower extremities, followed by abnormal movements.    Review of Systems  12 point ROS limited to acuity of condition    Objective :  Temp:  [98.5 °F (36.9 °C)] 98.5 °F (36.9 °C)  HR:  [67-69] 67  BP: (146-159)/(67-74) 146/74  Resp:  [18-19] 18  SpO2:  [99 %-100 %] 100 %  O2 Device: None (Room air)    Physical Exam  Vitals and nursing note reviewed.    Constitutional:       General: He is not in acute distress.     Appearance: Normal appearance. He is not ill-appearing, toxic-appearing or diaphoretic.   HENT:      Head: Normocephalic and atraumatic.   Eyes:      General: No scleral icterus.        Right eye: No discharge.         Left eye: No discharge.      Extraocular Movements: Extraocular movements intact.      Conjunctiva/sclera: Conjunctivae normal.   Musculoskeletal:         General: Normal range of motion.   Skin:     General: Skin is warm and dry.      Coloration: Skin is not jaundiced.   Neurological:      Mental Status: He is alert.      Motor: Motor strength is normal.  Psychiatric:         Mood and Affect: Mood normal.         Behavior: Behavior normal.         Thought Content: Thought content normal.         Judgment: Judgment normal.       Neurological Exam  Mental Status  Alert.  Patient is alert, lying in bed, accompanied by nephew  Oriented to person, place, month, and year   Able to name all objects provided   Follows central and appendicular commands   Answers all questions appropriately   No dysarthria or aphasia noted .    Cranial Nerves  CN III, IV, VI: Extraocular movements intact bilaterally.  Primary gaze midline, conjugate gaze noted   No gaze preference or forced gaze deviation   No visual field deficits noted   EOMs intacts, no evidence of nystagmus   Facial sensation intact to light touch bilaterally  No facial asymmetry noted   Hearing intact   Tongue midline, no deviation or lacerations .    Motor  Normal muscle bulk throughout. Normal muscle tone. Strength is 5/5 throughout all four extremities.  No drift in all 4 extremities.    Sensory  Diminished sensation to light touch in LUE, otherwise sensation to light touch intact  Pinprick intact throughout  No evidence of extinction with bilateral simultaneous stimulation.    Reflexes  No involuntary movements or rhythmic seizure-like activity noted throughout  exam.    Coordination    No ataxia or dysmetria in bilateral upper extremity finger-nose testing or bilateral lower extremity heel-to-shin testing.    NIHSS:  1a.Level of Consciousness: 0 = Alert   1b. LOC Questions: 0 = Answers both correctly   1c. LOC Commands: 0 = Obeys both correctly   2. Best Gaze: 0 = Normal   3. Visual: 0 = No visual field loss   4. Facial Palsy: 0=Normal symmetric movement   5a. Motor Right Arm: 0=No drift, limb holds 90 (or 45) degrees for full 10 seconds   5b. Motor Left Arm: 0=No drift, limb holds 90 (or 45) degrees for full 10 seconds   6a. Motor Right Le=No drift, limb holds 90 (or 45) degrees for full 10 seconds   6b. Motor Left Le=No drift, limb holds 90 (or 45) degrees for full 10 seconds   7. Limb Ataxia:  0=Absent   8. Sensory: 1=Mild to moderate sensory loss; patient feels pinprick is less sharp or is dull on the affected side; there is a loss of superficial pain with pinprick but patient is aware He is being touched   9. Best Language:  0=No aphasia, normal   10. Dysarthria: 0=Normal articulation   11. Extinction and Inattention (formerly Neglect): 0=No abnormality   Total Score: 1     Time NIHSS was completed: 0850    Modified Copenhagen Score:  Unable to determine currently, will gather additional data      Lab Results: I have personally reviewed pertinent reports.  Recent Results (from the past 24 hours)   Basic metabolic panel    Collection Time: 25  8:32 AM   Result Value Ref Range    Sodium 137 135 - 147 mmol/L    Potassium 4.1 3.5 - 5.3 mmol/L    Chloride 104 96 - 108 mmol/L    CO2 29 21 - 32 mmol/L    ANION GAP 4 4 - 13 mmol/L    BUN 22 5 - 25 mg/dL    Creatinine 1.34 (H) 0.60 - 1.30 mg/dL    Glucose 92 65 - 140 mg/dL    Calcium 9.4 8.4 - 10.2 mg/dL    eGFR 51 ml/min/1.73sq m   CBC and Platelet    Collection Time: 25  8:32 AM   Result Value Ref Range    WBC 3.89 (L) 4.31 - 10.16 Thousand/uL    RBC 4.67 3.88 - 5.62 Million/uL    Hemoglobin 14.1 12.0 - 17.0  "g/dL    Hematocrit 42.1 36.5 - 49.3 %    MCV 90 82 - 98 fL    MCH 30.2 26.8 - 34.3 pg    MCHC 33.5 31.4 - 37.4 g/dL    RDW 13.2 11.6 - 15.1 %    Platelets 217 149 - 390 Thousands/uL    MPV 10.3 8.9 - 12.7 fL   Protime-INR    Collection Time: 01/14/25  8:32 AM   Result Value Ref Range    Protime 13.8 12.3 - 15.0 seconds    INR 0.99 0.85 - 1.19   APTT    Collection Time: 01/14/25  8:32 AM   Result Value Ref Range    PTT 31 23 - 34 seconds   HS Troponin 0hr (reflex protocol)    Collection Time: 01/14/25  8:32 AM   Result Value Ref Range    hs TnI 0hr 4 \"Refer to ACS Flowchart\"- see link ng/L   FLU/COVID Rapid Antigen (30 min. TAT) - Preferred screening test in ED    Collection Time: 01/14/25  8:32 AM    Specimen: Nose; Nares   Result Value Ref Range    SARS COV Rapid Antigen Negative Negative    Influenza A Rapid Antigen Negative Negative    Influenza B Rapid Antigen Negative Negative   Fingerstick Glucose (POCT)    Collection Time: 01/14/25  8:33 AM   Result Value Ref Range    POC Glucose 81 65 - 140 mg/dl     Imaging Studies: I have personally reviewed pertinent reports and I have personally reviewed pertinent films in PACS.    EKG, Pathology, and Other Studies: I have personally reviewed pertinent reports.    VTE Prophylaxis: Patient in the ED    Code Status: Prior    Counseling / Coordination of Care  Total time spent today 46 minutes critical care time.  Greater than 50% of total time was spent with the patient and/or family counseling and/or coordination of care.  A description of the counseling/coordination of care:  Patient was seen and evaluated.  Discussed with attending.  Chart reviewed thoroughly including laboratory and imaging studies.  Plan of care discussed with patient and primary team.  Discussed CT imaging with patient at bedside as well as ED provider.  Communicated plan for migraine cocktail with both patient and primary team.    Dictation voice to text software has been used in the creation of " this document.  Please consider this in light of any contextual or grammatical errors.

## 2025-01-14 NOTE — ED PROVIDER NOTES
Time reflects when diagnosis was documented in both MDM as applicable and the Disposition within this note       Time User Action Codes Description Comment    1/14/2025  8:26 AM Yuliya Denson Add [R20.0] Left sided numbness     1/14/2025 11:56 AM Yuliya Denson Add [G43.009] Atypical migraine           ED Disposition       ED Disposition   Discharge    Condition   Stable    Date/Time   Tue Jan 14, 2025 11:56 AM    Comment   Tomas Robert discharge to home/self care.                   Assessment & Plan       Medical Decision Making  Patient is a pleasant 74-year-old male who presents to the emergency department complaining of left-sided numbness.  He states that he was awake at 3 AM to use the bathroom and was normal.  He reports that he then woke up at 730 noting that his arm and leg felt numb.  He initially assumed this was secondary to feeling colder sleeping on it wrong, however symptoms persisted after repositioning.  This prompted his evaluation in the emergency department.  He does have asymmetry noted to light touch in the right upper and lower extremities without motor, cranial nerve, or speech deficits.  NIH of 1.  Seen and evaluated as a level 2 stroke alert.  Neuro eval pending.  Patient does report a remote history of migraines as a young man.  Atypical migraine remains on the differential, also includes CVA, ICH    Amount and/or Complexity of Data Reviewed  External Data Reviewed: notes.     Details: Prior ED note reviewed - seen and evaluate for headache, malaise,   Labs: ordered.  Radiology: ordered. Decision-making details documented in ED Course.  Discussion of management or test interpretation with external provider(s): Reviewed over the phone with Stroke neurology. Scans pending.     Risk  OTC drugs.  Prescription drug management.  Decision regarding hospitalization.        ED Course as of 01/14/25 1616   Tue Jan 14, 2025   0920 Case again reviewed and discussed with neurology  on-call physician assistant.  Has been reviewed by stroke team.  Radiology read reviewed and discussed with the team.  No evidence of LVO.  Neuroteam agrees likely atypical migraine.  Alternate migraine cocktail given due to intolerance of Reglan Benadryl in the past.  Dissipate discharge with clinical improvement   1156 CTA stroke alert (head/neck)       Medications   iohexol (OMNIPAQUE) 350 MG/ML injection (MULTI-DOSE) 100 mL (85 mL Intravenous Given 1/14/25 0848)   acetaminophen (TYLENOL) tablet 650 mg (650 mg Oral Given 1/14/25 1004)   magnesium sulfate IVPB (premix) SOLN 1 g (0 g Intravenous Stopped 1/14/25 1111)   valproate (DEPACON) 1,000 mg in sodium chloride 0.9 % 50 mL BOLUS (0 mg Intravenous Stopped 1/14/25 1105)   sodium chloride 0.9 % bolus 500 mL (0 mL Intravenous Stopped 1/14/25 1101)       ED Risk Strat Scores                                              History of Present Illness       Chief Complaint   Patient presents with    Numbness     Patient reports waking up feeling numbness in left face, arm, and leg.  Patient reports waking up at 0330 and felt fine at that time and went back to bed.         Past Medical History:   Diagnosis Date    Benign prostatic hyperplasia     Chronic kidney disease     COVID-19 03/2020    Esophageal reflux     Hypertension     Hypertensive urgency 03/26/2019    Prostate cancer (HCC)     Vertigo       Past Surgical History:   Procedure Laterality Date    APPENDECTOMY  05/21/2015    CYST REMOVAL      Fatty cyst removed from back      Family History   Problem Relation Age of Onset    Prostate cancer Father     Prostate cancer Maternal Grandfather     Stomach cancer Maternal Aunt         malignant tumor of pharynx    Stomach cancer Maternal Uncle         malignant tumor of pharynx    Mental illness Family     Depression Family     Schizophrenia Family     Hypertension Mother     No Known Problems Sister     Dementia Sister     Hypertension Sister       Social History      Tobacco Use    Smoking status: Former     Current packs/day: 0.25     Average packs/day: 0.3 packs/day for 10.0 years (2.5 ttl pk-yrs)     Types: Cigarettes     Passive exposure: Past    Smokeless tobacco: Never    Tobacco comments:     1ppw   Vaping Use    Vaping status: Never Used   Substance Use Topics    Alcohol use: Yes     Comment: socially    Drug use: No      E-Cigarette/Vaping    E-Cigarette Use Never User       E-Cigarette/Vaping Substances    Nicotine No     THC No     CBD No     Flavoring No     Other No     Unknown No       I have reviewed and agree with the history as documented.     Patient seen and evaluated immediately on arrival as a level 2 stroke alert.   Reports left sided numbness upon waking up.       History provided by:  Patient      Review of Systems   Neurological:  Positive for numbness.   All other systems reviewed and are negative.          Objective       ED Triage Vitals [01/14/25 0819]   Temperature Pulse Blood Pressure Respirations SpO2 Patient Position - Orthostatic VS   98.5 °F (36.9 °C) 69 153/67 19 99 % Sitting      Temp Source Heart Rate Source BP Location FiO2 (%) Pain Score    Temporal Monitor Left arm -- --      Vitals      Date and Time Temp Pulse SpO2 Resp BP Pain Score FACES Pain Rating User   01/14/25 1100 -- 54 99 % 18 140/75 -- -- RS   01/14/25 1000 -- 64 100 % 18 136/81 -- -- RS   01/14/25 0900 -- -- 100 % 18 152/83 -- -- DANNY   01/14/25 0900 -- 64 -- -- -- -- -- RS   01/14/25 0845 -- 67 100 % 18 146/74 -- -- RS   01/14/25 0830 -- 67 100 % 18 159/74 -- -- RS   01/14/25 0819 98.5 °F (36.9 °C) 69 99 % 19 153/67 -- -- GP            Physical Exam  Vitals and nursing note reviewed.   Constitutional:       General: He is not in acute distress.     Appearance: Normal appearance.   HENT:      Head: Normocephalic and atraumatic.      Right Ear: External ear normal.      Left Ear: External ear normal.      Nose: Nose normal.   Cardiovascular:      Rate and Rhythm: Normal rate  and regular rhythm.   Pulmonary:      Effort: Pulmonary effort is normal.      Breath sounds: Normal breath sounds.   Abdominal:      General: There is no distension.      Palpations: Abdomen is soft.      Tenderness: There is no abdominal tenderness.   Musculoskeletal:      Right lower leg: No edema.      Left lower leg: No edema.   Skin:     General: Skin is warm and dry.   Neurological:      Mental Status: He is alert and oriented to person, place, and time. Mental status is at baseline.      Sensory: Sensory deficit present.   Psychiatric:         Behavior: Behavior normal.         Results Reviewed       Procedure Component Value Units Date/Time    HS Troponin I 2hr [[id number]]  (Normal) Collected: 01/14/25 1044    Lab Status: Final result Specimen: Blood from Arm, Left Updated: 01/14/25 1115     hs TnI 2hr 3 ng/L      Delta 2hr hsTnI -1 ng/L     HS Troponin 0hr (reflex protocol) [[id number]]  (Normal) Collected: 01/14/25 0832    Lab Status: Final result Specimen: Blood from Arm, Right Updated: 01/14/25 0900     hs TnI 0hr 4 ng/L     FLU/COVID Rapid Antigen (30 min. TAT) - Preferred screening test in ED [[id number]]  (Normal) Collected: 01/14/25 0832    Lab Status: Final result Specimen: Nares from Nose Updated: 01/14/25 0854     SARS COV Rapid Antigen Negative     Influenza A Rapid Antigen Negative     Influenza B Rapid Antigen Negative    Narrative:      This test has been performed using the [employer] Christian[employer] 2 FLU+SARS Antigen test under the Emergency Use Authorization (EUA). This test has been validated by the  and verified by the performing laboratory. The Christiane uses lateral flow immunofluorescent sandwich assay to detect SARS-COV, Influenza A and Influenza B Antigen.     The [employer] Christian[employer] 2 SARS Antigen test does not differentiate between SARS-CoV and SARS-CoV-2.     Negative results are presumptive and may be confirmed with a molecular assay, if necessary, for patient management. Negative results do  not rule out SARS-CoV-2 or influenza infection and should not be used as the sole basis for treatment or patient management decisions. A negative test result may occur if the level of antigen in a sample is below the limit of detection of this test.     Positive results are indicative of the presence of viral antigens, but do not rule out bacterial infection or co-infection with other viruses.     All test results should be used as an adjunct to clinical observations and other information available to the provider.    FOR PEDIATRIC PATIENTS - copy/paste COVID Guidelines URL to browser: [email or web address]    Basic metabolic panel [[id number]]  (Abnormal) Collected: 01/14/25 0832    Lab Status: Final result Specimen: Blood from Arm, Right Updated: 01/14/25 0853     Sodium 137 mmol/L      Potassium 4.1 mmol/L      Chloride 104 mmol/L      CO2 29 mmol/L      ANION GAP 4 mmol/L      BUN 22 mg/dL      Creatinine 1.34 mg/dL      Glucose 92 mg/dL      Calcium 9.4 mg/dL      eGFR 51 ml/min/1.73sq m     Narrative:      National Kidney Disease Foundation guidelines for Chronic Kidney Disease (CKD):     Stage 1 with normal or high GFR (GFR > 90 mL/min/1.73 square meters)    Stage 2 Mild CKD (GFR = 60-89 mL/min/1.73 square meters)    Stage 3A Moderate CKD (GFR = 45-59 mL/min/1.73 square meters)    Stage 3B Moderate CKD (GFR = 30-44 mL/min/1.73 square meters)    Stage 4 Severe CKD (GFR = 15-29 mL/min/1.73 square meters)    Stage 5 End Stage CKD (GFR <15 mL/min/1.73 square meters)  Note: GFR calculation is accurate only with a steady state creatinine    Protime-INR [[id number]]  (Normal) Collected: 01/14/25 0832    Lab Status: Final result Specimen: Blood from Arm, Right Updated: 01/14/25 0849     Protime 13.8 seconds      INR 0.99    Narrative:      INR Therapeutic Range    Indication                                             INR Range      Atrial Fibrillation                                                2.0-3.0  Hypercoagulable State                                    2.0.2.3  Left Ventricular Asist Device                            2.0-3.0  Mechanical Heart Valve                                  -    Aortic(with afib, MI, embolism, HF, LA enlargement,    and/or coagulopathy)                                     2.0-3.0 (2.5-3.5)     Mitral                                                             2.5-3.5  Prosthetic/Bioprosthetic Heart Valve               2.0-3.0  Venous thromboembolism (VTE: VT, PE        2.0-3.0    APTT [[id number]]  (Normal) Collected: 01/14/25 0832    Lab Status: Final result Specimen: Blood from Arm, Right Updated: 01/14/25 0849     PTT 31 seconds     CBC and Platelet [[id number]]  (Abnormal) Collected: 01/14/25 0832    Lab Status: Final result Specimen: Blood from Arm, Right Updated: 01/14/25 0838     WBC 3.89 Thousand/uL      RBC 4.67 Million/uL      Hemoglobin 14.1 g/dL      Hematocrit 42.1 %      MCV 90 fL      MCH 30.2 pg      MCHC 33.5 g/dL      RDW 13.2 %      Platelets 217 Thousands/uL      MPV 10.3 fL     Fingerstick Glucose (POCT) [[id number]]  (Normal) Collected: 01/14/25 0833    Lab Status: Final result Specimen: Blood Updated: 01/14/25 0834     POC Glucose 81 mg/dl             CTA stroke alert (head/neck)   Final Interpretation by Indra Barksdale MD (01/14 0918)         1.  No hemodynamically significant stenosis in the major arteries of the neck.   2.  No intracranial aneurysm or major intracranial arterial stenosis.   3. Emphysema.               I personally provided preliminary results of this study with MINDY MENDOZA and uJ Klein on 1/14/2025 9:10 AM.            Workstation performed: [id number]         CT stroke alert brain   Final Interpretation by Indra Barksdale MD (01/14 0857)      No acute intracranial hemorrhage, mass effect or edema.         Workstation performed: [id number]             Procedures    ED Medication  and Procedure Management   Prior to Admission Medications   Prescriptions Last Dose Informant Patient Reported? Taking?   amLODIPine (NORVASC) 10 mg tablet   No No   Sig: TAKE 1 TABLET BY MOUTH EVERY DAY   aspirin (ECOTRIN LOW STRENGTH) 81 mg EC tablet  Self No No   Sig: Take 1 tablet (81 mg total) by mouth daily   atorvastatin (LIPITOR) 40 mg tablet  Self No No   Sig: Take 1 tablet (40 mg total) by mouth daily with dinner   cyclobenzaprine (FLEXERIL) 10 mg tablet  Self No No   Sig: Take 1 tablet (10 mg total) by mouth if needed for muscle spasms   fluticasone (FLONASE) 50 mcg/act nasal spray   No No   Si spray into each nostril daily   lidocaine (LIDODERM) 5 %  Self No No   Sig: Apply 1 patch topically over 12 hours if needed (Intermittent right low back pain with radiculopathy) Remove & Discard patch within 12 hours or as directed by MD   multivitamin (THERAGRAN) TABS  Self Yes No   Sig: Take 1 tablet by mouth daily   pantoprazole (PROTONIX) 40 mg tablet   No No   Sig: Take 1 tablet (40 mg total) by mouth daily   sodium chloride (OCEAN) 0.65 % nasal spray   No No   Si spray into each nostril as needed for congestion or rhinitis   tadalafil (CIALIS) 20 MG tablet  Self Yes No   Sig: if needed   tamsulosin (FLOMAX) 0.4 mg  Self No No   Sig: Take 1 capsule (0.4 mg total) by mouth daily with dinner      Facility-Administered Medications: None     Discharge Medication List as of 2025 11:57 AM        START taking these medications    Details   prochlorperazine (COMPAZINE) 10 mg tablet Take 0.5 tablets (5 mg total) by mouth every 8 (eight) hours as needed for nausea or vomiting (headache), Starting 2025, Normal           CONTINUE these medications which have NOT CHANGED    Details   amLODIPine (NORVASC) 10 mg tablet TAKE 1 TABLET BY MOUTH EVERY DAY, Starting 2025, Normal      aspirin (ECOTRIN LOW STRENGTH) 81 mg EC tablet Take 1 tablet (81 mg total) by mouth daily, Starting 2024,  Normal      atorvastatin (LIPITOR) 40 mg tablet Take 1 tablet (40 mg total) by mouth daily with dinner, Starting Mon 7/22/2024, Normal      cyclobenzaprine (FLEXERIL) 10 mg tablet Take 1 tablet (10 mg total) by mouth if needed for muscle spasms, Starting Wed 5/15/2024, No Print      fluticasone (FLONASE) 50 mcg/act nasal spray 1 spray into each nostril daily, Starting Thu 1/9/2025, Normal      lidocaine (LIDODERM) 5 % Apply 1 patch topically over 12 hours if needed (Intermittent right low back pain with radiculopathy) Remove & Discard patch within 12 hours or as directed by MD, Starting Wed 5/3/2023, Normal      multivitamin (THERAGRAN) TABS Take 1 tablet by mouth daily, Historical Med      pantoprazole (PROTONIX) 40 mg tablet Take 1 tablet (40 mg total) by mouth daily, Starting Wed 1/8/2025, Normal      sodium chloride (OCEAN) 0.65 % nasal spray 1 spray into each nostril as needed for congestion or rhinitis, Starting Thu 1/9/2025, Normal      tadalafil (CIALIS) 20 MG tablet if needed, Historical Med      tamsulosin (FLOMAX) 0.4 mg Take 1 capsule (0.4 mg total) by mouth daily with dinner, Starting Tue 1/5/2021, Normal           No discharge procedures on file.  ED SEPSIS DOCUMENTATION   Time reflects when diagnosis was documented in both MDM as applicable and the Disposition within this note       Time User Action Codes Description Comment    1/14/2025  8:26 AM Yuliya Denson Add [R20.0] Left sided numbness     1/14/2025 11:56 AM Yuliya Denson Add [G43.009] Atypical migraine                  Yuliya Denson MD  01/14/25 8359

## 2025-01-14 NOTE — DISCHARGE INSTRUCTIONS
You were seen and evaluated today for left sided numbness.  Your test results demonstrated normal CT and CTA of the brain. Neurology has reviewed your case as well.   Please take all medications as instructed. Follow up with your PCP as discussed.   RETURN TO THE EMERGENCY DEPARTMENT if you develop new or worsening symptoms and are unable to see your PCP.

## 2025-01-14 NOTE — ASSESSMENT & PLAN NOTE
Tomas Robert is a 74 y.o. male with HTN, CKD, sensorineural hearing loss, history of lightheadedness/dizziness/ataxia in the setting of hypertensive urgency, recurrent episodes of left-sided paresthesias, most recent episode of left-sided paresthesias in July 2024 with concern for TIA on ASA daily who presented to [facility] ED on 1/14/2025 as a stroke alert with left-sided numbness and tingling.      - BP on presentation: 153/67  - NIHSS: 1 for LUE numbness/tingling  - CT imaging unremarkable for acute intracranial abnormality or large vessel occlusion    Thrombolytic Decision: Patient not a candidate. Unclear time of onset outside appropriate time window. and Symptoms resolved/clearly non disabling.    Workup:  - CT head: Unremarkable for acute intracranial abnormalities  - CTA head and neck: Unremarkable for large vessel occlusion or critical stenosis    Acute onset left-sided numbness and tingling affecting the left arm, left leg, left face.  Sensory changes associated with mild left hemispheric headache, currently 3/10 pain. Symptoms reportedly improving.    Low suspicion for CVA/TIA as patient reports similar left-sided sensory symptoms in the past, approximately 10 previous episodes in total. Given significant migraine history, concern for complex migraines given associated headache with the strokelike symptoms.    Plan:  - Hold off on stroke pathway at this time  - Will give migraine cocktail:  PO Acetaminophen prn  IV mag sulfate 2 g x 1  IV Depacon 1000 mg x 1  - Hold off on giving Reglan due to reported dyskinesia in the past  - Patient requesting to hold off on Benadryl due to significant sedation in the past  - Hold off on giving Toradol  - Medical management and supportive care per primary team, notify of changes

## 2025-01-22 LAB
ATRIAL RATE: 60 BPM
P AXIS: 71 DEGREES
PR INTERVAL: 166 MS
QRS AXIS: 53 DEGREES
QRSD INTERVAL: 86 MS
QT INTERVAL: 404 MS
QTC INTERVAL: 404 MS
T WAVE AXIS: 15 DEGREES
VENTRICULAR RATE: 60 BPM

## 2025-01-22 PROCEDURE — 93010 ELECTROCARDIOGRAM REPORT: CPT | Performed by: INTERNAL MEDICINE

## 2025-01-23 ENCOUNTER — TELEPHONE (OUTPATIENT)
Dept: NEPHROLOGY | Facility: CLINIC | Age: 75
End: 2025-01-23

## 2025-01-23 NOTE — TELEPHONE ENCOUNTER
Called left voice message to remind patient to get labs done 1 week prior to 02/07/25 scheduled follow-up appointment with . advised patient as a reminder that labs / testing will need to be done in the appropriate time for scheduled appointment as this is important prior to visit.

## 2025-01-24 DIAGNOSIS — N40.0 BENIGN PROSTATIC HYPERPLASIA, PRESENCE OF LOWER URINARY TRACT SYMPTOMS UNSPECIFIED: ICD-10-CM

## 2025-01-24 RX ORDER — TAMSULOSIN HYDROCHLORIDE 0.4 MG/1
0.4 CAPSULE ORAL
Qty: 30 CAPSULE | Refills: 0 | Status: SHIPPED | OUTPATIENT
Start: 2025-01-24

## 2025-01-24 NOTE — TELEPHONE ENCOUNTER
Patient is completely out of Tamsulosin and is requesting just a 30-day supply sent in to his local [facility].  Once he picks this up, he will call us for his usual 90-day supply with 3 refills to be sent to [facility].  He states that [facility] tried to reach out to us to refill this medication and never heard back and now he is out of it.    Thank you.

## 2025-01-28 DIAGNOSIS — I10 ESSENTIAL HYPERTENSION: ICD-10-CM

## 2025-01-28 DIAGNOSIS — N40.0 BENIGN PROSTATIC HYPERPLASIA, PRESENCE OF LOWER URINARY TRACT SYMPTOMS UNSPECIFIED: ICD-10-CM

## 2025-01-28 RX ORDER — TAMSULOSIN HYDROCHLORIDE 0.4 MG/1
0.4 CAPSULE ORAL
Qty: 90 CAPSULE | Refills: 1 | Status: SHIPPED | OUTPATIENT
Start: 2025-01-28

## 2025-01-28 RX ORDER — AMLODIPINE BESYLATE 10 MG/1
10 TABLET ORAL DAILY
Qty: 90 TABLET | Refills: 1 | Status: SHIPPED | OUTPATIENT
Start: 2025-01-28

## 2025-01-28 NOTE — TELEPHONE ENCOUNTER
Reason for call: : Nino Hancock,  manage these medications! Patient wants these medication sent to [facility]! Confirmed medication and Dosage with Patient!!    [x] Refill   [] Prior Auth  [] Other:     Office:   [x] PCP/Provider -   [] Specialty/Provider -     Medication:     amLODIPine (NORVASC) 10 mg tablet       Dose/Frequency: TAKE 1 TABLET BY MOUTH EVERY DAY,     Quantity:  90 tablet     Medication:    tamsulosin (FLOMAX) 0.4 mg     Dose/Frequency:Take 1 capsule (0.4 mg total) by mouth daily with dinner,     Quantity:90 Capsules    Pharmacy: [facility] - [address] [phone]     Does the patient have enough for 3 days?   [x] Yes   [] No - Send as HP to POD

## 2025-01-29 ENCOUNTER — APPOINTMENT (OUTPATIENT)
Dept: LAB | Facility: HOSPITAL | Age: 75
End: 2025-01-29
Attending: INTERNAL MEDICINE
Payer: COMMERCIAL

## 2025-01-29 DIAGNOSIS — M89.9 CHRONIC KIDNEY DISEASE-MINERAL AND BONE DISORDER: ICD-10-CM

## 2025-01-29 DIAGNOSIS — N18.31 STAGE 3A CHRONIC KIDNEY DISEASE (HCC): ICD-10-CM

## 2025-01-29 DIAGNOSIS — E83.9 CHRONIC KIDNEY DISEASE-MINERAL AND BONE DISORDER: ICD-10-CM

## 2025-01-29 DIAGNOSIS — N18.9 CHRONIC KIDNEY DISEASE-MINERAL AND BONE DISORDER: ICD-10-CM

## 2025-01-29 LAB
25(OH)D3 SERPL-MCNC: 34.6 NG/ML (ref 30–100)
ANION GAP SERPL CALCULATED.3IONS-SCNC: 6 MMOL/L (ref 4–13)
BASOPHILS # BLD AUTO: 0.02 THOUSANDS/ΜL (ref 0–0.1)
BASOPHILS NFR BLD AUTO: 1 % (ref 0–1)
BILIRUB UR QL STRIP: NEGATIVE
BUN SERPL-MCNC: 24 MG/DL (ref 5–25)
CALCIUM SERPL-MCNC: 9.2 MG/DL (ref 8.4–10.2)
CHLORIDE SERPL-SCNC: 104 MMOL/L (ref 96–108)
CLARITY UR: CLEAR
CO2 SERPL-SCNC: 28 MMOL/L (ref 21–32)
COLOR UR: NORMAL
CREAT SERPL-MCNC: 1.5 MG/DL (ref 0.6–1.3)
CREAT UR-MCNC: 163.8 MG/DL
EOSINOPHIL # BLD AUTO: 0.06 THOUSAND/ΜL (ref 0–0.61)
EOSINOPHIL NFR BLD AUTO: 2 % (ref 0–6)
ERYTHROCYTE [DISTWIDTH] IN BLOOD BY AUTOMATED COUNT: 13.3 % (ref 11.6–15.1)
GFR SERPL CREATININE-BSD FRML MDRD: 45 ML/MIN/1.73SQ M
GLUCOSE P FAST SERPL-MCNC: 90 MG/DL (ref 65–99)
GLUCOSE UR STRIP-MCNC: NEGATIVE MG/DL
HCT VFR BLD AUTO: 42.4 % (ref 36.5–49.3)
HGB BLD-MCNC: 13.8 G/DL (ref 12–17)
HGB UR QL STRIP.AUTO: NEGATIVE
IMM GRANULOCYTES # BLD AUTO: 0.01 THOUSAND/UL (ref 0–0.2)
IMM GRANULOCYTES NFR BLD AUTO: 0 % (ref 0–2)
KETONES UR STRIP-MCNC: NEGATIVE MG/DL
LEUKOCYTE ESTERASE UR QL STRIP: NEGATIVE
LYMPHOCYTES # BLD AUTO: 1.66 THOUSANDS/ΜL (ref 0.6–4.47)
LYMPHOCYTES NFR BLD AUTO: 44 % (ref 14–44)
MCH RBC QN AUTO: 29.7 PG (ref 26.8–34.3)
MCHC RBC AUTO-ENTMCNC: 32.5 G/DL (ref 31.4–37.4)
MCV RBC AUTO: 91 FL (ref 82–98)
MONOCYTES # BLD AUTO: 0.28 THOUSAND/ΜL (ref 0.17–1.22)
MONOCYTES NFR BLD AUTO: 7 % (ref 4–12)
NEUTROPHILS # BLD AUTO: 1.75 THOUSANDS/ΜL (ref 1.85–7.62)
NEUTS SEG NFR BLD AUTO: 46 % (ref 43–75)
NITRITE UR QL STRIP: NEGATIVE
NRBC BLD AUTO-RTO: 0 /100 WBCS
PH UR STRIP.AUTO: 5.5 [PH]
PHOSPHATE SERPL-MCNC: 3 MG/DL (ref 2.3–4.1)
PLATELET # BLD AUTO: 209 THOUSANDS/UL (ref 149–390)
PMV BLD AUTO: 10.8 FL (ref 8.9–12.7)
POTASSIUM SERPL-SCNC: 4.2 MMOL/L (ref 3.5–5.3)
PROT UR STRIP-MCNC: NEGATIVE MG/DL
PROT UR-MCNC: 5.9 MG/DL
PROT/CREAT UR: 0 MG/G{CREAT} (ref 0–0.1)
PTH-INTACT SERPL-MCNC: 58.1 PG/ML (ref 12–88)
RBC # BLD AUTO: 4.64 MILLION/UL (ref 3.88–5.62)
SODIUM SERPL-SCNC: 138 MMOL/L (ref 135–147)
SP GR UR STRIP.AUTO: 1.02 (ref 1–1.03)
UROBILINOGEN UR STRIP-ACNC: <2 MG/DL
WBC # BLD AUTO: 3.78 THOUSAND/UL (ref 4.31–10.16)

## 2025-01-29 PROCEDURE — 85025 COMPLETE CBC W/AUTO DIFF WBC: CPT

## 2025-01-29 PROCEDURE — 82306 VITAMIN D 25 HYDROXY: CPT

## 2025-01-29 PROCEDURE — 80048 BASIC METABOLIC PNL TOTAL CA: CPT

## 2025-01-29 PROCEDURE — 81003 URINALYSIS AUTO W/O SCOPE: CPT

## 2025-01-29 PROCEDURE — 84156 ASSAY OF PROTEIN URINE: CPT

## 2025-01-29 PROCEDURE — 84100 ASSAY OF PHOSPHORUS: CPT

## 2025-01-29 PROCEDURE — 36415 COLL VENOUS BLD VENIPUNCTURE: CPT

## 2025-01-29 PROCEDURE — 82570 ASSAY OF URINE CREATININE: CPT

## 2025-01-29 PROCEDURE — 83970 ASSAY OF PARATHORMONE: CPT

## 2025-02-01 DIAGNOSIS — R10.13 EPIGASTRIC ABDOMINAL PAIN: ICD-10-CM

## 2025-02-03 RX ORDER — PANTOPRAZOLE SODIUM 40 MG/1
40 TABLET, DELAYED RELEASE ORAL DAILY
Qty: 90 TABLET | Refills: 1 | Status: SHIPPED | OUTPATIENT
Start: 2025-02-03

## 2025-02-07 ENCOUNTER — OFFICE VISIT (OUTPATIENT)
Dept: NEPHROLOGY | Facility: CLINIC | Age: 75
End: 2025-02-07
Payer: COMMERCIAL

## 2025-02-07 VITALS
DIASTOLIC BLOOD PRESSURE: 70 MMHG | HEIGHT: 72 IN | TEMPERATURE: 96.9 F | BODY MASS INDEX: 24.92 KG/M2 | HEART RATE: 71 BPM | OXYGEN SATURATION: 99 % | SYSTOLIC BLOOD PRESSURE: 120 MMHG | WEIGHT: 184 LBS | RESPIRATION RATE: 16 BRPM

## 2025-02-07 DIAGNOSIS — N18.31 STAGE 3A CHRONIC KIDNEY DISEASE (HCC): Primary | ICD-10-CM

## 2025-02-07 DIAGNOSIS — E83.9 CHRONIC KIDNEY DISEASE-MINERAL AND BONE DISORDER: ICD-10-CM

## 2025-02-07 DIAGNOSIS — M89.9 CHRONIC KIDNEY DISEASE-MINERAL AND BONE DISORDER: ICD-10-CM

## 2025-02-07 DIAGNOSIS — N40.0 BENIGN PROSTATIC HYPERPLASIA, PRESENCE OF LOWER URINARY TRACT SYMPTOMS UNSPECIFIED: ICD-10-CM

## 2025-02-07 DIAGNOSIS — I10 PRIMARY HYPERTENSION: ICD-10-CM

## 2025-02-07 DIAGNOSIS — N18.9 CHRONIC KIDNEY DISEASE-MINERAL AND BONE DISORDER: ICD-10-CM

## 2025-02-07 DIAGNOSIS — M54.16 LUMBAR RADICULOPATHY: ICD-10-CM

## 2025-02-07 PROCEDURE — 99214 OFFICE O/P EST MOD 30 MIN: CPT | Performed by: INTERNAL MEDICINE

## 2025-02-07 NOTE — ASSESSMENT & PLAN NOTE
Lab Results   Component Value Date    EGFR 45 01/29/2025    EGFR 51 01/14/2025    EGFR 58 01/09/2025    CREATININE 1.50 (H) 01/29/2025    CREATININE 1.34 (H) 01/14/2025    CREATININE 1.22 01/09/2025     2.  Advise hydration and avoiding nephrotoxic medicine.  Patient had a CTA done that may be the reason there is some increasing creatinine.  Like I said before will advise hydration for now

## 2025-02-07 NOTE — PROGRESS NOTES
Name: Tomas Robert      : 1950      MRN: [id number]  Encounter Provider: Roceal Pyle MD  Encounter Date: 2025   Encounter department: [facility]  :  Assessment & Plan  Stage 3a chronic kidney disease (HCC)  Lab Results   Component Value Date    EGFR 45 2025    EGFR 51 2025    EGFR 58 2025    CREATININE 1.50 (H) 2025    CREATININE 1.34 (H) 2025    CREATININE 1.22 2025     2.  Advise hydration and avoiding nephrotoxic medicine.  Patient had a CTA done that may be the reason there is some increasing creatinine.  Like I said before will advise hydration for now       Benign prostatic hyperplasia, presence of lower urinary tract symptoms unspecified  Patient was found to have a prostate cancer which is localized and will begin a treatment and monitor with some sort of radiation       Chronic kidney disease-mineral and bone disorder  Lab Results   Component Value Date    EGFR 45 2025    EGFR 51 2025    EGFR 58 2025    CREATININE 1.50 (H) 2025    CREATININE 1.34 (H) 2025    CREATININE 1.22 2025   PTH and phosphorus along with vitamin D are within acceptable range and will continue to monitor         Primary hypertension  Very well-controlled       Lumbar radiculopathy  Stable and not on any painkiller         I will see him back in 6 months.  Will get blood and urine test before that visit.        History of Present Illness   HPI  Tomas Robert is a 74 y.o. male who presents CKD follow-up    Overall doing well and no acute complaint    Does have prostate cancer for which he will be getting proton beam treatment    Does have frequent urination but no other acute complaint    No chest pain no palpitation    No shortness of breath    No nausea no vomiting          Review of Systems   Constitutional:  Negative for fatigue.   HENT:  Negative for congestion.    Eyes:  Negative for photophobia and pain.    Respiratory:  Negative for chest tightness and shortness of breath.    Cardiovascular:  Negative for chest pain and palpitations.   Gastrointestinal:  Negative for abdominal distention, abdominal pain and blood in stool.   Endocrine: Negative for polydipsia.   Genitourinary:  Negative for difficulty urinating, dysuria, flank pain, hematuria and urgency.   Musculoskeletal:  Negative for arthralgias and back pain.   Skin:  Negative for rash.   Neurological:  Negative for dizziness, light-headedness and headaches.   Hematological:  Does not bruise/bleed easily.   Psychiatric/Behavioral:  Negative for behavioral problems. The patient is not nervous/anxious.           Objective   Pulse 71   Temp (!) 96.9 °F (36.1 °C) (Temporal)   Resp 16   Ht 6' (1.829 m)   Wt 83.5 kg (184 lb)   SpO2 99%   BMI 24.95 kg/m²      Physical Exam  Constitutional:       General: He is not in acute distress.     Appearance: He is well-developed.   HENT:      Head: Normocephalic.      Mouth/Throat:      Mouth: Mucous membranes are moist.   Eyes:      General: No scleral icterus.     Conjunctiva/sclera: Conjunctivae normal.   Neck:      Vascular: No JVD.   Cardiovascular:      Rate and Rhythm: Normal rate.      Heart sounds: Normal heart sounds.   Pulmonary:      Effort: Pulmonary effort is normal.      Breath sounds: No wheezing.   Abdominal:      Palpations: Abdomen is soft.      Tenderness: There is no abdominal tenderness.   Musculoskeletal:         General: Normal range of motion.      Cervical back: Neck supple.   Skin:     General: Skin is warm.      Findings: No rash.   Neurological:      Mental Status: He is alert and oriented to person, place, and time.   Psychiatric:         Behavior: Behavior normal.

## 2025-02-07 NOTE — ASSESSMENT & PLAN NOTE
Patient was found to have a prostate cancer which is localized and will begin a treatment and monitor with some sort of radiation

## 2025-02-07 NOTE — ASSESSMENT & PLAN NOTE
Lab Results   Component Value Date    EGFR 45 01/29/2025    EGFR 51 01/14/2025    EGFR 58 01/09/2025    CREATININE 1.50 (H) 01/29/2025    CREATININE 1.34 (H) 01/14/2025    CREATININE 1.22 01/09/2025   PTH and phosphorus along with vitamin D are within acceptable range and will continue to monitor

## 2025-02-21 ENCOUNTER — TELEPHONE (OUTPATIENT)
Age: 75
End: 2025-02-21

## 2025-02-21 NOTE — TELEPHONE ENCOUNTER
ADAN message     Patient sees  Gregg May for  TIA   Patient called would like to see Dr. Watt  for the same issue. Patient asking to Stay in the [facility] area for appointments       Patient wants to do transfer of care with Dr Shukri Landin do you agree patient sees Dr Watt ?    Dr Watt  do accept ADAN for this patient?                                                                                                                                                                                                                                                                                NOTE:   If the patient is requesting to change providers, send a telephone encounter to the current provider and requested provider, and cc [facility]rology Practice Urgent Add-On Pool. If both providers approve the transfer of care, the practice will proceed with scheduling.

## 2025-02-24 ENCOUNTER — TELEPHONE (OUTPATIENT)
Age: 75
End: 2025-02-24

## 2025-02-24 ENCOUNTER — OFFICE VISIT (OUTPATIENT)
Dept: INTERNAL MEDICINE CLINIC | Facility: CLINIC | Age: 75
End: 2025-02-24
Payer: COMMERCIAL

## 2025-02-24 VITALS
TEMPERATURE: 97.5 F | SYSTOLIC BLOOD PRESSURE: 128 MMHG | BODY MASS INDEX: 24.92 KG/M2 | OXYGEN SATURATION: 99 % | DIASTOLIC BLOOD PRESSURE: 64 MMHG | HEART RATE: 64 BPM | WEIGHT: 184 LBS | HEIGHT: 72 IN | RESPIRATION RATE: 16 BRPM

## 2025-02-24 DIAGNOSIS — J02.9 SORE THROAT: Primary | ICD-10-CM

## 2025-02-24 PROCEDURE — 99213 OFFICE O/P EST LOW 20 MIN: CPT | Performed by: INTERNAL MEDICINE

## 2025-02-24 PROCEDURE — G2211 COMPLEX E/M VISIT ADD ON: HCPCS | Performed by: INTERNAL MEDICINE

## 2025-02-24 RX ORDER — AZITHROMYCIN 250 MG/1
TABLET, FILM COATED ORAL
Qty: 6 TABLET | Refills: 0 | Status: SHIPPED | OUTPATIENT
Start: 2025-02-24 | End: 2025-02-28

## 2025-02-24 NOTE — PROGRESS NOTES
Name: Tomas Robert      : 1950      MRN: [id number]  Encounter Provider: Rinku Elizabeth MD  Encounter Date: 2025   Encounter department: [facility]IC[facility] [place]  :  Assessment & Plan  Sore throat  Not resolving and not responding to conservative measures.  Since he is going to have pending treatment for prostate cancer and also cataract surgery, will treat.  Orders:  •  azithromycin (ZITHROMAX) 250 mg tablet; Take 2 tablets today then 1 tablet daily x 4 days           History of Present Illness   Patient comes in today complaining of a sore throat for over a week.  He has tried various over-the-counter remedies.  Just not improving.  If anything, worsening.  He is concerned because he is going to undergo treatment for prostate cancer and also needs to schedule cataract surgery.      Review of Systems   Constitutional:  Negative for fever.   HENT:  Positive for sore throat. Negative for congestion.    Respiratory:  Negative for cough.        Objective   /64 (BP Location: Left arm, Patient Position: Sitting, Cuff Size: Adult)   Pulse 64   Temp 97.5 °F (36.4 °C) (Tympanic)   Resp 16   Ht 6' (1.829 m)   Wt 83.5 kg (184 lb)   SpO2 99%   BMI 24.95 kg/m²      Physical Exam  Vitals and nursing note reviewed.   Constitutional:       Appearance: He is well-developed. He is not diaphoretic.   HENT:      Right Ear: Tympanic membrane and external ear normal.      Left Ear: Tympanic membrane and external ear normal.      Nose: Nose normal.      Mouth/Throat:      Pharynx: Posterior oropharyngeal erythema present. No oropharyngeal exudate.   Eyes:      Conjunctiva/sclera: Conjunctivae normal.   Cardiovascular:      Rate and Rhythm: Normal rate and regular rhythm.      Heart sounds: Normal heart sounds.   Pulmonary:      Effort: Pulmonary effort is normal.      Breath sounds: Normal breath sounds.   Lymphadenopathy:      Cervical: Cervical adenopathy present.   Neurological:      Mental  Status: He is alert and oriented to person, place, and time.

## 2025-02-24 NOTE — TELEPHONE ENCOUNTER
WHO - patient      WHAT - sore throat/constipation    WHEN - going for 2 weeks     How Often/Duration -    Pain -    Alleviating Factors (anything they tried to use to help so far) - chloraseptic spray    Next Steps - appt schedule    Callback- patient

## 2025-03-03 DIAGNOSIS — N40.0 BENIGN PROSTATIC HYPERPLASIA, PRESENCE OF LOWER URINARY TRACT SYMPTOMS UNSPECIFIED: ICD-10-CM

## 2025-03-03 DIAGNOSIS — I10 ESSENTIAL HYPERTENSION: ICD-10-CM

## 2025-03-03 RX ORDER — TAMSULOSIN HYDROCHLORIDE 0.4 MG/1
0.4 CAPSULE ORAL
Qty: 90 CAPSULE | Refills: 1 | Status: SHIPPED | OUTPATIENT
Start: 2025-03-03

## 2025-03-03 RX ORDER — AMLODIPINE BESYLATE 10 MG/1
10 TABLET ORAL DAILY
Qty: 90 TABLET | Refills: 1 | Status: SHIPPED | OUTPATIENT
Start: 2025-03-03

## 2025-03-03 NOTE — TELEPHONE ENCOUNTER
Can we resend these 2 meds into a new mail away he uses [facility] now he says.     90 day supply with refills please     Amolodipine     Tamsulosin

## 2025-04-14 NOTE — PROGRESS NOTES
Outpatient Cardiology Note - Tomas Robert 74 y.o. male MRN: [id number]    @ Encounter: [id number]        Patient Active Problem List    Diagnosis Date Noted    Numbness and tingling 01/14/2025    TIA (transient ischemic attack) 08/12/2024    Hyperlipidemia 08/12/2024    Ataxia 05/14/2024    Claustrophobia 05/14/2024    Umbilical hernia without obstruction and without gangrene 04/16/2024    Umbilical pain 04/08/2024    Dysesthesia of multiple sites 03/13/2023    Primary hypertension 03/18/2022    Pain in left knee 01/20/2021    Colon polyps 01/11/2021    Chronic kidney disease-mineral and bone disorder 06/02/2020    CKD (chronic kidney disease) stage 3, GFR 30-59 ml/min ([facility]) 08/05/2019    Lumbar radiculopathy 04/26/2018    Erectile dysfunction 10/11/2017    Benign prostatic hyperplasia, presence of lower urinary tract symptoms unspecified 09/07/2016       Assessment:  # Stroke like symptoms  Rx: asa, plavix, statin  May be related to HTN    Studies- personally reviewed by me  EKG- SR, inf infarct pattern    Echo 7/22/24:  LVEF: 60%  RV: normal    # HTN- amlodipine 10 mg daily  # CKD3, Cr 1.3  # lower back pain  # prostate cancer    Today's Plan:  Discussed possible Zio Patch but pt did not seem to have true infarction but may have had a TIA  BP ok on amlodipine 10 mg   Vascular testing has been negative for obstruction  Asa 81 mg daily, atorvastatin 40 mg     HPI:     75 yo male is s/p hospital admit for stroke like symptoms. Leg, arm and facial numbness and decreased sensation in left leg. No motor weakness. CT head, neck and MRI brain were all normal  Echo was normal as well. He was discharged on asa and Plavix.     He was in ED 5/15 for transient dizziness and blurred vision. Of note, on both occasions he BP was around 170 mmHg.    Interim:  No new events  Mostly dealing with lower back pain  Doing intermittent fasting- lost 7 lbs  Past Medical History:   Diagnosis Date    Benign prostatic hyperplasia      Chronic kidney disease     COVID-19 03/2020    Esophageal reflux     Hypertension     Hypertensive urgency 03/26/2019    Prostate cancer (HCC)     Vertigo        Review of Systems   Constitutional:  Negative for activity change, appetite change, fatigue and unexpected weight change.   HENT:  Negative for congestion and nosebleeds.    Eyes: Negative.    Respiratory:  Negative for cough, chest tightness and shortness of breath.    Cardiovascular:  Negative for chest pain, palpitations and leg swelling.   Gastrointestinal:  Negative for abdominal distention.   Endocrine: Negative.    Genitourinary: Negative.    Musculoskeletal: Negative.    Skin: Negative.    Neurological:  Negative for dizziness, syncope and weakness.   Hematological: Negative.    Psychiatric/Behavioral: Negative.         Allergies   Allergen Reactions    Penicillin V Anaphylaxis     .    Current Outpatient Medications:     amLODIPine (NORVASC) 10 mg tablet, Take 1 tablet (10 mg total) by mouth daily, Disp: 90 tablet, Rfl: 1    lidocaine (LIDODERM) 5 %, Apply 1 patch topically over 12 hours if needed (Intermittent right low back pain with radiculopathy) Remove & Discard patch within 12 hours or as directed by MD, Disp: 30 patch, Rfl: 3    multivitamin (THERAGRAN) TABS, Take 1 tablet by mouth daily, Disp: , Rfl:     pantoprazole (PROTONIX) 40 mg tablet, TAKE 1 TABLET BY MOUTH EVERY DAY, Disp: 90 tablet, Rfl: 1    tadalafil (CIALIS) 20 MG tablet, if needed, Disp: , Rfl:     tamsulosin (FLOMAX) 0.4 mg, Take 1 capsule (0.4 mg total) by mouth daily with dinner, Disp: 90 capsule, Rfl: 1    aspirin (ECOTRIN LOW STRENGTH) 81 mg EC tablet, Take 1 tablet (81 mg total) by mouth daily (Patient not taking: Reported on 4/15/2025), Disp: 30 tablet, Rfl: 0    atorvastatin (LIPITOR) 40 mg tablet, Take 1 tablet (40 mg total) by mouth daily with dinner (Patient not taking: Reported on 2/7/2025), Disp: 30 tablet, Rfl: 0    cyclobenzaprine (FLEXERIL) 10 mg tablet, Take 1  tablet (10 mg total) by mouth if needed for muscle spasms (Patient not taking: Reported on 2/7/2025), Disp: , Rfl:     fluticasone (FLONASE) 50 mcg/act nasal spray, 1 spray into each nostril daily (Patient not taking: Reported on 2/7/2025), Disp: 16 g, Rfl: 0    sodium chloride (OCEAN) 0.65 % nasal spray, 1 spray into each nostril as needed for congestion or rhinitis (Patient not taking: Reported on 2/7/2025), Disp: 44 mL, Rfl: 0    Social History     Socioeconomic History    Marital status: /Civil Union     Spouse name: Not on file    Number of children: Not on file    Years of education: Not on file    Highest education level: Not on file   Occupational History    Occupation: Retired   Tobacco Use    Smoking status: Former     Current packs/day: 0.25     Average packs/day: 0.3 packs/day for 10.0 years (2.5 ttl pk-yrs)     Types: Cigarettes     Passive exposure: Past    Smokeless tobacco: Never    Tobacco comments:     1ppw   Vaping Use    Vaping status: Never Used   Substance and Sexual Activity    Alcohol use: Yes     Comment: socially    Drug use: No    Sexual activity: Yes     Partners: Female     Comment: denied history of high risk sexual behavior   Other Topics Concern    Not on file   Social History Narrative    No active advance directive    Always uses seat belt    Exercises occasionally, moderate    Five children    Occasional caffeine consumption      Regular dental care. Brushes teeth twice daily       Retired     Social Drivers of Health     Financial Resource Strain: Low Risk  (4/5/2023)    Overall Financial Resource Strain (CARDIA)     Difficulty of Paying Living Expenses: Not hard at all   Food Insecurity: No Food Insecurity (7/22/2024)    Nursing - Inadequate Food Risk Classification     Worried About Running Out of Food in the Last Year: Never true     Ran Out of Food in the Last Year: Never true     Ran Out of Food in the Last Year: Not on file   Transportation Needs: No Transportation  Needs (7/22/2024)    PRAPARE - Transportation     Lack of Transportation (Medical): No     Lack of Transportation (Non-Medical): No   Physical Activity: Not on file   Stress: Not on file   Social Connections: Not on file   Intimate Partner Violence: Not on file   Housing Stability: Low Risk  (7/22/2024)    Housing Stability Vital Sign     Unable to Pay for Housing in the Last Year: No     Number of Times Moved in the Last Year: 1     Homeless in the Last Year: No       Family History   Problem Relation Age of Onset    Prostate cancer Father     Prostate cancer Maternal Grandfather     Stomach cancer Maternal Aunt         malignant tumor of pharynx    Stomach cancer Maternal Uncle         malignant tumor of pharynx    Mental illness Family     Depression Family     Schizophrenia Family     Hypertension Mother     No Known Problems Sister     Dementia Sister     Hypertension Sister        Physical Exam:    Vitals: Blood pressure 110/72, pulse 64, weight 84.8 kg (187 lb), SpO2 98%., Body mass index is 25.36 kg/m².,   Wt Readings from Last 3 Encounters:   04/15/25 84.8 kg (187 lb)   02/24/25 83.5 kg (184 lb)   02/07/25 83.5 kg (184 lb)       Physical Exam:  Vitals:    04/15/25 1449   BP: 110/72   BP Location: Right arm   Patient Position: Sitting   Cuff Size: Large   Pulse: 64   SpO2: 98%   Weight: 84.8 kg (187 lb)         Physical Exam    Labs & Results:    Lab Results   Component Value Date    WBC 3.78 (L) 01/29/2025    HGB 13.8 01/29/2025    HCT 42.4 01/29/2025    MCV 91 01/29/2025     01/29/2025     Lab Results   Component Value Date    SODIUM 138 01/29/2025    K 4.2 01/29/2025     01/29/2025    CO2 28 01/29/2025    BUN 24 01/29/2025    CREATININE 1.50 (H) 01/29/2025    GLUC 92 01/14/2025    CALCIUM 9.2 01/29/2025     Lab Results   Component Value Date    BNP 25 01/09/2025      Lab Results   Component Value Date    CHOLESTEROL 167 10/04/2024    CHOLESTEROL 162 07/21/2024    CHOLESTEROL 177 04/08/2024      Lab Results   Component Value Date    HDL 77 10/04/2024    HDL 67 07/21/2024    HDL 69 04/08/2024     Lab Results   Component Value Date    TRIG 61 10/04/2024    TRIG 98 07/21/2024    TRIG 60 04/08/2024     Lab Results   Component Value Date    NONHDLC 108 04/08/2024    NONHDLC 99 10/11/2023    NONHDLC 112 07/05/2023             EKG personally reviewed by Beto Euceda DO.     Counseling / Coordination of Care  Time spent today 40 minutes.  Greater than 50% of total time was spent with the patient and / or family counseling and / or coordination of care. We discussed diagnoses, most recent studies and any changes in treatment  Thank you for the opportunity to participate in the care of this patient.    BETO EUCEDA D.O.  DIRECTOR OF HEART FAILURE/ PULMONARY HYPERTENSION  MEDICAL DIRECTOR OF LVAD PROGRAM  [facility]

## 2025-04-15 ENCOUNTER — OFFICE VISIT (OUTPATIENT)
Dept: CARDIOLOGY CLINIC | Facility: CLINIC | Age: 75
End: 2025-04-15
Payer: COMMERCIAL

## 2025-04-15 VITALS
OXYGEN SATURATION: 98 % | SYSTOLIC BLOOD PRESSURE: 110 MMHG | WEIGHT: 187 LBS | HEART RATE: 64 BPM | DIASTOLIC BLOOD PRESSURE: 72 MMHG | BODY MASS INDEX: 25.36 KG/M2

## 2025-04-15 DIAGNOSIS — I10 PRIMARY HYPERTENSION: ICD-10-CM

## 2025-04-15 DIAGNOSIS — M89.9 CHRONIC KIDNEY DISEASE-MINERAL AND BONE DISORDER: ICD-10-CM

## 2025-04-15 DIAGNOSIS — N18.9 CHRONIC KIDNEY DISEASE-MINERAL AND BONE DISORDER: ICD-10-CM

## 2025-04-15 DIAGNOSIS — G45.9 TIA (TRANSIENT ISCHEMIC ATTACK): Primary | ICD-10-CM

## 2025-04-15 DIAGNOSIS — E83.9 CHRONIC KIDNEY DISEASE-MINERAL AND BONE DISORDER: ICD-10-CM

## 2025-04-15 PROCEDURE — 99214 OFFICE O/P EST MOD 30 MIN: CPT | Performed by: INTERNAL MEDICINE

## 2025-04-18 ENCOUNTER — APPOINTMENT (OUTPATIENT)
Age: 75
End: 2025-04-18
Attending: NURSE PRACTITIONER
Payer: COMMERCIAL

## 2025-04-18 ENCOUNTER — OFFICE VISIT (OUTPATIENT)
Age: 75
End: 2025-04-18
Payer: COMMERCIAL

## 2025-04-18 VITALS
DIASTOLIC BLOOD PRESSURE: 67 MMHG | OXYGEN SATURATION: 99 % | TEMPERATURE: 97 F | HEART RATE: 63 BPM | RESPIRATION RATE: 18 BRPM | BODY MASS INDEX: 25.63 KG/M2 | SYSTOLIC BLOOD PRESSURE: 141 MMHG | WEIGHT: 189 LBS

## 2025-04-18 DIAGNOSIS — S39.012A STRAIN OF MUSCLE, FASCIA AND TENDON OF LOWER BACK, INITIAL ENCOUNTER: ICD-10-CM

## 2025-04-18 DIAGNOSIS — S39.012A STRAIN OF MUSCLE, FASCIA AND TENDON OF LOWER BACK, INITIAL ENCOUNTER: Primary | ICD-10-CM

## 2025-04-18 PROCEDURE — 72100 X-RAY EXAM L-S SPINE 2/3 VWS: CPT

## 2025-04-18 PROCEDURE — 99213 OFFICE O/P EST LOW 20 MIN: CPT | Performed by: NURSE PRACTITIONER

## 2025-04-18 PROCEDURE — S9083 URGENT CARE CENTER GLOBAL: HCPCS | Performed by: NURSE PRACTITIONER

## 2025-04-18 NOTE — PROGRESS NOTES
[facility]        NAME: Tomas Robert is a 74 y.o. male  : 1950    MRN: [id number]  DATE: 2025  TIME: 1:53 PM    Assessment and Plan   Strain of muscle, fascia and tendon of lower back, initial encounter [S39.012A]  1. Strain of muscle, fascia and tendon of lower back, initial encounter  XR spine lumbar 2 or 3 views injury    tiZANidine (ZANAFLEX) 4 mg tablet            Patient Instructions     No acute findings on x-rays, wet read  Take muscle relaxant as prescribed  May cause drowsiness  Tylenol OTC prn (can't take NSAIDs; stomach irritation)  Follow up with PCP in 3-5 days.  Proceed to  ER if symptoms worsen.    If tests have been performed at [facility], our office will contact you with results if changes need to be made to the care plan discussed with you at the visit.  [facility]    Chief Complaint     Chief Complaint   Patient presents with    Back Pain     Symptoms started 3 weeks ago, pt complains of lower back pain. Pt describes it is a burning pain, he has been seeing a chiropractor for it the past 2 weeks.          History of Present Illness       HPI  Reports low back pain. Started about 3 weeks ago. At onset, he was lifting something heavy. Started as achy pain, now is burning pain. Intermittent relief with use of Tylenol. Went to a chiropractor 2x/week for two weeks. Use the TENS unit.     Review of Systems   Review of Systems   Constitutional:  Negative for fever.   Gastrointestinal:  Negative for abdominal pain.   Genitourinary:  Negative for difficulty urinating.   Musculoskeletal:  Positive for back pain.   Skin:  Negative for rash.         Current Medications       Current Outpatient Medications:     amLODIPine (NORVASC) 10 mg tablet, Take 1 tablet (10 mg total) by mouth daily, Disp: 90 tablet, Rfl: 1    pantoprazole (PROTONIX) 40 mg tablet, TAKE 1 TABLET BY MOUTH EVERY DAY, Disp: 90 tablet, Rfl: 1    tamsulosin (FLOMAX) 0.4  mg, Take 1 capsule (0.4 mg total) by mouth daily with dinner, Disp: 90 capsule, Rfl: 1    tiZANidine (ZANAFLEX) 4 mg tablet, Take 1 tablet (4 mg total) by mouth 3 (three) times a day as needed for muscle spasms (may cause drowsiness), Disp: 20 tablet, Rfl: 0    aspirin (ECOTRIN LOW STRENGTH) 81 mg EC tablet, Take 1 tablet (81 mg total) by mouth daily (Patient not taking: Reported on 4/15/2025), Disp: 30 tablet, Rfl: 0    atorvastatin (LIPITOR) 40 mg tablet, Take 1 tablet (40 mg total) by mouth daily with dinner (Patient not taking: Reported on 2/7/2025), Disp: 30 tablet, Rfl: 0    cyclobenzaprine (FLEXERIL) 10 mg tablet, Take 1 tablet (10 mg total) by mouth if needed for muscle spasms (Patient not taking: Reported on 2/7/2025), Disp: , Rfl:     fluticasone (FLONASE) 50 mcg/act nasal spray, 1 spray into each nostril daily (Patient not taking: Reported on 2/7/2025), Disp: 16 g, Rfl: 0    lidocaine (LIDODERM) 5 %, Apply 1 patch topically over 12 hours if needed (Intermittent right low back pain with radiculopathy) Remove & Discard patch within 12 hours or as directed by MD (Patient not taking: Reported on 4/18/2025), Disp: 30 patch, Rfl: 3    multivitamin (THERAGRAN) TABS, Take 1 tablet by mouth daily (Patient not taking: Reported on 4/18/2025), Disp: , Rfl:     sodium chloride (OCEAN) 0.65 % nasal spray, 1 spray into each nostril as needed for congestion or rhinitis (Patient not taking: Reported on 2/7/2025), Disp: 44 mL, Rfl: 0    tadalafil (CIALIS) 20 MG tablet, if needed (Patient not taking: Reported on 4/18/2025), Disp: , Rfl:     Current Allergies     Allergies as of 04/18/2025 - Reviewed 04/18/2025   Allergen Reaction Noted    Penicillin v Anaphylaxis 07/06/2017            The following portions of the patient's history were reviewed and updated as appropriate: allergies, current medications, past family history, past medical history, past social history, past surgical history and problem list.     Past Medical  History:   Diagnosis Date    Benign prostatic hyperplasia     Chronic kidney disease     COVID-19 03/2020    Esophageal reflux     Hypertension     Hypertensive urgency 03/26/2019    Prostate cancer (HCC)     Vertigo        Past Surgical History:   Procedure Laterality Date    APPENDECTOMY  05/21/2015    CYST REMOVAL      Fatty cyst removed from back       Family History   Problem Relation Age of Onset    Prostate cancer Father     Prostate cancer Maternal Grandfather     Stomach cancer Maternal Aunt         malignant tumor of pharynx    Stomach cancer Maternal Uncle         malignant tumor of pharynx    Mental illness Family     Depression Family     Schizophrenia Family     Hypertension Mother     No Known Problems Sister     Dementia Sister     Hypertension Sister          Medications have been verified.        Objective   /67   Pulse 63   Temp (!) 97 °F (36.1 °C) (Tympanic)   Resp 18   Wt 85.7 kg (189 lb)   SpO2 99%   BMI 25.63 kg/m²   No LMP for male patient.       Physical Exam     Physical Exam  Constitutional:       General: He is not in acute distress.  Musculoskeletal:         General: Tenderness (with flexion and extension along the waist. Also lower back pain when standing and lifting the knees, both left and right. No weakness of the lower extremities. No foot drop) present. No swelling or deformity.   Skin:     Findings: No rash.   Neurological:      Gait: Gait normal.

## 2025-04-21 ENCOUNTER — DOCUMENTATION (OUTPATIENT)
Dept: ADMINISTRATIVE | Facility: OTHER | Age: 75
End: 2025-04-21

## 2025-04-21 NOTE — PROGRESS NOTES
Christiane ISBELL Patient Reported Team         Blood pressure elevated  Appointment department: [facility]  Appointment provider: * No providers found *  Blood pressure  04/18/25 1311 141/67  04/18/25 1309 149/71  04/21/25 10:18 AM    Patient was called after the Urgent Care visit ; a message was left for the patient to return the call    Thank you.  Alexy Carrillo MA  [facility]

## 2025-04-28 ENCOUNTER — RA CDI HCC (OUTPATIENT)
Dept: OTHER | Facility: HOSPITAL | Age: 75
End: 2025-04-28

## 2025-04-28 ENCOUNTER — TELEPHONE (OUTPATIENT)
Dept: FAMILY MEDICINE CLINIC | Facility: CLINIC | Age: 75
End: 2025-04-28

## 2025-05-02 ENCOUNTER — RA CDI HCC (OUTPATIENT)
Dept: OTHER | Facility: HOSPITAL | Age: 75
End: 2025-05-02

## 2025-05-06 RX ORDER — DIFLUPREDNATE OPHTHALMIC 0.5 MG/ML
EMULSION OPHTHALMIC
COMMUNITY
Start: 2025-04-21

## 2025-05-06 RX ORDER — MOXIFLOXACIN 5 MG/ML
SOLUTION/ DROPS OPHTHALMIC
COMMUNITY
Start: 2025-04-21

## 2025-05-09 ENCOUNTER — APPOINTMENT (OUTPATIENT)
Dept: LAB | Facility: HOSPITAL | Age: 75
End: 2025-05-09
Payer: COMMERCIAL

## 2025-05-09 ENCOUNTER — TELEPHONE (OUTPATIENT)
Age: 75
End: 2025-05-09

## 2025-05-09 ENCOUNTER — CONSULT (OUTPATIENT)
Dept: INTERNAL MEDICINE CLINIC | Facility: CLINIC | Age: 75
End: 2025-05-09
Payer: COMMERCIAL

## 2025-05-09 VITALS
HEART RATE: 64 BPM | TEMPERATURE: 97.9 F | BODY MASS INDEX: 25.47 KG/M2 | RESPIRATION RATE: 18 BRPM | WEIGHT: 188 LBS | DIASTOLIC BLOOD PRESSURE: 74 MMHG | OXYGEN SATURATION: 99 % | SYSTOLIC BLOOD PRESSURE: 124 MMHG | HEIGHT: 72 IN

## 2025-05-09 DIAGNOSIS — I10 PRIMARY HYPERTENSION: ICD-10-CM

## 2025-05-09 DIAGNOSIS — Z01.818 PRE-OP EXAMINATION: Primary | ICD-10-CM

## 2025-05-09 DIAGNOSIS — E78.2 MIXED HYPERLIPIDEMIA: ICD-10-CM

## 2025-05-09 DIAGNOSIS — H25.9 AGE-RELATED CATARACT OF BOTH EYES, UNSPECIFIED AGE-RELATED CATARACT TYPE: ICD-10-CM

## 2025-05-09 LAB
ALBUMIN SERPL BCG-MCNC: 4.4 G/DL (ref 3.5–5)
ALP SERPL-CCNC: 61 U/L (ref 34–104)
ALT SERPL W P-5'-P-CCNC: 17 U/L (ref 7–52)
ANION GAP SERPL CALCULATED.3IONS-SCNC: 5 MMOL/L (ref 4–13)
AST SERPL W P-5'-P-CCNC: 18 U/L (ref 13–39)
BASOPHILS # BLD AUTO: 0.02 THOUSANDS/ÂΜL (ref 0–0.1)
BASOPHILS NFR BLD AUTO: 1 % (ref 0–1)
BILIRUB SERPL-MCNC: 0.46 MG/DL (ref 0.2–1)
BUN SERPL-MCNC: 17 MG/DL (ref 5–25)
CALCIUM SERPL-MCNC: 9.3 MG/DL (ref 8.4–10.2)
CHLORIDE SERPL-SCNC: 106 MMOL/L (ref 96–108)
CHOLEST SERPL-MCNC: 187 MG/DL (ref ?–200)
CO2 SERPL-SCNC: 28 MMOL/L (ref 21–32)
CREAT SERPL-MCNC: 1.26 MG/DL (ref 0.6–1.3)
EOSINOPHIL # BLD AUTO: 0.05 THOUSAND/ÂΜL (ref 0–0.61)
EOSINOPHIL NFR BLD AUTO: 1 % (ref 0–6)
ERYTHROCYTE [DISTWIDTH] IN BLOOD BY AUTOMATED COUNT: 13.3 % (ref 11.6–15.1)
GFR SERPL CREATININE-BSD FRML MDRD: 55 ML/MIN/1.73SQ M
GLUCOSE P FAST SERPL-MCNC: 87 MG/DL (ref 65–99)
HCT VFR BLD AUTO: 41.1 % (ref 36.5–49.3)
HDLC SERPL-MCNC: 70 MG/DL
HGB BLD-MCNC: 13.8 G/DL (ref 12–17)
IMM GRANULOCYTES # BLD AUTO: 0.01 THOUSAND/UL (ref 0–0.2)
IMM GRANULOCYTES NFR BLD AUTO: 0 % (ref 0–2)
LDLC SERPL CALC-MCNC: 104 MG/DL (ref 0–100)
LYMPHOCYTES # BLD AUTO: 1.14 THOUSANDS/ÂΜL (ref 0.6–4.47)
LYMPHOCYTES NFR BLD AUTO: 33 % (ref 14–44)
MCH RBC QN AUTO: 30.1 PG (ref 26.8–34.3)
MCHC RBC AUTO-ENTMCNC: 33.6 G/DL (ref 31.4–37.4)
MCV RBC AUTO: 90 FL (ref 82–98)
MONOCYTES # BLD AUTO: 0.23 THOUSAND/ÂΜL (ref 0.17–1.22)
MONOCYTES NFR BLD AUTO: 7 % (ref 4–12)
NEUTROPHILS # BLD AUTO: 2 THOUSANDS/ÂΜL (ref 1.85–7.62)
NEUTS SEG NFR BLD AUTO: 58 % (ref 43–75)
NONHDLC SERPL-MCNC: 117 MG/DL
NRBC BLD AUTO-RTO: 0 /100 WBCS
PLATELET # BLD AUTO: 221 THOUSANDS/UL (ref 149–390)
PMV BLD AUTO: 10.4 FL (ref 8.9–12.7)
POTASSIUM SERPL-SCNC: 4.7 MMOL/L (ref 3.5–5.3)
PROT SERPL-MCNC: 7.4 G/DL (ref 6.4–8.4)
RBC # BLD AUTO: 4.58 MILLION/UL (ref 3.88–5.62)
SODIUM SERPL-SCNC: 139 MMOL/L (ref 135–147)
TRIGL SERPL-MCNC: 67 MG/DL (ref ?–150)
WBC # BLD AUTO: 3.45 THOUSAND/UL (ref 4.31–10.16)

## 2025-05-09 PROCEDURE — 85025 COMPLETE CBC W/AUTO DIFF WBC: CPT

## 2025-05-09 PROCEDURE — 36415 COLL VENOUS BLD VENIPUNCTURE: CPT

## 2025-05-09 PROCEDURE — G2211 COMPLEX E/M VISIT ADD ON: HCPCS

## 2025-05-09 PROCEDURE — 99214 OFFICE O/P EST MOD 30 MIN: CPT

## 2025-05-09 PROCEDURE — 80053 COMPREHEN METABOLIC PANEL: CPT

## 2025-05-09 RX ORDER — NAPROXEN SODIUM 220 MG/1
220 TABLET, FILM COATED ORAL
COMMUNITY
End: 2025-05-15 | Stop reason: ALTCHOICE

## 2025-05-09 NOTE — TELEPHONE ENCOUNTER
LM stating that it should be ok to take AZO, per Dr Pyle asked pt to call us back if any questions or concerns.

## 2025-05-09 NOTE — PROGRESS NOTES
Pre-operative Clearance  Name: Tomas Robert      : 1950      MRN: [id number]  Encounter Provider: IRMA Shaver  Encounter Date: 2025   Encounter department: [facility] INTERNAL MEDICINE [place]    :  Assessment & Plan  Pre-op examination  Patient will be having bilateral cataract removal at [facility] by Dr. Dunbar. The procedure dates are  for the right eye and 6/3 for the left eye. The patient already has his eyedrops. EKG completed in January reviewed reading normal sinus rhythm. No concerns at this time. Patient is cleared for this procedure without further evaluation.       Age-related cataract of both eyes, unspecified age-related cataract type  Progressive. Will be having both removed.           Pre-operative Clearance:     Revised Cardiac Risk Index:  RCI RISK CLASS II (1 risk factor, risk of major cardiac complications approximately 1.3%)    Clearance:  Patient is medically optimized (CLEARED) for proposed surgery without any additional cardiac testing.      Medication Instructions:   - Alpha-1 Adrenergic Blocker (ie, tamsulosin, doxazosin, alfusozin): Continue to take this medication on your normal schedule.  - Alpha-2 Adrenergic Agonist (ie, clonidine, tizanidine, methyldopa): Continue to take this medication on your normal schedule.  - Calcium channel blockers: Continue to take this medication on your normal schedule.  - Hyperlipidemia meds: Continue to take this medication on your normal schedule.      Depression Screening and Follow-up Plan: Patient was screened for depression during today's encounter. They screened negative with a PHQ-2 score of 0.        History of Present Illness   {?Quick Links Encounters * My Last Note * Last Note in Specialty * Snapshot * Since Last Visit * History :70106}  Pre-Op Examination     Surgery: cataract removal   Anticipated Date of Surgery: right eye  and left eye 6/3   Surgeon: Dr. Dunbar     Previous history of  bleeding disorders or clots?: No    Previous Anesthesia reaction?: No    Prolonged steroid use in the last 6 months?: No      Assessment of Cardiac Risk:   - Unstable or severe angina or MI in the last 6 weeks or history of stent placement in the last year?: No    - Decompensated heart failure (e.g. New onset heart failure, NYHA  Class IV heart failure, or worsening existing heart failure)?: No    - Significant arrhythmias such as high grade AV block, symptomatic ventricular arrhythmia, newly recognized ventricular tachycardia, supraventricular tachycardia with resting heart rate >100, or symptomatic bradycardia?: No    - Severe heart valve disease including aortic stenosis or symptomatic mitral stenosis?: No      Pre-operative Risk Factors:  - Elevated-risk surgery: No    - History of cerebrovascular disease: Yes    - History of ischemic heart disease: No    - History of congestive heart failure: No    - Pre-operative treatment with insulin: No    - Pre-operative creatinine >2 mg/dL: No      Review of Systems   Constitutional:  Negative for chills and fever.   HENT:  Negative for congestion, ear pain, rhinorrhea, sinus pressure and sore throat.    Eyes:  Positive for visual disturbance. Negative for pain.   Respiratory:  Negative for cough, chest tightness and shortness of breath.    Cardiovascular:  Negative for chest pain, palpitations and leg swelling.   Gastrointestinal:  Negative for abdominal pain, constipation, diarrhea, nausea and vomiting.   Endocrine: Negative.    Genitourinary:  Negative for dysuria, frequency and hematuria.   Musculoskeletal:  Negative for arthralgias, back pain and myalgias.   Skin:  Negative for color change and rash.   Allergic/Immunologic: Negative.    Neurological:  Negative for dizziness, seizures, syncope, light-headedness and headaches.   Hematological: Negative.    Psychiatric/Behavioral: Negative.     All other systems reviewed and are negative.    Past Medical History   Past  Medical History:   Diagnosis Date   • Benign prostatic hyperplasia    • Chronic kidney disease    • COVID-19 03/2020   • Esophageal reflux    • Hypertension    • Hypertensive urgency 03/26/2019   • Prostate cancer (HCC)    • Vertigo      Past Surgical History:   Procedure Laterality Date   • APPENDECTOMY  05/21/2015   • CYST REMOVAL      Fatty cyst removed from back     Family History   Problem Relation Age of Onset   • Prostate cancer Father    • Prostate cancer Maternal Grandfather    • Stomach cancer Maternal Aunt         malignant tumor of pharynx   • Stomach cancer Maternal Uncle         malignant tumor of pharynx   • Mental illness Family    • Depression Family    • Schizophrenia Family    • Hypertension Mother    • No Known Problems Sister    • Dementia Sister    • Hypertension Sister      Social History     Tobacco Use   • Smoking status: Former     Current packs/day: 0.25     Average packs/day: 0.3 packs/day for 10.0 years (2.5 ttl pk-yrs)     Types: Cigarettes     Passive exposure: Past   • Smokeless tobacco: Never   • Tobacco comments:     1ppw   Vaping Use   • Vaping status: Never Used   Substance and Sexual Activity   • Alcohol use: Yes     Comment: socially   • Drug use: No   • Sexual activity: Yes     Partners: Female     Comment: denied history of high risk sexual behavior     Current Outpatient Medications on File Prior to Visit   Medication Sig   • amLODIPine (NORVASC) 10 mg tablet Take 1 tablet (10 mg total) by mouth daily   • Difluprednate 0.05 % EMUL INSTILL 1 DROP INTO THE RIGHT EYE 4 TIMES A DAY. FOR USE AFTER SURGERY.   • moxifloxacin (VIGAMOX) 0.5 % ophthalmic solution INSTILL 1 DROP INTO THE RIGHT EYE 4 TIMES A DAY AS DIRECTED. FOR USE BEFORE AND AFTER SURGERY.   • aspirin (ECOTRIN LOW STRENGTH) 81 mg EC tablet Take 1 tablet (81 mg total) by mouth daily (Patient not taking: Reported on 4/15/2025)   • atorvastatin (LIPITOR) 40 mg tablet Take 1 tablet (40 mg total) by mouth daily with dinner  (Patient not taking: Reported on 2/7/2025)   • cyclobenzaprine (FLEXERIL) 10 mg tablet Take 1 tablet (10 mg total) by mouth if needed for muscle spasms (Patient not taking: Reported on 2/7/2025)   • fluticasone (FLONASE) 50 mcg/act nasal spray 1 spray into each nostril daily (Patient not taking: Reported on 2/7/2025)   • lidocaine (LIDODERM) 5 % Apply 1 patch topically over 12 hours if needed (Intermittent right low back pain with radiculopathy) Remove & Discard patch within 12 hours or as directed by MD (Patient not taking: Reported on 4/18/2025)   • multivitamin (THERAGRAN) TABS Take 1 tablet by mouth daily (Patient not taking: Reported on 4/18/2025)   • naproxen sodium (ALEVE) 220 MG tablet Take 220 mg by mouth (Patient not taking: Reported on 5/9/2025)   • pantoprazole (PROTONIX) 40 mg tablet TAKE 1 TABLET BY MOUTH EVERY DAY (Patient not taking: Reported on 5/9/2025)   • sodium chloride (OCEAN) 0.65 % nasal spray 1 spray into each nostril as needed for congestion or rhinitis (Patient not taking: Reported on 2/7/2025)   • tadalafil (CIALIS) 20 MG tablet if needed (Patient not taking: Reported on 4/18/2025)   • tamsulosin (FLOMAX) 0.4 mg Take 1 capsule (0.4 mg total) by mouth daily with dinner (Patient not taking: Reported on 5/9/2025)   • tiZANidine (ZANAFLEX) 4 mg tablet Take 1 tablet (4 mg total) by mouth 3 (three) times a day as needed for muscle spasms (may cause drowsiness) (Patient not taking: Reported on 5/9/2025)     Allergies   Allergen Reactions   • Penicillin V Anaphylaxis     Objective {?Quick Links Trend Vitals * Enter New Vitals * Results Review * Timeline (Adult) * Labs * Imaging * Cardiology * Procedures * Lung Cancer Screening * Surgical eConsent :68556}  /74 (BP Location: Left arm, Patient Position: Sitting, Cuff Size: Standard)   Pulse 64   Temp 97.9 °F (36.6 °C) (Tympanic)   Resp 18   Ht 6' (1.829 m)   Wt 85.3 kg (188 lb)   SpO2 99%   BMI 25.50 kg/m²     Physical Exam  Vitals and  nursing note reviewed.   Constitutional:       General: He is not in acute distress.     Appearance: He is well-developed.   Neck:      Vascular: No carotid bruit.   Cardiovascular:      Rate and Rhythm: Normal rate and regular rhythm.      Pulses: Normal pulses.      Heart sounds: Normal heart sounds. No murmur heard.  Pulmonary:      Effort: Pulmonary effort is normal. No respiratory distress.      Breath sounds: Normal breath sounds.   Abdominal:      General: Bowel sounds are normal.      Palpations: Abdomen is soft.      Tenderness: There is no abdominal tenderness.   Musculoskeletal:         General: No swelling.      Cervical back: Normal range of motion and neck supple.   Lymphadenopathy:      Cervical: No cervical adenopathy.   Skin:     General: Skin is warm and dry.   Neurological:      Mental Status: He is alert and oriented to person, place, and time.   Psychiatric:         Mood and Affect: Mood normal.           IRMA Shaver

## 2025-05-09 NOTE — TELEPHONE ENCOUNTER
Patient called stating another one of his doctors told him to take otc AZO for about 1 month. He read the box and it mentions not taking if you have kidney disease. Patient would like to know if this is okay for him to take?  Please advise (patient requesting call back today)

## 2025-05-15 ENCOUNTER — OFFICE VISIT (OUTPATIENT)
Dept: INTERNAL MEDICINE CLINIC | Facility: CLINIC | Age: 75
End: 2025-05-15
Payer: COMMERCIAL

## 2025-05-15 VITALS
WEIGHT: 188 LBS | HEART RATE: 70 BPM | HEIGHT: 72 IN | BODY MASS INDEX: 25.47 KG/M2 | OXYGEN SATURATION: 98 % | SYSTOLIC BLOOD PRESSURE: 122 MMHG | DIASTOLIC BLOOD PRESSURE: 80 MMHG

## 2025-05-15 DIAGNOSIS — I10 PRIMARY HYPERTENSION: ICD-10-CM

## 2025-05-15 DIAGNOSIS — Z00.00 MEDICARE ANNUAL WELLNESS VISIT, SUBSEQUENT: Primary | ICD-10-CM

## 2025-05-15 DIAGNOSIS — C61 PROSTATE CANCER (HCC): ICD-10-CM

## 2025-05-15 DIAGNOSIS — E78.2 MIXED HYPERLIPIDEMIA: ICD-10-CM

## 2025-05-15 DIAGNOSIS — N18.31 STAGE 3A CHRONIC KIDNEY DISEASE (HCC): ICD-10-CM

## 2025-05-15 PROCEDURE — 99214 OFFICE O/P EST MOD 30 MIN: CPT | Performed by: PHYSICIAN ASSISTANT

## 2025-05-15 PROCEDURE — G0439 PPPS, SUBSEQ VISIT: HCPCS | Performed by: PHYSICIAN ASSISTANT

## 2025-05-15 PROCEDURE — G2211 COMPLEX E/M VISIT ADD ON: HCPCS | Performed by: PHYSICIAN ASSISTANT

## 2025-05-15 NOTE — ASSESSMENT & PLAN NOTE
Labs are stable.  Staying hydrated.  Avoiding nephrotoxic substances.  Follows with nephrology.    Lab Results   Component Value Date    EGFR 55 05/09/2025    EGFR 45 01/29/2025    EGFR 51 01/14/2025    CREATININE 1.26 05/09/2025    CREATININE 1.50 (H) 01/29/2025    CREATININE 1.34 (H) 01/14/2025

## 2025-05-15 NOTE — ASSESSMENT & PLAN NOTE
Has been stable on amlodipine 10 mg daily.  Eduarda cp, palp, sob, edema, HA, dizziness, diaphoresis, syncope, visual disturbance.  We will continue to follow with patient.

## 2025-05-15 NOTE — ASSESSMENT & PLAN NOTE
Currently not on any lipid-lowering agent.  LDL borderline.  Low-cholesterol diet encouraged.  We will continue to monitor with him.

## 2025-05-15 NOTE — PROGRESS NOTES
Name: Tomas Robert      : 1950      MRN: [id number]  Encounter Provider: Nino Hancock PA-C  Encounter Date: 5/15/2025   Encounter department: [facility] INTERNAL MEDICINE [place]  :  Assessment & Plan      Depression Screening and Follow-up Plan: Patient was screened for depression during today's encounter. They screened negative with a PHQ-2 score of 0.        Preventive health issues were discussed with patient, and age appropriate screening tests were ordered as noted in patient's After Visit Summary. Personalized health advice and appropriate referrals for health education or preventive services given if needed, as noted in patient's After Visit Summary.    History of Present Illness     74-year-old male presents for his annual Medicare wellness visit.  Recently has undergone treatment for prostate cancer.  Currently having some dysuria secondary to this.  Following with urology.  Also undergoing cataract extractions.  Recently had surgery on his right eye.  Has upcoming surgery for the left eye as scheduled.  Right now he is not able to go to the gym secondary to this.  Somewhat frustrating to him as he goes to the gym on a regular basis.    Labs reviewed with patient.    Patient's chronic problems have been reviewed.    EMR has been reviewed, clarified, and updated with patient today.       Patient Care Team:  Nino Hancock PA-C as PCP - General (Internal Medicine)  MD Rocael Segal MD    Review of Systems   Constitutional:  Negative for activity change, chills, fatigue and fever.   HENT:  Negative for congestion.    Eyes:  Negative for discharge.   Respiratory:  Negative for cough, chest tightness and shortness of breath.    Cardiovascular:  Negative for chest pain, palpitations and leg swelling.   Gastrointestinal:  Positive for constipation. Negative for abdominal pain, blood in stool, diarrhea, nausea and vomiting.   Endocrine: Negative for polydipsia, polyphagia and  polyuria.   Genitourinary:  Positive for dysuria. Negative for difficulty urinating, flank pain and hematuria.   Musculoskeletal:  Negative for arthralgias and myalgias.   Skin:  Negative for rash.   Allergic/Immunologic: Negative for immunocompromised state.   Neurological:  Negative for dizziness, syncope, weakness, light-headedness and headaches.   Hematological:  Negative for adenopathy. Does not bruise/bleed easily.   Psychiatric/Behavioral:  Negative for dysphoric mood, sleep disturbance and suicidal ideas. The patient is not nervous/anxious.      Medical History Reviewed by provider this encounter:       Annual Wellness Visit Questionnaire   Tomas is here for his Subsequent Wellness visit. Last Medicare Wellness visit information reviewed, patient interviewed and updates made to the record today.      Health Risk Assessment:   Patient rates overall health as good. Patient feels that their physical health rating is slightly worse. Patient is satisfied with their life. Eyesight was rated as same. Hearing was rated as same. Patient feels that their emotional and mental health rating is same. Patients states they are never, rarely angry. Patient states they are never, rarely unusually tired/fatigued. Pain experienced in the last 7 days has been some. Patient's pain rating has been 10/10. Patient states that he has experienced no weight loss or gain in last 6 months. Dysuria post urologic procedure    Depression Screening:   PHQ-2 Score: 0      Fall Risk Screening:   In the past year, patient has experienced: no history of falling in past year      Home Safety:  Patient does not have trouble with stairs inside or outside of their home. Patient has working smoke alarms and has working carbon monoxide detector. Home safety hazards include: none.     Nutrition:   Current diet is Regular and Limited junk food.     Medications:   Patient is not currently taking any over-the-counter supplements. Patient is able to  manage medications.     Activities of Daily Living (ADLs)/Instrumental Activities of Daily Living (IADLs):   Walk and transfer into and out of bed and chair?: Yes  Dress and groom yourself?: Yes    Bathe or shower yourself?: Yes    Feed yourself? Yes  Do your laundry/housekeeping?: Yes  Manage your money, pay your bills and track your expenses?: Yes  Make your own meals?: Yes    Do your own shopping?: Yes    Previous Hospitalizations:   Any hospitalizations or ED visits within the last 12 months?: Yes    How many hospitalizations have you had in the last year?: 3-4    Advance Care Planning:   Living will: Yes    Durable POA for healthcare: Yes    Advanced directive: Yes      Cognitive Screening:   Provider or family/friend/caregiver concerned regarding cognition?: No    Preventive Screenings      Cardiovascular Screening:    General: Screening Not Indicated, History Lipid Disorder and Screening Current    Due for: Lipid Panel      Diabetes Screening:     General: Screening Current      Colorectal Cancer Screening:     General: Screening Current      Prostate Cancer Screening:    General: History Prostate Cancer and Screening Current      Osteoporosis Screening:    General: Screening Not Indicated      Abdominal Aortic Aneurysm (AAA) Screening:    Risk factors include: age between 65-76 yo and tobacco use        General: Screening Not Indicated      Lung Cancer Screening:     General: Screening Not Indicated      Hepatitis C Screening:    General: Screening Current    Immunizations:  - Immunizations due: Prevnar 20 and Zoster (Shingrix)    Screening, Brief Intervention, and Referral to Treatment (SBIRT)     Screening  Typical number of drinks in a day: 0  Typical number of drinks in a week: 0  Interpretation: Low risk drinking behavior.    Single Item Drug Screening:  How often have you used an illegal drug (including marijuana) or a prescription medication for non-medical reasons in the past year? never    Single  Item Drug Screen Score: 0  Interpretation: Negative screen for possible drug use disorder    Brief Intervention  Alcohol & drug use screenings were reviewed. No concerns regarding substance use disorder identified.     Social Drivers of Health     Financial Resource Strain: Low Risk  (4/5/2023)    Overall Financial Resource Strain (CARDIA)     Difficulty of Paying Living Expenses: Not hard at all   Food Insecurity: No Food Insecurity (7/22/2024)    Nursing - Inadequate Food Risk Classification     Worried About Running Out of Food in the Last Year: Never true     Ran Out of Food in the Last Year: Never true   Transportation Needs: No Transportation Needs (7/22/2024)    PRAPARE - Transportation     Lack of Transportation (Medical): No     Lack of Transportation (Non-Medical): No   Housing Stability: Low Risk  (7/22/2024)    Housing Stability Vital Sign     Unable to Pay for Housing in the Last Year: No     Number of Times Moved in the Last Year: 1     Homeless in the Last Year: No   Utilities: Not At Risk (7/22/2024)    [facility] Utilities     Threatened with loss of utilities: No     No results found.    Objective   Ht 6' (1.829 m)   Wt 85.3 kg (188 lb)   BMI 25.50 kg/m²     Physical Exam  Vitals and nursing note reviewed.   Constitutional:       General: He is not in acute distress.     Appearance: Normal appearance. He is well-developed. He is not ill-appearing.   HENT:      Head: Normocephalic and atraumatic.      Right Ear: Tympanic membrane, ear canal and external ear normal.      Left Ear: Tympanic membrane, ear canal and external ear normal.      Nose: Nose normal.      Mouth/Throat:      Mouth: Mucous membranes are moist.      Pharynx: Oropharynx is clear. No oropharyngeal exudate or posterior oropharyngeal erythema.     Eyes:      Extraocular Movements: Extraocular movements intact.      Conjunctiva/sclera: Conjunctivae normal.      Comments: Right pupil dilated secondary to recent cataract surgery   Neck:       Thyroid: No thyromegaly.      Vascular: No carotid bruit or JVD.     Cardiovascular:      Rate and Rhythm: Normal rate and regular rhythm.      Heart sounds: No murmur heard.  Pulmonary:      Effort: Pulmonary effort is normal. No respiratory distress.      Breath sounds: Normal breath sounds.   Chest:      Chest wall: No tenderness.   Abdominal:      General: Abdomen is flat. Bowel sounds are normal. There is no distension.      Palpations: Abdomen is soft. There is no hepatomegaly, splenomegaly or mass.      Tenderness: There is no abdominal tenderness. There is no right CVA tenderness, left CVA tenderness, guarding or rebound.      Hernia: No hernia is present.   Genitourinary:     Comments: Deferred to urology    Musculoskeletal:         General: No swelling. Normal range of motion.      Cervical back: Normal range of motion and neck supple.      Right lower leg: No edema.      Left lower leg: No edema.   Lymphadenopathy:      Cervical: No cervical adenopathy.     Skin:     General: Skin is warm and dry.      Findings: No rash.     Neurological:      General: No focal deficit present.      Mental Status: He is alert and oriented to person, place, and time. Mental status is at baseline.      Cranial Nerves: No cranial nerve deficit.      Sensory: No sensory deficit.      Motor: No weakness.      Coordination: Coordination normal.      Gait: Gait normal.      Deep Tendon Reflexes: Reflexes normal.     Psychiatric:         Mood and Affect: Mood normal.         Behavior: Behavior normal.         Thought Content: Thought content normal.         Judgment: Judgment normal.

## 2025-05-15 NOTE — PATIENT INSTRUCTIONS
General medical exam remains excellent.  Follow-up with specialist as scheduled.  Will plan follow-up with repeat labs here in 6 months, sooner as needed.  No change in current medication.          Medicare Preventive Visit Patient Instructions  Thank you for completing your Welcome to Medicare Visit or Medicare Annual Wellness Visit today. Your next wellness visit will be due in one year (5/16/2026).  The screening/preventive services that you may require over the next 5-10 years are detailed below. Some tests may not apply to you based off risk factors and/or age. Screening tests ordered at today's visit but not completed yet may show as past due. Also, please note that scanned in results may not display below.  Preventive Screenings:  Service Recommendations Previous Testing/Comments   Colorectal Cancer Screening  Colonoscopy    Fecal Occult Blood Test (FOBT)/Fecal Immunochemical Test (FIT)  Fecal DNA/Cologuard Test  Flexible Sigmoidoscopy Age: 45-75 years old   Colonoscopy: every 10 years (May be performed more frequently if at higher risk)  OR  FOBT/FIT: every 1 year  OR  Cologuard: every 3 years  OR  Sigmoidoscopy: every 5 years  Screening may be recommended earlier than age 45 if at higher risk for colorectal cancer. Also, an individualized decision between you and your healthcare provider will decide whether screening between the ages of 76-85 would be appropriate. Colonoscopy: 11/20/2019  FOBT/FIT: Not on file  Cologuard: Not on file  Sigmoidoscopy: Not on file    Screening Current     Prostate Cancer Screening Individualized decision between patient and health care provider in men between ages of 55-69   Medicare will cover every 12 months beginning on the day after your 50th birthday PSA: 5.209 ng/mL     History Prostate Cancer     Hepatitis C Screening Once for adults born between 1945 and 1965  More frequently in patients at high risk for Hepatitis C Hep C Antibody: 06/06/2019    Screening Current    Diabetes Screening 1-2 times per year if you're at risk for diabetes or have pre-diabetes Fasting glucose: 87 mg/dL (5/9/2025)  A1C: 5.7 % (5/15/2024)  Screening Current   Cholesterol Screening Once every 5 years if you don't have a lipid disorder. May order more often based on risk factors. Lipid panel: 05/09/2025  Screening Not Indicated  History Lipid Disorder      Other Preventive Screenings Covered by Medicare:  Abdominal Aortic Aneurysm (AAA) Screening: covered once if your at risk. You're considered to be at risk if you have a family history of AAA or a male between the age of 65-75 who smoking at least 100 cigarettes in your lifetime.  Lung Cancer Screening: covers low dose CT scan once per year if you meet all of the following conditions: (1) Age 55-77; (2) No signs or symptoms of lung cancer; (3) Current smoker or have quit smoking within the last 15 years; (4) You have a tobacco smoking history of at least 20 pack years (packs per day x number of years you smoked); (5) You get a written order from a healthcare provider.  Glaucoma Screening: covered annually if you're considered high risk: (1) You have diabetes OR (2) Family history of glaucoma OR (3)  aged 50 and older OR (4)  American aged 65 and older  Osteoporosis Screening: covered every 2 years if you meet one of the following conditions: (1) Have a vertebral abnormality; (2) On glucocorticoid therapy for more than 3 months; (3) Have primary hyperparathyroidism; (4) On osteoporosis medications and need to assess response to drug therapy.  HIV Screening: covered annually if you're between the age of 15-65. Also covered annually if you are younger than 15 and older than 65 with risk factors for HIV infection. For pregnant patients, it is covered up to 3 times per pregnancy.    Immunizations:  Immunization Recommendations   Influenza Vaccine Annual influenza vaccination during flu season is recommended for all persons aged >= 6  months who do not have contraindications   Pneumococcal Vaccine   * Pneumococcal conjugate vaccine = PCV13 (Prevnar 13), PCV15 (Vaxneuvance), PCV20 (Prevnar 20)  * Pneumococcal polysaccharide vaccine = PPSV23 (Pneumovax) Adults 19-65 yo with certain risk factors or if 65+ yo  If never received any pneumonia vaccine: recommend Prevnar 20 (PCV20)  Give PCV20 if previously received 1 dose of PCV13 or PPSV23   Hepatitis B Vaccine 3 dose series if at intermediate or high risk (ex: diabetes, end stage renal disease, liver disease)   Respiratory syncytial virus (RSV) Vaccine - COVERED BY MEDICARE PART D  * RSVPreF3 (Arexvy) CDC recommends that adults 60 years of age and older may receive a single dose of RSV vaccine using shared clinical decision-making (SCDM)   Tetanus (Td) Vaccine - COST NOT COVERED BY MEDICARE PART B Following completion of primary series, a booster dose should be given every 10 years to maintain immunity against tetanus. Td may also be given as tetanus wound prophylaxis.   Tdap Vaccine - COST NOT COVERED BY MEDICARE PART B Recommended at least once for all adults. For pregnant patients, recommended with each pregnancy.   Shingles Vaccine (Shingrix) - COST NOT COVERED BY MEDICARE PART B  2 shot series recommended in those 19 years and older who have or will have weakened immune systems or those 50 years and older     Health Maintenance Due:      Topic Date Due    Colorectal Cancer Screening  11/20/2022    Hepatitis C Screening  Completed     Immunizations Due:      Topic Date Due    Pneumococcal Vaccine: 65+ Years (1 of 1 - PCV) Never done    COVID-19 Vaccine (3 - 2024-25 season) 09/01/2024     Advance Directives   What are advance directives?  Advance directives are legal documents that state your wishes and plans for medical care. These plans are made ahead of time in case you lose your ability to make decisions for yourself. Advance directives can apply to any medical decision, such as the treatments  you want, and if you want to donate organs.   What are the types of advance directives?  There are many types of advance directives, and each state has rules about how to use them. You may choose a combination of any of the following:  Living will:  This is a written record of the treatment you want. You can also choose which treatments you do not want, which to limit, and which to stop at a certain time. This includes surgery, medicine, IV fluid, and tube feedings.   Durable power of  for healthcare (DPAHC):  This is a written record that states who you want to make healthcare choices for you when you are unable to make them for yourself. This person, called a proxy, is usually a family member or a friend. You may choose more than 1 proxy.  Do not resuscitate (DNR) order:  A DNR order is used in case your heart stops beating or you stop breathing. It is a request not to have certain forms of treatment, such as CPR. A DNR order may be included in other types of advance directives.  Medical directive:  This covers the care that you want if you are in a coma, near death, or unable to make decisions for yourself. You can list the treatments you want for each condition. Treatment may include pain medicine, surgery, blood transfusions, dialysis, IV or tube feedings, and a ventilator (breathing machine).  Values history:  This document has questions about your views, beliefs, and how you feel and think about life. This information can help others choose the care that you would choose.  Why are advance directives important?  An advance directive helps you control your care. Although spoken wishes may be used, it is better to have your wishes written down. Spoken wishes can be misunderstood, or not followed. Treatments may be given even if you do not want them. An advance directive may make it easier for your family to make difficult choices about your care.   Weight Management   Why it is important to manage your  weight:  Being overweight increases your risk of health conditions such as heart disease, high blood pressure, type 2 diabetes, and certain types of cancer. It can also increase your risk for osteoarthritis, sleep apnea, and other respiratory problems. Aim for a slow, steady weight loss. Even a small amount of weight loss can lower your risk of health problems.  How to lose weight safely:  A safe and healthy way to lose weight is to eat fewer calories and get regular exercise. You can lose up about 1 pound a week by decreasing the number of calories you eat by 500 calories each day.   Healthy meal plan for weight management:  A healthy meal plan includes a variety of foods, contains fewer calories, and helps you stay healthy. A healthy meal plan includes the following:  Eat whole-grain foods more often.  A healthy meal plan should contain fiber. Fiber is the part of grains, fruits, and vegetables that is not broken down by your body. Whole-grain foods are healthy and provide extra fiber in your diet. Some examples of whole-grain foods are whole-wheat breads and pastas, oatmeal, brown rice, and bulgur.  Eat a variety of vegetables every day.  Include dark, leafy greens such as spinach, kale, dena greens, and mustard greens. Eat yellow and orange vegetables such as carrots, sweet potatoes, and winter squash.   Eat a variety of fruits every day.  Choose fresh or canned fruit (canned in its own juice or light syrup) instead of juice. Fruit juice has very little or no fiber.  Eat low-fat dairy foods.  Drink fat-free (skim) milk or 1% milk. Eat fat-free yogurt and low-fat cottage cheese. Try low-fat cheeses such as mozzarella and other reduced-fat cheeses.  Choose meat and other protein foods that are low in fat.  Choose beans or other legumes such as split peas or lentils. Choose fish, skinless poultry (chicken or turkey), or lean cuts of red meat (beef or pork). Before you cook meat or poultry, cut off any visible  fat.   Use less fat and oil.  Try baking foods instead of frying them. Add less fat, such as margarine, sour cream, regular salad dressing and mayonnaise to foods. Eat fewer high-fat foods. Some examples of high-fat foods include french fries, doughnuts, ice cream, and cakes.  Eat fewer sweets.  Limit foods and drinks that are high in sugar. This includes candy, cookies, regular soda, and sweetened drinks.  Exercise:  Exercise at least 30 minutes per day on most days of the week. Some examples of exercise include walking, biking, dancing, and swimming. You can also fit in more physical activity by taking the stairs instead of the elevator or parking farther away from stores. Ask your healthcare provider about the best exercise plan for you.    © Copyright [employer] 2018 Information is for End User's use only and may not be sold, redistributed or otherwise used for commercial purposes. All illustrations and images included in CareNotes® are the copyrighted property of [employer] or [employer]

## 2025-06-02 ENCOUNTER — APPOINTMENT (EMERGENCY)
Dept: CT IMAGING | Facility: HOSPITAL | Age: 75
End: 2025-06-02
Payer: COMMERCIAL

## 2025-06-02 ENCOUNTER — HOSPITAL ENCOUNTER (EMERGENCY)
Facility: HOSPITAL | Age: 75
Discharge: HOME | End: 2025-06-02
Payer: COMMERCIAL

## 2025-06-02 VITALS
HEART RATE: 62 BPM | DIASTOLIC BLOOD PRESSURE: 88 MMHG | RESPIRATION RATE: 18 BRPM | SYSTOLIC BLOOD PRESSURE: 175 MMHG | OXYGEN SATURATION: 96 % | TEMPERATURE: 97.7 F

## 2025-06-02 DIAGNOSIS — K59.00 CONSTIPATION: ICD-10-CM

## 2025-06-02 DIAGNOSIS — E83.42 HYPOMAGNESEMIA: ICD-10-CM

## 2025-06-02 DIAGNOSIS — I10 PRIMARY HYPERTENSION: Primary | ICD-10-CM

## 2025-06-02 DIAGNOSIS — E83.51 HYPOCALCEMIA: ICD-10-CM

## 2025-06-02 DIAGNOSIS — E87.8 HYPERCHLOREMIA: ICD-10-CM

## 2025-06-02 DIAGNOSIS — D72.819 LEUKOPENIA: ICD-10-CM

## 2025-06-02 LAB
ALBUMIN SERPL BCG-MCNC: 3.4 G/DL (ref 3.5–5)
ALP SERPL-CCNC: 42 U/L (ref 34–104)
ALT SERPL W P-5'-P-CCNC: 17 U/L (ref 7–52)
ANION GAP SERPL CALCULATED.3IONS-SCNC: 4 MMOL/L (ref 4–13)
AST SERPL W P-5'-P-CCNC: 23 U/L (ref 13–39)
BASOPHILS # BLD AUTO: 0.01 THOUSANDS/ÂΜL (ref 0–0.1)
BASOPHILS NFR BLD AUTO: 0 % (ref 0–1)
BILIRUB SERPL-MCNC: 0.53 MG/DL (ref 0.2–1)
BILIRUB UR QL STRIP: NEGATIVE
BUN SERPL-MCNC: 16 MG/DL (ref 5–25)
CALCIUM ALBUM COR SERPL-MCNC: 7.7 MG/DL (ref 8.3–10.1)
CALCIUM SERPL-MCNC: 7.2 MG/DL (ref 8.4–10.2)
CHLORIDE SERPL-SCNC: 114 MMOL/L (ref 96–108)
CLARITY UR: CLEAR
CO2 SERPL-SCNC: 22 MMOL/L (ref 21–32)
COLOR UR: YELLOW
CREAT SERPL-MCNC: 0.89 MG/DL (ref 0.6–1.3)
EOSINOPHIL # BLD AUTO: 0.05 THOUSAND/ÂΜL (ref 0–0.61)
EOSINOPHIL NFR BLD AUTO: 1 % (ref 0–6)
ERYTHROCYTE [DISTWIDTH] IN BLOOD BY AUTOMATED COUNT: 13.5 % (ref 11.6–15.1)
GFR SERPL CREATININE-BSD FRML MDRD: 84 ML/MIN/1.73SQ M
GLUCOSE SERPL-MCNC: 64 MG/DL (ref 65–140)
GLUCOSE UR STRIP-MCNC: NEGATIVE MG/DL
HCT VFR BLD AUTO: 39.6 % (ref 36.5–49.3)
HGB BLD-MCNC: 13.3 G/DL (ref 12–17)
HGB UR QL STRIP.AUTO: NEGATIVE
IMM GRANULOCYTES # BLD AUTO: 0 THOUSAND/UL (ref 0–0.2)
IMM GRANULOCYTES NFR BLD AUTO: 0 % (ref 0–2)
KETONES UR STRIP-MCNC: NEGATIVE MG/DL
LACTATE SERPL-SCNC: 0.8 MMOL/L (ref 0.5–2)
LEUKOCYTE ESTERASE UR QL STRIP: NEGATIVE
LIPASE SERPL-CCNC: 12 U/L (ref 11–82)
LYMPHOCYTES # BLD AUTO: 1.36 THOUSANDS/ÂΜL (ref 0.6–4.47)
LYMPHOCYTES NFR BLD AUTO: 36 % (ref 14–44)
MAGNESIUM SERPL-MCNC: 1.6 MG/DL (ref 1.9–2.7)
MCH RBC QN AUTO: 30 PG (ref 26.8–34.3)
MCHC RBC AUTO-ENTMCNC: 33.6 G/DL (ref 31.4–37.4)
MCV RBC AUTO: 89 FL (ref 82–98)
MONOCYTES # BLD AUTO: 0.3 THOUSAND/ÂΜL (ref 0.17–1.22)
MONOCYTES NFR BLD AUTO: 8 % (ref 4–12)
NEUTROPHILS # BLD AUTO: 2.03 THOUSANDS/ÂΜL (ref 1.85–7.62)
NEUTS SEG NFR BLD AUTO: 55 % (ref 43–75)
NITRITE UR QL STRIP: NEGATIVE
NRBC BLD AUTO-RTO: 0 /100 WBCS
PH UR STRIP.AUTO: 5.5 [PH]
PLATELET # BLD AUTO: 196 THOUSANDS/UL (ref 149–390)
PMV BLD AUTO: 11 FL (ref 8.9–12.7)
POTASSIUM SERPL-SCNC: 3.8 MMOL/L (ref 3.5–5.3)
PROT SERPL-MCNC: 5.5 G/DL (ref 6.4–8.4)
PROT UR STRIP-MCNC: NEGATIVE MG/DL
RBC # BLD AUTO: 4.44 MILLION/UL (ref 3.88–5.62)
SODIUM SERPL-SCNC: 140 MMOL/L (ref 135–147)
SP GR UR STRIP.AUTO: 1.02 (ref 1–1.03)
TSH SERPL DL<=0.05 MIU/L-ACNC: 0.67 UIU/ML (ref 0.45–4.5)
UROBILINOGEN UR STRIP-ACNC: <2 MG/DL
WBC # BLD AUTO: 3.75 THOUSAND/UL (ref 4.31–10.16)

## 2025-06-02 PROCEDURE — 99285 EMERGENCY DEPT VISIT HI MDM: CPT

## 2025-06-02 PROCEDURE — 80053 COMPREHEN METABOLIC PANEL: CPT

## 2025-06-02 PROCEDURE — 99284 EMERGENCY DEPT VISIT MOD MDM: CPT

## 2025-06-02 PROCEDURE — 96368 THER/DIAG CONCURRENT INF: CPT

## 2025-06-02 PROCEDURE — 83690 ASSAY OF LIPASE: CPT

## 2025-06-02 PROCEDURE — 83605 ASSAY OF LACTIC ACID: CPT

## 2025-06-02 PROCEDURE — 74177 CT ABD & PELVIS W/CONTRAST: CPT

## 2025-06-02 PROCEDURE — 85025 COMPLETE CBC W/AUTO DIFF WBC: CPT

## 2025-06-02 PROCEDURE — 83735 ASSAY OF MAGNESIUM: CPT

## 2025-06-02 PROCEDURE — 84443 ASSAY THYROID STIM HORMONE: CPT

## 2025-06-02 PROCEDURE — 81003 URINALYSIS AUTO W/O SCOPE: CPT

## 2025-06-02 PROCEDURE — 96365 THER/PROPH/DIAG IV INF INIT: CPT

## 2025-06-02 PROCEDURE — 36415 COLL VENOUS BLD VENIPUNCTURE: CPT

## 2025-06-02 RX ORDER — DOCUSATE SODIUM 100 MG/1
100 CAPSULE, LIQUID FILLED ORAL EVERY 12 HOURS
Qty: 28 CAPSULE | Refills: 0 | Status: SHIPPED | OUTPATIENT
Start: 2025-06-02 | End: 2025-06-16

## 2025-06-02 RX ORDER — MAGNESIUM CARB/ALUMINUM HYDROX 105-160MG
296 TABLET,CHEWABLE ORAL ONCE
Qty: 296 ML | Refills: 0 | Status: SHIPPED | OUTPATIENT
Start: 2025-06-02 | End: 2025-06-02

## 2025-06-02 RX ORDER — POLYETHYLENE GLYCOL 3350 17 G/17G
17 POWDER, FOR SOLUTION ORAL DAILY
Qty: 119 G | Refills: 0 | Status: SHIPPED | OUTPATIENT
Start: 2025-06-02 | End: 2025-06-09

## 2025-06-02 RX ORDER — CALCIUM GLUCONATE 20 MG/ML
2 INJECTION, SOLUTION INTRAVENOUS ONCE
Status: COMPLETED | OUTPATIENT
Start: 2025-06-02 | End: 2025-06-02

## 2025-06-02 RX ORDER — MAGNESIUM SULFATE HEPTAHYDRATE 40 MG/ML
2 INJECTION, SOLUTION INTRAVENOUS ONCE
Status: COMPLETED | OUTPATIENT
Start: 2025-06-02 | End: 2025-06-02

## 2025-06-02 RX ADMIN — MAGNESIUM SULFATE HEPTAHYDRATE 2 G: 40 INJECTION, SOLUTION INTRAVENOUS at 18:28

## 2025-06-02 RX ADMIN — IOHEXOL 100 ML: 350 INJECTION, SOLUTION INTRAVENOUS at 17:16

## 2025-06-02 RX ADMIN — CALCIUM GLUCONATE 2 G: 20 INJECTION, SOLUTION INTRAVENOUS at 18:53

## 2025-06-02 NOTE — ED PROVIDER NOTES
Time reflects when diagnosis was documented in both MDM as applicable and the Disposition within this note       Time User Action Codes Description Comment    6/2/2025  7:57 PM Reyna Libby Add [I10] Primary hypertension     6/2/2025  7:57 PM OrellanaLibby Add [E83.42] Hypomagnesemia     6/2/2025  7:57 PM OrellanaLibby Add [E83.51] Hypocalcemia     6/2/2025  7:57 PM OrellanaLibby Add [E87.8] Hyperchloremia     6/2/2025  7:57 PM iLbby Orellana Add [D72.819] Leukopenia     6/2/2025  7:57 PM Libby Orellana Add [K59.00] Constipation           ED Disposition       ED Disposition   Discharge    Condition   Stable    Date/Time   Mon Jun 2, 2025  7:57 PM    Comment   Tomas Robert discharge to home/self care.                   Assessment & Plan       Medical Decision Making  74y M p/w constipation, general abdominal pain    Ddx includes thyroid abnormality, metabolic derangement, SBO, colitis, pancreatitis    Plan: bloodwork, imaging    Blood work reveals hyperchloremia, hypocalcemia, hypomagnesemia.  Patient received calcium and magnesium in the emergency department.  TSH WNL. He does have ongoing leukopenia which may be related to his cancer treatment, this is a longitudinal issue.  He notes ongoing constipation, can try outpatient bowel regimen.  Did offer enema in the emergency department which patient declined.  No acute findings on CAT scan to explain patient abdominal discomfort may be musculoskeletal in nature.  Suspect low BG 2/2 decreased PO intake today, pt GCS 15 and states he currently wants to eat. Does have elevated blood pressure consistent with diagnosis of primary hypertension noted in patient chart.  Had lengthy discussion with patient, recommend outpatient bowel management with GI follow-up.  Additionally recommend PCP follow-up for further evaluation of electrolyte derangements.  Return precautions given. Patient verbalized understanding of these recommendations. PT d/c  home in good condition.    Amount and/or Complexity of Data Reviewed  Labs: ordered.  Radiology: ordered.    Risk  OTC drugs.  Prescription drug management.             Medications   iohexol (OMNIPAQUE) 350 MG/ML injection (MULTI-DOSE) 100 mL (100 mL Intravenous Given 6/2/25 1716)   calcium gluconate 2 g in sodium chloride 0.9% 100 mL (premix) (0 g Intravenous Stopped 6/2/25 1957)   magnesium sulfate 2 g/50 mL IVPB (premix) 2 g (0 g Intravenous Stopped 6/2/25 1928)       ED Risk Strat Scores                    No data recorded        SBIRT 22yo+      Flowsheet Row Most Recent Value   Initial Alcohol Screen: US AUDIT-C     1. How often do you have a drink containing alcohol? 0 Filed at: 06/02/2025 9222   2. How many drinks containing alcohol do you have on a typical day you are drinking?  0 Filed at: 06/02/2025 2512   3a. Male UNDER 65: How often do you have five or more drinks on one occasion? 0 Filed at: 06/02/2025 135   3b. FEMALE Any Age, or MALE 65+: How often do you have 4 or more drinks on one occassion? 0 Filed at: 06/02/2025 1358   Audit-C Score 0 Filed at: 06/02/2025 8550   CRISTIAN: How many times in the past year have you...    Used an illegal drug or used a prescription medication for non-medical reasons? Never Filed at: 06/02/2025 3126                            History of Present Illness       Chief Complaint   Patient presents with    Abdominal Pain     Upper abd pain, recent constipation, states that his abd is sore, intermittent abd swelling x 2 wks, recent radiation for prostate ca        Past Medical History[1]   Past Surgical History[2]   Family History[3]   Social History[4]   E-Cigarette/Vaping    E-Cigarette Use Never User       E-Cigarette/Vaping Substances    Nicotine No     THC No     CBD No     Flavoring No     Other No     Unknown No       I have reviewed and agree with the history as documented.     Patient is a 74-year-old male with a past medical history significant for BPH, prostate  cancer s/p radiation, hypertension presenting to the emergency department for evaluation of abdominal pain.  Patient states that he has been constipated for 2 weeks.  He reports intermittent use of laxatives which improve his hard stools.  Over the last week he has noted some abdominal distention and upper abdominal discomfort.  He notes the abdominal discomfort is intermittently present.  He denies any chest pain, shortness breath, fevers, chills, bloody stools.  He is concerned that he is having abdominal swelling in the context of constipation.  He is otherwise in his normal state of health.  He is status post radiation on May 8. Reports he hasn't eaten all day.        Review of Systems   Constitutional:  Negative for chills and fever.   HENT:  Negative for ear pain and sore throat.    Eyes:  Negative for pain and visual disturbance.   Respiratory:  Negative for cough and shortness of breath.    Cardiovascular:  Negative for chest pain and palpitations.   Gastrointestinal:  Negative for abdominal pain and vomiting.   Genitourinary:  Positive for dysuria. Negative for hematuria.   Musculoskeletal:  Negative for arthralgias and back pain.   Skin:  Negative for color change and rash.   Neurological:  Negative for seizures and syncope.   All other systems reviewed and are negative.          Objective       ED Triage Vitals [06/02/25 1357]   Temperature Pulse Blood Pressure Respirations SpO2 Patient Position - Orthostatic VS   97.7 °F (36.5 °C) 72 137/68 19 99 % Sitting      Temp Source Heart Rate Source BP Location FiO2 (%) Pain Score    Temporal Monitor Left arm -- --      Vitals      Date and Time Temp Pulse SpO2 Resp BP Pain Score FACES Pain Rating User   06/02/25 1830 -- 62 96 % 18 175/88 -- --    06/02/25 1515 -- 57 96 % 18 165/76 -- --    06/02/25 1357 97.7 °F (36.5 °C) 72 99 % 19 137/68 -- -- GP            Physical Exam  Vitals and nursing note reviewed.   Constitutional:       General: He is not in acute  distress.     Appearance: Normal appearance. He is not ill-appearing, toxic-appearing or diaphoretic.      Comments: Patient sitting in NAD   HENT:      Head: Normocephalic and atraumatic.     Eyes:      General: No scleral icterus.        Right eye: No discharge.         Left eye: No discharge.      Conjunctiva/sclera: Conjunctivae normal.       Cardiovascular:      Rate and Rhythm: Normal rate.      Pulses: Normal pulses.   Pulmonary:      Effort: Pulmonary effort is normal. No respiratory distress.      Breath sounds: Normal breath sounds. No stridor. No wheezing, rhonchi or rales.   Abdominal:      General: Abdomen is flat. Bowel sounds are normal. There is no distension.      Palpations: Abdomen is soft.      Tenderness: There is no abdominal tenderness. There is no guarding or rebound.      Comments: Generalized upper abdominal TTP without rigidity, guarding, rebound     Musculoskeletal:         General: No swelling. Normal range of motion.      Cervical back: Normal range of motion. No rigidity.      Right lower leg: No edema.      Left lower leg: No edema.     Skin:     General: Skin is warm and dry.      Capillary Refill: Capillary refill takes less than 2 seconds.      Coloration: Skin is not jaundiced.      Findings: No bruising or lesion.     Neurological:      General: No focal deficit present.      Mental Status: He is alert and oriented to person, place, and time. Mental status is at baseline.     Psychiatric:         Mood and Affect: Mood normal.         Behavior: Behavior normal.         Thought Content: Thought content normal.         Judgment: Judgment normal.         Results Reviewed       Procedure Component Value Units Date/Time    UA w Reflex to Microscopic w Reflex to Culture [[id number]] Collected: 06/02/25 1902    Lab Status: Final result Specimen: Urine, Clean Catch Updated: 06/02/25 1918     Color, UA Yellow     Clarity, UA Clear     Specific Gravity, UA 1.025     pH, UA 5.5      Leukocytes, UA Negative     Nitrite, UA Negative     Protein, UA Negative mg/dl      Glucose, UA Negative mg/dl      Ketones, UA Negative mg/dl      Urobilinogen, UA <2.0 mg/dl      Bilirubin, UA Negative     Occult Blood, UA Negative    TSH [[id number]]  (Normal) Collected: 06/02/25 1629    Lab Status: Final result Specimen: Blood from Arm, Right Updated: 06/02/25 1735     TSH 3RD GENERATON 0.671 uIU/mL     Comprehensive metabolic panel [[id number]]  (Abnormal) Collected: 06/02/25 1629    Lab Status: Final result Specimen: Blood from Arm, Right Updated: 06/02/25 1657     Sodium 140 mmol/L      Potassium 3.8 mmol/L      Chloride 114 mmol/L      CO2 22 mmol/L      ANION GAP 4 mmol/L      BUN 16 mg/dL      Creatinine 0.89 mg/dL      Glucose 64 mg/dL      Calcium 7.2 mg/dL      Corrected Calcium 7.7 mg/dL      AST 23 U/L      ALT 17 U/L      Alkaline Phosphatase 42 U/L      Total Protein 5.5 g/dL      Albumin 3.4 g/dL      Total Bilirubin 0.53 mg/dL      eGFR 84 ml/min/1.73sq m     Narrative:      National Kid[employer] Foundation guidelines for Chronic Kidney Disease (CKD):     Stage 1 with normal or high GFR (GFR > 90 mL/min/1.73 square meters)    Stage 2 Mild CKD (GFR = 60-89 mL/min/1.73 square meters)    Stage 3A Moderate CKD (GFR = 45-59 mL/min/1.73 square meters)    Stage 3B Moderate CKD (GFR = 30-44 mL/min/1.73 square meters)    Stage 4 Severe CKD (GFR = 15-29 mL/min/1.73 square meters)    Stage 5 End Stage CKD (GFR <15 mL/min/1.73 square meters)  Note: GFR calculation is accurate only with a steady state creatinine    Magnesium [[id number]]  (Abnormal) Collected: 06/02/25 1629    Lab Status: Final result Specimen: Blood from Arm, Right Updated: 06/02/25 1657     Magnesium 1.6 mg/dL     Lipase [[id number]]  (Normal) Collected: 06/02/25 1629    Lab Status: Final result Specimen: Blood from Arm, Right Updated: 06/02/25 1657     Lipase 12 u/L     Lactic acid, plasma (w/reflex if result > 2.0) [[id number]]  (Normal)  Collected: 06/02/25 1629    Lab Status: Final result Specimen: Blood from Arm, Right Updated: 06/02/25 1655     LACTIC ACID 0.8 mmol/L     Narrative:      Result may be elevated if tourniquet was used during collection.    CBC and differential [[id number]]  (Abnormal) Collected: 06/02/25 1629    Lab Status: Final result Specimen: Blood from Arm, Right Updated: 06/02/25 1639     WBC 3.75 Thousand/uL      RBC 4.44 Million/uL      Hemoglobin 13.3 g/dL      Hematocrit 39.6 %      MCV 89 fL      MCH 30.0 pg      MCHC 33.6 g/dL      RDW 13.5 %      MPV 11.0 fL      Platelets 196 Thousands/uL      nRBC 0 /100 WBCs      Segmented % 55 %      Immature Grans % 0 %      Lymphocytes % 36 %      Monocytes % 8 %      Eosinophils Relative 1 %      Basophils Relative 0 %      Absolute Neutrophils 2.03 Thousands/µL      Absolute Immature Grans 0.00 Thousand/uL      Absolute Lymphocytes 1.36 Thousands/µL      Absolute Monocytes 0.30 Thousand/µL      Eosinophils Absolute 0.05 Thousand/µL      Basophils Absolute 0.01 Thousands/µL             CT abdomen pelvis with contrast   Final Interpretation by Indra Lazaro MD (06/02 1808)      Mild bladder wall thickening. Correlate for possible cystitis.      Stable prostatomegaly.         Workstation performed: [id number]             Procedures    ED Medication and Procedure Management   Prior to Admission Medications   Prescriptions Last Dose Informant Patient Reported? Taking?   Difluprednate 0.05 % EMUL   Yes No   amLODIPine (NORVASC) 10 mg tablet  Self No No   Sig: Take 1 tablet (10 mg total) by mouth daily   aspirin (ECOTRIN LOW STRENGTH) 81 mg EC tablet  Self No No   Sig: Take 1 tablet (81 mg total) by mouth daily   cyclobenzaprine (FLEXERIL) 10 mg tablet  Self No No   Sig: Take 1 tablet (10 mg total) by mouth if needed for muscle spasms   lidocaine (LIDODERM) 5 %  Self No No   Sig: Apply 1 patch topically over 12 hours if needed (Intermittent right low back pain with radiculopathy)  Remove & Discard patch within 12 hours or as directed by MD   moxifloxacin (VIGAMOX) 0.5 % ophthalmic solution   Yes No   multivitamin (THERAGRAN) TABS  Self Yes No   Sig: Take 1 tablet by mouth daily   pantoprazole (PROTONIX) 40 mg tablet  Self No No   Sig: TAKE 1 TABLET BY MOUTH EVERY DAY   tadalafil (CIALIS) 20 MG tablet  Self Yes No   Sig: if needed   tamsulosin (FLOMAX) 0.4 mg  Self No No   Sig: Take 1 capsule (0.4 mg total) by mouth daily with dinner      Facility-Administered Medications: None     Discharge Medication List as of 6/2/2025  8:00 PM        START taking these medications    Details   docusate sodium (COLACE) 100 mg capsule Take 1 capsule (100 mg total) by mouth every 12 (twelve) hours for 14 days, Starting Mon 6/2/2025, Until Mon 6/16/2025, Normal      magnesium citrate (CITROMA) 1.745 g/30 mL oral solution Take 296 mL by mouth once for 1 dose Prn constipation, Starting Mon 6/2/2025, Normal      polyethylene glycol (MIRALAX) 17 g packet Take 17 g by mouth daily for 7 days Prn constipation, Starting Mon 6/2/2025, Until Mon 6/9/2025, Normal           CONTINUE these medications which have NOT CHANGED    Details   amLODIPine (NORVASC) 10 mg tablet Take 1 tablet (10 mg total) by mouth daily, Starting Mon 3/3/2025, Normal      aspirin (ECOTRIN LOW STRENGTH) 81 mg EC tablet Take 1 tablet (81 mg total) by mouth daily, Starting Tue 7/23/2024, Normal      cyclobenzaprine (FLEXERIL) 10 mg tablet Take 1 tablet (10 mg total) by mouth if needed for muscle spasms, Starting Wed 5/15/2024, No Print      Difluprednate 0.05 % EMUL Historical Med      lidocaine (LIDODERM) 5 % Apply 1 patch topically over 12 hours if needed (Intermittent right low back pain with radiculopathy) Remove & Discard patch within 12 hours or as directed by MD, Starting Wed 5/3/2023, Normal      moxifloxacin (VIGAMOX) 0.5 % ophthalmic solution Historical Med      multivitamin (THERAGRAN) TABS Take 1 tablet by mouth daily, Historical Med       pantoprazole (PROTONIX) 40 mg tablet TAKE 1 TABLET BY MOUTH EVERY DAY, Starting Mon 2/3/2025, Normal      tadalafil (CIALIS) 20 MG tablet if needed, Historical Med      tamsulosin (FLOMAX) 0.4 mg Take 1 capsule (0.4 mg total) by mouth daily with dinner, Starting Mon 3/3/2025, Normal           No discharge procedures on file.  ED SEPSIS DOCUMENTATION   Time reflects when diagnosis was documented in both MDM as applicable and the Disposition within this note       Time User Action Codes Description Comment    6/2/2025  7:57 PM Libby Orellana [I10] Primary hypertension     6/2/2025  7:57 PM Libby Orellana Add [E83.42] Hypomagnesemia     6/2/2025  7:57 PM Libby Orellana [E83.51] Hypocalcemia     6/2/2025  7:57 PM Libby Orellana Add [E87.8] Hyperchloremia     6/2/2025  7:57 PM Libby Orellana Add [D72.819] Leukopenia     6/2/2025  7:57 PM Libby Orellana [K59.00] Constipation                    Libby Orellana, DO  06/03/25 0131       Libby Orellana, DO  06/03/25 0158         [1]   Past Medical History:  Diagnosis Date    Benign prostatic hyperplasia     Chronic kidney disease     COVID-19 03/2020    Esophageal reflux     Hypertension     Hypertensive urgency 03/26/2019    Prostate cancer (HCC)     Vertigo    [2]   Past Surgical History:  Procedure Laterality Date    APPENDECTOMY  05/21/2015    CYST REMOVAL      Fatty cyst removed from back   [3]   Family History  Problem Relation Name Age of Onset    Prostate cancer Father Tomas Robert Sr.     Prostate cancer Maternal Grandfather Carlos     Stomach cancer Maternal Aunt          malignant tumor of pharynx    Stomach cancer Maternal Uncle          malignant tumor of pharynx    Mental illness Family      Depression Family      Schizophrenia Family      Hypertension Mother      No Known Problems Sister juliana     Dementia Sister Brooklynn     Hypertension Sister Brooklynn    [4]   Social History  Tobacco Use     Smoking status: Former     Current packs/day: 0.25     Average packs/day: 0.3 packs/day for 10.0 years (2.5 ttl pk-yrs)     Types: Cigarettes     Passive exposure: Past    Smokeless tobacco: Never    Tobacco comments:     1ppw   Vaping Use    Vaping status: Never Used   Substance Use Topics    Alcohol use: Yes     Comment: socially    Drug use: No        Libby Orellana DO  06/03/25 0158

## 2025-06-03 NOTE — DISCHARGE INSTRUCTIONS
Follow-up with your family doctor for your electrolyte derangements and low WBC count.  Follow-up with gastroenterology for your ongoing constipation.  Return to the ER for new or worsening symptoms.

## 2025-06-11 ENCOUNTER — OFFICE VISIT (OUTPATIENT)
Dept: INTERNAL MEDICINE CLINIC | Facility: CLINIC | Age: 75
End: 2025-06-11
Payer: COMMERCIAL

## 2025-06-11 VITALS
SYSTOLIC BLOOD PRESSURE: 122 MMHG | OXYGEN SATURATION: 98 % | HEIGHT: 72 IN | BODY MASS INDEX: 25.5 KG/M2 | HEART RATE: 73 BPM | DIASTOLIC BLOOD PRESSURE: 68 MMHG

## 2025-06-11 DIAGNOSIS — I10 PRIMARY HYPERTENSION: Primary | ICD-10-CM

## 2025-06-11 DIAGNOSIS — E83.42 HYPOMAGNESEMIA: ICD-10-CM

## 2025-06-11 DIAGNOSIS — K59.00 CONSTIPATION, UNSPECIFIED CONSTIPATION TYPE: ICD-10-CM

## 2025-06-11 PROCEDURE — 99213 OFFICE O/P EST LOW 20 MIN: CPT | Performed by: PHYSICIAN ASSISTANT

## 2025-06-11 PROCEDURE — G2211 COMPLEX E/M VISIT ADD ON: HCPCS | Performed by: PHYSICIAN ASSISTANT

## 2025-06-11 NOTE — ASSESSMENT & PLAN NOTE
BP was elevated in the emergency room.  Currently on amlodipine 10 mg daily.  Eduarda cp, palp, sob, edema, HA, dizziness, diaphoresis, syncope, visual disturbance.  Blood pressure well-controlled for me today.  He states he did not even take his amlodipine so far today.    Orders:    CBC and differential; Future    Comprehensive metabolic panel; Future

## 2025-06-11 NOTE — PATIENT INSTRUCTIONS
Have labs done next week and we will review when available.  Follow up as scheduled, sooner as needed

## 2025-06-11 NOTE — PROGRESS NOTES
Name: Tomas Robert      : 1950      MRN: [id number]  Encounter Provider: Nino Hancock PA-C  Encounter Date: 2025   Encounter department: [facility] INTERNAL MEDICINE [place]  :  Assessment & Plan  Primary hypertension  BP was elevated in the emergency room.  Currently on amlodipine 10 mg daily.  Eduarda cp, palp, sob, edema, HA, dizziness, diaphoresis, syncope, visual disturbance.  Blood pressure well-controlled for me today.  He states he did not even take his amlodipine so far today.    Orders:    CBC and differential; Future    Comprehensive metabolic panel; Future    Constipation, unspecified constipation type  ER follow-up.  Patient states that he has been undergoing proton RT for prostate cancer.  Despite his RT team providing medication for him to go regularly last week he still became constipated, states he did not move his bowels well for over 1 week, had small pebble-like stools, abdominal bloating, and developed abdominal pain.  Patient went to the ER, imaging studies were unremarkable.  Labs did show hyperchloremia, hypomagnesemia, and hypocalcemia.  Patient will repeat labs.  Since he had used Dulcolax and magnesium citrate with good results.  He is now moving his bowels regularly and feeling better.       Hypomagnesemia    Orders:    Magnesium; Future          Depression Screening and Follow-up Plan: Patient was screened for depression during today's encounter. They screened negative with a PHQ-2 score of 0.        History of Present Illness   ER follow-up, ER records reviewed with patient.      Review of Systems   Constitutional:  Negative for activity change, chills, fatigue and fever.   HENT:  Negative for congestion.    Eyes:  Negative for discharge.   Respiratory:  Negative for cough, chest tightness and shortness of breath.    Cardiovascular:  Negative for chest pain, palpitations and leg swelling.   Gastrointestinal:  Positive for abdominal pain and constipation. Negative  for blood in stool, diarrhea, nausea and vomiting.   Genitourinary:  Negative for difficulty urinating.   Musculoskeletal:  Negative for arthralgias and myalgias.   Skin:  Negative for rash.   Allergic/Immunologic: Negative for immunocompromised state.   Neurological:  Negative for dizziness, syncope, weakness, light-headedness and headaches.   Hematological:  Negative for adenopathy. Does not bruise/bleed easily.   Psychiatric/Behavioral:  Negative for dysphoric mood, sleep disturbance and suicidal ideas. The patient is not nervous/anxious.        Objective   /68 (BP Location: Left arm, Patient Position: Sitting)   Pulse 73   Ht 6' (1.829 m)   SpO2 98%   BMI 25.50 kg/m²      Physical Exam  Vitals and nursing note reviewed.   Constitutional:       General: He is not in acute distress.     Appearance: Normal appearance. He is well-developed. He is not ill-appearing.   HENT:      Head: Normocephalic.     Eyes:      Extraocular Movements: Extraocular movements intact.      Pupils: Pupils are equal, round, and reactive to light.     Neck:      Thyroid: No thyromegaly.      Vascular: No carotid bruit or JVD.     Cardiovascular:      Rate and Rhythm: Normal rate and regular rhythm.      Heart sounds: Normal heart sounds.   Pulmonary:      Effort: Pulmonary effort is normal.      Breath sounds: Normal breath sounds.   Abdominal:      Palpations: Abdomen is soft.      Tenderness: There is no abdominal tenderness.     Musculoskeletal:         General: No swelling.      Cervical back: Normal range of motion and neck supple.      Right lower leg: No edema.      Left lower leg: No edema.   Lymphadenopathy:      Cervical: No cervical adenopathy.     Skin:     General: Skin is warm and dry.      Findings: No rash.     Neurological:      General: No focal deficit present.      Mental Status: He is alert and oriented to person, place, and time. Mental status is at baseline.     Psychiatric:         Mood and Affect: Mood  normal.         Behavior: Behavior normal.         Thought Content: Thought content normal.

## 2025-06-16 ENCOUNTER — RESULTS FOLLOW-UP (OUTPATIENT)
Dept: INTERNAL MEDICINE CLINIC | Facility: CLINIC | Age: 75
End: 2025-06-16

## 2025-06-16 ENCOUNTER — APPOINTMENT (OUTPATIENT)
Dept: LAB | Facility: HOSPITAL | Age: 75
End: 2025-06-16
Payer: COMMERCIAL

## 2025-06-16 DIAGNOSIS — I10 PRIMARY HYPERTENSION: ICD-10-CM

## 2025-06-16 DIAGNOSIS — E78.2 MIXED HYPERLIPIDEMIA: ICD-10-CM

## 2025-06-16 DIAGNOSIS — I10 PRIMARY HYPERTENSION: Primary | ICD-10-CM

## 2025-06-16 DIAGNOSIS — C61 PROSTATE CANCER (HCC): ICD-10-CM

## 2025-06-16 DIAGNOSIS — E83.42 HYPOMAGNESEMIA: ICD-10-CM

## 2025-06-16 LAB
ALBUMIN SERPL BCG-MCNC: 4.2 G/DL (ref 3.5–5)
ALP SERPL-CCNC: 56 U/L (ref 34–104)
ALT SERPL W P-5'-P-CCNC: 25 U/L (ref 7–52)
ANION GAP SERPL CALCULATED.3IONS-SCNC: 5 MMOL/L (ref 4–13)
AST SERPL W P-5'-P-CCNC: 20 U/L (ref 13–39)
BASOPHILS # BLD AUTO: 0.02 THOUSANDS/ÂΜL (ref 0–0.1)
BASOPHILS NFR BLD AUTO: 1 % (ref 0–1)
BILIRUB SERPL-MCNC: 0.53 MG/DL (ref 0.2–1)
BUN SERPL-MCNC: 20 MG/DL (ref 5–25)
CALCIUM SERPL-MCNC: 9.5 MG/DL (ref 8.4–10.2)
CHLORIDE SERPL-SCNC: 106 MMOL/L (ref 96–108)
CHOLEST SERPL-MCNC: 186 MG/DL (ref ?–200)
CO2 SERPL-SCNC: 28 MMOL/L (ref 21–32)
CREAT SERPL-MCNC: 1.3 MG/DL (ref 0.6–1.3)
EOSINOPHIL # BLD AUTO: 0.07 THOUSAND/ÂΜL (ref 0–0.61)
EOSINOPHIL NFR BLD AUTO: 2 % (ref 0–6)
ERYTHROCYTE [DISTWIDTH] IN BLOOD BY AUTOMATED COUNT: 13.4 % (ref 11.6–15.1)
GFR SERPL CREATININE-BSD FRML MDRD: 53 ML/MIN/1.73SQ M
GLUCOSE P FAST SERPL-MCNC: 96 MG/DL (ref 65–99)
HCT VFR BLD AUTO: 40.3 % (ref 36.5–49.3)
HDLC SERPL-MCNC: 74 MG/DL
HGB BLD-MCNC: 13.7 G/DL (ref 12–17)
IMM GRANULOCYTES # BLD AUTO: 0 THOUSAND/UL (ref 0–0.2)
IMM GRANULOCYTES NFR BLD AUTO: 0 % (ref 0–2)
LDLC SERPL CALC-MCNC: 99 MG/DL (ref 0–100)
LYMPHOCYTES # BLD AUTO: 1.43 THOUSANDS/ÂΜL (ref 0.6–4.47)
LYMPHOCYTES NFR BLD AUTO: 41 % (ref 14–44)
MAGNESIUM SERPL-MCNC: 1.8 MG/DL (ref 1.9–2.7)
MCH RBC QN AUTO: 29.9 PG (ref 26.8–34.3)
MCHC RBC AUTO-ENTMCNC: 34 G/DL (ref 31.4–37.4)
MCV RBC AUTO: 88 FL (ref 82–98)
MONOCYTES # BLD AUTO: 0.27 THOUSAND/ÂΜL (ref 0.17–1.22)
MONOCYTES NFR BLD AUTO: 8 % (ref 4–12)
NEUTROPHILS # BLD AUTO: 1.74 THOUSANDS/ÂΜL (ref 1.85–7.62)
NEUTS SEG NFR BLD AUTO: 48 % (ref 43–75)
NRBC BLD AUTO-RTO: 0 /100 WBCS
PLATELET # BLD AUTO: 164 THOUSANDS/UL (ref 149–390)
PMV BLD AUTO: 10.7 FL (ref 8.9–12.7)
POTASSIUM SERPL-SCNC: 4 MMOL/L (ref 3.5–5.3)
PROT SERPL-MCNC: 7.2 G/DL (ref 6.4–8.4)
RBC # BLD AUTO: 4.58 MILLION/UL (ref 3.88–5.62)
SODIUM SERPL-SCNC: 139 MMOL/L (ref 135–147)
TRIGL SERPL-MCNC: 66 MG/DL (ref ?–150)
WBC # BLD AUTO: 3.53 THOUSAND/UL (ref 4.31–10.16)

## 2025-06-16 PROCEDURE — 83735 ASSAY OF MAGNESIUM: CPT

## 2025-06-16 PROCEDURE — 36415 COLL VENOUS BLD VENIPUNCTURE: CPT

## 2025-06-16 PROCEDURE — 80053 COMPREHEN METABOLIC PANEL: CPT

## 2025-06-16 PROCEDURE — 80061 LIPID PANEL: CPT

## 2025-06-16 PROCEDURE — 85025 COMPLETE CBC W/AUTO DIFF WBC: CPT

## 2025-06-16 PROCEDURE — 84153 ASSAY OF PSA TOTAL: CPT

## 2025-06-17 ENCOUNTER — TELEPHONE (OUTPATIENT)
Dept: NEUROLOGY | Facility: CLINIC | Age: 75
End: 2025-06-17

## 2025-06-17 ENCOUNTER — TELEPHONE (OUTPATIENT)
Dept: INTERNAL MEDICINE CLINIC | Facility: CLINIC | Age: 75
End: 2025-06-17

## 2025-06-17 LAB — PSA SERPL DL<=0.01 NG/ML-MCNC: 4.68 NG/ML (ref 0–4)

## 2025-06-17 NOTE — TELEPHONE ENCOUNTER
Patient is not taking Magnesium so he would like for you to tell him what kind you would recommend and he will get it and start taking it.    Please advise.....

## 2025-06-24 ENCOUNTER — OFFICE VISIT (OUTPATIENT)
Dept: NEUROLOGY | Facility: CLINIC | Age: 75
End: 2025-06-24
Payer: COMMERCIAL

## 2025-06-24 VITALS
OXYGEN SATURATION: 99 % | DIASTOLIC BLOOD PRESSURE: 72 MMHG | HEIGHT: 72 IN | BODY MASS INDEX: 25.19 KG/M2 | SYSTOLIC BLOOD PRESSURE: 122 MMHG | HEART RATE: 67 BPM | WEIGHT: 186 LBS

## 2025-06-24 DIAGNOSIS — R29.818 TRANSIENT NEUROLOGICAL SYMPTOMS: Primary | ICD-10-CM

## 2025-06-24 PROCEDURE — 99214 OFFICE O/P EST MOD 30 MIN: CPT | Performed by: STUDENT IN AN ORGANIZED HEALTH CARE EDUCATION/TRAINING PROGRAM

## 2025-06-24 PROCEDURE — G2211 COMPLEX E/M VISIT ADD ON: HCPCS | Performed by: STUDENT IN AN ORGANIZED HEALTH CARE EDUCATION/TRAINING PROGRAM

## 2025-06-24 NOTE — PROGRESS NOTES
"Neurology Ambulatory Visit  Name: Tomas Robert       : 1950       MRN: [id number]   Encounter Provider: Alise Watt MD   Encounter Date: 2025  Encounter department: [facility]    Tomas Robert is a 74 y.o. male with PMH of HTN, CKD, migraines and prostate cancer who presents as ADAN for episodes of left hemibody numbness and tingling that have been ongoing since , concerning for possible TIA, however vessel imaging and cardiac work-up have been unrevealing. Differential also includes hypertensive urgency or functional neurologic disease (he is always stressed when it happens). He is maintained on aspirin but does not want to take statin due to side effects.     Assessment & Plan  Transient neurological symptoms  Possible TIA  - continue aspirin 81mg  - he defers statin; recommended lifestyle modifications  - Goal SBP <130/80  - Goal LDL <70  - Goal A1C <7       RTC 1 year      HISTORY OF PRESENT ILLNESS    Per my chart review:   He presented to the ED on 25: \"Patient is 73 yo with migraines at young age and possible TIAs who presented as stroke alert for left sided numbness and tingling. Patient reports he has 10 similar episodes before and sometimes it comes with headache and sometimes with stress. He was supposed to call his cancer doc about newly diagnosed prostate cancer this morning so it could also be a stress reaction. LKN 3 am, noticed symptoms around 7:30 am. NIHSS 1 for numbness(very subtle). OOW for TNK. CTH without acute bleed or large territorial infarct. CTA without LVO. Imaging reviewed on PACS and discussed with radiologist. Given multiple stroke work ups and negative imaging and similar reaction to stress, will hold off on repeat stroke work up. Patient is already on ASA and statin at home.\"    He has had episodes of left-sided numbness going as far back as , some of these episodes were felt to be in the setting of hypertensive " urgency, and sometimes in the setting of headache.     Per patient:  He reports episodes as above. He does not usually get a headache with these. It usually lasts about 15-20 minutes. He believes they are stress related, it tends to happen when he is stressed out. He denies slurred speech, facial droop or weakness.     He reports that he no longer takes a statin, it makes his stomach feel funny. He says instead he is trying to eat right and go to the gym.     He denies headaches, he says he got migraines when he was younger. He does have a family history of coronary artery disease.     Prior Work-up:   TTE 7/22/24:  EF 60%, normal systolic function, no PFO, normal LA size.   CTA H/N 1/14/25:  1.  No hemodynamically significant stenosis in the major arteries of the neck.  2.  No intracranial aneurysm or major intracranial arterial stenosis.  3. Emphysema.  MRI brain wo 7/21/24:  Unremarkable MRI of the brain aside from chronic inflammatory paranasal sinus disease.     Past Medical History:    Past Medical History[1]  Past Surgical History[2]    Family History:  Family History[3]    Social History:  Social History[4]       Allergies:  Allergies[5]    Medications:  Current Medications[6]      OBJECTIVE  /72 (BP Location: Left arm, Patient Position: Sitting, Cuff Size: Large)   Pulse 67   Ht 6' (1.829 m)   Wt 84.4 kg (186 lb)   SpO2 99%   BMI 25.23 kg/m²      Labs  I have reviewed pertinent labs:  CBC:   Lab Results   Component Value Date    WBC 3.53 (L) 06/16/2025    RBC 4.58 06/16/2025    HGB 13.7 06/16/2025    HCT 40.3 06/16/2025    MCV 88 06/16/2025     06/16/2025    MCH 29.9 06/16/2025    MCHC 34.0 06/16/2025    RDW 13.4 06/16/2025    MPV 10.7 06/16/2025    NEUTROABS 1.74 (L) 06/16/2025     CMP:   Lab Results   Component Value Date    SODIUM 139 06/16/2025    K 4.0 06/16/2025     06/16/2025    CO2 28 06/16/2025    AGAP 5 06/16/2025    BUN 20 06/16/2025    CREATININE 1.30 06/16/2025    GLUC  64 (L) 06/02/2025    GLUF 96 06/16/2025    CALCIUM 9.5 06/16/2025    AST 20 06/16/2025    ALT 25 06/16/2025    ALKPHOS 56 06/16/2025    TP 7.2 06/16/2025    ALB 4.2 06/16/2025    TBILI 0.53 06/16/2025    EGFR 53 06/16/2025     Lipid Profile:   Lab Results   Component Value Date    CHOLESTEROL 186 06/16/2025    HDL 74 06/16/2025    TRIG 66 06/16/2025    LDLCALC 99 06/16/2025    NONHDLC 117 05/09/2025       Lab Results   Component Value Date/Time    UTSJNDHK40 728 07/21/2024 02:50 AM    WUDC96MHXVOZ 34.6 01/29/2025 08:44 AM    IXM2OPRNCKMU 0.671 06/02/2025 04:29 PM    HGBA1C 5.7 (H) 05/15/2024 05:35 AM    FOLATE 13.5 07/21/2024 02:50 AM    CRP 1.4 07/21/2024 02:50 AM    CRP <3.0 02/04/2021 10:29 AM    ESR 2 02/25/2022 09:46 AM    LYMETOTALAB Negative 03/11/2024 02:02 PM            General Exam  GENERAL APPEARANCE:  No distress, alert, interactive and cooperative.  CARDIOVASCULAR: Warm and well perfused  LUNGS: normal work of breathing on room air  EXTREMITIES: no peripheral edema     Neurologic Exam  MENTAL STATE:  Orientation was normal to time, place and person. Recent and remote memory was intact.  Attention span and concentration were normal. Language testing was normal for comprehension, repetition, expression, and naming.      CRANIAL NERVES:  CN 3, 4, 6  Pupils round, 2 mm in diameter, equally reactive to light. Lids symmetric; no ptosis. EOMs normal alignment, full range. No nystagmus.  CN 5  Facial sensation intact bilaterally.  CN 7  Normal and symmetric facial strength.   CN 8  Hearing intact to conversation.  CN 9  Palate elevates symmetrically.  CN 11  Normal strength of shoulder shrug and neck turning.  CN 12  Tongue midline, with normal bulk and strength.     MOTOR:  Muscle bulk and tone were normal in both upper and lower extremities. Muscle strength was 5/5 in distal and proximal muscles in both upper and lower extremities. No fasciculations, tremor or other abnormal movements were present.      REFLEXES: 0, unable to elicit throughout.      SENSORY:  In both upper and lower extremities, sensation was intact to light touch.     COORDINATION:   In both upper extremities, finger-nose-finger was intact without dysmetria or overshoot. In both lower extremities, heel-to-shin was intact.     GAIT:  Station was stable with a normal base. Gait was stable with a normal arm swing and speed. Tandem gait was impaired. No Romberg sign was present.    Administrative Statements   The total amount of time spent with the patient and on chart review and documentation was 30 minutes. Issues addressed during this visit included counseling on medical management, counseling on medication side effects, and counseling on lifestyle.        [1]   Past Medical History:  Diagnosis Date    Benign prostatic hyperplasia     Chronic kidney disease     COVID-19 03/2020    Esophageal reflux     Hypertension     Hypertensive urgency 03/26/2019    Prostate cancer (HCC)     Vertigo    [2]   Past Surgical History:  Procedure Laterality Date    APPENDECTOMY  05/21/2015    CYST REMOVAL      Fatty cyst removed from back   [3]   Family History  Problem Relation Name Age of Onset    Prostate cancer Father Tomas Robert Sr.     Prostate cancer Maternal Grandfather Carlos     Stomach cancer Maternal Aunt          malignant tumor of pharynx    Stomach cancer Maternal Uncle          malignant tumor of pharynx    Mental illness Family      Depression Family      Schizophrenia Family      Hypertension Mother      No Known Problems Sister juliana     Dementia Sister Brooklynn     Hypertension Sister Brooklynn    [4]   Social History  Tobacco Use    Smoking status: Former     Current packs/day: 0.25     Average packs/day: 0.3 packs/day for 10.0 years (2.5 ttl pk-yrs)     Types: Cigarettes     Passive exposure: Past    Smokeless tobacco: Never    Tobacco comments:     1ppw   Vaping Use    Vaping status: Never Used   Substance Use Topics    Alcohol use: Yes      Comment: socially    Drug use: No   [5]   Allergies  Allergen Reactions    Penicillin V Anaphylaxis   [6]   Current Outpatient Medications:     amLODIPine (NORVASC) 10 mg tablet, Take 1 tablet (10 mg total) by mouth daily, Disp: 90 tablet, Rfl: 1    Difluprednate 0.05 % EMUL, , Disp: , Rfl:     docusate sodium (COLACE) 100 mg capsule, Take 1 capsule (100 mg total) by mouth every 12 (twelve) hours for 14 days, Disp: 28 capsule, Rfl: 0    lidocaine (LIDODERM) 5 %, Apply 1 patch topically over 12 hours if needed (Intermittent right low back pain with radiculopathy) Remove & Discard patch within 12 hours or as directed by MD, Disp: 30 patch, Rfl: 3    magnesium citrate (CITROMA) 1.745 g/30 mL oral solution, Take 296 mL by mouth once for 1 dose Prn constipation, Disp: 296 mL, Rfl: 0    moxifloxacin (VIGAMOX) 0.5 % ophthalmic solution, , Disp: , Rfl:     multivitamin (THERAGRAN) TABS, Take 1 tablet by mouth in the morning., Disp: , Rfl:     pantoprazole (PROTONIX) 40 mg tablet, TAKE 1 TABLET BY MOUTH EVERY DAY (Patient taking differently: Take 40 mg by mouth in the morning. As needed  .), Disp: 90 tablet, Rfl: 1    polyethylene glycol (MIRALAX) 17 g packet, Take 17 g by mouth daily for 7 days Prn constipation, Disp: 119 g, Rfl: 0    tadalafil (CIALIS) 20 MG tablet, if needed, Disp: , Rfl:     tamsulosin (FLOMAX) 0.4 mg, Take 1 capsule (0.4 mg total) by mouth daily with dinner, Disp: 90 capsule, Rfl: 1

## 2025-07-02 DIAGNOSIS — R10.13 EPIGASTRIC ABDOMINAL PAIN: ICD-10-CM

## 2025-07-03 RX ORDER — PANTOPRAZOLE SODIUM 40 MG/1
40 TABLET, DELAYED RELEASE ORAL DAILY
Qty: 90 TABLET | Refills: 1 | Status: SHIPPED | OUTPATIENT
Start: 2025-07-03

## 2025-07-06 ENCOUNTER — HOSPITAL ENCOUNTER (EMERGENCY)
Facility: HOSPITAL | Age: 75
Discharge: HOME | End: 2025-07-06
Attending: EMERGENCY MEDICINE | Admitting: EMERGENCY MEDICINE
Payer: COMMERCIAL

## 2025-07-06 ENCOUNTER — APPOINTMENT (EMERGENCY)
Dept: CT IMAGING | Facility: HOSPITAL | Age: 75
End: 2025-07-06
Payer: COMMERCIAL

## 2025-07-06 VITALS
WEIGHT: 191.36 LBS | BODY MASS INDEX: 25.92 KG/M2 | RESPIRATION RATE: 18 BRPM | SYSTOLIC BLOOD PRESSURE: 137 MMHG | TEMPERATURE: 98 F | DIASTOLIC BLOOD PRESSURE: 68 MMHG | HEART RATE: 70 BPM | HEIGHT: 72 IN | OXYGEN SATURATION: 99 %

## 2025-07-06 DIAGNOSIS — S05.00XA CORNEAL ABRASION: Primary | ICD-10-CM

## 2025-07-06 LAB
ALBUMIN SERPL BCG-MCNC: 4.1 G/DL (ref 3.5–5)
ALP SERPL-CCNC: 49 U/L (ref 34–104)
ALT SERPL W P-5'-P-CCNC: 25 U/L (ref 7–52)
ANION GAP SERPL CALCULATED.3IONS-SCNC: 5 MMOL/L (ref 4–13)
AST SERPL W P-5'-P-CCNC: 33 U/L (ref 13–39)
BASOPHILS # BLD AUTO: 0.02 THOUSANDS/ÂΜL (ref 0–0.1)
BASOPHILS NFR BLD AUTO: 1 % (ref 0–1)
BILIRUB SERPL-MCNC: 0.47 MG/DL (ref 0.2–1)
BUN SERPL-MCNC: 20 MG/DL (ref 5–25)
CALCIUM SERPL-MCNC: 8.6 MG/DL (ref 8.4–10.2)
CHLORIDE SERPL-SCNC: 106 MMOL/L (ref 96–108)
CO2 SERPL-SCNC: 27 MMOL/L (ref 21–32)
CREAT SERPL-MCNC: 1.26 MG/DL (ref 0.6–1.3)
EOSINOPHIL # BLD AUTO: 0.06 THOUSAND/ÂΜL (ref 0–0.61)
EOSINOPHIL NFR BLD AUTO: 2 % (ref 0–6)
ERYTHROCYTE [DISTWIDTH] IN BLOOD BY AUTOMATED COUNT: 13.6 % (ref 11.6–15.1)
GFR SERPL CREATININE-BSD FRML MDRD: 55 ML/MIN/1.73SQ M
GLUCOSE SERPL-MCNC: 87 MG/DL (ref 65–140)
HCT VFR BLD AUTO: 36.9 % (ref 36.5–49.3)
HGB BLD-MCNC: 12.8 G/DL (ref 12–17)
IMM GRANULOCYTES # BLD AUTO: 0.01 THOUSAND/UL (ref 0–0.2)
IMM GRANULOCYTES NFR BLD AUTO: 0 % (ref 0–2)
LYMPHOCYTES # BLD AUTO: 1.01 THOUSANDS/ÂΜL (ref 0.6–4.47)
LYMPHOCYTES NFR BLD AUTO: 38 % (ref 14–44)
MCH RBC QN AUTO: 30.8 PG (ref 26.8–34.3)
MCHC RBC AUTO-ENTMCNC: 34.7 G/DL (ref 31.4–37.4)
MCV RBC AUTO: 89 FL (ref 82–98)
MONOCYTES # BLD AUTO: 0.25 THOUSAND/ÂΜL (ref 0.17–1.22)
MONOCYTES NFR BLD AUTO: 10 % (ref 4–12)
NEUTROPHILS # BLD AUTO: 1.29 THOUSANDS/ÂΜL (ref 1.85–7.62)
NEUTS SEG NFR BLD AUTO: 49 % (ref 43–75)
NRBC BLD AUTO-RTO: 0 /100 WBCS
PLATELET # BLD AUTO: 174 THOUSANDS/UL (ref 149–390)
PMV BLD AUTO: 10.7 FL (ref 8.9–12.7)
POTASSIUM SERPL-SCNC: 3.8 MMOL/L (ref 3.5–5.3)
PROT SERPL-MCNC: 6.7 G/DL (ref 6.4–8.4)
RBC # BLD AUTO: 4.15 MILLION/UL (ref 3.88–5.62)
SODIUM SERPL-SCNC: 138 MMOL/L (ref 135–147)
WBC # BLD AUTO: 2.64 THOUSAND/UL (ref 4.31–10.16)

## 2025-07-06 PROCEDURE — 96374 THER/PROPH/DIAG INJ IV PUSH: CPT

## 2025-07-06 PROCEDURE — 36415 COLL VENOUS BLD VENIPUNCTURE: CPT | Performed by: EMERGENCY MEDICINE

## 2025-07-06 PROCEDURE — 80053 COMPREHEN METABOLIC PANEL: CPT | Performed by: EMERGENCY MEDICINE

## 2025-07-06 PROCEDURE — 99285 EMERGENCY DEPT VISIT HI MDM: CPT | Performed by: EMERGENCY MEDICINE

## 2025-07-06 PROCEDURE — 99283 EMERGENCY DEPT VISIT LOW MDM: CPT

## 2025-07-06 PROCEDURE — 70481 CT ORBIT/EAR/FOSSA W/DYE: CPT

## 2025-07-06 PROCEDURE — 85025 COMPLETE CBC W/AUTO DIFF WBC: CPT | Performed by: EMERGENCY MEDICINE

## 2025-07-06 RX ORDER — ERYTHROMYCIN 5 MG/G
OINTMENT OPHTHALMIC
Qty: 1 G | Refills: 0 | Status: SHIPPED | OUTPATIENT
Start: 2025-07-06

## 2025-07-06 RX ORDER — ACETAMINOPHEN 10 MG/ML
1000 INJECTION, SOLUTION INTRAVENOUS ONCE
Status: COMPLETED | OUTPATIENT
Start: 2025-07-06 | End: 2025-07-06

## 2025-07-06 RX ORDER — TETRACAINE HYDROCHLORIDE 5 MG/ML
2 SOLUTION OPHTHALMIC ONCE
Status: COMPLETED | OUTPATIENT
Start: 2025-07-06 | End: 2025-07-06

## 2025-07-06 RX ORDER — ERYTHROMYCIN 5 MG/G
0.5 OINTMENT OPHTHALMIC ONCE
Status: COMPLETED | OUTPATIENT
Start: 2025-07-06 | End: 2025-07-06

## 2025-07-06 RX ADMIN — IOHEXOL 85 ML: 350 INJECTION, SOLUTION INTRAVENOUS at 08:41

## 2025-07-06 RX ADMIN — TETRACAINE HYDROCHLORIDE 2 DROP: 5 SOLUTION OPHTHALMIC at 07:57

## 2025-07-06 RX ADMIN — ERYTHROMYCIN 0.5 INCH: 5 OINTMENT OPHTHALMIC at 10:34

## 2025-07-06 RX ADMIN — FLUORESCEIN SODIUM 1 STRIP: 1 STRIP OPHTHALMIC at 07:57

## 2025-07-06 RX ADMIN — ACETAMINOPHEN 1000 MG: 10 INJECTION INTRAVENOUS at 07:57

## 2025-07-06 NOTE — ED PROVIDER NOTES
ED Disposition       None          Assessment & Plan       Medical Decision Making  Patient is 74-year-old male past medical history of CKD stage III, hypertension, TIA, hyperlipidemia, prostate cancer presenting with eye pain.  Patient is well-appearing at bedside with stable vitals and in no acute distress.  He has mild conjunctival injection, pupils are 4 mm and reactive to light but does appear to be photosensitive, he has pain with extraocular muscle motion which is intact, no periorbital edema, no foreign bodies.  Obtain fluorescein stain to assess for foreign bodies or corneal abrasion, pressures to assess for glaucoma, visual acuity, will obtain CT orbit to assess for orbital cellulitis or abscess, give pain control and reassess.    Amount and/or Complexity of Data Reviewed  Labs: ordered.  Radiology: ordered.    Risk  Prescription drug management.      ED Course as of 07/06/25 1427   Sun Jul 06, 2025   0809 Corneal abrasion at the 3 o'clock position, pressure is 7 and 8 in the left eye, visual acuity 20/70 in the left eye, 20/40 in the right eye   1012 No signs of orbital cellulitis, will treat for corneal abrasion and have advised outpatient follow-up and discussed return precautions which patient states he understands.       Medications - No data to display    ED Risk Strat Scores                    No data recorded        SBIRT 20yo+      Flowsheet Row Most Recent Value   Initial Alcohol Screen: US AUDIT-C     1. How often do you have a drink containing alcohol? 0 Filed at: 07/06/2025 0723   2. How many drinks containing alcohol do you have on a typical day you are drinking?  0 Filed at: 07/06/2025 0723   3b. FEMALE Any Age, or MALE 65+: How often do you have 4 or more drinks on one occassion? 0 Filed at: 07/06/2025 0723   Audit-C Score 0 Filed at: 07/06/2025 0723   CRISTIAN: How many times in the past year have you...    Used an illegal drug or used a prescription medication for non-medical reasons? Never  Filed at: 07/06/2025 3515                            History of Present Illness       Chief Complaint   Patient presents with   • Eye Redness     Reports waking up with L eye redness and pain today. Had cataracts surgery on 6/3 on b/l eyes. Denies vision changes.       Past Medical History[1]   Past Surgical History[2]   Family History[3]   Social History[4]   E-Cigarette/Vaping   • E-Cigarette Use Never User       E-Cigarette/Vaping Substances   • Nicotine No    • THC No    • CBD No    • Flavoring No    • Other No    • Unknown No       I have reviewed and agree with the history as documented.     Patient is a 74-year-old male past medical history of hypertension, TIA, hyperlipidemia, prostate cancer, CKD stage III presenting for eye pain.  Patient states he woke up this morning with left eye redness and pain, photophobia.  Denies any purulent drainage but notes excessive tearing.  Denies any trauma or foreign bodies that he is aware of.  Denies any vision changes or fevers.  Had cataract surgery bilaterally on 6/3.  Does not wear glasses or contacts.  Does not take any medication for today.      Review of Systems   All other systems reviewed and are negative.        Objective       ED Triage Vitals [07/06/25 0721]   Temperature Pulse Blood Pressure Respirations SpO2 Patient Position - Orthostatic VS   98 °F (36.7 °C) 70 137/68 18 99 % Sitting      Temp Source Heart Rate Source BP Location FiO2 (%) Pain Score    Temporal Monitor Left arm -- --      Vitals      Date and Time Temp Pulse SpO2 Resp BP Pain Score FACES Pain Rating User   07/06/25 0721 98 °F (36.7 °C) 70 99 % 18 137/68 -- -- TO            Physical Exam  Vitals reviewed.   Constitutional:       General: He is not in acute distress.     Appearance: Normal appearance. He is not ill-appearing.   HENT:      Mouth/Throat:      Mouth: Mucous membranes are moist.     Eyes:      Extraocular Movements: Extraocular movements intact.      Conjunctiva/sclera:  Conjunctivae normal.      Pupils: Pupils are equal, round, and reactive to light.      Comments: Pain with extraocular muscle motion on the left, no significant periorbital edema, mild conjunctival injection, pupils equal reactive, no foreign bodies visualized     Cardiovascular:      Rate and Rhythm: Normal rate.   Pulmonary:      Effort: Pulmonary effort is normal.     Musculoskeletal:         General: Normal range of motion.      Cervical back: Neck supple.     Skin:     General: Skin is warm and dry.     Neurological:      General: No focal deficit present.      Mental Status: He is alert.     Psychiatric:         Mood and Affect: Mood normal.     Results Reviewed       None            No orders to display       Procedures    ED Medication and Procedure Management   Prior to Admission Medications   Prescriptions Last Dose Informant Patient Reported? Taking?   Difluprednate 0.05 % EMUL  Self Yes No   amLODIPine (NORVASC) 10 mg tablet  Self No No   Sig: Take 1 tablet (10 mg total) by mouth daily   docusate sodium (COLACE) 100 mg capsule  Self No No   Sig: Take 1 capsule (100 mg total) by mouth every 12 (twelve) hours for 14 days   lidocaine (LIDODERM) 5 %  Self No No   Sig: Apply 1 patch topically over 12 hours if needed (Intermittent right low back pain with radiculopathy) Remove & Discard patch within 12 hours or as directed by MD   magnesium citrate (CITROMA) 1.745 g/30 mL oral solution  Self No No   Sig: Take 296 mL by mouth once for 1 dose Prn constipation   moxifloxacin (VIGAMOX) 0.5 % ophthalmic solution  Self Yes No   multivitamin (THERAGRAN) TABS  Self Yes No   Sig: Take 1 tablet by mouth in the morning.   pantoprazole (PROTONIX) 40 mg tablet   No No   Sig: TAKE 1 TABLET BY MOUTH EVERY DAY   polyethylene glycol (MIRALAX) 17 g packet  Self No No   Sig: Take 17 g by mouth daily for 7 days Prn constipation   tadalafil (CIALIS) 20 MG tablet  Self Yes No   Sig: if needed   tamsulosin (FLOMAX) 0.4 mg  Self No  No   Sig: Take 1 capsule (0.4 mg total) by mouth daily with dinner      Facility-Administered Medications: None     Patient's Medications   Discharge Prescriptions    No medications on file     No discharge procedures on file.  ED SEPSIS DOCUMENTATION                [1]  Past Medical History:  Diagnosis Date   • Benign prostatic hyperplasia    • Chronic kidney disease    • COVID-19 03/2020   • Esophageal reflux    • Hypertension    • Hypertensive urgency 03/26/2019   • Prostate cancer (HCC)    • Vertigo    [2]  Past Surgical History:  Procedure Laterality Date   • APPENDECTOMY  05/21/2015   • CYST REMOVAL      Fatty cyst removed from back   • EYE SURGERY Bilateral     cataract surgery 6/3/25   [3]  Family History  Problem Relation Name Age of Onset   • Prostate cancer Father Tomas Robert Sr.    • Prostate cancer Maternal Grandfather Carlos    • Stomach cancer Maternal Aunt          malignant tumor of pharynx   • Stomach cancer Maternal Uncle          malignant tumor of pharynx   • Mental illness Family     • Depression Family     • Schizophrenia Family     • Hypertension Mother     • No Known Problems Sister juliana    • Dementia Sister Brooklynn    • Hypertension Sister Brooklynn    [4]  Social History  Tobacco Use   • Smoking status: Former     Current packs/day: 0.25     Average packs/day: 0.3 packs/day for 10.0 years (2.5 ttl pk-yrs)     Types: Cigarettes     Passive exposure: Past   • Smokeless tobacco: Never   • Tobacco comments:     1ppw   Vaping Use   • Vaping status: Never Used   Substance Use Topics   • Alcohol use: Yes     Comment: socially   • Drug use: No      Constance Mays DO  07/06/25 0511

## 2025-07-25 ENCOUNTER — TELEPHONE (OUTPATIENT)
Dept: NEPHROLOGY | Facility: CLINIC | Age: 75
End: 2025-07-25

## 2025-07-25 NOTE — TELEPHONE ENCOUNTER
Called [facility] for patient advised to get fasting labs done 1 week prior to 08/07/25 scheduled follow-up appointment with our Nephrology provider MD Jaime.

## 2025-07-27 ENCOUNTER — APPOINTMENT (EMERGENCY)
Dept: RADIOLOGY | Facility: HOSPITAL | Age: 75
End: 2025-07-27
Payer: COMMERCIAL

## 2025-07-27 ENCOUNTER — HOSPITAL ENCOUNTER (EMERGENCY)
Facility: HOSPITAL | Age: 75
Discharge: HOME | End: 2025-07-27
Attending: EMERGENCY MEDICINE | Admitting: EMERGENCY MEDICINE
Payer: COMMERCIAL

## 2025-07-27 VITALS
TEMPERATURE: 98.3 F | RESPIRATION RATE: 17 BRPM | DIASTOLIC BLOOD PRESSURE: 78 MMHG | BODY MASS INDEX: 26.04 KG/M2 | HEART RATE: 59 BPM | WEIGHT: 192.24 LBS | SYSTOLIC BLOOD PRESSURE: 159 MMHG | OXYGEN SATURATION: 99 % | HEIGHT: 72 IN

## 2025-07-27 DIAGNOSIS — T59.891A: Primary | ICD-10-CM

## 2025-07-27 PROCEDURE — 99284 EMERGENCY DEPT VISIT MOD MDM: CPT | Performed by: EMERGENCY MEDICINE

## 2025-07-27 PROCEDURE — 93005 ELECTROCARDIOGRAM TRACING: CPT

## 2025-07-27 PROCEDURE — 71046 X-RAY EXAM CHEST 2 VIEWS: CPT

## 2025-07-27 PROCEDURE — 99283 EMERGENCY DEPT VISIT LOW MDM: CPT

## 2025-07-28 LAB
ATRIAL RATE: 57 BPM
P AXIS: 72 DEGREES
PR INTERVAL: 168 MS
QRS AXIS: 21 DEGREES
QRSD INTERVAL: 88 MS
QT INTERVAL: 420 MS
QTC INTERVAL: 408 MS
T WAVE AXIS: 47 DEGREES
VENTRICULAR RATE: 57 BPM

## 2025-07-28 PROCEDURE — 93010 ELECTROCARDIOGRAM REPORT: CPT | Performed by: STUDENT IN AN ORGANIZED HEALTH CARE EDUCATION/TRAINING PROGRAM

## 2025-08-01 ENCOUNTER — APPOINTMENT (OUTPATIENT)
Dept: LAB | Facility: HOSPITAL | Age: 75
End: 2025-08-01
Payer: COMMERCIAL

## 2025-08-01 DIAGNOSIS — N18.9 CHRONIC KIDNEY DISEASE-MINERAL AND BONE DISORDER: ICD-10-CM

## 2025-08-01 DIAGNOSIS — N40.0 BENIGN PROSTATIC HYPERPLASIA, PRESENCE OF LOWER URINARY TRACT SYMPTOMS UNSPECIFIED: ICD-10-CM

## 2025-08-01 DIAGNOSIS — E83.9 CHRONIC KIDNEY DISEASE-MINERAL AND BONE DISORDER: ICD-10-CM

## 2025-08-01 DIAGNOSIS — M89.9 CHRONIC KIDNEY DISEASE-MINERAL AND BONE DISORDER: ICD-10-CM

## 2025-08-01 DIAGNOSIS — N18.31 STAGE 3A CHRONIC KIDNEY DISEASE (HCC): ICD-10-CM

## 2025-08-01 LAB
25(OH)D3 SERPL-MCNC: 38.7 NG/ML (ref 30–100)
ANION GAP SERPL CALCULATED.3IONS-SCNC: 5 MMOL/L (ref 4–13)
BASOPHILS # BLD AUTO: 0.02 THOUSANDS/ÂΜL (ref 0–0.1)
BASOPHILS NFR BLD AUTO: 1 % (ref 0–1)
BILIRUB UR QL STRIP: NEGATIVE
BUN SERPL-MCNC: 22 MG/DL (ref 5–25)
CALCIUM SERPL-MCNC: 9.4 MG/DL (ref 8.4–10.2)
CHLORIDE SERPL-SCNC: 105 MMOL/L (ref 96–108)
CLARITY UR: CLEAR
CO2 SERPL-SCNC: 29 MMOL/L (ref 21–32)
COLOR UR: NORMAL
CREAT SERPL-MCNC: 1.38 MG/DL (ref 0.6–1.3)
CREAT UR-MCNC: 146.8 MG/DL
EOSINOPHIL # BLD AUTO: 0.09 THOUSAND/ÂΜL (ref 0–0.61)
EOSINOPHIL NFR BLD AUTO: 3 % (ref 0–6)
ERYTHROCYTE [DISTWIDTH] IN BLOOD BY AUTOMATED COUNT: 13.8 % (ref 11.6–15.1)
GFR SERPL CREATININE-BSD FRML MDRD: 49 ML/MIN/1.73SQ M
GLUCOSE P FAST SERPL-MCNC: 83 MG/DL (ref 65–99)
GLUCOSE UR STRIP-MCNC: NEGATIVE MG/DL
HCT VFR BLD AUTO: 39.3 % (ref 36.5–49.3)
HGB BLD-MCNC: 13.5 G/DL (ref 12–17)
HGB UR QL STRIP.AUTO: NEGATIVE
IMM GRANULOCYTES # BLD AUTO: 0 THOUSAND/UL (ref 0–0.2)
IMM GRANULOCYTES NFR BLD AUTO: 0 % (ref 0–2)
KETONES UR STRIP-MCNC: NEGATIVE MG/DL
LEUKOCYTE ESTERASE UR QL STRIP: NEGATIVE
LYMPHOCYTES # BLD AUTO: 1.22 THOUSANDS/ÂΜL (ref 0.6–4.47)
LYMPHOCYTES NFR BLD AUTO: 40 % (ref 14–44)
MCH RBC QN AUTO: 30.8 PG (ref 26.8–34.3)
MCHC RBC AUTO-ENTMCNC: 34.4 G/DL (ref 31.4–37.4)
MCV RBC AUTO: 90 FL (ref 82–98)
MONOCYTES # BLD AUTO: 0.28 THOUSAND/ÂΜL (ref 0.17–1.22)
MONOCYTES NFR BLD AUTO: 9 % (ref 4–12)
NEUTROPHILS # BLD AUTO: 1.48 THOUSANDS/ÂΜL (ref 1.85–7.62)
NEUTS SEG NFR BLD AUTO: 47 % (ref 43–75)
NITRITE UR QL STRIP: NEGATIVE
NRBC BLD AUTO-RTO: 0 /100 WBCS
PH UR STRIP.AUTO: 5.5 [PH]
PHOSPHATE SERPL-MCNC: 3.5 MG/DL (ref 2.3–4.1)
PLATELET # BLD AUTO: 193 THOUSANDS/UL (ref 149–390)
PMV BLD AUTO: 10.5 FL (ref 8.9–12.7)
POTASSIUM SERPL-SCNC: 4.4 MMOL/L (ref 3.5–5.3)
PROT UR STRIP-MCNC: NEGATIVE MG/DL
PROT UR-MCNC: 8.7 MG/DL
PROT/CREAT UR: 0.1 MG/G{CREAT}
PTH-INTACT SERPL-MCNC: 64.8 PG/ML (ref 12–88)
RBC # BLD AUTO: 4.39 MILLION/UL (ref 3.88–5.62)
SODIUM SERPL-SCNC: 139 MMOL/L (ref 135–147)
SP GR UR STRIP.AUTO: 1.02 (ref 1–1.03)
UROBILINOGEN UR STRIP-ACNC: <2 MG/DL
WBC # BLD AUTO: 3.09 THOUSAND/UL (ref 4.31–10.16)

## 2025-08-01 PROCEDURE — 85025 COMPLETE CBC W/AUTO DIFF WBC: CPT

## 2025-08-01 PROCEDURE — 81003 URINALYSIS AUTO W/O SCOPE: CPT

## 2025-08-01 PROCEDURE — 82570 ASSAY OF URINE CREATININE: CPT

## 2025-08-01 PROCEDURE — 36415 COLL VENOUS BLD VENIPUNCTURE: CPT

## 2025-08-01 PROCEDURE — 84156 ASSAY OF PROTEIN URINE: CPT

## 2025-08-01 PROCEDURE — 84100 ASSAY OF PHOSPHORUS: CPT

## 2025-08-01 PROCEDURE — 82306 VITAMIN D 25 HYDROXY: CPT

## 2025-08-01 PROCEDURE — 80048 BASIC METABOLIC PNL TOTAL CA: CPT

## 2025-08-01 PROCEDURE — 83970 ASSAY OF PARATHORMONE: CPT

## 2025-08-07 ENCOUNTER — TELEPHONE (OUTPATIENT)
Age: 75
End: 2025-08-07

## 2025-08-07 ENCOUNTER — TELEPHONE (OUTPATIENT)
Dept: NEPHROLOGY | Facility: CLINIC | Age: 75
End: 2025-08-07

## 2025-08-07 ENCOUNTER — OFFICE VISIT (OUTPATIENT)
Dept: NEPHROLOGY | Facility: CLINIC | Age: 75
End: 2025-08-07
Payer: COMMERCIAL

## 2025-08-07 VITALS
BODY MASS INDEX: 25.6 KG/M2 | OXYGEN SATURATION: 98 % | SYSTOLIC BLOOD PRESSURE: 120 MMHG | WEIGHT: 189 LBS | RESPIRATION RATE: 18 BRPM | DIASTOLIC BLOOD PRESSURE: 70 MMHG | TEMPERATURE: 97.6 F | HEART RATE: 80 BPM | HEIGHT: 72 IN

## 2025-08-07 DIAGNOSIS — C61 PROSTATE CANCER (HCC): ICD-10-CM

## 2025-08-07 DIAGNOSIS — N18.31 STAGE 3A CHRONIC KIDNEY DISEASE (HCC): Primary | ICD-10-CM

## 2025-08-07 DIAGNOSIS — M89.9 CHRONIC KIDNEY DISEASE-MINERAL AND BONE DISORDER: ICD-10-CM

## 2025-08-07 DIAGNOSIS — N18.9 CHRONIC KIDNEY DISEASE-MINERAL AND BONE DISORDER: ICD-10-CM

## 2025-08-07 DIAGNOSIS — E83.9 CHRONIC KIDNEY DISEASE-MINERAL AND BONE DISORDER: ICD-10-CM

## 2025-08-07 DIAGNOSIS — I10 PRIMARY HYPERTENSION: ICD-10-CM

## 2025-08-07 PROCEDURE — 99214 OFFICE O/P EST MOD 30 MIN: CPT | Performed by: INTERNAL MEDICINE

## 2025-08-11 ENCOUNTER — APPOINTMENT (OUTPATIENT)
Dept: LAB | Facility: HOSPITAL | Age: 75
End: 2025-08-11
Payer: COMMERCIAL